# Patient Record
Sex: MALE | Race: WHITE | Employment: UNEMPLOYED | URBAN - METROPOLITAN AREA
[De-identification: names, ages, dates, MRNs, and addresses within clinical notes are randomized per-mention and may not be internally consistent; named-entity substitution may affect disease eponyms.]

---

## 2017-02-21 ENCOUNTER — HOSPITAL ENCOUNTER (EMERGENCY)
Age: 52
Discharge: HOME | End: 2017-02-21
Attending: EMERGENCY MEDICINE
Payer: MEDICARE

## 2017-02-21 VITALS
WEIGHT: 179 LBS | BODY MASS INDEX: 27.13 KG/M2 | OXYGEN SATURATION: 100 % | SYSTOLIC BLOOD PRESSURE: 135 MMHG | TEMPERATURE: 97.1 F | HEIGHT: 68 IN | RESPIRATION RATE: 19 BRPM | DIASTOLIC BLOOD PRESSURE: 88 MMHG | HEART RATE: 90 BPM

## 2017-02-21 DIAGNOSIS — J06.9 ACUTE UPPER RESPIRATORY INFECTION: Primary | ICD-10-CM

## 2017-02-21 LAB
FLUAV AG NPH QL IA: NEGATIVE
FLUBV AG NOSE QL IA: NEGATIVE

## 2017-02-21 PROCEDURE — 87804 INFLUENZA ASSAY W/OPTIC: CPT | Performed by: EMERGENCY MEDICINE

## 2017-02-21 PROCEDURE — 99283 EMERGENCY DEPT VISIT LOW MDM: CPT

## 2017-02-21 RX ORDER — GUAIFENESIN DEXTROMETHORPHAN HYDROBROMIDE ORAL SOLUTION 10; 100 MG/5ML; MG/5ML
10 SOLUTION ORAL
Qty: 118 ML | Refills: 0 | Status: SHIPPED | OUTPATIENT
Start: 2017-02-21 | End: 2017-03-07

## 2017-02-21 NOTE — ED NOTES
Emergency Department Nursing Plan of Care       The Nursing Plan of Care is developed from the Nursing assessment and Emergency Department Attending provider initial evaluation. The plan of care may be reviewed in the ED Provider note. The Plan of Care was developed with the following considerations:   Patient / Family readiness to learn indicated by:verbalized understanding  Persons(s) to be included in education: patient  Barriers to Learning/Limitations:No    Signed     Kelvin Copeland    2/21/2017   11:33 AM    See nursing assessment    Patient is alert and oriented x 4 and in no acute distress at this time. Respirations are at a regular rate, depth, and pattern. Patient updated on plan of care and has no questions or concerns at this time.

## 2017-02-21 NOTE — ED PROVIDER NOTES
Patient is a 45 y.o. male presenting with nasal congestion. Nasal Congestion   This is a new problem. Episode onset: Pt reports chest congestions, productive cough, and sore throat x 1 week. Pt reports one epsiode of emesis two days ago. Denies abd pain, nausea, fever/chills. Pt lives in assisted living home and multiple people have the flu. Pertinent negatives include no chest pain, no abdominal pain, no headaches and no shortness of breath. Nothing aggravates the symptoms. Nothing relieves the symptoms. He has tried nothing for the symptoms. History reviewed. No pertinent past medical history. History reviewed. No pertinent past surgical history. History reviewed. No pertinent family history. Social History     Social History    Marital status:      Spouse name: N/A    Number of children: N/A    Years of education: N/A     Occupational History    Not on file. Social History Main Topics    Smoking status: Current Some Day Smoker    Smokeless tobacco: Not on file    Alcohol use No    Drug use: No    Sexual activity: Not on file     Other Topics Concern    Not on file     Social History Narrative    No narrative on file         ALLERGIES: Review of patient's allergies indicates no known allergies. Review of Systems   Constitutional: Negative for chills and fever. HENT: Positive for congestion and sore throat. Negative for ear discharge, ear pain, facial swelling, postnasal drip, rhinorrhea, sinus pressure and trouble swallowing. Eyes: Negative for photophobia and visual disturbance. Respiratory: Positive for cough. Negative for chest tightness, shortness of breath, wheezing and stridor. Cardiovascular: Negative for chest pain. Gastrointestinal: Positive for vomiting. Negative for abdominal pain and nausea. Genitourinary: Negative for flank pain. Musculoskeletal: Negative for back pain and myalgias.    Skin: Negative for color change, pallor, rash and wound.   Neurological: Negative for dizziness, weakness, light-headedness and headaches. All other systems reviewed and are negative. Vitals:    02/21/17 1008   BP: 135/88   Pulse: (!) 108   Resp: 19   Temp: 97.1 °F (36.2 °C)   SpO2: 100%   Weight: 81.2 kg (179 lb)   Height: 5' 8\" (1.727 m)            Physical Exam   Constitutional: He is oriented to person, place, and time. He appears well-developed and well-nourished. No distress. HENT:   Head: Normocephalic and atraumatic. Right Ear: Tympanic membrane, external ear and ear canal normal.   Left Ear: Tympanic membrane, external ear and ear canal normal.   Nose: Nose normal. No mucosal edema or rhinorrhea. Right sinus exhibits no maxillary sinus tenderness and no frontal sinus tenderness. Left sinus exhibits no maxillary sinus tenderness and no frontal sinus tenderness. Mouth/Throat: Uvula is midline and mucous membranes are normal. No trismus in the jaw. No uvula swelling. Posterior oropharyngeal erythema present. No oropharyngeal exudate, posterior oropharyngeal edema or tonsillar abscesses. Eyes: Conjunctivae are normal.   Cardiovascular: Normal rate, regular rhythm and normal heart sounds. Pulmonary/Chest: Effort normal and breath sounds normal. No respiratory distress. He has no wheezes. He has no rales. Abdominal: Soft. Bowel sounds are normal. He exhibits no distension. There is no tenderness. Musculoskeletal: Normal range of motion. Neurological: He is alert and oriented to person, place, and time. No cranial nerve deficit. Skin: Skin is warm. No rash noted. Psychiatric: He has a normal mood and affect. His behavior is normal.   Nursing note and vitals reviewed.        MDM  Number of Diagnoses or Management Options  Acute upper respiratory infection:   Diagnosis management comments: DDx: URI, Viral Illness, Influenza, Viral Pharyngitis       Amount and/or Complexity of Data Reviewed  Clinical lab tests: ordered and reviewed      ED Course       Procedures      LABORATORY TESTS:  Recent Results (from the past 12 hour(s))   INFLUENZA A & B AG (RAPID TEST)    Collection Time: 02/21/17 11:15 AM   Result Value Ref Range    Influenza A Antigen NEGATIVE  NEG      Influenza B Antigen NEGATIVE  NEG         IMAGING RESULTS:  No orders to display       MEDICATIONS GIVEN:  Medications - No data to display    IMPRESSION:  1. Acute upper respiratory infection        PLAN:  1. Discharge Medication List as of 2/21/2017 12:20 PM      START taking these medications    Details   guaiFENesin-dextromethorphan (ROBAFEN DM COUGH)  mg/5 mL liqd Take 10 mL by mouth every six (6) hours as needed. , Print, Disp-118 mL, R-0           2.    Follow-up Information     Follow up With Details Comments [id number] Schedule an appointment as soon as possible for a visit in 2 days  [address]  [phone]    [facility] Schedule an appointment as soon as possible for a visit in 2 days  [address]  [phone]        Return to ED if worse

## 2017-02-21 NOTE — DISCHARGE INSTRUCTIONS

## 2017-03-07 ENCOUNTER — HOSPITAL ENCOUNTER (EMERGENCY)
Age: 52
Discharge: HOME | End: 2017-03-07
Attending: EMERGENCY MEDICINE
Payer: MEDICARE

## 2017-03-07 ENCOUNTER — APPOINTMENT (OUTPATIENT)
Dept: GENERAL RADIOLOGY | Age: 52
End: 2017-03-07
Attending: EMERGENCY MEDICINE
Payer: MEDICARE

## 2017-03-07 ENCOUNTER — APPOINTMENT (OUTPATIENT)
Dept: CT IMAGING | Age: 52
End: 2017-03-07
Attending: EMERGENCY MEDICINE
Payer: MEDICARE

## 2017-03-07 VITALS
DIASTOLIC BLOOD PRESSURE: 96 MMHG | HEART RATE: 111 BPM | OXYGEN SATURATION: 97 % | WEIGHT: 185 LBS | SYSTOLIC BLOOD PRESSURE: 146 MMHG | TEMPERATURE: 97.8 F | RESPIRATION RATE: 18 BRPM | BODY MASS INDEX: 28.04 KG/M2 | HEIGHT: 68 IN

## 2017-03-07 DIAGNOSIS — J44.1 ACUTE EXACERBATION OF CHRONIC OBSTRUCTIVE PULMONARY DISEASE (COPD) (HCC): Primary | ICD-10-CM

## 2017-03-07 LAB
ANION GAP BLD CALC-SCNC: 20 MMOL/L (ref 5–15)
ATRIAL RATE: 130 BPM
BASOPHILS # BLD AUTO: 0 K/UL (ref 0–0.1)
BASOPHILS # BLD: 0 % (ref 0–1)
BNP SERPL-MCNC: 14 PG/ML (ref 0–125)
BUN BLD-MCNC: 8 MG/DL (ref 9–20)
CA-I BLD-MCNC: 1.09 MMOL/L (ref 1.12–1.32)
CALCULATED P AXIS, ECG09: 62 DEGREES
CALCULATED R AXIS, ECG10: 91 DEGREES
CALCULATED T AXIS, ECG11: 63 DEGREES
CHLORIDE BLD-SCNC: 95 MMOL/L (ref 98–107)
CO2 BLD-SCNC: 22 MMOL/L (ref 21–32)
CREAT BLD-MCNC: 0.9 MG/DL (ref 0.6–1.3)
DEPRECATED S PYO AG THROAT QL EIA: NEGATIVE
DIAGNOSIS, 93000: NORMAL
EOSINOPHIL # BLD: 0.1 K/UL (ref 0–0.4)
EOSINOPHIL NFR BLD: 1 % (ref 0–7)
ERYTHROCYTE [DISTWIDTH] IN BLOOD BY AUTOMATED COUNT: 13.4 % (ref 11.5–14.5)
GLUCOSE BLD-MCNC: 93 MG/DL (ref 75–110)
HCT VFR BLD AUTO: 42.7 % (ref 36.6–50.3)
HCT VFR BLD CALC: 47 % (ref 36.6–50.3)
HGB BLD-MCNC: 14.8 G/DL (ref 12.1–17)
HGB BLD-MCNC: 16 GM/DL (ref 12.1–17)
LYMPHOCYTES # BLD AUTO: 17 % (ref 12–49)
LYMPHOCYTES # BLD: 1.7 K/UL (ref 0.8–3.5)
MCH RBC QN AUTO: 29.8 PG (ref 26–34)
MCHC RBC AUTO-ENTMCNC: 34.7 G/DL (ref 30–36.5)
MCV RBC AUTO: 85.9 FL (ref 80–99)
MONOCYTES # BLD: 0.9 K/UL (ref 0–1)
MONOCYTES NFR BLD AUTO: 9 % (ref 5–13)
NEUTS SEG # BLD: 7 K/UL (ref 1.8–8)
NEUTS SEG NFR BLD AUTO: 73 % (ref 32–75)
P-R INTERVAL, ECG05: 140 MS
PLATELET # BLD AUTO: 260 K/UL (ref 150–400)
POTASSIUM BLD-SCNC: 3.5 MMOL/L (ref 3.5–5.1)
Q-T INTERVAL, ECG07: 318 MS
QRS DURATION, ECG06: 78 MS
QTC CALCULATION (BEZET), ECG08: 467 MS
RBC # BLD AUTO: 4.97 M/UL (ref 4.1–5.7)
SERVICE CMNT-IMP: ABNORMAL
SODIUM BLD-SCNC: 133 MMOL/L (ref 136–145)
TROPONIN I BLD-MCNC: <0.04 NG/ML (ref 0–0.08)
VENTRICULAR RATE, ECG03: 130 BPM
WBC # BLD AUTO: 9.7 K/UL (ref 4.1–11.1)

## 2017-03-07 PROCEDURE — 87040 BLOOD CULTURE FOR BACTERIA: CPT | Performed by: EMERGENCY MEDICINE

## 2017-03-07 PROCEDURE — 80047 BASIC METABLC PNL IONIZED CA: CPT

## 2017-03-07 PROCEDURE — 93005 ELECTROCARDIOGRAM TRACING: CPT

## 2017-03-07 PROCEDURE — 87070 CULTURE OTHR SPECIMN AEROBIC: CPT | Performed by: EMERGENCY MEDICINE

## 2017-03-07 PROCEDURE — 74011636320 HC RX REV CODE- 636/320: Performed by: EMERGENCY MEDICINE

## 2017-03-07 PROCEDURE — 96360 HYDRATION IV INFUSION INIT: CPT

## 2017-03-07 PROCEDURE — 71275 CT ANGIOGRAPHY CHEST: CPT

## 2017-03-07 PROCEDURE — 36415 COLL VENOUS BLD VENIPUNCTURE: CPT | Performed by: EMERGENCY MEDICINE

## 2017-03-07 PROCEDURE — 83880 ASSAY OF NATRIURETIC PEPTIDE: CPT | Performed by: EMERGENCY MEDICINE

## 2017-03-07 PROCEDURE — 74011250636 HC RX REV CODE- 250/636: Performed by: EMERGENCY MEDICINE

## 2017-03-07 PROCEDURE — 84484 ASSAY OF TROPONIN QUANT: CPT

## 2017-03-07 PROCEDURE — 99285 EMERGENCY DEPT VISIT HI MDM: CPT

## 2017-03-07 PROCEDURE — 87880 STREP A ASSAY W/OPTIC: CPT | Performed by: EMERGENCY MEDICINE

## 2017-03-07 PROCEDURE — 71020 XR CHEST PA LAT: CPT

## 2017-03-07 PROCEDURE — 85025 COMPLETE CBC W/AUTO DIFF WBC: CPT | Performed by: EMERGENCY MEDICINE

## 2017-03-07 RX ORDER — SODIUM CHLORIDE 0.9 % (FLUSH) 0.9 %
5-10 SYRINGE (ML) INJECTION AS NEEDED
Status: DISCONTINUED | OUTPATIENT
Start: 2017-03-07 | End: 2017-03-07 | Stop reason: HOSPADM

## 2017-03-07 RX ORDER — SODIUM CHLORIDE 0.9 % (FLUSH) 0.9 %
5-10 SYRINGE (ML) INJECTION
Status: COMPLETED | OUTPATIENT
Start: 2017-03-07 | End: 2017-03-07

## 2017-03-07 RX ORDER — GUAIFENESIN 100 MG/5ML
81 LIQUID (ML) ORAL DAILY
Qty: 30 TAB | Refills: 0 | Status: SHIPPED | OUTPATIENT
Start: 2017-03-07 | End: 2020-11-06

## 2017-03-07 RX ORDER — DULOXETIN HYDROCHLORIDE 60 MG/1
60 CAPSULE, DELAYED RELEASE ORAL DAILY
COMMUNITY
End: 2017-03-29 | Stop reason: ALTCHOICE

## 2017-03-07 RX ORDER — RISPERIDONE 2 MG/1
2 TABLET, FILM COATED ORAL
COMMUNITY
End: 2017-03-29 | Stop reason: SDUPTHER

## 2017-03-07 RX ORDER — ALBUTEROL SULFATE 90 UG/1
1-2 AEROSOL, METERED RESPIRATORY (INHALATION)
Qty: 1 INHALER | Refills: 1 | Status: SHIPPED | OUTPATIENT
Start: 2017-03-07 | End: 2017-03-29 | Stop reason: SDUPTHER

## 2017-03-07 RX ORDER — TRAZODONE HYDROCHLORIDE 150 MG/1
150 TABLET ORAL
COMMUNITY
End: 2017-03-29 | Stop reason: ALTCHOICE

## 2017-03-07 RX ORDER — SODIUM CHLORIDE 0.9 % (FLUSH) 0.9 %
5-10 SYRINGE (ML) INJECTION EVERY 8 HOURS
Status: DISCONTINUED | OUTPATIENT
Start: 2017-03-07 | End: 2017-03-07 | Stop reason: HOSPADM

## 2017-03-07 RX ORDER — PREDNISONE 20 MG/1
20 TABLET ORAL 2 TIMES DAILY
Qty: 10 TAB | Refills: 0 | Status: SHIPPED | OUTPATIENT
Start: 2017-03-07 | End: 2017-03-12

## 2017-03-07 RX ORDER — BENZTROPINE MESYLATE 1 MG/1
1 TABLET ORAL 2 TIMES DAILY
COMMUNITY
End: 2020-10-26 | Stop reason: ALTCHOICE

## 2017-03-07 RX ORDER — HYDROXYZINE PAMOATE 50 MG/1
50 CAPSULE ORAL
COMMUNITY
End: 2017-03-29 | Stop reason: SDUPTHER

## 2017-03-07 RX ORDER — DOXYCYCLINE HYCLATE 100 MG
100 TABLET ORAL 2 TIMES DAILY
Qty: 14 TAB | Refills: 0 | Status: SHIPPED | OUTPATIENT
Start: 2017-03-07 | End: 2017-03-14

## 2017-03-07 RX ADMIN — Medication 10 ML: at 10:34

## 2017-03-07 RX ADMIN — IOPAMIDOL 100 ML: 755 INJECTION, SOLUTION INTRAVENOUS at 09:43

## 2017-03-07 RX ADMIN — SODIUM CHLORIDE 250 ML: 900 INJECTION, SOLUTION INTRAVENOUS at 10:34

## 2017-03-07 RX ADMIN — Medication 10 ML: at 09:44

## 2017-03-07 NOTE — DISCHARGE INSTRUCTIONS
Chronic Obstructive Pulmonary Disease (COPD): Care Instructions  Your Care Instructions    Chronic obstructive pulmonary disease (COPD) is a general term for a group of lung diseases, including emphysema and chronic bronchitis. People with COPD have decreased airflow in and out of the lungs, which makes it hard to breathe. The airways also can get clogged with thick mucus. Cigarette smoking is a major cause of COPD. Although there is no cure for COPD, you can slow its progress. Following your treatment plan and taking care of yourself can help you feel better and live longer. Follow-up care is a key part of your treatment and safety. Be sure to make and go to all appointments, and call your doctor if you are having problems. It's also a good idea to know your test results and keep a list of the medicines you take. How can you care for yourself at home? Staying healthy  · Do not smoke. This is the most important step you can take to prevent more damage to your lungs. If you need help quitting, talk to your doctor about stop-smoking programs and medicines. These can increase your chances of quitting for good. · Avoid colds and flu. Get a pneumococcal vaccine shot. If you have had one before, ask your doctor whether you need a second dose. Get the flu vaccine every fall. If you must be around people with colds or the flu, wash your hands often. · Avoid secondhand smoke, air pollution, and high altitudes. Also avoid cold, dry air and hot, humid air. Stay at home with your windows closed when air pollution is bad. Medicines and oxygen therapy  · Take your medicines exactly as prescribed. Call your doctor if you think you are having a problem with your medicine. · You may be taking medicines such as:  ¨ Bronchodilators. These help open your airways and make breathing easier. Bronchodilators are either short-acting (work for 6 to 9 hours) or long-acting (work for 24 hours).  You inhale most bronchodilators, so they start to act quickly. Always carry your quick-relief inhaler with you in case you need it while you are away from home. ¨ Corticosteroids (prednisone, budesonide). These reduce airway inflammation. They come in pill or inhaled form. You must take these medicines every day for them to work well. · A spacer may help you get more inhaled medicine to your lungs. Ask your doctor or pharmacist if a spacer is right for you. If it is, ask how to use it properly. · Do not take any vitamins, over-the-counter medicine, or herbal products without talking to your doctor first.  · If your doctor prescribed antibiotics, take them as directed. Do not stop taking them just because you feel better. You need to take the full course of antibiotics. · Oxygen therapy boosts the amount of oxygen in your blood and helps you breathe easier. Use the flow rate your doctor has recommended, and do not change it without talking to your doctor first.  Activity  · Get regular exercise. Walking is an easy way to get exercise. Start out slowly, and walk a little more each day. · Pay attention to your breathing. You are exercising too hard if you cannot talk while you are exercising. · Take short rest breaks when doing household chores and other activities. · Learn breathing methods--such as breathing through pursed lips--to help you become less short of breath. · If your doctor has not set you up with a pulmonary rehabilitation program, talk to him or her about whether rehab is right for you. Rehab includes exercise programs, education about your disease and how to manage it, help with diet and other changes, and emotional support. Diet  · Eat regular, healthy meals. Use bronchodilators about 1 hour before you eat to make it easier to eat. Eat several small meals instead of three large ones. Drink beverages at the end of the meal. Avoid foods that are hard to chew.   · Eat foods that contain protein so that you do not lose muscle mass.  Mental health  · Talk to your family, friends, or a therapist about your feelings. It is normal to feel frightened, angry, hopeless, helpless, and even guilty. Talking openly about bad feelings can help you cope. If these feelings last, talk to your doctor. When should you call for help? Call 911 anytime you think you may need emergency care. For example, call if:  · You have severe trouble breathing. Call your doctor now or seek immediate medical care if:  · You have new or worse trouble breathing. · You cough up blood. · You have a fever. Watch closely for changes in your health, and be sure to contact your doctor if:  · You cough more deeply or more often, especially if you notice more mucus or a change in the color of your mucus. · You have new or worse swelling in your legs or belly. · You are not getting better as expected. Where can you learn more? Go to http://amyTeleportricardo.info/. Tete [place] in the search box to learn more about \"Chronic Obstructive Pulmonary Disease (COPD): Care Instructions. \"  Current as of: May 23, 2016  Content Version: 11.1  © 6680-2417 [employer]. Care instructions adapted under license by [employer] (which disclaims liability or warranty for this information). If you have questions about a medical condition or this instruction, always ask your healthcare professional. [address] any warranty or liability for your use of this information. Learning About Coronary Artery Disease (CAD)  What is coronary artery disease? Coronary artery disease (CAD) occurs when plaque builds up in the arteries that bring oxygen-rich blood to your heart. Plaque is a fatty substance made of cholesterol, calcium, and other substances in the blood. This process is called hardening of the arteries, or atherosclerosis. What happens when you have coronary artery disease? · Plaque may narrow the coronary arteries. Narrowed arteries cause poor blood flow. This can lead to angina symptoms such as chest pain or discomfort. If blood flow is completely blocked, you could have a heart attack. · You can slow CAD and reduce the risk of future problems by making changes in your lifestyle. These include quitting smoking and eating heart-healthy foods. · Treatments for CAD, along with changes in your lifestyle, can help you live a longer and healthier life. How can you prevent coronary artery disease? · Do not smoke. It may be the best thing you can do to prevent heart disease. If you need help quitting, talk to your doctor about stop-smoking programs and medicines. These can increase your chances of quitting for good. · Be active. Get at least 30 minutes of exercise on most days of the week. Walking is a good choice. You also may want to do other activities, such as running, swimming, cycling, or playing tennis or team sports. · Eat heart-healthy foods. Eat more fruits and vegetables and less foods that contain saturated and trans fats. Limit alcohol, sodium, and sweets. · Stay at a healthy weight. Lose weight if you need to. · Manage other health problems such as diabetes, high blood pressure, and high cholesterol. · Manage stress. Stress can hurt your heart. To keep stress low, talk about your problems and feelings. Don't keep your feelings hidden. · If you have talked about it with your doctor, take a low-dose aspirin every day. Aspirin can help certain people lower their risk of a heart attack or stroke. But taking aspirin isn't right for everyone, because it can cause serious bleeding. Do not start taking daily aspirin unless your doctor knows about it. How is coronary artery disease treated? · Your doctor will suggest that you make lifestyle changes. For example, your doctor may ask you to eat healthy foods, quit smoking, lose extra weight, and be more active. · You will have to take medicines.   · Your doctor may suggest a procedure to open narrowed or blocked arteries. This is called angioplasty. Or your doctor may suggest using healthy blood vessels to create detours around narrowed or blocked arteries. This is called bypass surgery. Follow-up care is a key part of your treatment and safety. Be sure to make and go to all appointments, and call your doctor if you are having problems. It's also a good idea to know your test results and keep a list of the medicines you take. Where can you learn more? Go to http://amy-ricardo.info/. Enter (23) [phone] in the search box to learn more about \"Learning About Coronary Artery Disease (CAD). \"  Current as of: January 27, 2016  Content Version: 11.1  © 7705-0209 [employer]. Care instructions adapted under license by [employer] (which disclaims liability or warranty for this information). If you have questions about a medical condition or this instruction, always ask your healthcare professional. [place] [address] any warranty or liability for your use of this information.

## 2017-03-07 NOTE — ED NOTES
Patient given copy of dc instructions and 4 paper script(s) and 0 electronic scripts. Patient  verbalized understanding of instructions and script (s). Patient given a current medication reconciliation form and verbalized understanding of their medications. Patient  verbalized understanding of the importance of discussing medications with  his or her physician or clinic they will be following up with. Patient alert and oriented and in no acute distress. Patient offered wheelchair from treatment area to hospital entrance, patient declined wheelchair to lobby. Patient volunteer transporter Mr Cameron Nation to provide patient with ride home.

## 2017-03-07 NOTE — ED PROVIDER NOTES
HPI Comments: John Paul Cook is a 45 y.o. male presenting via EMS to the ED c/o 6/10 aching right upper chest pain x ~2 weeks. EMS states that the pt's vital signs were normal en route to the ED. Pt notes associated symptoms of sore throat, congestion, and productive cough (with brown/white sputum). Pt states that he has taken the prescribed cough medications from a prior ED visit. Pt denies any follow up with a pulmonologist, as directed by his PCP. Pt specifically denies any hemoptysis, N/V/D or fevers/chills. PCP: None  Social Hx: + tobacco use, - alcohol use, - illicit drug use    There are no other complaints, changes, or physical findings at this time. The history is provided by the patient and the EMS personnel. No  was used. History reviewed. No pertinent past medical history. History reviewed. No pertinent surgical history. History reviewed. No pertinent family history. Social History     Social History    Marital status:      Spouse name: N/A    Number of children: N/A    Years of education: N/A     Occupational History    Not on file. Social History Main Topics    Smoking status: Current Some Day Smoker     Packs/day: 1.00    Smokeless tobacco: Not on file    Alcohol use No    Drug use: No    Sexual activity: Not on file     Other Topics Concern    Not on file     Social History Narrative         ALLERGIES: Review of patient's allergies indicates no known allergies. Review of Systems   Constitutional: Negative. Negative for chills and fever. HENT: Positive for congestion and sore throat. Respiratory: Positive for cough. Negative for shortness of breath. Cardiovascular: Positive for chest pain (right sided). Gastrointestinal: Negative. Negative for abdominal pain, diarrhea, nausea and vomiting. Genitourinary: Negative. Negative for difficulty urinating. Skin: Negative. Negative for rash. Neurological: Negative. Psychiatric/Behavioral: Negative. All other systems reviewed and are negative.       Vitals:    03/07/17 0746 03/07/17 0921 03/07/17 1107   BP: (!) 156/95 (!) 146/96    Pulse: (!) 133 (!) 121 (!) 111   Resp: 18 18    Temp: 97.8 °F (36.6 °C) 97.8 °F (36.6 °C)    SpO2: 95% 93% 97%   Weight: 83.9 kg (185 lb)     Height: 5' 8\" (1.727 m)              Physical Exam   Physical Examination: General appearance - WDWN, in no apparent distress  Head - NC/AT  Eyes - pupils equal, round  and reactive, extraocular eye movements intact, conj/sclera clear, anicteric  Mouth - mucous membranes moist, erythema to the oropharynx  Nose/Ears - nares clear, Tms & canals clear  Neck - supple, no significant adenopathy, trachea midline, no crepitus, c spine diffusely non-tender, no step offs  Chest - Normal respiratory effort, clear to auscultation bilaterally, no wheezes/rales/rhonchi, right chest wall TTP  Heart - tachycardic, regular rhythm, S1 and S2 normal, no murmurs, gallops, or rubs  Abdomen - soft, nontender, nondistended, nabs, no masses, guarding, rebound or rigidity  Neurological - alert, oriented, normal speech, cranial nerves intact, no focal motor findings, motor & sensory diffusely intact, normal gait  Extremities/MS - peripheral pulses normal, no pedal edema, all joints atraumatic, FROM, no gross deformities, spine diffusely non-tender  Skin - normal coloration and turgor, no rashes, no lesions or lacerations     MDM  Number of Diagnoses or Management Options  Acute exacerbation of chronic obstructive pulmonary disease (COPD) ([facility] [address]):   Diagnosis management comments: DDx: mass, PNA, pleural effusion, pneumothorax, PE, unlikely cardiac       Amount and/or Complexity of Data Reviewed  Clinical lab tests: ordered and reviewed  Tests in the radiology section of CPT®: ordered and reviewed  Tests in the medicine section of CPT®: ordered and reviewed  Review and summarize past medical records: yes  Independent visualization of images, tracings, or specimens: yes    Patient Progress  Patient progress: stable    ED Course       Procedures  EKG interpretation: (Preliminary) at 0755  Rhythm: sinus tachycardia; and regular . Rate (approx.): 130 bpm; Axis: rightward axis; P wave: normal; QRS interval: normal ; ST/T wave: normal.  Written by Reagan Segovia. Trish Manriquez, ED Scribe, as dictated by Lorena Solano. Tye Foster MD.      Progress Note:  10:18 AM  Pt and/or family have been updated on their results. Pt and/or pt's family are aware of the plan of care and are in agreement. Pt will be provided an appointment for cardiology follow up at time of discharge. Written by Ann-Marie Manriquez, ED Scribe, as dictated by Lorena Solano. Tye Foster MD.        LABORATORY TESTS:  Recent Results (from the past 12 hour(s))   EKG, 12 LEAD, INITIAL    Collection Time: 03/07/17  7:55 AM   Result Value Ref Range    Ventricular Rate 130 BPM    Atrial Rate 130 BPM    P-R Interval 140 ms    QRS Duration 78 ms    Q-T Interval 318 ms    QTC Calculation (Bezet) 467 ms    Calculated P Axis 62 degrees    Calculated R Axis 91 degrees    Calculated T Axis 63 degrees    Diagnosis       Sinus tachycardia  Rightward axis  Borderline ECG  No previous ECGs available     STREP AG SCREEN, GROUP A    Collection Time: 03/07/17  8:09 AM   Result Value Ref Range    Group A Strep Ag ID NEGATIVE  NEG     CBC WITH AUTOMATED DIFF    Collection Time: 03/07/17  8:09 AM   Result Value Ref Range    WBC 9.7 4.1 - 11.1 K/uL    RBC 4.97 4.10 - 5.70 M/uL    HGB 14.8 12.1 - 17.0 g/dL    HCT 42.7 36.6 - 50.3 %    MCV 85.9 80.0 - 99.0 FL    MCH 29.8 26.0 - 34.0 PG    MCHC 34.7 30.0 - 36.5 g/dL    RDW 13.4 11.5 - 14.5 %    PLATELET 566 227 - 574 K/uL    NEUTROPHILS 73 32 - 75 %    LYMPHOCYTES 17 12 - 49 %    MONOCYTES 9 5 - 13 %    EOSINOPHILS 1 0 - 7 %    BASOPHILS 0 0 - 1 %    ABS. NEUTROPHILS 7.0 1.8 - 8.0 K/UL    ABS. LYMPHOCYTES 1.7 0.8 - 3.5 K/UL    ABS. MONOCYTES 0.9 0.0 - 1.0 K/UL    ABS.  EOSINOPHILS 0.1 0.0 - 0.4 K/UL    ABS. BASOPHILS 0.0 0.0 - 0.1 K/UL   POC CHEM8    Collection Time: 03/07/17  8:25 AM   Result Value Ref Range    Calcium, ionized (POC) 1.09 (L) 1.12 - 1.32 MMOL/L    Sodium (POC) 133 (L) 136 - 145 MMOL/L    Potassium (POC) 3.5 3.5 - 5.1 MMOL/L    Chloride (POC) 95 (L) 98 - 107 MMOL/L    CO2 (POC) 22 21 - 32 MMOL/L    Anion gap (POC) 20 (H) 5 - 15 mmol/L    Glucose (POC) 93 75 - 110 MG/DL    BUN (POC) 8 (L) 9 - 20 MG/DL    Creatinine (POC) 0.9 0.6 - 1.3 MG/DL    GFR-AA (POC) >60 >60 ml/min/1.73m2    GFR, non-AA (POC) >60 >60 ml/min/1.73m2    Hemoglobin (POC) 16.0 12.1 - 17.0 GM/DL    Hematocrit (POC) 47 36.6 - 50.3 %    Comment Notified RN or MD immediately by      POC TROPONIN-I    Collection Time: 03/07/17  8:29 AM   Result Value Ref Range    Troponin-I (POC) <0.04 0.00 - 0.08 ng/mL   PRO-BNP    Collection Time: 03/07/17  9:19 AM   Result Value Ref Range    NT pro-BNP 14 0 - 125 PG/ML       IMAGING RESULTS:  CXR Results  (Last 48 hours)               03/07/17 0835  XR CHEST PA LAT Final result    Impression:  Impression: Prominent interstitium possibly representing the patient's baseline. No focal infiltrate is identified. Narrative:  Exam:  2 view chest       Indication: Cough, chest pain       PA and lateral views demonstrate normal heart size. Interstitial prominence is   noted and may represent the patient's baseline. There is no acute process in the   lung fields. The osseous structures are unremarkable. CT Results  (Last 48 hours)               03/07/17 0948  CTA CHEST W WO CONT Final result    Impression:  IMPRESSION:    Moderate to severe emphysematous changes. No pulmonary embolism       No aortic aneurysm or dissection. Mild atherosclerotic change at the origin of the left subclavian artery. Nonvisualization of the right vertebral artery this may be due to hypoplastic   characteristics. Small to moderate hiatal hernia. Coronary artery disease affecting the left main coronary vessel and proximal   LAD. Narrative:  CLINICAL HISTORY: Dyspnea, tachycardia       INDICATION: Dyspnea and tachycardia       COMPARISON: None       TECHNIQUE: CT of the chest with  IV contrast , Isovue-370 is performed. Axial   images from the thoracic inlet to the level of the upper abdomen are obtained. Manual post-processing of the images and coronal reformatting is also performed. CT dose reduction was achieved through use of a standardized protocol tailored   for this examination and automatic exposure control for dose modulation. Multiplanar reformatted imaging was performed. Sagittal and coronal reformatting. 3-D Postprocessing of imaging was performed. 3-D MIP reconstructed images were obtained in the coronal plane. FINDINGS:        There is no pulmonary embolism. There is no aortic aneurysm or dissection. No acute intrathoracic process is identified. There is no mediastinal, axillary or hilar lymphadenopathy. There is no pleural   or pericardial effusion. The aorta is normal in course and caliber. The proximal   pulmonary arteries are without evidence of filling defects, the caliber of the   pulmonary arteries is also normal. . There is a small to moderate hiatal hernia   with distal esophageal wall thickening which is not further delineation. There   are moderate to severe emphysematous changes. There is atherosclerotic disease   at the origin of the left subclavian artery. A proximal left vertebral artery is   noted. This is patent. The right vertebral artery is not identified. Coronary   artery disease. The remainder of the upper abdomen visualized is unremarkable.                       MEDICATIONS GIVEN:  Medications   sodium chloride (NS) flush 5-10 mL (10 mL IntraVENous Given 3/7/17 1034)   sodium chloride (NS) flush 5-10 mL (not administered)   sodium chloride 0.9 % bolus infusion 250 mL (250 mL IntraVENous New Bag 3/7/17 1034)   iopamidol (ISOVUE-370) 76 % injection 100 mL (100 mL IntraVENous Given 3/7/17 0943)   sodium chloride (NS) flush 5-10 mL (10 mL IntraVENous Given 3/7/17 0922)       IMPRESSION:  1. Acute exacerbation of chronic obstructive pulmonary disease (COPD) ([address]        PLAN:  1. Current Discharge Medication List      CONTINUE these medications which have NOT CHANGED    Details   benztropine (COGENTIN) 1 mg tablet Take 1 mg by mouth two (2) times a day. traZODone (DESYREL) 150 mg tablet Take 150 mg by mouth nightly. hydrOXYzine pamoate (VISTARIL) 50 mg capsule Take 50 mg by mouth three (3) times daily as needed for Itching. DULoxetine (CYMBALTA) 60 mg capsule Take 60 mg by mouth daily. risperiDONE (RISPERDAL) 2 mg tablet Take 2 mg by mouth. 2.   Follow-up Information     Follow up With Details Comments Contact Info    Nasim Oreilly MD  Wednesday 3/15 at 1:20pm [address] [facility]  [address] EMERGENCY DEPT  As needed, If symptoms worsen [address] [facility]  [phone]        Return to ED if worse   DISCHARGE NOTE:  11:10 AM  The patient is ready for discharge. The patient's signs, symptoms, diagnosis, and discharge instructions have been discussed and the patient and/or family has conveyed their understanding. The patient and/or family is to follow up as recommended or return to the ER should their symptoms worsen. Plan has been discussed and the patient and/or family is in agreement. Written by Adore Pérez, ED Scribe, as dictated by Jorge Loredo. [facility]: This note is prepared by Drea Mckeon. Inocencio Pérez, acting as Scribe for Jorge Loredo. [place] [id number]. Brandi Barba MD: The scribe's documentation has been prepared under my direction and personally reviewed by me in its entirety.  I confirm that the note above accurately reflects all work, treatment, procedures, and medical decision making performed by me.

## 2017-03-09 LAB
BACTERIA SPEC CULT: NORMAL
SERVICE CMNT-IMP: NORMAL

## 2017-03-12 LAB
BACTERIA SPEC CULT: NORMAL
SERVICE CMNT-IMP: NORMAL

## 2017-03-15 ENCOUNTER — HOSPITAL ENCOUNTER (OUTPATIENT)
Dept: LAB | Age: 52
Discharge: HOME | End: 2017-03-15
Payer: MEDICARE

## 2017-03-15 ENCOUNTER — HOSPITAL ENCOUNTER (OUTPATIENT)
Dept: LAB | Age: 52
Discharge: HOME | End: 2017-03-15

## 2017-03-15 ENCOUNTER — OFFICE VISIT (OUTPATIENT)
Dept: CARDIOLOGY CLINIC | Age: 52
End: 2017-03-15

## 2017-03-15 VITALS
WEIGHT: 181.2 LBS | OXYGEN SATURATION: 99 % | DIASTOLIC BLOOD PRESSURE: 85 MMHG | HEIGHT: 68 IN | SYSTOLIC BLOOD PRESSURE: 113 MMHG | BODY MASS INDEX: 27.46 KG/M2 | HEART RATE: 129 BPM

## 2017-03-15 DIAGNOSIS — R07.89 OTHER CHEST PAIN: ICD-10-CM

## 2017-03-15 DIAGNOSIS — R00.0 TACHYCARDIA: ICD-10-CM

## 2017-03-15 DIAGNOSIS — R07.9 CHEST PAIN, UNSPECIFIED TYPE: Primary | ICD-10-CM

## 2017-03-15 DIAGNOSIS — R06.09 DYSPNEA ON EXERTION: ICD-10-CM

## 2017-03-15 PROCEDURE — 99001 SPECIMEN HANDLING PT-LAB: CPT | Performed by: INTERNAL MEDICINE

## 2017-03-15 PROCEDURE — 84443 ASSAY THYROID STIM HORMONE: CPT

## 2017-03-15 PROCEDURE — 85025 COMPLETE CBC W/AUTO DIFF WBC: CPT

## 2017-03-15 PROCEDURE — 84436 ASSAY OF TOTAL THYROXINE: CPT

## 2017-03-15 RX ORDER — ASPIRIN 81 MG/1
TABLET ORAL DAILY
COMMUNITY
End: 2017-10-13 | Stop reason: ALTCHOICE

## 2017-03-15 RX ORDER — GUAIFENESIN/DEXTROMETHORPHAN 100-10MG/5
LIQUID (ML) ORAL
Refills: 0 | COMMUNITY
Start: 2017-02-21 | End: 2022-02-26

## 2017-03-15 RX ORDER — RISPERIDONE 2 MG/1
2 TABLET, FILM COATED ORAL 2 TIMES DAILY
COMMUNITY

## 2017-03-15 RX ORDER — TRAZODONE HYDROCHLORIDE 150 MG/1
150 TABLET ORAL
COMMUNITY
End: 2017-03-15 | Stop reason: DRUGHIGH

## 2017-03-15 RX ORDER — GUAIFENESIN 100 MG/5ML
LIQUID (ML) ORAL
Refills: 0 | COMMUNITY
Start: 2017-03-07 | End: 2017-03-15 | Stop reason: DRUGHIGH

## 2017-03-15 RX ORDER — ALBUTEROL SULFATE 90 UG/1
2 AEROSOL, METERED RESPIRATORY (INHALATION) AS NEEDED
COMMUNITY
End: 2017-03-29 | Stop reason: SDUPTHER

## 2017-03-15 RX ORDER — TRAZODONE HYDROCHLORIDE 300 MG/1
TABLET ORAL
Refills: 3 | COMMUNITY
Start: 2017-03-01 | End: 2018-05-14 | Stop reason: SDUPTHER

## 2017-03-15 RX ORDER — DULOXETIN HYDROCHLORIDE 60 MG/1
60 CAPSULE, DELAYED RELEASE ORAL
COMMUNITY
End: 2017-03-29 | Stop reason: SDUPTHER

## 2017-03-15 RX ORDER — BENZTROPINE MESYLATE 1 MG/1
TABLET ORAL
COMMUNITY
End: 2017-03-29 | Stop reason: SDUPTHER

## 2017-03-15 RX ORDER — HYDROXYZINE 50 MG/1
50 TABLET, FILM COATED ORAL
COMMUNITY
End: 2017-10-13 | Stop reason: ALTCHOICE

## 2017-03-15 RX ORDER — QUETIAPINE FUMARATE 100 MG/1
TABLET, FILM COATED ORAL
Refills: 3 | COMMUNITY
Start: 2017-03-01 | End: 2018-05-04 | Stop reason: ALTCHOICE

## 2017-03-15 RX ORDER — DOXYCYCLINE HYCLATE 100 MG
TABLET ORAL
Refills: 0 | COMMUNITY
Start: 2017-03-07 | End: 2017-03-29 | Stop reason: ALTCHOICE

## 2017-03-15 RX ORDER — HYDROXYZINE PAMOATE 50 MG/1
CAPSULE ORAL
Refills: 2 | COMMUNITY
Start: 2017-03-04 | End: 2017-03-15 | Stop reason: DRUGHIGH

## 2017-03-15 NOTE — PATIENT INSTRUCTIONS
I have asked for a heart test called and echocardiogram. It's a painless ultrasound examination of your heart muscle. I have also ordered some simple blood tests that you can have drawn on your way out today. I'd like to see you back here after the echo test is done.

## 2017-03-15 NOTE — PROGRESS NOTES
This is the first office visit for this 54-year-old male referred for cardiac assessment after an emergency room visit for chest pain and dyspnea. He believes he was in the emergency room in this hospital for 2 visits within the last 6 weeks, but there is no record in the [facility] system of any prior contact. He states that he was treated for respiratory distress and bronchospasm on one visit, and the second visit included a complaint of right-sided chest discomfort. In both cases he was treated and discharged. He is not aware of any of his medications. He lives in a behavioral health oriented group home, and all of his listed medications or for behavioral health treatment. He is not presently on any cardiac medications. He is not sure what tests he has had in the past other than chest x-ray. He is an extremely limited historian. Mr Sandra Jamison denies paroxysmal nocturnal dyspnea but he does describe paroxysmal nocturnal diaphoresis which awakens him from sleep. He does not have orthopnea. He apparently does have an element of postural dizziness if he gets up out of a chair too quickly. He smokes about 20 cigarettes per day. His maximum consumption in previous years was 30 cigarettes per day but he has been a lifelong smoker since his teenage years. He denies prior surgeries or unusual traumatic events, and he is apparently not on any chronic management for COPD. He describes his chest discomfort as right-sided, sharply localized in the right parasternal area approximately ICS #4. He states that it is constant, it is not exacerbated by any particular posture, by deep breathing or by coughing. It is also not exacerbated by activity but he does describe significant exertional dyspnea with minimal ambulation. Review of systems is otherwise unremarkable. It was not possible to get a precise history about weight loss but he thinks he might be losing weight.   He does report calf cramps at times although he is unable to state what circumstances might be provocative. He also describes occasional cramps of his abdominal muscles. Once again he is unable to elaborate regarding provocative circumstances. On examination, the patient is an adult male appearing approximately his stated age. He is anxious looking with a slight pill-rolling tremor involving both hands. Posture is somewhat hunched and kyphotic. There is no muscle tremor or fasciculation noted. Muscle tone is normal, there is no edema, there is no sign of DVT. Tendon reflexes are slightly hyper but symmetrical.  Gait is normal.  Carotids show equal upstrokes without bruit. Jugular veins are flat. Lungs are clear to auscultation. Cardiac auscultation shows unusually rapid regular rhythm with S3 gallop audible to the right side of the sternum, no apparent murmur. Blood pressure is 113/85, equal in both arms. Oral mucosa  or red and rough surfaced without exudate. Palate is class I to II, tongue is normal, dentition appears to be in average repair. Twelve-lead electrocardiogram shows sinus tachycardia with left atrial conduction abnormality. The tracing is otherwise normal.  There is no chest x-ray on file. 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     Rule out occult thyrotoxicosis as well as occult anemia    Plan:  Echocardiography    TSH with reflex T4    CBC with automated differential    Follow-up as soon as the echocardiogram is completed for further management

## 2017-03-15 NOTE — MR AVS SNAPSHOT
Visit Information Date & Time Provider Department Dept. Phone Encounter #  
 3/15/2017  1:20 PM Priyanka Gillis MD [facility] [phone] Upcoming Health Maintenance Date Due Hepatitis C Screening 1965 DTaP/Tdap/Td series (1 - Tdap) 10/8/1985 FOBT Q 1 YEAR AGE 50-75 10/8/2014 INFLUENZA AGE 9 TO ADULT 8/1/2016 Allergies as of 3/15/2017  Review Complete On: 3/15/2017 By: Royal Graham No Known Allergies Current Immunizations  Never Reviewed No immunizations on file. Not reviewed this visit You Were Diagnosed With   
  
 Codes Comments Chest pain, unspecified type    -  Primary ICD-10-CM: R07.9 ICD-9-CM: 786.50 Tachycardia     ICD-10-CM: R00.0 ICD-9-CM: 303. 0 Dyspnea on exertion     ICD-10-CM: R06.09 
ICD-9-CM: 786.09 Other chest pain     ICD-10-CM: R07.89 ICD-9-CM: 786.59 Vitals BP Pulse Height(growth percentile) Weight(growth percentile) SpO2 BMI  
 113/85 (!) 129 5' 8\" (1.727 m) 181 lb 3.2 oz (82.2 kg) 99% 27.55 kg/m2 Smoking Status Current Every Day Smoker Vitals History BMI and BSA Data Body Mass Index Body Surface Area  
 27.55 kg/m 2 1.99 m 2 Your Updated Medication List  
  
   
This list is accurate as of: 3/15/17  1:54 PM.  Always use your most recent med list.  
  
  
  
  
 aspirin delayed-release 81 mg tablet Take  by mouth daily. benztropine 1 mg tablet Commonly known as:  COGENTIN Take  by mouth. doxycycline 100 mg tablet Commonly known as:  VIBRA-TABS TAKE 1 TABLET BY MOUTH TWO TIMES A DAY FOR 7 DAYS DULoxetine 60 mg capsule Commonly known as:  CYMBALTA Take 60 mg by mouth. hydrOXYzine HCl 50 mg tablet Commonly known as:  ATARAX Take 50 mg by mouth. QUEtiapine 100 mg tablet Commonly known as:  SEROquel TAKE 1 TABLET(S) BY MOUTH 2 TIMES A DAY  
  
 RisperDAL 2 mg tablet Generic drug:  risperiDONE Take  by mouth. traZODone 300 mg tablet Commonly known as:  DESYREL  
TAKE 1 TABLET(S) BY MOUTH AT BEDTIME  
  
 TUSSIN DM  mg/5 mL Liqd Generic drug:  guaiFENesin-dextromethorphan TAKE 10 ML BY MOUTH EVERY 6 HOURS AS NEEDED VENTOLIN HFA 90 mcg/actuation inhaler Generic drug:  albuterol Take 2 Puffs by inhalation as needed for Wheezing. We Performed the Following AMB POC EKG ROUTINE W/ 12 LEADS, INTER & REP [26324 CPT(R)] CBC WITH AUTOMATED DIFF [48260 CPT(R)] TSH REFLEX TO T4 [[id number] Custom] To-Do List   
 Around 03/15/2017 ECHO:  2D ECHO COMPLETE ADULT (TTE) W OR WO CONTR Patient Instructions I have asked for a heart test called and echocardiogram. It's a painless ultrasound examination of your heart muscle. I have also ordered some simple blood tests that you can have drawn on your way out today. I'd like to see you back here after the echo test is done. Introducing [facility]! Dex Thao introduces Manifact patient portal. Now you can access parts of your medical record, email your doctor's office, and request medication refills online. 1. In your internet browser, go to [email or web address] 2. Click on the First Time User? Click Here link in the Sign In box. You will see the New Member Sign Up page. 3. Enter your Manifact Access Code exactly as it appears below. You will not need to use this code after youve completed the sign-up process. If you do not sign up before the expiration date, you must request a new code. · Manifact Access Code: [email or web address] Expires: 6/13/2017  1:50 PM 
 
4. Enter the last four digits of your Social Security Number (xxxx) and Date of Birth (mm/dd/yyyy) as indicated and click Submit. You will be taken to the next sign-up page. 5. Create a Manifact ID.  This will be your Manifact login ID and cannot be changed, so think of one that is secure and easy to remember. 6. Create a YogiPlay password. You can change your password at any time. 7. Enter your Password Reset Question and Answer. This can be used at a later time if you forget your password. 8. Enter your e-mail address. You will receive e-mail notification when new information is available in [address] 9. Click Sign Up. You can now view and download portions of your medical record. 10. Click the Download Summary menu link to download a portable copy of your medical information. If you have questions, please visit the Frequently Asked Questions section of the YogiPlay website. Remember, YogiPlay is NOT to be used for urgent needs. For medical emergencies, dial 911. Now available from your iPhone and Android! Please provide this summary of care documentation to your next provider. If you have any questions after today's visit, please call [phone].

## 2017-03-16 LAB
BASOPHILS # BLD AUTO: 0 X10E3/UL (ref 0–0.2)
BASOPHILS NFR BLD AUTO: 0 %
EOSINOPHIL # BLD AUTO: 0.2 X10E3/UL (ref 0–0.4)
EOSINOPHIL NFR BLD AUTO: 3 %
ERYTHROCYTE [DISTWIDTH] IN BLOOD BY AUTOMATED COUNT: 13.6 % (ref 12.3–15.4)
HCT VFR BLD AUTO: 41.7 % (ref 37.5–51)
HGB BLD-MCNC: 13.9 G/DL (ref 12.6–17.7)
IMM GRANULOCYTES # BLD: 0.1 X10E3/UL (ref 0–0.1)
IMM GRANULOCYTES NFR BLD: 1 %
LYMPHOCYTES # BLD AUTO: 1.9 X10E3/UL (ref 0.7–3.1)
LYMPHOCYTES NFR BLD AUTO: 28 %
MCH RBC QN AUTO: 29.3 PG (ref 26.6–33)
MCHC RBC AUTO-ENTMCNC: 33.3 G/DL (ref 31.5–35.7)
MCV RBC AUTO: 88 FL (ref 79–97)
MONOCYTES # BLD AUTO: 1 X10E3/UL (ref 0.1–0.9)
MONOCYTES NFR BLD AUTO: 14 %
NEUTROPHILS # BLD AUTO: 3.8 X10E3/UL (ref 1.4–7)
NEUTROPHILS NFR BLD AUTO: 54 %
PLATELET # BLD AUTO: 253 X10E3/UL (ref 150–379)
RBC # BLD AUTO: 4.74 X10E6/UL (ref 4.14–5.8)
T4 SERPL-MCNC: 6.3 UG/DL (ref 4.5–12)
TSH SERPL DL<=0.005 MIU/L-ACNC: 5.05 UIU/ML (ref 0.45–4.5)
WBC # BLD AUTO: 6.9 X10E3/UL (ref 3.4–10.8)

## 2017-03-28 ENCOUNTER — TELEPHONE (OUTPATIENT)
Dept: CARDIOLOGY CLINIC | Age: 52
End: 2017-03-28

## 2017-03-29 ENCOUNTER — OFFICE VISIT (OUTPATIENT)
Dept: CARDIOLOGY CLINIC | Age: 52
End: 2017-03-29

## 2017-03-29 ENCOUNTER — HOSPITAL ENCOUNTER (OUTPATIENT)
Dept: NON INVASIVE DIAGNOSTICS | Age: 52
Discharge: HOME | End: 2017-03-29
Attending: INTERNAL MEDICINE
Payer: MEDICARE

## 2017-03-29 VITALS
BODY MASS INDEX: 27.31 KG/M2 | DIASTOLIC BLOOD PRESSURE: 75 MMHG | WEIGHT: 180.2 LBS | SYSTOLIC BLOOD PRESSURE: 112 MMHG | HEIGHT: 68 IN | HEART RATE: 82 BPM | OXYGEN SATURATION: 97 %

## 2017-03-29 DIAGNOSIS — R07.89 OTHER CHEST PAIN: ICD-10-CM

## 2017-03-29 DIAGNOSIS — R06.09 DOE (DYSPNEA ON EXERTION): Primary | ICD-10-CM

## 2017-03-29 DIAGNOSIS — J41.8 MIXED SIMPLE AND MUCOPURULENT CHRONIC BRONCHITIS (HCC): ICD-10-CM

## 2017-03-29 DIAGNOSIS — R06.09 DYSPNEA ON EXERTION: ICD-10-CM

## 2017-03-29 DIAGNOSIS — R07.9 CHEST PAIN, UNSPECIFIED TYPE: ICD-10-CM

## 2017-03-29 DIAGNOSIS — J41.0 SIMPLE CHRONIC BRONCHITIS (HCC): ICD-10-CM

## 2017-03-29 DIAGNOSIS — R00.0 TACHYCARDIA: ICD-10-CM

## 2017-03-29 DIAGNOSIS — R94.31 ABNORMAL EKG: ICD-10-CM

## 2017-03-29 PROCEDURE — 93306 TTE W/DOPPLER COMPLETE: CPT

## 2017-03-29 RX ORDER — TRAZODONE HYDROCHLORIDE 150 MG/1
TABLET ORAL
Refills: 3 | COMMUNITY
Start: 2016-12-31 | End: 2017-03-29 | Stop reason: ALTCHOICE

## 2017-03-29 RX ORDER — HYDROXYZINE PAMOATE 50 MG/1
CAPSULE ORAL
Refills: 2 | COMMUNITY
Start: 2017-02-04 | End: 2018-05-14 | Stop reason: ALTCHOICE

## 2017-03-29 RX ORDER — ALBUTEROL SULFATE 90 UG/1
2 AEROSOL, METERED RESPIRATORY (INHALATION) AS NEEDED
Qty: 1 INHALER | Refills: 6 | Status: SHIPPED | OUTPATIENT
Start: 2017-03-29 | End: 2017-03-31 | Stop reason: SDUPTHER

## 2017-03-29 RX ORDER — DULOXETIN HYDROCHLORIDE 60 MG/1
CAPSULE, DELAYED RELEASE ORAL
Refills: 3 | COMMUNITY
Start: 2017-03-04 | End: 2018-05-14 | Stop reason: DRUGHIGH

## 2017-03-29 RX ORDER — HALOPERIDOL DECANOATE 100 MG/ML
100 INJECTION INTRAMUSCULAR
COMMUNITY
Start: 2017-03-24 | End: 2020-11-06

## 2017-03-29 NOTE — PATIENT INSTRUCTIONS
For the heart test Chencho Murry Stress test) please have no caffeine starting the day before. You can have a light breakfast on the day of the test and you can take all of your pills. I renewed your inhaler with a written prescription.

## 2017-03-29 NOTE — PROGRESS NOTES
Trouble breathing during activity. He continues to deny chest pain. Since the last visit he underwent echocardiography and thyroid testing. Results for Soni Saez (MRN [id number]) as of 5/30/2017 14:08   Ref. Range 3/15/2017 12:00   T4, Total Latest Ref Range: 4.5 - 12.0 ug/dL 6.3   TSH Latest Ref Range: 0.450 - 4.500 uIU/mL 5.050 (H)     Symmetrical focal hypokinesis of distal LV. Strong possibility of occult  ischemic disease or sequelae of small vessel insult as from cocaine. SUMMARY:  Left ventricle: The ventricle was moderately dilated. Systolic function  was moderately to markedly reduced. Ejection fraction was estimated to be  30 % to 35 %. There was severe hypokinesis of the apical wall(s). There  was severe hypokinesis of the apical anterior wall(s). There was severe  hypokinesis of the apical septal wall(s). There was severe hypokinesis of  the apical inferior wall(s). There was severe hypokinesis of the apical  lateral wall(s). Wall thickness was moderately increased. There was mild  concentric hypertrophy. There was no asymmetric hypertrophy. Mass was  borderline (top normal). Right ventricle: Wall thickness was markedly increased. Systolic pressure  was moderately to markedly increased. Estimated peak pressure was 50 mmHg. Atrial septum: There was increased thickness of the septum, consistent  with lipomatous hypertrophy. Clearly, we need to exclude coronary insufficiency. This is imperative in light of his smoking history and the absence of chest pain by itself is not reassuring. He is probably hypothyroid but replacement should not be entertained until we have excluded active ischemic heart disease. On exam, blood pressure is 128/75. Pulse is 74 and regular. Peripheral pulses are intact. He has a bilateral hand tremor that has a parkinsonian appearance. Carotids are silent. Lungs are clear with diminished breath sounds. Cardiac auscultation is unremarkable.   There is no edema, there is no DVT. There is no abdominal bruit or pulsation.     Impression: High probability of coronary artery disease    Probable early hypothyroidism    Plan:  No recommendation of treatment for thyroid before ruling out CAD    Perfusion stress testing as soon as possible

## 2017-03-29 NOTE — MR AVS SNAPSHOT
Visit Information Date & Time Provider Department Dept. Phone Encounter #  
 3/29/2017  1:20 PM Timothy Burnett MD [facility] [phone] Upcoming Health Maintenance Date Due Hepatitis C Screening 1965 Pneumococcal 19-64 Medium Risk (1 of 1 - PPSV23) 10/8/1983 DTaP/Tdap/Td series (1 - Tdap) 10/8/1985 FOBT Q 1 YEAR AGE 50-75 10/8/2014 INFLUENZA AGE 9 TO ADULT 8/1/2016 Allergies as of 3/29/2017  Review Complete On: 3/29/2017 By: Bony Freire No Known Allergies Current Immunizations  Never Reviewed No immunizations on file. Not reviewed this visit You Were Diagnosed With   
  
 Codes Comments HCOPRA (dyspnea on exertion)    -  Primary ICD-10-CM: R06.09 
ICD-9-CM: 786.09 Abnormal EKG     ICD-10-CM: R94.31 
ICD-9-CM: 794.31 Simple chronic bronchitis (HCC)     ICD-10-CM: J41.0 ICD-9-CM: 491.0 Mixed simple and mucopurulent chronic bronchitis (HCC)     ICD-10-CM: J41.8 ICD-9-CM: 491.1 Vitals BP Pulse Height(growth percentile) Weight(growth percentile) SpO2 BMI  
 112/75 82 5' 8\" (1.727 m) 180 lb 3.2 oz (81.7 kg) 97% 27.4 kg/m2 Smoking Status Current Every Day Smoker Vitals History BMI and BSA Data Body Mass Index Body Surface Area  
 27.4 kg/m 2 1.98 m 2 Your Updated Medication List  
  
   
This list is accurate as of: 3/29/17  1:58 PM.  Always use your most recent med list.  
  
  
  
  
 albuterol 90 mcg/actuation inhaler Commonly known as:  VENTOLIN HFA Take 2 Puffs by inhalation as needed for Wheezing. * aspirin delayed-release 81 mg tablet Take  by mouth daily. * aspirin 81 mg chewable tablet Take 1 Tab by mouth daily for 30 days. benztropine 1 mg tablet Commonly known as:  COGENTIN Take 1 mg by mouth two (2) times a day. DULoxetine 60 mg capsule Commonly known as:  CYMBALTA TAKE 1 CAPSULE(S) BY MOUTH EVERYDAY  
  
 haloperidol decanoate 100 mg/mL injection Commonly known as:  HALDOL DECANOATE  
  
 hydrOXYzine HCl 50 mg tablet Commonly known as:  ATARAX Take 50 mg by mouth. hydrOXYzine pamoate 50 mg capsule Commonly known as:  VISTARIL  
TAKE 1 CAPSULE BY MOUTH TWICE DAILY AND 2 TABLETS BY MOUTH AT BEDTIME QUEtiapine 100 mg tablet Commonly known as:  SEROquel TAKE 1 TABLET(S) BY MOUTH 2 TIMES A DAY RisperDAL 2 mg tablet Generic drug:  risperiDONE Take  by mouth. traZODone 300 mg tablet Commonly known as:  DESYREL  
TAKE 1 TABLET(S) BY MOUTH AT BEDTIME  
  
 TUSSIN DM  mg/5 mL Liqd Generic drug:  guaiFENesin-dextromethorphan TAKE 10 ML BY MOUTH EVERY 6 HOURS AS NEEDED * Notice: This list has 2 medication(s) that are the same as other medications prescribed for you. Read the directions carefully, and ask your doctor or other care provider to review them with you. Prescriptions Printed Refills  
 albuterol (VENTOLIN HFA) 90 mcg/actuation inhaler 6 Sig: Take 2 Puffs by inhalation as needed for Wheezing. Class: Print Route: Inhalation To-Do List   
 Around 03/29/2017 Imaging:  NM CARDIAC SPECT W STRS/REST MULT Around 03/29/2017 ECG:  NUCLEAR STRESS TEST Patient Instructions For the heart test Juve Asencio Stress test) please have no caffeine starting the day before. You can have a light breakfast on the day of the test and you can take all of your pills. I renewed your inhaler with a written prescription. Introducing [facility]! Toribio Nyhan introduces [employer] patient portal. Now you can access parts of your medical record, email your doctor's office, and request medication refills online. 1. In your internet browser, go to [email or web address] 2. Click on the First Time User? Click Here link in the Sign In box.  You will see the New Member Sign Up page. 3. Enter your [employer] Access Code exactly as it appears below. You will not need to use this code after youve completed the sign-up process. If you do not sign up before the expiration date, you must request a new code. · [employer] Access Code: [email or web address] Expires: 5/22/2017 11:27 AM 
 
4. Enter the last four digits of your Social Security Number (xxxx) and Date of Birth (mm/dd/yyyy) as indicated and click Submit. You will be taken to the next sign-up page. 5. Create a [employer] Cor[employer] ID. This will be your [employer] login ID and cannot be changed, so think of one that is secure and easy to remember. 6. Create a [employer] password. You can change your password at any time. 7. Enter your Password Reset Question and Answer. This can be used at a later time if you forget your password. 8. Enter your e-mail address. You will receive e-mail notification when new information is available in [address]. 9. Click Sign Up. You can now view and download portions of your medical record. 10. Click the Download Summary menu link to download a portable copy of your medical information. If you have questions, please visit the Frequently Asked Questions section of the [employer] website. Remember, [employer] is NOT to be used for urgent needs. For medical emergencies, dial 911. Now available from your iPhone and Android! Please provide this summary of care documentation to your next provider. Your primary care clinician is listed as NONE. If you have any questions after today's visit, please call [phone].

## 2017-03-31 DIAGNOSIS — J41.8 MIXED SIMPLE AND MUCOPURULENT CHRONIC BRONCHITIS (HCC): ICD-10-CM

## 2017-04-02 RX ORDER — ALBUTEROL SULFATE 90 UG/1
2 AEROSOL, METERED RESPIRATORY (INHALATION) AS NEEDED
Qty: 1 INHALER | Refills: 6 | Status: SHIPPED | OUTPATIENT
Start: 2017-04-02 | End: 2017-04-28 | Stop reason: SDUPTHER

## 2017-04-04 ENCOUNTER — HOSPITAL ENCOUNTER (OUTPATIENT)
Dept: NUCLEAR MEDICINE | Age: 52
Discharge: HOME | End: 2017-04-04
Attending: INTERNAL MEDICINE
Payer: MEDICARE

## 2017-04-04 ENCOUNTER — TELEPHONE (OUTPATIENT)
Dept: CARDIOLOGY CLINIC | Age: 52
End: 2017-04-04

## 2017-04-04 ENCOUNTER — HOSPITAL ENCOUNTER (OUTPATIENT)
Dept: NON INVASIVE DIAGNOSTICS | Age: 52
Discharge: HOME | End: 2017-04-04
Attending: INTERNAL MEDICINE
Payer: MEDICARE

## 2017-04-04 DIAGNOSIS — R94.31 ABNORMAL EKG: ICD-10-CM

## 2017-04-04 DIAGNOSIS — R06.09 DOE (DYSPNEA ON EXERTION): ICD-10-CM

## 2017-04-04 LAB
ATTENDING PHYSICIAN, CST07: NORMAL
DIAGNOSIS, 93000: NORMAL
DUKE TM SCORE RESULT, CST14: NORMAL
DUKE TREADMILL SCORE, CST13: 1
ECG INTERP BEFORE EX, CST11: NORMAL
ECG INTERP DURING EX, CST12: NORMAL
FUNCTIONAL CAPACITY, CST17: NORMAL
KNOWN CARDIAC CONDITION, CST08: NORMAL
MAX. DIASTOLIC BP, CST04: 65 MMHG
MAX. HEART RATE, CST05: 122 BPM
MAX. SYSTOLIC BP, CST03: 162 MMHG
OVERALL BP RESPONSE TO EXERCISE, CST16: NORMAL
OVERALL HR RESPONSE TO EXERCISE, CST15: NORMAL
PEAK EX METS, CST10: 1 METS
PROTOCOL NAME, CST01: NORMAL
TEST INDICATION, CST09: NORMAL

## 2017-04-04 PROCEDURE — 93017 CV STRESS TEST TRACING ONLY: CPT

## 2017-04-04 PROCEDURE — 78452 HT MUSCLE IMAGE SPECT MULT: CPT

## 2017-04-04 PROCEDURE — 74011250636 HC RX REV CODE- 250/636: Performed by: INTERNAL MEDICINE

## 2017-04-04 RX ORDER — SODIUM CHLORIDE 0.9 % (FLUSH) 0.9 %
10 SYRINGE (ML) INJECTION AS NEEDED
Status: DISCONTINUED | OUTPATIENT
Start: 2017-04-04 | End: 2017-04-08 | Stop reason: HOSPADM

## 2017-04-04 RX ORDER — SODIUM CHLORIDE 0.9 % (FLUSH) 0.9 %
SYRINGE (ML) INJECTION
Status: COMPLETED
Start: 2017-04-04 | End: 2017-04-04

## 2017-04-04 RX ADMIN — REGADENOSON 0.4 MG: 0.08 INJECTION, SOLUTION INTRAVENOUS at 11:15

## 2017-04-04 RX ADMIN — Medication 10 ML: at 11:14

## 2017-04-04 RX ADMIN — Medication 10 ML: at 11:18

## 2017-04-04 RX ADMIN — Medication 10 ML: at 09:46

## 2017-04-04 NOTE — PROGRESS NOTES
PROGRESS NOTE    The patient Tisha banda 45 y.o. male arrived on 4/4/2017 for an appointment in cardiology. Patient was transported to cardiology outpatient unit by Don Cruz RN by wheelchair. RN verifies test explanation by physician with patient. Reviews test and allows for questions. No further questions. Medical history and allergies reviewed  and noted. Home Meds reviewed. Prior to Admission medications    Medication Sig Start Date End Date Taking? Authorizing Provider   albuterol (VENTOLIN HFA) 90 mcg/actuation inhaler Take 2 Puffs by inhalation as needed for Wheezing. 4/2/17   Lisa Jackson PA-C   hydrOXYzine pamoate (VISTARIL) 50 mg capsule TAKE 1 CAPSULE BY MOUTH TWICE DAILY AND 2 TABLETS BY MOUTH AT BEDTIME 2/4/17   Historical Provider   haloperidol decanoate (HALDOL DECANOATE) 100 mg/mL injection  3/24/17   Historical Provider   DULoxetine (CYMBALTA) 60 mg capsule TAKE 1 CAPSULE(S) BY MOUTH EVERYDAY 3/4/17   Historical Provider   hydrOXYzine HCl (ATARAX) 50 mg tablet Take 50 mg by mouth. Historical Provider   risperiDONE (RISPERDAL) 2 mg tablet Take  by mouth. Historical Provider   aspirin delayed-release 81 mg tablet Take  by mouth daily. Historical Provider   QUEtiapine (SEROQUEL) 100 mg tablet TAKE 1 TABLET(S) BY MOUTH 2 TIMES A DAY 3/1/17   Historical Provider   traZODone (DESYREL) 300 mg tablet TAKE 1 TABLET(S) BY MOUTH AT BEDTIME 3/1/17   Historical Provider   TUSSIN DM  mg/5 mL liqd TAKE 10 ML BY MOUTH EVERY 6 HOURS AS NEEDED 2/21/17   Historical Provider   benztropine (COGENTIN) 1 mg tablet Take 1 mg by mouth two (2) times a day. Angelic Oakley MD   aspirin 81 mg chewable tablet Take 1 Tab by mouth daily for 30 days. 3/7/17 4/6/17  MD Dr Char Marshall notified of arrival.  Patient resting on stretcher with side rails in up position for safety. All BP's noted on stress strip.  Patient monitored throughout test. Physician and RN remained in room with patient throughout test.      Patient prepped for test. IV started # 24 right ac by this Yodit Gould, [facility].      1100  Dr. Man Stanford, Yodit Gould, [facility] and Nuclear Medicine Tech in room, Timeout called. Universal protocol followed. Dr Man Stanford explained test to pt no further questions, consent obtained. Patient sitting on side of bed with seated leg exercise for Lexiscan stress test.  1108  Patient IV checked for patency. Normal saline flush 10 cc injected and IV remains patent. [address] 0.04mg/5ml injected over ten seconds followed by saline flush of 10cc. After 40 seconds Myoview injected by Nuclear Medicine Tech and then saline flush of 10 cc injected to maintain patency of IV.  1124  Patient given snack and soda. Patient Poole Gamma monitored in unit until BP and heart rate returned to baseline. Patient allowed to dress and this RN transported patient by wheelchair Nuclear Medicine area. iv discontinued tip intact, dsg placed. 1125  All personal belongings returned to patient. This RN notes to patient if chest pain or shortness of breath occurs in the future, please return to the Emergency Room.

## 2017-04-05 ENCOUNTER — APPOINTMENT (OUTPATIENT)
Dept: GENERAL RADIOLOGY | Age: 52
End: 2017-04-05
Attending: PHYSICIAN ASSISTANT
Payer: MEDICARE

## 2017-04-05 ENCOUNTER — HOSPITAL ENCOUNTER (EMERGENCY)
Age: 52
Discharge: HOME | End: 2017-04-05
Attending: EMERGENCY MEDICINE
Payer: MEDICARE

## 2017-04-05 VITALS
HEART RATE: 98 BPM | WEIGHT: 187 LBS | DIASTOLIC BLOOD PRESSURE: 78 MMHG | RESPIRATION RATE: 17 BRPM | OXYGEN SATURATION: 100 % | BODY MASS INDEX: 28.34 KG/M2 | HEIGHT: 68 IN | TEMPERATURE: 98.5 F | SYSTOLIC BLOOD PRESSURE: 147 MMHG

## 2017-04-05 DIAGNOSIS — R55 NEAR SYNCOPE: Primary | ICD-10-CM

## 2017-04-05 LAB
ALBUMIN SERPL BCP-MCNC: 3.2 G/DL (ref 3.5–5)
ALBUMIN/GLOB SERPL: 0.8 {RATIO} (ref 1.1–2.2)
ALP SERPL-CCNC: 71 U/L (ref 45–117)
ALT SERPL-CCNC: 24 U/L (ref 12–78)
AMPHET UR QL SCN: NEGATIVE
ANION GAP BLD CALC-SCNC: 8 MMOL/L (ref 5–15)
AST SERPL W P-5'-P-CCNC: 17 U/L (ref 15–37)
BARBITURATES UR QL SCN: NEGATIVE
BASOPHILS # BLD AUTO: 0 K/UL (ref 0–0.1)
BASOPHILS # BLD: 0 % (ref 0–1)
BENZODIAZ UR QL: NEGATIVE
BILIRUB SERPL-MCNC: 0.4 MG/DL (ref 0.2–1)
BUN SERPL-MCNC: 4 MG/DL (ref 6–20)
BUN/CREAT SERPL: 5 (ref 12–20)
CALCIUM SERPL-MCNC: 8.2 MG/DL (ref 8.5–10.1)
CANNABINOIDS UR QL SCN: NEGATIVE
CHLORIDE SERPL-SCNC: 101 MMOL/L (ref 97–108)
CK MB CFR SERPL CALC: NORMAL % (ref 0–2.5)
CK MB SERPL-MCNC: <1 NG/ML (ref 5–25)
CK SERPL-CCNC: 121 U/L (ref 39–308)
CO2 SERPL-SCNC: 28 MMOL/L (ref 21–32)
COCAINE UR QL SCN: NEGATIVE
CREAT SERPL-MCNC: 0.87 MG/DL (ref 0.7–1.3)
DRUG SCRN COMMENT,DRGCM: NORMAL
EOSINOPHIL # BLD: 0.3 K/UL (ref 0–0.4)
EOSINOPHIL NFR BLD: 4 % (ref 0–7)
ERYTHROCYTE [DISTWIDTH] IN BLOOD BY AUTOMATED COUNT: 12.9 % (ref 11.5–14.5)
EST. AVERAGE GLUCOSE BLD GHB EST-MCNC: 111 MG/DL
GLOBULIN SER CALC-MCNC: 3.8 G/DL (ref 2–4)
GLUCOSE SERPL-MCNC: 91 MG/DL (ref 65–100)
HBA1C MFR BLD: 5.5 % (ref 4.2–6.3)
HCT VFR BLD AUTO: 39 % (ref 36.6–50.3)
HGB BLD-MCNC: 13.2 G/DL (ref 12.1–17)
LYMPHOCYTES # BLD AUTO: 31 % (ref 12–49)
LYMPHOCYTES # BLD: 2.1 K/UL (ref 0.8–3.5)
MCH RBC QN AUTO: 29.1 PG (ref 26–34)
MCHC RBC AUTO-ENTMCNC: 33.8 G/DL (ref 30–36.5)
MCV RBC AUTO: 85.9 FL (ref 80–99)
METHADONE UR QL: NEGATIVE
MONOCYTES # BLD: 0.8 K/UL (ref 0–1)
MONOCYTES NFR BLD AUTO: 11 % (ref 5–13)
NEUTS SEG # BLD: 3.7 K/UL (ref 1.8–8)
NEUTS SEG NFR BLD AUTO: 54 % (ref 32–75)
OPIATES UR QL: NEGATIVE
PCP UR QL: NEGATIVE
PLATELET # BLD AUTO: 253 K/UL (ref 150–400)
POTASSIUM SERPL-SCNC: 3.6 MMOL/L (ref 3.5–5.1)
PROT SERPL-MCNC: 7 G/DL (ref 6.4–8.2)
RBC # BLD AUTO: 4.54 M/UL (ref 4.1–5.7)
SODIUM SERPL-SCNC: 137 MMOL/L (ref 136–145)
TROPONIN I SERPL-MCNC: <0.04 NG/ML
WBC # BLD AUTO: 6.9 K/UL (ref 4.1–11.1)

## 2017-04-05 PROCEDURE — 74011250637 HC RX REV CODE- 250/637: Performed by: INTERNAL MEDICINE

## 2017-04-05 PROCEDURE — 83036 HEMOGLOBIN GLYCOSYLATED A1C: CPT | Performed by: INTERNAL MEDICINE

## 2017-04-05 PROCEDURE — 82550 ASSAY OF CK (CPK): CPT | Performed by: PHYSICIAN ASSISTANT

## 2017-04-05 PROCEDURE — 85025 COMPLETE CBC W/AUTO DIFF WBC: CPT | Performed by: PHYSICIAN ASSISTANT

## 2017-04-05 PROCEDURE — 99285 EMERGENCY DEPT VISIT HI MDM: CPT

## 2017-04-05 PROCEDURE — 80307 DRUG TEST PRSMV CHEM ANLYZR: CPT | Performed by: INTERNAL MEDICINE

## 2017-04-05 PROCEDURE — 80053 COMPREHEN METABOLIC PANEL: CPT | Performed by: PHYSICIAN ASSISTANT

## 2017-04-05 PROCEDURE — 84484 ASSAY OF TROPONIN QUANT: CPT | Performed by: PHYSICIAN ASSISTANT

## 2017-04-05 PROCEDURE — 93005 ELECTROCARDIOGRAM TRACING: CPT

## 2017-04-05 PROCEDURE — 36415 COLL VENOUS BLD VENIPUNCTURE: CPT | Performed by: PHYSICIAN ASSISTANT

## 2017-04-05 PROCEDURE — 71010 XR CHEST PORT: CPT

## 2017-04-05 RX ORDER — METOPROLOL SUCCINATE 50 MG/1
50 TABLET, EXTENDED RELEASE ORAL DAILY
Qty: 14 TAB | Refills: 0 | Status: SHIPPED | OUTPATIENT
Start: 2017-04-05 | End: 2017-10-13

## 2017-04-05 RX ORDER — METOPROLOL SUCCINATE 25 MG/1
100 TABLET, EXTENDED RELEASE ORAL DAILY
Status: DISCONTINUED | OUTPATIENT
Start: 2017-04-05 | End: 2017-04-05 | Stop reason: HOSPADM

## 2017-04-05 RX ORDER — METOPROLOL SUCCINATE 50 MG/1
50 TABLET, EXTENDED RELEASE ORAL DAILY
Qty: 14 TAB | Refills: 0 | Status: SHIPPED | OUTPATIENT
Start: 2017-04-05 | End: 2017-04-05

## 2017-04-05 RX ADMIN — METOPROLOL SUCCINATE 100 MG: 25 TABLET, EXTENDED RELEASE ORAL at 17:06

## 2017-04-05 NOTE — ED NOTES
Pt reported he feels like passed out so he sat on the staircase and hold rail,denies hit his head,its about 10 seconds. Denies nausea,omiting,fever,chills,sob. Emergency Department Nursing Plan of Care       The Nursing Plan of Care is developed from the Nursing assessment and Emergency Department Attending provider initial evaluation. The plan of care may be reviewed in the ED Provider note.     The Plan of Care was developed with the following considerations:   Patient / Family readiness to learn indicated by:verbalized understanding  Persons(s) to be included in education: patient  Barriers to Learning/Limitations:No    Signed     Karma Sweeney RN    4/5/2017   4:10 PM

## 2017-04-05 NOTE — DISCHARGE INSTRUCTIONS
Lightheadedness or Faintness: Care Instructions  Your Care Instructions  Lightheadedness is a feeling that you are about to faint or \"pass out. \" You do not feel as if you or your surroundings are moving. It is different from vertigo, which is the feeling that you or things around you are spinning or tilting. Lightheadedness usually goes away or gets better when you lie down. If lightheadedness gets worse, it can lead to a fainting spell. It is common to feel lightheaded from time to time. Lightheadedness usually is not caused by a serious problem. It often is caused by a short-lasting drop in blood pressure and blood flow to your head that occurs when you get up too quickly from a seated or lying position. Follow-up care is a key part of your treatment and safety. Be sure to make and go to all appointments, and call your doctor if you are having problems. It's also a good idea to know your test results and keep a list of the medicines you take. How can you care for yourself at home? · Lie down for 1 or 2 minutes when you feel lightheaded. After lying down, sit up slowly and remain sitting for 1 to 2 minutes before slowly standing up. · Avoid movements, positions, or activities that have made you lightheaded in the past.  · Get plenty of rest, especially if you have a cold or flu, which can cause lightheadedness. · Make sure you drink plenty of fluids, especially if you have a fever or have been sweating. · Do not drive or put yourself and others in danger while you feel lightheaded. When should you call for help? Call 911 anytime you think you may need emergency care. For example, call if:  · You have symptoms of a stroke. These may include:  ¨ Sudden numbness, tingling, weakness, or loss of movement in your face, arm, or leg, especially on only one side of your body. ¨ Sudden vision changes. ¨ Sudden trouble speaking. ¨ Sudden confusion or trouble understanding simple statements.   ¨ Sudden problems with walking or balance. ¨ A sudden, severe headache that is different from past headaches. · You have symptoms of a heart attack. These may include:  ¨ Chest pain or pressure, or a strange feeling in the chest.  ¨ Sweating. ¨ Shortness of breath. ¨ Nausea or vomiting. ¨ Pain, pressure, or a strange feeling in the back, neck, jaw, or upper belly or in one or both shoulders or arms. ¨ Lightheadedness or sudden weakness. ¨ A fast or irregular heartbeat. After you call 911, the  may tell you to chew 1 adult-strength or 2 to 4 low-dose aspirin. Wait for an ambulance. Do not try to drive yourself. Watch closely for changes in your health, and be sure to contact your doctor if:  · Your lightheadedness gets worse or does not get better with home care. Where can you learn more? Go to http://amy-ricardo.info/. Enter O936 in the search box to learn more about \"Lightheadedness or Faintness: Care Instructions. \"  Current as of: May 27, 2016  Content Version: 11.2  © 7372-2336 [employer]. Care instructions adapted under license by [employer] (which disclaims liability or warranty for this information). If you have questions about a medical condition or this instruction, always ask your healthcare professional. [address] any warranty or liability for your use of this information.

## 2017-04-05 NOTE — ED TRIAGE NOTES
Pt arrived via EMS,EMS reported pt had syncope episode at 1130 am todayand c/o chest pain x 3 months its come and go.
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
## 2017-04-05 NOTE — ED PROVIDER NOTES
Patient is a 45 y.o. male presenting with dizziness and chest pain. Dizziness   Pertinent negatives include no speech difficulty and no agitation. Associated symptoms include chest pain. Pertinent negatives include no shortness of breath, no vomiting, no confusion, no headaches and no nausea. Chest Pain (Angina)    Associated symptoms include dizziness. Pertinent negatives include no abdominal pain, no back pain, no cough, no fever, no headaches, no nausea, no numbness, no shortness of breath and no vomiting. To ED with near syncope episode today. He described being at [facility], sitting on porch when he felt dizzy/lightheaded and \"went out for a few seconds\". Denies falling over, slumping over, but states he was \"totally out and then came to and everything was fuzzy for a little bit\". Then was able to get up on his own. He provides this very vague and changing history of several months of right sided chest pain. Pain is \"sharp but kind of pressure\" without radiation. He states it has now subsided but he thinks he may have had some after the episode this am.  Unsure how long it lasted. He is not able to tell me any associated or aggravating factors for his chest pain. No recent SOB or cough. Past Medical History:   Diagnosis Date    Hypertension     Psychiatric disorder        History reviewed. No pertinent surgical history. History reviewed. No pertinent family history. Social History     Social History    Marital status:      Spouse name: N/A    Number of children: N/A    Years of education: N/A     Occupational History    Not on file.      Social History Main Topics    Smoking status: Current Every Day Smoker     Packs/day: 1.00    Smokeless tobacco: Not on file    Alcohol use No    Drug use: No    Sexual activity: Not on file     Other Topics Concern    Not on file     Social History Narrative    ** Merged History Encounter ** ALLERGIES: Review of patient's allergies indicates no known allergies. Review of Systems   Constitutional: Negative for activity change, appetite change, fever and unexpected weight change. HENT: Negative for mouth sores and sore throat. Eyes: Negative for photophobia and discharge. Respiratory: Negative for cough, chest tightness, shortness of breath and wheezing. Cardiovascular: Positive for chest pain. Gastrointestinal: Negative for abdominal distention, abdominal pain, diarrhea, nausea and vomiting. Genitourinary: Negative for discharge and urgency. Musculoskeletal: Negative for back pain, myalgias and neck pain. Skin: Negative for pallor and rash. Neurological: Positive for dizziness and light-headedness. Negative for tremors, facial asymmetry, speech difficulty, numbness and headaches. Syncope: per hpi. Psychiatric/Behavioral: Negative for agitation and confusion. The patient is not hyperactive. All other systems reviewed and are negative. Vitals:    04/05/17 1535   BP: (!) 136/98   Pulse: (!) 104   Resp: 14   Temp: 98.1 °F (36.7 °C)   SpO2: 100%   Weight: 84.8 kg (187 lb)   Height: 5' 8\" (1.727 m)            Physical Exam     GENERAL ASSESSMENT: active, alert, no acute distress, well hydrated, well nourished  SKIN: no lesions, jaundice, petechiae, pallor, cyanosis, ecchymosis  HEAD: Atraumatic, normocephalic. NOSE: nasal mucosa, septum, turbinates normal bilaterally  MOUTH: mucous membranes moist.  pharynx without erythema or exudate  NECK: supple, full range of motion, no mass  RESP: respiratory effort normal. clear to auscultation with normal breath sounds bilaterally. CARD: Regular rate and rhythm, normal S1/S2, no murmurs, normal pulses and capillary fill. No chest wall tenderness. ABDOMEN: normal bowel sounds, soft and non-tender throughout, nondistended, no mass, no organomegaly. NEURO: AAOX3. Non-focal. Speech clear. No facial droop.  Moving extremities well and symmetrically. EXTREMITY: Normal muscle tone. No deformity or tenderness. MDM  Number of Diagnoses or Management Options  Near syncope:   Diagnosis management comments: DDX:  Dehydration, ACS, neurologic event, doubt PE given SAT and no SOB. Recent CTA negative for PE. Amount and/or Complexity of Data Reviewed  Clinical lab tests: ordered and reviewed  Tests in the radiology section of CPT®: ordered and reviewed  Obtain history from someone other than the patient: (Discussed with Dr. Zainab Greenwood. Seen in ED by Dr. Zainab Greenwood)      ED Course       Procedures           LABORATORY TESTS:  Recent Results (from the past 12 hour(s))   EKG, 12 LEAD, INITIAL    Collection Time: 04/05/17  3:32 PM   Result Value Ref Range    Ventricular Rate 103 BPM    Atrial Rate 103 BPM    P-R Interval 136 ms    QRS Duration 80 ms    Q-T Interval 376 ms    QTC Calculation (Bezet) 492 ms    Calculated P Axis 60 degrees    Calculated R Axis 74 degrees    Calculated T Axis 56 degrees    Diagnosis       Sinus tachycardia  Otherwise normal ECG  No previous ECGs available     CBC WITH AUTOMATED DIFF    Collection Time: 04/05/17  4:40 PM   Result Value Ref Range    WBC 6.9 4.1 - 11.1 K/uL    RBC 4.54 4.10 - 5.70 M/uL    HGB 13.2 12.1 - 17.0 g/dL    HCT 39.0 36.6 - 50.3 %    MCV 85.9 80.0 - 99.0 FL    MCH 29.1 26.0 - 34.0 PG    MCHC 33.8 30.0 - 36.5 g/dL    RDW 12.9 11.5 - 14.5 %    PLATELET 142 299 - 635 K/uL    NEUTROPHILS 54 32 - 75 %    LYMPHOCYTES 31 12 - 49 %    MONOCYTES 11 5 - 13 %    EOSINOPHILS 4 0 - 7 %    BASOPHILS 0 0 - 1 %    ABS. NEUTROPHILS 3.7 1.8 - 8.0 K/UL    ABS. LYMPHOCYTES 2.1 0.8 - 3.5 K/UL    ABS. MONOCYTES 0.8 0.0 - 1.0 K/UL    ABS. EOSINOPHILS 0.3 0.0 - 0.4 K/UL    ABS.  BASOPHILS 0.0 0.0 - 0.1 K/UL   METABOLIC PANEL, COMPREHENSIVE    Collection Time: 04/05/17  4:40 PM   Result Value Ref Range    Sodium 137 136 - 145 mmol/L    Potassium 3.6 3.5 - 5.1 mmol/L    Chloride 101 97 - 108 mmol/L    CO2 28 21 - 32 mmol/L    Anion gap 8 5 - 15 mmol/L    Glucose 91 65 - 100 mg/dL    BUN 4 (L) 6 - 20 MG/DL    Creatinine 0.87 0.70 - 1.30 MG/DL    BUN/Creatinine ratio 5 (L) 12 - 20      GFR est AA >60 >60 ml/min/1.73m2    GFR est non-AA >60 >60 ml/min/1.73m2    Calcium 8.2 (L) 8.5 - 10.1 MG/DL    Bilirubin, total 0.4 0.2 - 1.0 MG/DL    ALT (SGPT) 24 12 - 78 U/L    AST (SGOT) 17 15 - 37 U/L    Alk. phosphatase 71 45 - 117 U/L    Protein, total 7.0 6.4 - 8.2 g/dL    Albumin 3.2 (L) 3.5 - 5.0 g/dL    Globulin 3.8 2.0 - 4.0 g/dL    A-G Ratio 0.8 (L) 1.1 - 2.2     TROPONIN I    Collection Time: 04/05/17  4:40 PM   Result Value Ref Range    Troponin-I, Qt. <0.04 <0.05 ng/mL   CK W/ CKMB & INDEX    Collection Time: 04/05/17  4:40 PM   Result Value Ref Range     39 - 308 U/L    CK - MB <1.0 <3.6 NG/ML    CK-MB Index Cannot be calulated 0 - 2.5     DRUG SCREEN, URINE    Collection Time: 04/05/17  5:32 PM   Result Value Ref Range    AMPHETAMINE NEGATIVE  NEG      BARBITURATES NEGATIVE  NEG      BENZODIAZEPINE NEGATIVE  NEG      COCAINE NEGATIVE  NEG      METHADONE NEGATIVE  NEG      OPIATES NEGATIVE  NEG      PCP(PHENCYCLIDINE) NEGATIVE  NEG      THC (TH-CANNABINOL) NEGATIVE  NEG      Drug screen comment (NOTE)        IMAGING RESULTS:  XR CHEST PORT   Final Result          MEDICATIONS GIVEN:  Medications   metoprolol succinate (TOPROL-XL) XL tablet 100 mg (100 mg Oral Given 4/5/17 0112)       IMPRESSION:  1. Near syncope    UDS negative for cocaine  Will start BB as advised by Dr. Martha Garcia:  1. Discharge Medication List as of 4/5/2017  6:43 PM        2.    Follow-up Information     Follow up With Details Comments Contact Info    Bobby Fry MD Schedule an appointment as soon as possible for a visit in 1 week For continued management [address]  [phone]          Return to ED if worse

## 2017-04-05 NOTE — ED NOTES
Patient (s)  given copy of dc instructions and 1 paper script(s) and 0 electronic scripts. Patient (s)  verbalized understanding of instructions and script (s). Patient given a current medication reconciliation form and verbalized understanding of their medications. Patient (s) verbalized understanding of the importance of discussing medications with his or her physician or clinic they will be following up with. Patient alert and oriented and in no acute distress. Patient offered wheelchair from treatment area to hospital entrance, patient denied wheelchair.

## 2017-04-07 LAB
ATRIAL RATE: 103 BPM
CALCULATED P AXIS, ECG09: 60 DEGREES
CALCULATED R AXIS, ECG10: 74 DEGREES
CALCULATED T AXIS, ECG11: 56 DEGREES
DIAGNOSIS, 93000: NORMAL
P-R INTERVAL, ECG05: 136 MS
Q-T INTERVAL, ECG07: 376 MS
QRS DURATION, ECG06: 80 MS
QTC CALCULATION (BEZET), ECG08: 492 MS
VENTRICULAR RATE, ECG03: 103 BPM

## 2017-04-28 DIAGNOSIS — J41.8 MIXED SIMPLE AND MUCOPURULENT CHRONIC BRONCHITIS (HCC): ICD-10-CM

## 2017-05-02 RX ORDER — ALBUTEROL SULFATE 90 UG/1
2 AEROSOL, METERED RESPIRATORY (INHALATION) AS NEEDED
Qty: 1 INHALER | Refills: 6 | Status: SHIPPED | OUTPATIENT
Start: 2017-05-02

## 2017-10-13 ENCOUNTER — OFFICE VISIT (OUTPATIENT)
Dept: CARDIOLOGY CLINIC | Age: 52
End: 2017-10-13

## 2017-10-13 VITALS
SYSTOLIC BLOOD PRESSURE: 115 MMHG | WEIGHT: 164 LBS | HEIGHT: 68 IN | DIASTOLIC BLOOD PRESSURE: 83 MMHG | HEART RATE: 87 BPM | BODY MASS INDEX: 24.86 KG/M2 | OXYGEN SATURATION: 97 %

## 2017-10-13 DIAGNOSIS — R00.1 SYMPTOMATIC BRADYCARDIA: ICD-10-CM

## 2017-10-13 DIAGNOSIS — R06.02 SOB (SHORTNESS OF BREATH): Primary | ICD-10-CM

## 2017-10-13 RX ORDER — NAPROXEN 500 MG/1
500 TABLET ORAL
COMMUNITY
End: 2018-05-14 | Stop reason: SDUPTHER

## 2017-10-13 RX ORDER — HALOPERIDOL 5 MG/1
TABLET ORAL
COMMUNITY
End: 2018-05-14 | Stop reason: ALTCHOICE

## 2017-10-13 RX ORDER — ATORVASTATIN CALCIUM 20 MG/1
TABLET, FILM COATED ORAL DAILY
COMMUNITY
End: 2020-03-26 | Stop reason: SDUPTHER

## 2017-10-13 RX ORDER — LISINOPRIL 2.5 MG/1
TABLET ORAL DAILY
COMMUNITY
End: 2017-10-13

## 2017-10-13 RX ORDER — LISINOPRIL 2.5 MG/1
TABLET ORAL DAILY
COMMUNITY
End: 2017-12-18 | Stop reason: ALTCHOICE

## 2017-10-13 RX ORDER — METOPROLOL TARTRATE 25 MG/1
TABLET, FILM COATED ORAL 2 TIMES DAILY
COMMUNITY
End: 2017-12-18 | Stop reason: ALTCHOICE

## 2017-10-13 NOTE — PATIENT INSTRUCTIONS
-- PLEASE STOP both the Lisinopril and the Lopressor  -- Please get the heart monitor scheduled here at [facility] in the next week if possible  -- We'd like to see you back as soon as possible ~1-2 weeks    -- Please go to the ER with chest pain, SOB, low BP or slow heart rate get worse           Bradycardia: Care Instructions  Your Care Instructions    Bradycardia is a slow heart rate. If your heart beats too slowly, it can't supply your body with enough blood. This can make you weak or dizzy. Or it may make you pass out. Sometimes medicine can cause this problem. If this happens, your doctor may have you adjust one of your medicines. If a medicine is not the problem, your doctor may recommend a pacemaker. It is important to treat bradycardia so that you don't get more serious health problems. Your doctor will want to see you on a routine schedule to make sure that your heartbeat is normal.  Follow-up care is a key part of your treatment and safety. Be sure to make and go to all appointments, and call your doctor if you are having problems. It's also a good idea to know your test results and keep a list of the medicines you take. How can you care for yourself at home? · Take your medicines exactly as prescribed. Call your doctor if you think you are having a problem with your medicine. If your bradycardia is caused by another disease, your doctor will try to treat the disease. If it is caused by heart medicines, he or she will adjust your medicines. · Make lifestyle changes to improve your heart health. ¨ To control your cholesterol, avoid foods with a lot of fat, saturated fat, or sodium. Try to eat more fiber. And if your doctor says it's okay, get some exercise on most days. ¨ Do not smoke. Smoking can make your heart condition worse. If you need help quitting, talk to your doctor about stop-smoking programs and medicines. These can increase your chances of quitting for good.   ¨ Limit alcohol to 2 drinks a day for men and 1 drink a day for women. Too much alcohol can cause health problems. Pacemaker  If you have a pacemaker, you will get more specific information about it. Be sure to:  · Check your pulse as your doctor tells you. · Have your pacemaker checked as often as your doctor recommends. You may be able to do this over the phone or computer. · Avoid strong magnetic or electrical fields. These include wand metal detectors used in airports, MRIs, welding equipment, and generators. · You will be checked several times right after you get your pacemaker and when it is time to have the battery changed. Batteries last for 5 to 15 years. · You can talk on a cell phone. But keep it 6 inches away from your pacemaker. · Microwaves, TVs, radios, and kitchen and bathroom appliances won't harm you. When should you call for help? Call 911 anytime you think you may need emergency care. For example, call if:  · You passed out (lost consciousness). · You have symptoms of a heart attack. These may include:  ¨ Chest pain or pressure, or a strange feeling in the chest.  ¨ Sweating. ¨ Shortness of breath. ¨ Nausea or vomiting. ¨ Pain, pressure, or a strange feeling in the back, neck, jaw, or upper belly or in one or both shoulders or arms. ¨ Lightheadedness or sudden weakness. ¨ A fast or irregular heartbeat. After you call 911, the  may tell you to chew 1 adult-strength or 2 to 4 low-dose aspirin. Wait for an ambulance. Do not try to drive yourself. · You have symptoms of a stroke. These may include:  ¨ Sudden numbness, tingling, weakness, or loss of movement in your face, arm, or leg, especially on only one side of your body. ¨ Sudden vision changes. ¨ Sudden trouble speaking. ¨ Sudden confusion or trouble understanding simple statements. ¨ Sudden problems with walking or balance. ¨ A sudden, severe headache that is different from past headaches.   Call your doctor now or seek immediate medical care if:  · You are dizzy or lightheaded, or you feel like you may faint. · You have new or increased shortness of breath. · You had a pacemaker implanted and you have signs of infection, such as:  ¨ Increased pain, swelling, warmth, or redness. ¨ Red streaks leading from the cut (incision). ¨ Pus draining from the incision. ¨ A fever. Watch closely for changes in your health, and be sure to contact your doctor if you have any problems. Where can you learn more? Go to http://amy-ricardo.info/. Enter K443 in the search box to learn more about \"Bradycardia: Care Instructions. \"  Current as of: April 3, 2017  Content Version: 11.3  © 1610-0995 [employer]. Care instructions adapted under license by [employer] (which disclaims liability or warranty for this information). If you have questions about a medical condition or this instruction, always ask your healthcare professional. [place] [address] any warranty or liability for your use of this information.

## 2017-10-13 NOTE — PROGRESS NOTES
[facility]   [address] [facility], [address] [place] [address] Road                                          NEW PATIENT HPI/FOLLOW-UP      NAME:  Arsh Huggins   :   1965   MRN:   [id number]   PCP:  Eduardo Arriaza NP           Subjective: The patient is a 47y.o. year old male with h/o HTN, hyperlipidemia, polysubstance abuse in remission, and behavioral health hx, currently in residence at mental health facility who returns for concerns about new onset hypotension and symptomatic bradycardia. Pt notes \"it's been going on for some time\". Time line is difficult because pt is a poor historian. His mental health worker, who accompanied him to this visit, notes at least two separate medical visits with nurses in past two weeks. He also notes that pt has been less coherent in his thoughts and speech when he is experiencing this slow HR. The low HR is a/w precordial chest pain, lightheadedness and SOB. There has been no syncope but pt notes he has fallen, hitting the back of his head. There was no LOC from the fall. He has has low BP for \"some time\" as well. Denies change in exercise tolerance, edema, medication intolerance, palpitations, PND/orthopnea, wheezing, sputum, or syncope. Doing satisfactorily. Review of Systems  General: Pt denies excessive weight gain or loss. Pt is able to conduct ADL's. Respiratory: +shortness of breath, CHOPAR, Denies wheezing or stridor. Cardiovascular: +precordial pain, Denies palpitations, edema or PND  Gastrointestinal: Denies poor appetite, indigestion, abdominal pain or blood in stool  Peripheral vascular: Denies claudication, leg cramps  Neuropsychiatric: Denies paresthesias,tingling,numbness,anxiety,depression,fatigue  Musculoskeletal: Denies pain,tenderness, soreness,swelling      Past Medical History:   Diagnosis Date    Hypertension     Psychiatric disorder      There are no active problems to display for this patient.      No past surgical history on file. No Known Allergies   No family history on file. Social History     Social History    Marital status:      Spouse name: N/A    Number of children: N/A    Years of education: N/A     Occupational History    Not on file. Social History Main Topics    Smoking status: Current Every Day Smoker     Packs/day: 1.00    Smokeless tobacco: Not on file    Alcohol use No    Drug use: No    Sexual activity: Not on file     Other Topics Concern    Not on file     Social History Narrative    ** Merged History Encounter **           Current Outpatient Prescriptions   Medication Sig    lisinopril (PRINIVIL, ZESTRIL) 2.5 mg tablet Take  by mouth daily.  albuterol (VENTOLIN HFA) 90 mcg/actuation inhaler Take 2 Puffs by inhalation as needed for Wheezing.  hydrOXYzine pamoate (VISTARIL) 50 mg capsule TAKE 1 CAPSULE BY MOUTH TWICE DAILY AND 2 TABLETS BY MOUTH AT BEDTIME    haloperidol decanoate (HALDOL DECANOATE) 100 mg/mL injection     DULoxetine (CYMBALTA) 60 mg capsule TAKE 1 CAPSULE(S) BY MOUTH EVERYDAY    hydrOXYzine HCl (ATARAX) 50 mg tablet Take 50 mg by mouth.  risperiDONE (RISPERDAL) 2 mg tablet Take  by mouth.  aspirin delayed-release 81 mg tablet Take  by mouth daily.  QUEtiapine (SEROQUEL) 100 mg tablet TAKE 1 TABLET(S) BY MOUTH 2 TIMES A DAY    traZODone (DESYREL) 300 mg tablet TAKE 1 TABLET(S) BY MOUTH AT BEDTIME    TUSSIN DM  mg/5 mL liqd TAKE 10 ML BY MOUTH EVERY 6 HOURS AS NEEDED    benztropine (COGENTIN) 1 mg tablet Take 1 mg by mouth two (2) times a day. No current facility-administered medications for this visit. I have reviewed the MAs notes, vitals, problem list, allergy list, medical history, family medical, social history and medications.       Objective:     Physical Exam:     Vitals:    10/13/17 0922 10/13/17 0925 10/13/17 0928   BP: 97/72 94/68 115/83   Pulse: 90 92 87   SpO2: 97%     Weight: 164 lb (74.4 kg)     Height: 5' 8\" (1.727 m)      Body mass index is 24.94 kg/(m^2). General: WDWN adult male, in no acute distress. Flat affect. HEENT: No carotid bruits, no JVD, trach is midline. Heart:  Normal S1/S2 negative S3 or S4. Regular, no murmur, gallop or rub.   Respiratory: Clear bilaterally, no wheezing or rales  Abdomen:   Soft, non-tender, bowel sounds are active.   Extremities:  No edema, normal cap refill, no cyanosis. Neuro: A&Ox3, speech clear, gait stable. Skin: Skin color is normal. No rashes or lesions. No diaphoresis. Vascular: 2+ pulses symmetric in all extremities      Data Review:       Cardiographics:    EKG: NSR, with normal SD and QT intervals. No ST segment changes. Cardiology Labs:    Results for orders placed or performed during the hospital encounter of 04/05/17   EKG, 12 LEAD, INITIAL   Result Value Ref Range    Ventricular Rate 103 BPM    Atrial Rate 103 BPM    P-R Interval 136 ms    QRS Duration 80 ms    Q-T Interval 376 ms    QTC Calculation (Bezet) 492 ms    Calculated P Axis 60 degrees    Calculated R Axis 74 degrees    Calculated T Axis 56 degrees    Diagnosis       Sinus tachycardia  Otherwise normal ECG  No previous ECGs available  Confirmed by Willette Skiff ([id number]) on 4/7/2017 10:34:13 AM         No results found for: CHOL, CHOLX, CHLST, CHOLV, [id number], HDL, LDL, LDLC, DLDLP, Bretta B[facility], [facility], [facility]    Lab Results   Component Value Date/Time    Sodium 137 04/05/2017 04:40 PM    Potassium 3.6 04/05/2017 04:40 PM    Chloride 101 04/05/2017 04:40 PM    CO2 28 04/05/2017 04:40 PM    Anion gap 8 04/05/2017 04:40 PM    Glucose 91 04/05/2017 04:40 PM    BUN 4 04/05/2017 04:40 PM    Creatinine 0.87 04/05/2017 04:40 PM    BUN/Creatinine ratio 5 04/05/2017 04:40 PM    GFR est AA >60 04/05/2017 04:40 PM    GFR est non-AA >60 04/05/2017 04:40 PM    Calcium 8.2 04/05/2017 04:40 PM    Bilirubin, total 0.4 04/05/2017 04:40 PM    AST (SGOT) 17 04/05/2017 04:40 PM    Alk.  phosphatase 71 04/05/2017 04:40 PM    Protein, total 7.0 04/05/2017 04:40 PM    Albumin 3.2 04/05/2017 04:40 PM    Globulin 3.8 04/05/2017 04:40 PM    A-G Ratio 0.8 04/05/2017 04:40 PM    ALT (SGPT) 24 04/05/2017 04:40 PM          Assessment:       ICD-10-CM ICD-9-CM    1. SOB (shortness of breath) R06.02 786.05 AMB POC EKG ROUTINE W/ 12 LEADS, INTER & REP         Discussion: Patient presents at this time with episodic, symptomatic bradycardia and new onset hypotension. NM perfusion study done in April 2017 was negative for coronary insufficiency,  positive for concentric type hypertrophic cardiomyopathy. Other than hypotension, pt appears stable in office today. HR WNL. Plan:   Discussed with Dr. Vinh Aguilar     1. STOP lisinopril and lopressor   -- 48-Hr holter    2. Encouraged to seek immediate ER attention if symptoms worsen or episodes become more frequent. 3.Follow up: 1-2 weeks for test results    I have discussed the diagnosis with the patient and the intended plan as seen in the above orders. The patient has received an after-visit summary and questions were answered concerning future plans. I have discussed any concerning medication side effects and warnings with the patient as well.     Antonietta Salmon PA-C  10/13/2017

## 2017-10-13 NOTE — MR AVS SNAPSHOT
Visit Information Date & Time Provider Department Dept. Phone Encounter #  
 10/13/2017  9:20 AM Juliet Varela MD [facility] at [address] [place] [address] [phone] Upcoming Health Maintenance Date Due Hepatitis C Screening 1965 Pneumococcal 19-64 Medium Risk (1 of 1 - PPSV23) 10/8/1983 DTaP/Tdap/Td series (1 - Tdap) 10/8/1985 FOBT Q 1 YEAR AGE 50-75 10/8/2014 INFLUENZA AGE 9 TO ADULT 8/1/2017 Allergies as of 10/13/2017  Review Complete On: 10/13/2017 By: Chito Gunn PA-C No Known Allergies Current Immunizations  Never Reviewed No immunizations on file. Not reviewed this visit You Were Diagnosed With   
  
 Codes Comments SOB (shortness of breath)    -  Primary ICD-10-CM: R06.02 
ICD-9-CM: 786.05 Symptomatic bradycardia     ICD-10-CM: R00.1 ICD-9-CM: 427.89 Vitals BP Pulse Height(growth percentile) Weight(growth percentile) SpO2 BMI  
 115/83 87 5' 8\" (1.727 m) 164 lb (74.4 kg) 97% 24.94 kg/m2 Smoking Status Current Every Day Smoker Vitals History BMI and BSA Data Body Mass Index Body Surface Area 24.94 kg/m 2 1.89 m 2 Preferred Pharmacy Pharmacy Name [phone] [place] [phone] Your Updated Medication List  
  
   
This list is accurate as of: 10/13/17 10:19 AM.  Always use your most recent med list.  
  
  
  
  
 albuterol 90 mcg/actuation inhaler Commonly known as:  VENTOLIN HFA Take 2 Puffs by inhalation as needed for Wheezing. aspirin delayed-release 81 mg tablet Take  by mouth daily. benztropine 1 mg tablet Commonly known as:  COGENTIN Take 1 mg by mouth two (2) times a day. DULoxetine 60 mg capsule Commonly known as:  CYMBALTA TAKE 1 CAPSULE(S) BY MOUTH EVERYDAY  
  
 haloperidol decanoate 100 mg/mL injection Commonly known as:  HALDOL DECANOATE  
  
 hydrOXYzine HCl 50 mg tablet Commonly known as:  ATARAX Take 50 mg by mouth. hydrOXYzine pamoate 50 mg capsule Commonly known as:  VISTARIL  
TAKE 1 CAPSULE BY MOUTH TWICE DAILY AND 2 TABLETS BY MOUTH AT BEDTIME QUEtiapine 100 mg tablet Commonly known as:  SEROquel TAKE 1 TABLET(S) BY MOUTH 2 TIMES A DAY RisperDAL 2 mg tablet Generic drug:  risperiDONE Take  by mouth. traZODone 300 mg tablet Commonly known as:  DESYREL  
TAKE 1 TABLET(S) BY MOUTH AT BEDTIME  
  
 TUSSIN DM  mg/5 mL Liqd Generic drug:  guaiFENesin-dextromethorphan TAKE 10 ML BY MOUTH EVERY 6 HOURS AS NEEDED We Performed the Following AMB POC EKG ROUTINE W/ 12 LEADS, INTER & REP [16016 CPT(R)] To-Do List   
 10/13/2017 ECG:  ECG HOLTER MONITOR, UP TO 48 HRS Patient Instructions -- PLEASE STOP both the Lisinopril and the Lopressor -- Please get the heart monitor scheduled here at [facility] in the next week if possible 
-- We'd like to see you back as soon as possible ~1-2 weeks -- Please go to the ER with chest pain, SOB, low BP or slow heart rate get worse Bradycardia: Care Instructions Your Care Instructions Bradycardia is a slow heart rate. If your heart beats too slowly, it can't supply your body with enough blood. This can make you weak or dizzy. Or it may make you pass out. Sometimes medicine can cause this problem. If this happens, your doctor may have you adjust one of your medicines. If a medicine is not the problem, your doctor may recommend a pacemaker. It is important to treat bradycardia so that you don't get more serious health problems. Your doctor will want to see you on a routine schedule to make sure that your heartbeat is normal. 
Follow-up care is a key part of your treatment and safety.  Be sure to make and go to all appointments, and call your doctor if you are having problems. It's also a good idea to know your test results and keep a list of the medicines you take. How can you care for yourself at home? · Take your medicines exactly as prescribed. Call your doctor if you think you are having a problem with your medicine. If your bradycardia is caused by another disease, your doctor will try to treat the disease. If it is caused by heart medicines, he or she will adjust your medicines. · Make lifestyle changes to improve your heart health. ¨ To control your cholesterol, avoid foods with a lot of fat, saturated fat, or sodium. Try to eat more fiber. And if your doctor says it's okay, get some exercise on most days. ¨ Do not smoke. Smoking can make your heart condition worse. If you need help quitting, talk to your doctor about stop-smoking programs and medicines. These can increase your chances of quitting for good. ¨ Limit alcohol to 2 drinks a day for men and 1 drink a day for women. Too much alcohol can cause health problems. Pacemaker If you have a pacemaker, you will get more specific information about it. Be sure to: · Check your pulse as your doctor tells you. · Have your pacemaker checked as often as your doctor recommends. You may be able to do this over the phone or computer. · Avoid strong magnetic or electrical fields. These include wand metal detectors used in airports, MRIs, welding equipment, and generators. · You will be checked several times right after you get your pacemaker and when it is time to have the battery changed. Batteries last for 5 to 15 years. · You can talk on a cell phone. But keep it 6 inches away from your pacemaker. · Microwaves, TVs, radios, and kitchen and bathroom appliances won't harm you. When should you call for help? Call 911 anytime you think you may need emergency care. For example, call if: 
· You passed out (lost consciousness). · You have symptoms of a heart attack. These may include: ¨ Chest pain or pressure, or a strange feeling in the chest. 
¨ Sweating. ¨ Shortness of breath. ¨ Nausea or vomiting. ¨ Pain, pressure, or a strange feeling in the back, neck, jaw, or upper belly or in one or both shoulders or arms. ¨ Lightheadedness or sudden weakness. ¨ A fast or irregular heartbeat. After you call 911, the  may tell you to chew 1 adult-strength or 2 to 4 low-dose aspirin. Wait for an ambulance. Do not try to drive yourself. · You have symptoms of a stroke. These may include: 
¨ Sudden numbness, tingling, weakness, or loss of movement in your face, arm, or leg, especially on only one side of your body. ¨ Sudden vision changes. ¨ Sudden trouble speaking. ¨ Sudden confusion or trouble understanding simple statements. ¨ Sudden problems with walking or balance. ¨ A sudden, severe headache that is different from past headaches. Call your doctor now or seek immediate medical care if: 
· You are dizzy or lightheaded, or you feel like you may faint. · You have new or increased shortness of breath. · You had a pacemaker implanted and you have signs of infection, such as: 
¨ Increased pain, swelling, warmth, or redness. ¨ Red streaks leading from the cut (incision). ¨ Pus draining from the incision. ¨ A fever. Watch closely for changes in your health, and be sure to contact your doctor if you have any problems. Where can you learn more? Go to http://amy-ricardo.info/. Enter X016 in the search box to learn more about \"Bradycardia: Care Instructions. \" Current as of: April 3, 2017 Content Version: 11.3 © 2522-1331 [employer]. Care instructions adapted under license by [employer] (which disclaims liability or warranty for this information).  If you have questions about a medical condition or this instruction, always ask your healthcare professional. [address] any warranty or liability for your use of this information. Introducing [facility]! [employer] introduces Boxfish patient portal. Now you can access parts of your medical record, email your doctor's office, and request medication refills online. 1. In your internet browser, go to [email or web address] 2. Click on the First Time User? Click Here link in the Sign In box. You will see the New Member Sign Up page. 3. Enter your Boxfish Access Code exactly as it appears below. You will not need to use this code after youve completed the sign-up process. If you do not sign up before the expiration date, you must request a new code. · Boxfish Access Code: [email or web address] Expires: 1/11/2018 10:19 AM 
 
4. Enter the last four digits of your Social Security Number (xxxx) and Date of Birth (mm/dd/yyyy) as indicated and click Submit. You will be taken to the next sign-up page. 5. Create a Boxfish ID. This will be your Boxfish login ID and cannot be changed, so think of one that is secure and easy to remember. 6. Create a Boxfish password. You can change your password at any time. 7. Enter your Password Reset Question and Answer. This can be used at a later time if you forget your password. 8. Enter your e-mail address. You will receive e-mail notification when new information is available in [address] 9. Click Sign Up. You can now view and download portions of your medical record. 10. Click the Download Summary menu link to download a portable copy of your medical information. If you have questions, please visit the Frequently Asked Questions section of the Boxfish website. Remember, Boxfish is NOT to be used for urgent needs. For medical emergencies, dial 911. Now available from your iPhone and Android! Please provide this summary of care documentation to your next provider. Your primary care clinician is listed as Chantel.  If you have any questions after today's visit, please call [phone].

## 2017-10-23 ENCOUNTER — HOSPITAL ENCOUNTER (OUTPATIENT)
Dept: NON INVASIVE DIAGNOSTICS | Age: 52
Discharge: HOME | End: 2017-10-23
Attending: PHYSICIAN ASSISTANT
Payer: MEDICARE

## 2017-10-23 DIAGNOSIS — R00.1 SYMPTOMATIC BRADYCARDIA: ICD-10-CM

## 2017-10-23 DIAGNOSIS — R06.02 SOB (SHORTNESS OF BREATH): ICD-10-CM

## 2017-10-23 PROCEDURE — 93225 XTRNL ECG REC<48 HRS REC: CPT

## 2017-10-27 ENCOUNTER — TELEPHONE (OUTPATIENT)
Dept: CARDIOLOGY CLINIC | Age: 52
End: 2017-10-27

## 2017-10-27 NOTE — TELEPHONE ENCOUNTER
Made an attempt to contact patient regarding Holter results, and follow up, but was unable to reach him. Left message for patient to contact office to discuss.

## 2017-11-09 ENCOUNTER — TELEPHONE (OUTPATIENT)
Dept: CARDIOLOGY CLINIC | Age: 52
End: 2017-11-09

## 2017-11-09 NOTE — TELEPHONE ENCOUNTER
Made a second attempt to contact patient regarding Holter results, and follow up, but was unable to reach him. Left message for patient to contact office to discuss.

## 2017-12-18 ENCOUNTER — OFFICE VISIT (OUTPATIENT)
Dept: CARDIOLOGY CLINIC | Age: 52
End: 2017-12-18

## 2017-12-18 VITALS
HEIGHT: 68 IN | HEART RATE: 132 BPM | BODY MASS INDEX: 25.28 KG/M2 | SYSTOLIC BLOOD PRESSURE: 94 MMHG | OXYGEN SATURATION: 96 % | DIASTOLIC BLOOD PRESSURE: 53 MMHG | WEIGHT: 166.8 LBS

## 2017-12-18 DIAGNOSIS — I42.2 HYPERTROPHIC CARDIOMYOPATHY (HCC): ICD-10-CM

## 2017-12-18 DIAGNOSIS — R06.02 SOB (SHORTNESS OF BREATH): ICD-10-CM

## 2017-12-18 DIAGNOSIS — R00.0 TACHYCARDIA: ICD-10-CM

## 2017-12-18 DIAGNOSIS — R42 DIZZINESS: Primary | ICD-10-CM

## 2017-12-18 RX ORDER — ASPIRIN 81 MG/1
TABLET ORAL DAILY
COMMUNITY
End: 2020-03-25 | Stop reason: SDUPTHER

## 2017-12-18 RX ORDER — CARVEDILOL 6.25 MG/1
6.25 TABLET ORAL 2 TIMES DAILY WITH MEALS
Qty: 60 TAB | Refills: 6 | Status: SHIPPED | OUTPATIENT
Start: 2017-12-18 | End: 2018-09-05 | Stop reason: SDUPTHER

## 2017-12-18 NOTE — PROGRESS NOTES
Ambulatory cardiology attending physician note:    I discussed current findings with NASH Jackson and after holding full discussion regarding potential management changes, explained our thinking to . Tom Taylor. In brief, he has moderate but significant QT prolongation, already in sinus tachycardia with moderate hypotension. The low blood pressure has persisted despite removal of the ACE inhibitor. Given the QT prolongation we are reluctant to discontinue beta blockade altogether, but in light of the low left ventricular ejection fraction we will change metoprolol to a modest dose of Coreg. Meanwhile, we will ask the primary and behavioral health management team to give some thought to modification of the psychoactive medications because of the QT prolongation. We will need to follow the patient closely and he has been asked to come back in 1 month.     Alvarez Lanier MD [facility]

## 2017-12-18 NOTE — PATIENT INSTRUCTIONS
Dear Holly Bolaños,    Mr. Dilia Escobedo hypotension is likely from psychiatric cocktail. His QT, while not morbid, is prolonged on today's EKG which is why we should keep the BB on board, in case removal of the BB makes the HR even higher. We'll switch him to coreg instead of metoprolol as he also has a hypertrophic cardiomyopathy and 35% EF. I have discontinued the lisinopril. The recent Holter was negative for arrhythmias. We agree with labs for TSH. The last TSH in our system was 5.05 in March of 2017. Could the tachycardia be compensation for a hypothyroidism? Please call with any questions. Let's keep in touch.     Follow up should be in 1 month

## 2017-12-18 NOTE — PROGRESS NOTES
[facility]   [address] [facility], [facility] [address]                                          NEW PATIENT HPI/FOLLOW-UP      NAME:  Alison Antonio   :   1965   MRN:   [id number]   PCP:  Janee Palomares NP           Subjective: The patient is a 47y.o. year old male with h/o hypertrophic cardiomyopathy (recent EF 35%), hypotension, dizziness,  who returns for a routine follow-up. Since the last visit, patient reports no new symptoms. States same symptoms as before: dizziness, SOB/CHOPRA. No syncope. Denies chest pain, edema, medication intolerance, palpitations,  PND/orthopnea, wheezing, sputum, syncope. Review of Systems  General: Pt denies excessive weight gain or loss. Pt is able to conduct ADL's. Respiratory: +shortness of breath, CHOPRA, Denies wheezing or stridor. Cardiovascular: Denies precordial pain, palpitations, edema or PND  Gastrointestinal: Denies poor appetite, indigestion, abdominal pain or blood in stool  Peripheral vascular: Denies claudication, leg cramps  Neuropsychiatric: Denies paresthesias,tingling,numbness,anxiety,depression,fatigue  Musculoskeletal: Denies pain,tenderness, soreness,swelling      Past Medical History:   Diagnosis Date    Hypertension     Psychiatric disorder      There are no active problems to display for this patient. No past surgical history on file. No Known Allergies   No family history on file. Social History     Social History    Marital status:      Spouse name: N/A    Number of children: N/A    Years of education: N/A     Occupational History    Not on file.      Social History Main Topics    Smoking status: Current Every Day Smoker     Packs/day: 1.00    Smokeless tobacco: Not on file    Alcohol use No    Drug use: No    Sexual activity: Not on file     Other Topics Concern    Not on file     Social History Narrative    ** Merged History Encounter **           Current Outpatient Prescriptions Medication Sig    aspirin delayed-release 81 mg tablet Take  by mouth daily.  carvedilol (COREG) 6.25 mg tablet Take 1 Tab by mouth two (2) times daily (with meals).  atorvastatin (LIPITOR) 20 mg tablet Take  by mouth daily.  tiotropium (SPIRIVA WITH HANDIHALER) 18 mcg inhalation capsule Take 1 Cap by inhalation daily.  naproxen (NAPROSYN) 500 mg tablet Take 500 mg by mouth two (2) times daily as needed. FOR PAIN    albuterol (VENTOLIN HFA) 90 mcg/actuation inhaler Take 2 Puffs by inhalation as needed for Wheezing. (Patient taking differently: Take 2 Puffs by inhalation. EVERY 4-6 HOURS AS NEEDED)    hydrOXYzine pamoate (VISTARIL) 50 mg capsule TAKE 1 CAPSULE BY MOUTH TWICE DAILY AND 2 TABLETS BY MOUTH AT BEDTIME    haloperidol decanoate (HALDOL DECANOATE) 100 mg/mL injection 100 mg every twenty-eight (28) days.  risperiDONE (RISPERDAL) 2 mg tablet Take  by mouth two (2) times a day.  QUEtiapine (SEROQUEL) 100 mg tablet TAKE 1 TABLET(S) BY MOUTH 2 TIMES A DAY    traZODone (DESYREL) 300 mg tablet TAKE 1 TABLET(S) BY MOUTH AT BEDTIME    benztropine (COGENTIN) 1 mg tablet Take 1 mg by mouth two (2) times a day.  haloperidol (HALDOL) 5 mg tablet Take  by mouth nightly.  nicotine 22 mg/24 hr pt24 by TransDERmal route daily. DAILY FOR SMOKING CESSION    DULoxetine (CYMBALTA) 60 mg capsule TAKE 1 CAPSULE(S) BY MOUTH EVERYDAY    TUSSIN DM  mg/5 mL liqd TAKE 10 ML BY MOUTH EVERY 6 HOURS AS NEEDED     No current facility-administered medications for this visit. I have reviewed the nurses MAs , vitals, problem list, allergy list, medical history, family medical, social history and medications. Objective:     Physical Exam:     Vitals:    12/18/17 1328 12/18/17 1343 12/18/17 1349   BP: 104/63 101/44 94/53   Pulse: (!) 120 (!) 131 (!) 132   SpO2: 96%     Weight: 166 lb 12.8 oz (75.7 kg)     Height: 5' 8\" (1.727 m)      Body mass index is 25.36 kg/(m^2).     General: WDWN adult male, in no acute distress. Pleasant affect. HEENT: No carotid bruits, no JVD, trach is midline. Heart:  Normal S1/S2 negative S3 or S4. Regular, tachycardia, no murmur, gallop or rub.   Respiratory: Clear bilaterally, no wheezing or rales  Abdomen:   Soft, non-tender, bowel sounds are active.   Extremities:  No edema, normal cap refill, no cyanosis. Neuro: A&Ox3, speech clear, gait stable. Skin: Skin color is normal. No rashes or lesions. No diaphoresis. Vascular: 2+ pulses symmetric in all extremities        Data Review:       Cardiographics:    EKG interpretation:  Rhythm: sinus tachycardia; and regular . Rate (approx.): 135; Axis: normal P wave: normal, short TX interval; QRS interval: normal, but prolonged QT interval, QTc 445; ST/T wave: normal;  This EKG was interpreted by Ken Lomas PA-C. Cardiology Labs:    Results for orders placed or performed during the hospital encounter of 10/23/17   ECG HOLTER MONITOR, UP  [address], [address] [place] [address]                                         Test Date:    2017-10-26  Angela Dejesus Name:     Gemma Spring             Department:     Patient ID:   [id number]                Room:           Gender:       Male                   Technician:     :          19-               Requested By: Dr. Elena Goff Number:                          Reading MD:   Mirella Lee MD                    Interpretive Statements  Vikas Whiting was in Sinus Rhythm. The average heart rate, excluding ectopy, was 95 BPM with a minimum of 73 BPM   at 04:30 D2 and a maximum of 135 BPM at  07:08 D2. Heart beats, including ectopy, totaled 678698 beats. There were no VENTRICULAR ectopics found. SUPRAVENTRICULAR ECTOPICS totaled 3  ,with 3 single and 0 paired beats.     Holter monitor recording provided 24 hours 4 minutes of readable data. The   patient did not report any symptoms. Tracing quality is satisfactory. Baseline rhythm is sinus. AV conduction is   consistently normal. There is occasional normally conducted single APC's. Rate variations are physiologic. There is no ventricular ectopy at all, and   there are no variations in normal baseline ST segments. Impression:    Normal study with only insignificant atrial ectopy, no   symptoms    Chrystal Faria MD [facility]  Electronically signed by Chrystal Faria MD on Oct 26 2017  7:17PM CDT   Results for orders placed or performed during the hospital encounter of 04/05/17   EKG, 12 LEAD, INITIAL   Result Value Ref Range    Ventricular Rate 103 BPM    Atrial Rate 103 BPM    P-R Interval 136 ms    QRS Duration 80 ms    Q-T Interval 376 ms    QTC Calculation (Bezet) 492 ms    Calculated P Axis 60 degrees    Calculated R Axis 74 degrees    Calculated T Axis 56 degrees    Diagnosis       Sinus tachycardia  Otherwise normal ECG  No previous ECGs available  Confirmed by Lázaro Foster ([id number]) on 4/7/2017 10:34:13 AM         No results found for: CHOL, CHOLX, CHLST, CHOLV, 074685, HDL, LDL, LDLC, DLDLP, Surekha Cancel, CHHD, [facility]    Lab Results   Component Value Date/Time    Sodium 137 04/05/2017 04:40 PM    Potassium 3.6 04/05/2017 04:40 PM    Chloride 101 04/05/2017 04:40 PM    CO2 28 04/05/2017 04:40 PM    Anion gap 8 04/05/2017 04:40 PM    Glucose 91 04/05/2017 04:40 PM    BUN 4 04/05/2017 04:40 PM    Creatinine 0.87 04/05/2017 04:40 PM    BUN/Creatinine ratio 5 04/05/2017 04:40 PM    GFR est AA >60 04/05/2017 04:40 PM    GFR est non-AA >60 04/05/2017 04:40 PM    Calcium 8.2 04/05/2017 04:40 PM    Bilirubin, total 0.4 04/05/2017 04:40 PM    AST (SGOT) 17 04/05/2017 04:40 PM    Alk.  phosphatase 71 04/05/2017 04:40 PM    Protein, total 7.0 04/05/2017 04:40 PM    Albumin 3.2 04/05/2017 04:40 PM    Globulin 3.8 04/05/2017 04:40 PM    A-G Ratio 0.8 04/05/2017 04:40 PM ALT (SGPT) 24 04/05/2017 04:40 PM           Assessment:       ICD-10-CM ICD-9-CM    1. Dizziness R42 780.4 AMB POC EKG ROUTINE W/ 12 LEADS, INTER & REP   2. Hypertrophic cardiomyopathy (HCC) I42.2 425.18 carvedilol (COREG) 6.25 mg tablet   3. SOB (shortness of breath) R06.02 786.05    4. Tachycardia R00.0 785.0          Discussion: Patient presents at this time stable from a cardiac perspective. Hypotension is likely from psychiatric cocktail. QT, while not morbid, is prolonged would recommend a reduction in Haldol if this is feasible. We should keep the BB on board, in case removal of the BB makes the HR even higher. Tachycardia in setting hypertrophic cardiomyopathy would create dizziness and make the patient susceptible to syncope. He should remain well hydrated and HR should be controlled as well as possible. Discussed/seen with Dr. Christo Werner:     1. Switch metoprolol to Coreg 6.25 mg BID       2. Encouraged to remain hydrated. 3.Follow up: 1 month     --  Call with questions or concerns. I have discussed the diagnosis with the patient and the intended plan as seen in the above orders. The patient has received an after-visit summary and questions were answered concerning future plans. I have discussed any concerning medication side effects and warnings with the patient as well.     Francisca Locke PA-C  12/18/2017

## 2017-12-18 NOTE — MR AVS SNAPSHOT
Visit Information Date & Time Provider Department Dept. Phone Encounter #  
 12/18/2017  1:20 PM Mark Toledo MD [facility] [phone] [id number] Upcoming Health Maintenance Date Due Hepatitis C Screening 1965 Pneumococcal 19-64 Medium Risk (1 of 1 - PPSV23) 10/8/1983 DTaP/Tdap/Td series (1 - Tdap) 10/8/1985 FOBT Q 1 YEAR AGE 50-75 10/8/2014 Influenza Age 5 to Adult 8/1/2017 Allergies as of 12/18/2017  Review Complete On: 12/18/2017 By: Ashley Mie No Known Allergies Current Immunizations  Never Reviewed No immunizations on file. Not reviewed this visit You Were Diagnosed With   
  
 Codes Comments Dizziness    -  Primary ICD-10-CM: N17 ICD-9-CM: 780.4 Hypertrophic cardiomyopathy (HCC)     ICD-10-CM: I42.2 ICD-9-CM: 425.18   
 SOB (shortness of breath)     ICD-10-CM: R06.02 
ICD-9-CM: 786.05 Tachycardia     ICD-10-CM: R00.0 ICD-9-CM: 409. 0 Vitals BP Pulse Height(growth percentile) Weight(growth percentile) SpO2 BMI  
 94/53 (!) 132 5' 8\" (1.727 m) 166 lb 12.8 oz (75.7 kg) 96% 25.36 kg/m2 Smoking Status Current Every Day Smoker Vitals History BMI and BSA Data Body Mass Index Body Surface Area  
 25.36 kg/m 2 1.91 m 2 Preferred Pharmacy Pharmacy Name Phone [phone] [facility] [phone] Your Updated Medication List  
  
   
This list is accurate as of: 12/18/17  2:32 PM.  Always use your most recent med list.  
  
  
  
  
 albuterol 90 mcg/actuation inhaler Commonly known as:  VENTOLIN HFA Take 2 Puffs by inhalation as needed for Wheezing. aspirin delayed-release 81 mg tablet Take  by mouth daily. atorvastatin 20 mg tablet Commonly known as:  LIPITOR Take  by mouth daily. benztropine 1 mg tablet Commonly known as:  COGENTIN  
 Take 1 mg by mouth two (2) times a day. carvedilol 6.25 mg tablet Commonly known as:  Nitin Marshal Take 1 Tab by mouth two (2) times daily (with meals). DULoxetine 60 mg capsule Commonly known as:  CYMBALTA TAKE 1 CAPSULE(S) BY MOUTH EVERYDAY  
  
 haloperidol 5 mg tablet Commonly known as:  HALDOL Take  by mouth nightly.  
  
 haloperidol decanoate 100 mg/mL injection Commonly known as:  HALDOL DECANOATE  
100 mg every twenty-eight (28) days. hydrOXYzine pamoate 50 mg capsule Commonly known as:  VISTARIL  
TAKE 1 CAPSULE BY MOUTH TWICE DAILY AND 2 TABLETS BY MOUTH AT BEDTIME  
  
 NAPROSYN 500 mg tablet Generic drug:  naproxen Take 500 mg by mouth two (2) times daily as needed. FOR PAIN  
  
 nicotine 22 mg/24 hr Pt24  
by TransDERmal route daily. DAILY FOR SMOKING CESSION QUEtiapine 100 mg tablet Commonly known as:  SEROquel TAKE 1 TABLET(S) BY MOUTH 2 TIMES A DAY RisperDAL 2 mg tablet Generic drug:  risperiDONE Take  by mouth two (2) times a day. SPIRIVA WITH HANDIHALER 18 mcg inhalation capsule Generic drug:  tiotropium Take 1 Cap by inhalation daily. traZODone 300 mg tablet Commonly known as:  DESYREL  
TAKE 1 TABLET(S) BY MOUTH AT BEDTIME  
  
 TUSSIN DM  mg/5 mL Liqd Generic drug:  guaiFENesin-dextromethorphan TAKE 10 ML BY MOUTH EVERY 6 HOURS AS NEEDED Prescriptions Sent to Pharmacy Refills  
 carvedilol (COREG) 6.25 mg tablet 6 Sig: Take 1 Tab by mouth two (2) times daily (with meals). Class: Normal  
 [facility] [address] Ph #: [phone] Route: Oral  
  
We Performed the Following AMB POC EKG ROUTINE W/ 12 LEADS, INTER & REP [81602 CPT(R)] [facility] Instructions [facility] 
 
Mr. Dell Zheng hypotension is likely from psychiatric cocktail.  His QT, while not morbid, is prolonged on today's EKG which is why we should keep the BB on board, in case removal of the BB makes the HR even higher. We'll switch him to coreg instead of metoprolol as he also has a hypertrophic cardiomyopathy and 35% EF. I have discontinued the lisinopril. The recent Holter was negative for arrhythmias. We agree with labs for TSH. The last TSH in our system was 5.05 in March of 2017. Could the tachycardia be compensation for a hypothyroidism? Please call with any questions. Let's keep in touch. Follow up should be in 1 month Introducing [facility]! [facility] introduces [email or web address] patient portal. Now you can access parts of your medical record, email your doctor's office, and request medication refills online. 1. In your internet browser, go to [email or web address] 2. Click on the First Time User? Click Here link in the Sign In box. You will see the New Member Sign Up page. 3. Enter your [email or web address] Access Code exactly as it appears below. You will not need to use this code after youve completed the sign-up process. If you do not sign up before the expiration date, you must request a new code. · [email or web address] Access Code: [email or web address] Expires: 1/11/2018  9:19 AM 
 
4. Enter the last four digits of your Social Security Number (xxxx) and Date of Birth (mm/dd/yyyy) as indicated and click Submit. You will be taken to the next sign-up page. 5. Create a [email or web address] ID. This will be your [email or web address] login ID and cannot be changed, so think of one that is secure and easy to remember. 6. Create a [email or web address] password. You can change your password at any time. 7. Enter your Password Reset Question and Answer. This can be used at a later time if you forget your password. 8. Enter your e-mail address. You will receive e-mail notification when new information is available in [address] 9. Click Sign Up. You can now view and download portions of your medical record. 10. Click the Download Summary menu link to download a portable copy of your medical information. If you have questions, please visit the Frequently Asked Questions section of the [employer] website. Remember, [employer] is NOT to be used for urgent needs. For medical emergencies, dial 911. Now available from your iPhone and Android! Please provide this summary of care documentation to your next provider. Your primary care clinician is listed as Chantel. If you have any questions after today's visit, please call [phone].

## 2018-05-04 ENCOUNTER — OFFICE VISIT (OUTPATIENT)
Dept: CARDIOLOGY CLINIC | Age: 53
End: 2018-05-04

## 2018-05-04 VITALS
HEART RATE: 84 BPM | HEIGHT: 68 IN | BODY MASS INDEX: 24.71 KG/M2 | RESPIRATION RATE: 12 BRPM | DIASTOLIC BLOOD PRESSURE: 78 MMHG | SYSTOLIC BLOOD PRESSURE: 102 MMHG | WEIGHT: 163 LBS | OXYGEN SATURATION: 94 %

## 2018-05-04 DIAGNOSIS — F51.01 PRIMARY INSOMNIA: ICD-10-CM

## 2018-05-04 DIAGNOSIS — F33.2 SEVERE EPISODE OF RECURRENT MAJOR DEPRESSIVE DISORDER, WITHOUT PSYCHOTIC FEATURES (HCC): ICD-10-CM

## 2018-05-04 DIAGNOSIS — R52 PAIN: ICD-10-CM

## 2018-05-04 DIAGNOSIS — R00.0 INAPPROPRIATE SINUS TACHYCARDIA: ICD-10-CM

## 2018-05-04 DIAGNOSIS — J30.1 ALLERGIC RHINITIS DUE TO POLLEN, UNSPECIFIED SEASONALITY: ICD-10-CM

## 2018-05-04 DIAGNOSIS — R00.0 TACHYCARDIA: Primary | ICD-10-CM

## 2018-05-04 DIAGNOSIS — F40.11 GENERALIZED SOCIAL PHOBIA: ICD-10-CM

## 2018-05-04 DIAGNOSIS — I10 ESSENTIAL HYPERTENSION: ICD-10-CM

## 2018-05-04 DIAGNOSIS — R55 SYNCOPE, UNSPECIFIED SYNCOPE TYPE: ICD-10-CM

## 2018-05-04 NOTE — PROGRESS NOTES
Ambulatory cardiology attending physician note: We were able in the aftermath of this visit to obtain a current list of medications for this patient. They are as follows:     Lipitor 20 mg daily    Atrovent inhaler    Cogentin 1 mg twice daily    Coreg 6.25 twice daily    Clonazepam 0.5 twice daily    Cymbalta 60 mg at bedtime    Cymbalta 30 mg every morning    Haloperidol 200 mg injected IM, apparently as needed    Prinivil 2.5 mg 1 tablet daily    Claritin 10 mg daily    Lopressor 12.5 mg twice daily    Nicotine patch 14 mg once every 24 hours    Nicotine 4 mg gum once an hour    Risperdal 2 mg twice daily    Spiriva capsule inhaled once daily     trazodone 100 mg at bedtime    Ambien 10 mg at bedtime    Naprosyn 500 mg twice daily as needed for pain    Tussin DM syrup 10 mL by mouth every 6 hours as needed  Ventolin 90 mcg 2 puffs every 4-6 hours as needed        Urinary frequency without satisfactory completion. Nocturia times 2. 3+ cups of coffee daily. Advised to hydrate and flex leg muscles etc. I spent about 20 minutes with patient and caregiver reiterating instructions for prevention of orthostatic syncope. The medication list was only obtainable after the visit and obviously there are numerous things that could be promoting orthostasis but his behavioral health needs are extraordinarily complex. Cardiac and pulmonary examination are unremarkable today. The patient is peaceful intractable with no sign of agitation. He is fully cooperative. I reviewed all findings, diagnosis and plan of care with NASH Jackson and PA student Shabana Cormier.  The expressed summary and plan is the synthesis of this discussion.     Leidy Kahn MD [facility]

## 2018-05-04 NOTE — MR AVS SNAPSHOT
[address] [facility] 
 
 
 [address] [facility] 
[phone] Patient: Thais Ronquillo MRN: [id number] :1965 Visit Information Date & Time Provider Department Dept. Phone Encounter #  
 2018 11:00 AM Markell Beth MD [facility] at [address] [place] [address] [phone] Upcoming Health Maintenance Date Due Hepatitis C Screening 1965 Pneumococcal 19-64 Medium Risk (1 of 1 - PPSV23) 10/8/1983 DTaP/Tdap/Td series (1 - Tdap) 10/8/1985 FOBT Q 1 YEAR AGE 50-75 10/8/2014 MEDICARE YEARLY EXAM 3/14/2018 Influenza Age 5 to Adult 2018 Allergies as of 2018  Review Complete On: 2017 By: Palmer Lugo PA-C No Known Allergies Current Immunizations  Never Reviewed No immunizations on file. Not reviewed this visit You Were Diagnosed With   
  
 Codes Comments Tachycardia    -  Primary ICD-10-CM: R00.0 ICD-9-CM: 902. 0 Vitals BP Pulse Resp Height(growth percentile) Weight(growth percentile) SpO2  
 102/78 (BP 1 Location: Right arm, BP Patient Position: Sitting) 84 12 5' 8\" (1.727 m) 163 lb (73.9 kg) 94% BMI Smoking Status 24.78 kg/m2 Current Every Day Smoker Vitals History BMI and BSA Data Body Mass Index Body Surface Area 24.78 kg/m 2 1.88 m 2 Preferred Pharmacy Pharmacy Name Phone [address], [address] [place] [address] [phone] [facility] [phone] Your Updated Medication List  
  
   
This list is accurate as of 18 11:47 AM.  Always use your most recent med list.  
  
  
  
  
 albuterol 90 mcg/actuation inhaler Commonly known as:  VENTOLIN HFA Take 2 Puffs by inhalation as needed for Wheezing. aspirin delayed-release 81 mg tablet Take  by mouth daily. atorvastatin 20 mg tablet Commonly known as:  LIPITOR Take  by mouth daily. benztropine 1 mg tablet Commonly known as:  COGENTIN Take 1 mg by mouth two (2) times a day. carvedilol 6.25 mg tablet Commonly known as:  [place] Take 1 Tab by mouth two (2) times daily (with meals). DULoxetine 60 mg capsule Commonly known as:  CYMBALTA TAKE 1 CAPSULE(S) BY MOUTH EVERYDAY  
  
 haloperidol 5 mg tablet Commonly known as:  HALDOL Take  by mouth nightly.  
  
 haloperidol decanoate 100 mg/mL injection Commonly known as:  HALDOL DECANOATE  
100 mg every twenty-eight (28) days. hydrOXYzine pamoate 50 mg capsule Commonly known as:  VISTARIL  
TAKE 1 CAPSULE BY MOUTH TWICE DAILY AND 2 TABLETS BY MOUTH AT BEDTIME  
  
 NAPROSYN 500 mg tablet Generic drug:  naproxen Take 500 mg by mouth two (2) times daily as needed. FOR PAIN  
  
 nicotine 22 mg/24 hr Pt24  
by TransDERmal route daily. DAILY FOR SMOKING CESSION RisperDAL 2 mg tablet Generic drug:  risperiDONE Take  by mouth two (2) times a day. SPIRIVA WITH HANDIHALER 18 mcg inhalation capsule Generic drug:  tiotropium Take 1 Cap by inhalation daily. traZODone 300 mg tablet Commonly known as:  DESYREL  
TAKE 1 TABLET(S) BY MOUTH AT BEDTIME  
  
 TUSSIN DM  mg/5 mL Liqd Generic drug:  guaiFENesin-dextromethorphan TAKE 10 ML BY MOUTH EVERY 6 HOURS AS NEEDED We Performed the Following AMB POC EKG ROUTINE W/ 12 LEADS, INTER & REP [65210 CPT(R)] Patient Instructions I think the latest fainting spell was due to low blood pressure. I can prescribe medicine to bring the blood pressure up, but this will cause new problems with the weakened heart muscle. Review of existing medications shows a fairly high dose of HYDROXYZINE PAMOATE. Given that he is taking a high dose of TRAZODONE at bedtime, would it be possible to reduce or eliminate the HYDROXYZINE to allow him a bit higher blood pressure? I understand Yury Graf has been added; this will not hurt blood pressure. The QT interval on the EKG is normal today, so the current dose of RISPERIDONE is fine. I would appreciate a list of current medications with future visits. Thank you Tamiko Gottlieb MD 
 
 
  
Introducing [facility]! [employer] introduces [employer] patient portal. Now you can access parts of your medical record, email your doctor's office, and request medication refills online. 1. In your internet browser, go to [email or web address] 2. Click on the First Time User? Click Here link in the Sign In box. You will see the New Member Sign Up page. 3. Enter your [employer] Access Code exactly as it appears below. You will not need to use this code after youve completed the sign-up process. If you do not sign up before the expiration date, you must request a new code. · [employer] Access Code: [email or web address] Expires: 8/2/2018 11:47 AM 
 
4. Enter the last four digits of your Social Security Number (xxxx) and Date of Birth (mm/dd/yyyy) as indicated and click Submit. You will be taken to the next sign-up page. 5. Create a [employer] ID. This will be your [employer] login ID and cannot be changed, so think of one that is secure and easy to remember. 6. Create a [employer] password. You can change your password at any time. 7. Enter your Password Reset Question and Answer. This can be used at a later time if you forget your password. 8. Enter your e-mail address. You will receive e-mail notification when new information is available in [address] 9. Click Sign Up. You can now view and download portions of your medical record. 10. Click the Download Summary menu link to download a portable copy of your medical information. If you have questions, please visit the Frequently Asked Questions section of the [employer] website. Remember, [employer] is NOT to be used for urgent needs. For medical emergencies, dial 911. Now available from your iPhone and Android! Please provide this summary of care documentation to your next provider. Your primary care clinician is listed as Chantel. If you have any questions after today's visit, please call [phone].

## 2018-05-04 NOTE — PATIENT INSTRUCTIONS
I think the latest fainting spell was due to low blood pressure. I can prescribe medicine to bring the blood pressure up, but this will cause new problems with the weakened heart muscle. Review of existing medications shows a fairly high dose of HYDROXYZINE PAMOATE. Given that he is taking a high dose of TRAZODONE at bedtime, would it be possible to reduce or eliminate the HYDROXYZINE to allow him a bit higher blood pressure? I understand Assbrynna Jeans has been added; this will not hurt blood pressure. The QT interval on the EKG is normal today, so the current dose of RISPERIDONE is fine. I would appreciate a list of current medications with future visits.      Thank you    Billy Castellanos MD

## 2018-05-04 NOTE — PROGRESS NOTES
Chief Complaint   Patient presents with    Shortness of Breath     no other complaints.  Syncope     1. Have you been to the ER, urgent care clinic since your last visit? Hospitalized since your last visit? No    2. Have you seen or consulted any other health care providers outside of the [address] [place] [address] since your last visit? Include any pap smears or colon screening.  Yes When: Josey Ruiz;  Vladimir Blackwell 442 2 WEEKS AGO

## 2018-05-04 NOTE — PROGRESS NOTES
[facility]   [address] [facility], [address] [place] [address]                                          NEW PATIENT HPI/FOLLOW-UP      NAME:  Alli Johnston   :   1965   MRN:   [id number]   PCP:  Alma Gilbert NP           Subjective: The patient is a 47y.o. year old male with h/o psychiatric history, hypertrophic cardiomyopathy who returns for a routine follow-up. Patient reports a syncopal episode 2 days ago. He was trying to get something out of his closet when he started to feel dizzy and lightheaded. He walked towards his bed and passed out on it. Denies any prodrome of chest pain, palpitations, SOB, or any other symptoms. Patient had another syncopal episode a couple months ago where he passed out from standing up too quickly. Reports chronic SOB and CHOPRA that patient felt was getting better, but is now unsure after syncopal episode 2 days ago. Patient currently feels tired, however attributes it to not taking his coffee this morning. Patient states he usually feels like this and improves after his morning cup. Denies any chest pain or palpitations in general, medication intolerance, extremity swelling.  in the room. Review of Systems  General: Pt denies excessive weight gain or loss. Pt is able to conduct ADL's. Respiratory: (+) SOB/CHOPRA. Denieswheezing or stridor. Cardiovascular: Denies precordial pain, palpitations, or PND  Gastrointestinal: Denies poor appetite, indigestion, abdominal pain, or blood in stool  Peripheral vascular: Denies claudication, leg cramps  Neuropsychiatric: (+) tired currently. Denies paresthesias, tingling, numbness, anxiety, depression.   Musculoskeletal: Denies pain, tenderness, soreness, swelling      Past Medical History:   Diagnosis Date    Hypertension     Psychiatric disorder      Patient Active Problem List    Diagnosis Date Noted    Dizziness 2017    Hypertrophic cardiomyopathy ([facility] [address] 2017    SOB (shortness of breath) 12/18/2017    Tachycardia 12/18/2017      History reviewed. No pertinent surgical history. No Known Allergies   History reviewed. No pertinent family history. Social History     Social History    Marital status:      Spouse name: N/A    Number of children: N/A    Years of education: N/A     Occupational History    Not on file. Social History Main Topics    Smoking status: Current Every Day Smoker     Packs/day: 1.00    Smokeless tobacco: Never Used    Alcohol use No    Drug use: No    Sexual activity: Not on file     Other Topics Concern    Not on file     Social History Narrative    ** Merged History Encounter **           Current Outpatient Prescriptions   Medication Sig    tiotropium (SPIRIVA WITH HANDIHALER) 18 mcg inhalation capsule Take 1 Cap by inhalation daily.  albuterol (VENTOLIN HFA) 90 mcg/actuation inhaler Take 2 Puffs by inhalation as needed for Wheezing. (Patient taking differently: Take 2 Puffs by inhalation. EVERY 4-6 HOURS AS NEEDED)    haloperidol decanoate (HALDOL DECANOATE) 100 mg/mL injection 100 mg every twenty-eight (28) days.  aspirin delayed-release 81 mg tablet Take  by mouth daily.  carvedilol (COREG) 6.25 mg tablet Take 1 Tab by mouth two (2) times daily (with meals).  atorvastatin (LIPITOR) 20 mg tablet Take  by mouth daily.  haloperidol (HALDOL) 5 mg tablet Take  by mouth nightly.  naproxen (NAPROSYN) 500 mg tablet Take 500 mg by mouth two (2) times daily as needed. FOR PAIN    nicotine 22 mg/24 hr pt24 by TransDERmal route daily. DAILY FOR SMOKING CESSION    hydrOXYzine pamoate (VISTARIL) 50 mg capsule TAKE 1 CAPSULE BY MOUTH TWICE DAILY AND 2 TABLETS BY MOUTH AT BEDTIME    DULoxetine (CYMBALTA) 60 mg capsule TAKE 1 CAPSULE(S) BY MOUTH EVERYDAY    risperiDONE (RISPERDAL) 2 mg tablet Take  by mouth two (2) times a day.     QUEtiapine (SEROQUEL) 100 mg tablet TAKE 1 TABLET(S) BY MOUTH 2 TIMES A DAY    traZODone (DESYREL) 300 mg tablet TAKE 1 TABLET(S) BY MOUTH AT BEDTIME    TUSSIN DM  mg/5 mL liqd TAKE 10 ML BY MOUTH EVERY 6 HOURS AS NEEDED    benztropine (COGENTIN) 1 mg tablet Take 1 mg by mouth two (2) times a day. No current facility-administered medications for this visit. I have reviewed the nurses notes, vitals, problem list, allergy list, medical history, family medical, social history and medications. Objective:     Physical Exam:     Vitals:    18 1039 18 1048   BP: 95/73 102/78   Pulse: 87 84   Resp: 12    SpO2: 94%    Weight: 163 lb (73.9 kg)    Height: 5' 8\" (1.727 m)     Body mass index is 24.78 kg/(m^2). General: WDWN, in no acute distress. Slowed affect and movement. HEENT: No carotid bruits, no JVD, trach is midline. Heart:  RRR. Normal S1/S2 negative S3 or S4. No murmur, gallop or rub.   Respiratory: Clear bilaterally, no wheezing or rales  Extremities:  No edema, normal cap refill, no cyanosis. Neuro: A&Ox3, speech slow, yet clear and coherent, gait stable. Skin: Skin color is normal. No rashes or lesions. No diaphoresis. Vascular: Radial pulses 2+ and symmetric. Warm extremities throughout. Data Review:       Cardiographics:    EKG interpretation:  Rhythm: NSR at 78 bpm, borderline Qs in lateral and inferior leads. No ST elevations or TX prolongation. QTc 421.  This EKG was interpreted by Armani Sarmiento.        Cardiology Labs:    Results for orders placed or performed during the hospital encounter of 10/23/17   ECG HOLTER MONITOR, UP  [facility]                    [address] [facility], [address]                                         Test Date:    2017-10-26  [facility] Name:     May Gorman             Department:     Patient ID:   [id number]                Room:           Gender:       Male                   Technician:     : 34-               Requested By: Dr. Kyle Howard  Order Number:                          Reading MD:   Kyle Howard MD                    Interpretive Statements  Niki Crowell was in Sinus Rhythm. The average heart rate, excluding ectopy, was 95 BPM with a minimum of 73 BPM   at 04:30 D2 and a maximum of 135 BPM at  07:08 D2. Heart beats, including ectopy, totaled 128631 beats. There were no VENTRICULAR ectopics found. SUPRAVENTRICULAR ECTOPICS totaled 3  ,with 3 single and 0 paired beats. Holter monitor recording provided 24 hours 4 minutes of readable data. The   patient did not report any symptoms. Tracing quality is satisfactory. Baseline rhythm is sinus. AV conduction is   consistently normal. There is occasional normally conducted single APC's. Rate variations are physiologic. There is no ventricular ectopy at all, and   there are no variations in normal baseline ST segments.      Impression:    Normal study with only insignificant atrial ectopy, no   symptoms    Kyle Howard MD [facility]  Electronically signed by Kyle Howard MD on Oct 26 2017  7:17PM CDT   Results for orders placed or performed during the hospital encounter of 04/05/17   EKG, 12 LEAD, INITIAL   Result Value Ref Range    Ventricular Rate 103 BPM    Atrial Rate 103 BPM    P-R Interval 136 ms    QRS Duration 80 ms    Q-T Interval 376 ms    QTC Calculation (Bezet) 492 ms    Calculated P Axis 60 degrees    Calculated R Axis 74 degrees    Calculated T Axis 56 degrees    Diagnosis       Sinus tachycardia  Otherwise normal ECG  No previous ECGs available  Confirmed by Rafael Melgoza ([id number]) on 4/7/2017 10:34:13 AM         No results found for: CHOL, CHOLX, CHLST, CHOLV, 297282, HDL, LDL, LDLC, DLDLP, TGLX, TRIGL, TRIGP, CHHD, [facility]    Lab Results   Component Value Date/Time    Sodium 137 04/05/2017 04:40 PM    Potassium 3.6 04/05/2017 04:40 PM    Chloride 101 04/05/2017 04:40 PM    CO2 28 04/05/2017 04:40 PM    Anion gap 8 04/05/2017 04:40 PM    Glucose 91 04/05/2017 04:40 PM    BUN 4 (L) 04/05/2017 04:40 PM    Creatinine 0.87 04/05/2017 04:40 PM    BUN/Creatinine ratio 5 (L) 04/05/2017 04:40 PM    GFR est AA >60 04/05/2017 04:40 PM    GFR est non-AA >60 04/05/2017 04:40 PM    Calcium 8.2 (L) 04/05/2017 04:40 PM    Bilirubin, total 0.4 04/05/2017 04:40 PM    AST (SGOT) 17 04/05/2017 04:40 PM    Alk. phosphatase 71 04/05/2017 04:40 PM    Protein, total 7.0 04/05/2017 04:40 PM    Albumin 3.2 (L) 04/05/2017 04:40 PM    Globulin 3.8 04/05/2017 04:40 PM    A-G Ratio 0.8 (L) 04/05/2017 04:40 PM    ALT (SGPT) 24 04/05/2017 04:40 PM          Assessment:       ICD-10-CM ICD-9-CM    1. Tachycardia R00.0 785.0 AMB POC EKG ROUTINE W/ 12 LEADS, INTER & REP         Discussion: Patient presents at this time stable from a cardiac perspective. BP was well controlled. Pleased with present status. 1. Syncopal episodes, possibly due to polypharmacy psych medications, possibly orthostatic hypotension, r/o cardiology epidemiology   -- Patient on psychiatric cocktail that can prolong QT interval and cause orthostasis. Patient's QT interval has been prolonged in previous visits, however is not today. We ask to have patient's psych medications be re-evaluated and possibly adjusted. Discussed/seen with Dr. Manuela Mahajan and [facility] PA-C. Plan:     1. Continue same meds. -- Re-evaluate and possibly adjust psych regimen. -- F/U with Electrophysiology    2. Lipid profile and labs followed by PCP. 3. Encouraged to exercise to tolerance, lose weight, stop smoking and follow low fat, low cholesterol, low sodium predominantly Plant-based (consider Mediterranean) diet. 4.Follow up 3 months   --  Call with questions or concerns. -- Will follow up any test results by phone and/or f/u here in office if needed. I have discussed the diagnosis with the patient and the intended plan as seen in the above orders.   The patient has received an after-visit summary and questions were answered concerning future plans. I have discussed any concerning medication side effects and warnings with the patient as well.     Santa Sidhu  5/4/2018

## 2018-05-14 RX ORDER — LISINOPRIL 2.5 MG/1
2.5 TABLET ORAL DAILY
Qty: 30 TAB | Refills: 3 | Status: SHIPPED | OUTPATIENT
Start: 2018-05-14 | End: 2018-06-22 | Stop reason: ALTCHOICE

## 2018-05-14 RX ORDER — LORATADINE 10 MG/1
10 TABLET ORAL DAILY
Qty: 30 TAB | Refills: 3 | Status: SHIPPED | OUTPATIENT
Start: 2018-05-14

## 2018-05-14 RX ORDER — ZOLPIDEM TARTRATE 10 MG/1
10 TABLET ORAL
Qty: 30 TAB | Refills: 3 | Status: SHIPPED | OUTPATIENT
Start: 2018-05-14

## 2018-05-14 RX ORDER — DULOXETIN HYDROCHLORIDE 30 MG/1
30 CAPSULE, DELAYED RELEASE ORAL DAILY
Qty: 90 CAP | Refills: 3 | Status: SHIPPED | OUTPATIENT
Start: 2018-05-14

## 2018-05-14 RX ORDER — NAPROXEN 500 MG/1
500 TABLET ORAL
Qty: 60 TAB | Refills: 3 | Status: SHIPPED | OUTPATIENT
Start: 2018-05-14 | End: 2022-02-26

## 2018-05-14 RX ORDER — CLONAZEPAM 0.5 MG/1
0.5 TABLET ORAL
Qty: 60 TAB | Refills: 3 | Status: SHIPPED | OUTPATIENT
Start: 2018-05-14 | End: 2020-12-08

## 2018-05-14 RX ORDER — TRAZODONE HYDROCHLORIDE 300 MG/1
150 TABLET ORAL
Qty: 30 TAB | Refills: 3 | Status: SHIPPED | OUTPATIENT
Start: 2018-05-14 | End: 2020-11-06

## 2018-06-22 ENCOUNTER — OFFICE VISIT (OUTPATIENT)
Dept: CARDIOLOGY CLINIC | Age: 53
End: 2018-06-22

## 2018-06-22 VITALS
OXYGEN SATURATION: 97 % | HEART RATE: 80 BPM | WEIGHT: 160 LBS | BODY MASS INDEX: 24.25 KG/M2 | SYSTOLIC BLOOD PRESSURE: 87 MMHG | RESPIRATION RATE: 12 BRPM | DIASTOLIC BLOOD PRESSURE: 70 MMHG | HEIGHT: 68 IN

## 2018-06-22 DIAGNOSIS — I95.1 ORTHOSTATIC HYPOTENSION: ICD-10-CM

## 2018-06-22 DIAGNOSIS — I42.2 HYPERTROPHIC CARDIOMYOPATHY (HCC): Primary | ICD-10-CM

## 2018-06-22 RX ORDER — METOPROLOL TARTRATE 25 MG/1
TABLET, FILM COATED ORAL 2 TIMES DAILY
COMMUNITY
End: 2018-06-22 | Stop reason: ALTCHOICE

## 2018-06-22 NOTE — PATIENT INSTRUCTIONS
-- 3 month's follow up    Orthostatic Hypotension: Care Instructions  Your Care Instructions    Orthostatic hypotension is a quick drop in blood pressure. It happens when you get up from sitting or lying down. You may feel faint, lightheaded, or dizzy. When a person sits up or stands up, the body changes the way it pumps blood. This can slow the flow of blood to the brain for a very short time. And that can make you feel lightheaded. Many medicines can cause this problem, especially in older people. Lack of fluids (dehydration) or illnesses such as diabetes or heart disease also can cause it. Follow-up care is a key part of your treatment and safety. Be sure to make and go to all appointments, and call your doctor if you are having problems. It's also a good idea to know your test results and keep a list of the medicines you take. How can you care for yourself at home? · Tell your doctor about any problems you have with your medicines. · If your doctor prescribes medicine to help prevent a low blood pressure problem, take it exactly as prescribed. Call your doctor if you think you are having a problem with your medicine. · Drink plenty of fluids, enough so that your urine is light yellow or clear like water. Choose water and other caffeine-free clear liquids. If you have kidney, heart, or liver disease and have to limit fluids, talk with your doctor before you increase the amount of fluids you drink. · Limit or avoid alcohol and caffeine. · Get up slowly from bed or after sitting for a long time. If you are in bed, roll to your side and swing your legs over the edge of the bed and onto the floor. Push your body up to a sitting position. Wait for a while before you slowly stand up. If you are dizzy or lightheaded, sit or lie down. When should you call for help? Call 911 anytime you think you may need emergency care. For example, call if:  ? · You passed out (lost consciousness). ? Watch closely for changes in your health, and be sure to contact your doctor if:  ? · You do not get better as expected. Where can you learn more? Go to http://amy-ricardo.info/. Enter R526 in the search box to learn more about \"Orthostatic Hypotension: Care Instructions. \"  Current as of: September 21, 2016  Content Version: 11.4  © 5729-7350 [employer]. Care instructions adapted under license by [employer] (which disclaims liability or warranty for this information). If you have questions about a medical condition or this instruction, always ask your healthcare professional. [place] [address] any warranty or liability for your use of this information.

## 2018-06-22 NOTE — PROGRESS NOTES
[facility]   [address] [facility], [address] [place] [address] Road                                          NEW PATIENT HPI/FOLLOW-UP      NAME:  Linda Lai   :   1965   MRN:   [id number]   PCP:  Coreen Rosenthal NP           Subjective: The patient is a 47y.o. year old male with h/o hypertrophic cardiomyopathy, orthostatic hypotension and extensive Behavioral Health issue who returns for a routine follow-up. Since the last visit, patient reports no new episodes of orthostasis/fainting or lightheadedness with postural change. He is using suggested techniques of moving between positions cautiously, exercising his legs before standing up, etc. Pt is accompanied by his , who states the psych NP has removed the Seroquel to help with intermittent QT prolongation on EKG. Denies change in exercise tolerance, chest pain, edema, medication intolerance, palpitations, shortness of breath, PND/orthopnea wheezing, sputum, syncope, dizziness or light headedness. Doing satisfactorily. Review of Systems  General: Pt denies excessive weight gain or loss. Pt is able to conduct ADL's. Respiratory: Denies shortness of breath, CHOPRA, wheezing or stridor. Cardiovascular: Denies precordial pain, palpitations, edema or PND  Gastrointestinal: Denies poor appetite, indigestion, abdominal pain or blood in stool  Peripheral vascular: Denies claudication, leg cramps  Neuropsychiatric: Denies paresthesias,tingling,numbness,anxiety,depression,fatigue  Musculoskeletal: Denies pain,tenderness, soreness,swelling      Past Medical History:   Diagnosis Date    Hypertension     Psychiatric disorder      Patient Active Problem List    Diagnosis Date Noted    Dizziness 2017    Hypertrophic cardiomyopathy ([facility] [address] 2017    SOB (shortness of breath) 2017    Tachycardia 2017      History reviewed. No pertinent surgical history. No Known Allergies   History reviewed.  No pertinent family history. Social History     Social History    Marital status:      Spouse name: N/A    Number of children: N/A    Years of education: N/A     Occupational History    Not on file. Social History Main Topics    Smoking status: Current Every Day Smoker     Packs/day: 1.00    Smokeless tobacco: Never Used    Alcohol use No    Drug use: No    Sexual activity: Not on file     Other Topics Concern    Not on file     Social History Narrative    ** Merged History Encounter **           Current Outpatient Prescriptions   Medication Sig    traZODone (DESYREL) 300 mg tablet Take 0.5 Tabs by mouth nightly.  clonazePAM (KLONOPIN) 0.5 mg tablet Take 1 Tab by mouth nightly as needed. Max Daily Amount: 0.5 mg. Indications: Panic Disorder (Patient taking differently: Take 0.5 mg by mouth two (2) times a day.)    DULoxetine (CYMBALTA) 30 mg capsule Take 1 Cap by mouth daily. One daily in the morning, two at HS (Patient taking differently: Take 60 mg by mouth daily. One daily in the morning, two at HS)    loratadine (CLARITIN) 10 mg tablet Take 1 Tab by mouth daily.  zolpidem (AMBIEN) 10 mg tablet Take 1 Tab by mouth nightly as needed for Sleep. Max Daily Amount: 10 mg.    naproxen (NAPROSYN) 500 mg tablet Take 1 Tab by mouth two (2) times daily as needed. FOR PAIN   Indications: Pain    carvedilol (COREG) 6.25 mg tablet Take 1 Tab by mouth two (2) times daily (with meals).  atorvastatin (LIPITOR) 20 mg tablet Take  by mouth daily.  tiotropium (SPIRIVA WITH HANDIHALER) 18 mcg inhalation capsule Take 1 Cap by inhalation daily.  nicotine 22 mg/24 hr pt24 by TransDERmal route daily. DAILY FOR SMOKING CESSION    albuterol (VENTOLIN HFA) 90 mcg/actuation inhaler Take 2 Puffs by inhalation as needed for Wheezing. (Patient taking differently: Take 2 Puffs by inhalation. EVERY 4-6 HOURS AS NEEDED)    risperiDONE (RISPERDAL) 2 mg tablet Take  by mouth two (2) times a day.     TUSSIN DM  mg/5 mL liqd TAKE 10 ML BY MOUTH EVERY 6 HOURS AS NEEDED    benztropine (COGENTIN) 1 mg tablet Take 1 mg by mouth two (2) times a day.  aspirin delayed-release 81 mg tablet Take  by mouth daily.  haloperidol decanoate (HALDOL DECANOATE) 100 mg/mL injection 100 mg every twenty-eight (28) days.  aspirin 81 mg chewable tablet Take 1 Tab by mouth daily for 30 days. No current facility-administered medications for this visit. I have reviewed the nurses notes, vitals, problem list, allergy list, medical history, family medical, social history and medications. Objective:     Physical Exam:     Vitals:    06/22/18 1015 06/22/18 1017   BP: (!) 86/76 (!) 87/70   Pulse: 80    Resp: 12    SpO2: 97%    Weight: 160 lb (72.6 kg)    Height: 5' 8\" (1.727 m)     Body mass index is 24.33 kg/(m^2). General: WDWN adult male, in no acute distress. Pleasant affect. HEENT: No carotid bruits, no JVD, trach is midline. Heart:  Normal S1/S2 negative S3 or S4. Regular, early 1/6 systolic murmur, no gallop or rub.   Respiratory: Clear bilaterally, no wheezing or rales  Abdomen:   Soft, non-tender, bowel sounds are active.   Extremities:  No edema, normal cap refill, no cyanosis. Neuro: A&Ox3, speech clear, gait stable. Skin: Skin color is normal. +maculopapular rash on b/l knees; no lesions. No diaphoresis.   Vascular: 2+ pulses symmetric in all extremities        Data Review:       Cardiographics:    EKG interpretation:  None today    Cardiology Labs:    Results for orders placed or performed during the hospital encounter of 10/23/17   ECG HOLTER MONITOR, UP  [facility]                    [address] [facility], [address]                                         Test Date:    2017-10-26  [facility] Name:     Deena Luevano             Department:     Patient ID:   [id number]                Room: Gender:       Male                   Technician:     :          81-               Requested By: Dr. Bobby Pack  Order Number:                          Reading MD:   Bobby Pack MD                    Interpretive Statements  Rey Forbes was in Sinus Rhythm. The average heart rate, excluding ectopy, was 95 BPM with a minimum of 73 BPM   at 04:30 D2 and a maximum of 135 BPM at  07:08 D2. Heart beats, including ectopy, totaled [id number] beats. There were no VENTRICULAR ectopics found. SUPRAVENTRICULAR ECTOPICS totaled 3  ,with 3 single and 0 paired beats. Holter monitor recording provided 24 hours 4 minutes of readable data. The   patient did not report any symptoms. Tracing quality is satisfactory. Baseline rhythm is sinus. AV conduction is   consistently normal. There is occasional normally conducted single APC's. Rate variations are physiologic. There is no ventricular ectopy at all, and   there are no variations in normal baseline ST segments.      Impression:    Normal study with only insignificant atrial ectopy, no   symptoms    Bobby Pack MD [facility]  Electronically signed by Bobby Pack MD on Oct 26 2017  7:17PM CDT   Results for orders placed or performed during the hospital encounter of 17   EKG, 12 LEAD, INITIAL   Result Value Ref Range    Ventricular Rate 103 BPM    Atrial Rate 103 BPM    P-R Interval 136 ms    QRS Duration 80 ms    Q-T Interval 376 ms    QTC Calculation (Bezet) 492 ms    Calculated P Axis 60 degrees    Calculated R Axis 74 degrees    Calculated T Axis 56 degrees    Diagnosis       Sinus tachycardia  Otherwise normal ECG  No previous ECGs available  Confirmed by Zakia Logan ([id number]) on 2017 10:34:13 AM         No results found for: CHOL, CHOLX, CHLST, CHOLV, 741269, HDL, LDL, LDLC, DLDLP, TGLX, TRIGL, TRIGP, CHHD, CHHDX    Lab Results   Component Value Date/Time    Sodium 137 2017 04:40 PM    Potassium 3.6 2017 04:40 PM Chloride 101 04/05/2017 04:40 PM    CO2 28 04/05/2017 04:40 PM    Anion gap 8 04/05/2017 04:40 PM    Glucose 91 04/05/2017 04:40 PM    BUN 4 (L) 04/05/2017 04:40 PM    Creatinine 0.87 04/05/2017 04:40 PM    BUN/Creatinine ratio 5 (L) 04/05/2017 04:40 PM    GFR est AA >60 04/05/2017 04:40 PM    GFR est non-AA >60 04/05/2017 04:40 PM    Calcium 8.2 (L) 04/05/2017 04:40 PM    Bilirubin, total 0.4 04/05/2017 04:40 PM    AST (SGOT) 17 04/05/2017 04:40 PM    Alk. phosphatase 71 04/05/2017 04:40 PM    Protein, total 7.0 04/05/2017 04:40 PM    Albumin 3.2 (L) 04/05/2017 04:40 PM    Globulin 3.8 04/05/2017 04:40 PM    A-G Ratio 0.8 (L) 04/05/2017 04:40 PM    ALT (SGPT) 24 04/05/2017 04:40 PM          Assessment:       ICD-10-CM ICD-9-CM    1. Hypertrophic cardiomyopathy (HCC) I42.2 425.18    2. Orthostatic hypotension I95.1 458.0          Discussion: Patient presents at this time stable from a cardiac perspective. BP was hypotensive. Pt inadvertently on two BBs though HR seemed unaffected. Will correct. Pleased that a complete med-rec was able to be done during visit as pt had paperwork from behavioral health facility with him. Pleased with present status. Discussed/seen with Dr. Jewels Wadsworth:     1. STOP Lisinopril and Metoprolol   -- Continue Coreg 6.25 mg BID. 2. Lipid profile and labs followed by PCP/mental health facility    3. Encouraged to exercise to tolerance, and follow low fat, low cholesterol, low sodium predominantly Plant-based (consider Mediterranean) diet. 4. Follow up: 3 months   --  Call with questions or concerns. I have discussed the diagnosis with the patient and the intended plan as seen in the above orders. The patient has received an after-visit summary and questions were answered concerning future plans. I have discussed any concerning medication side effects and warnings with the patient as well.     Miguel Angel Andres PA-C  6/22/2018

## 2018-06-22 NOTE — MR AVS SNAPSHOT
[address] [facility] 
 
 
 [address] [place] [address] 
[phone] Patient: Reza Rojas MRN: [id number] :1965 Visit Information Date & Time Provider Department Dept. Phone Encounter #  
 2018 10:20 AM [facility] [place] [address] [id number] Your Appointments 2018  2:40 PM  
New Patient with Christophe Rubinstein, MD  
[facility] [place] ([facility] Appt Note: NP refd by Jasmin lAmeida NP for chronic systolic heart failure- Medicare [address]  
  
   
 [address] [facility] [address] [facility] [id number] Upcoming Health Maintenance Date Due Hepatitis C Screening 1965 Pneumococcal 19-64 Medium Risk (1 of 1 - PPSV23) 10/8/1983 DTaP/Tdap/Td series (1 - Tdap) 10/8/1985 FOBT Q 1 YEAR AGE 50-75 10/8/2014 MEDICARE YEARLY EXAM 3/14/2018 Influenza Age 5 to Adult 2018 Allergies as of 2018  Review Complete On: 2018 By: Kade Gong LPN No Known Allergies Current Immunizations  Never Reviewed No immunizations on file. Not reviewed this visit You Were Diagnosed With   
  
 Codes Comments Hypertrophic cardiomyopathy ([facility] [address]    -  Primary ICD-10-CM: I42.2 ICD-9-CM: 425.18 Orthostatic hypotension     ICD-10-CM: I95.1 ICD-9-CM: 458.0 Vitals BP Pulse Resp Height(growth percentile) Weight(growth percentile) SpO2  
 (!) 87/70 (BP 1 Location: Right arm, BP Patient Position: Sitting) 80 12 5' 8\" (1.727 m) 160 lb (72.6 kg) 97% BMI Smoking Status 24.33 kg/m2 Current Every Day Smoker Vitals History BMI and BSA Data Body Mass Index Body Surface Area  
 24.33 kg/m 2 1.87 m 2 Preferred Pharmacy Pharmacy Name Phone [address], [address]42 [facility] 149 3800 [facility] [phone] Your Updated Medication List  
  
   
This list is accurate as of 6/22/18 10:39 AM.  Always use your most recent med list.  
  
  
  
  
 albuterol 90 mcg/actuation inhaler Commonly known as:  VENTOLIN HFA Take 2 Puffs by inhalation as needed for Wheezing. * aspirin delayed-release 81 mg tablet Take  by mouth daily. * aspirin 81 mg chewable tablet Take 1 Tab by mouth daily for 30 days. atorvastatin 20 mg tablet Commonly known as:  LIPITOR Take  by mouth daily. benztropine 1 mg tablet Commonly known as:  COGENTIN Take 1 mg by mouth two (2) times a day. carvedilol 6.25 mg tablet Commonly known as:  Vanessa Chante Take 1 Tab by mouth two (2) times daily (with meals). clonazePAM 0.5 mg tablet Commonly known as:  Erven Level Take 1 Tab by mouth nightly as needed. Max Daily Amount: 0.5 mg. Indications: Panic Disorder DULoxetine 30 mg capsule Commonly known as:  CYMBALTA Take 1 Cap by mouth daily. One daily in the morning, two at HS  
  
 haloperidol decanoate 100 mg/mL injection Commonly known as:  HALDOL DECANOATE  
100 mg every twenty-eight (28) days. loratadine 10 mg tablet Commonly known as:  Gaile Chimera Take 1 Tab by mouth daily. naproxen 500 mg tablet Commonly known as:  NAPROSYN Take 1 Tab by mouth two (2) times daily as needed. FOR PAIN   Indications: Pain  
  
 nicotine 22 mg/24 hr Pt24  
by TransDERmal route daily. DAILY FOR SMOKING CESSION RisperDAL 2 mg tablet Generic drug:  risperiDONE Take  by mouth two (2) times a day. SPIRIVA WITH HANDIHALER 18 mcg inhalation capsule Generic drug:  tiotropium Take 1 Cap by inhalation daily. traZODone 300 mg tablet Commonly known as:   Goshen Take 0.5 Tabs by mouth nightly. TUSSIN DM  mg/5 mL Liqd Generic drug:  guaiFENesin-dextromethorphan TAKE 10 ML BY MOUTH EVERY 6 HOURS AS NEEDED  
  
 zolpidem 10 mg tablet Commonly known as:  AMBIEN Take 1 Tab by mouth nightly as needed for Sleep. Max Daily Amount: 10 mg.  
  
 * Notice: This list has 2 medication(s) that are the same as other medications prescribed for you. Read the directions carefully, and ask your doctor or other care provider to review them with you. Patient Instructions -- 3 month's follow up Orthostatic Hypotension: Care Instructions Your Care Instructions Orthostatic hypotension is a quick drop in blood pressure. It happens when you get up from sitting or lying down. You may feel faint, lightheaded, or dizzy. When a person sits up or stands up, the body changes the way it pumps blood. This can slow the flow of blood to the brain for a very short time. And that can make you feel lightheaded. Many medicines can cause this problem, especially in older people. Lack of fluids (dehydration) or illnesses such as diabetes or heart disease also can cause it. Follow-up care is a key part of your treatment and safety. Be sure to make and go to all appointments, and call your doctor if you are having problems. It's also a good idea to know your test results and keep a list of the medicines you take. How can you care for yourself at home? · Tell your doctor about any problems you have with your medicines. · If your doctor prescribes medicine to help prevent a low blood pressure problem, take it exactly as prescribed. Call your doctor if you think you are having a problem with your medicine. · Drink plenty of fluids, enough so that your urine is light yellow or clear like water. Choose water and other caffeine-free clear liquids. If you have kidney, heart, or liver disease and have to limit fluids, talk with your doctor before you increase the amount of fluids you drink. · Limit or avoid alcohol and caffeine. · Get up slowly from bed or after sitting for a long time. If you are in bed, roll to your side and swing your legs over the edge of the bed and onto the floor. Push your body up to a sitting position. Wait for a while before you slowly stand up. If you are dizzy or lightheaded, sit or lie down. When should you call for help? Call 911 anytime you think you may need emergency care. For example, call if: 
? · You passed out (lost consciousness). ? Watch closely for changes in your health, and be sure to contact your doctor if: 
? · You do not get better as expected. Where can you learn more? Go to http://amy-ricardo.info/. Enter T470 in the search box to learn more about \"Orthostatic Hypotension: Care Instructions. \" Current as of: September 21, 2016 Content Version: 11.4 © 7880-5491 [employer]. Care instructions adapted under license by [employer] (which disclaims liability or warranty for this information). If you have questions about a medical condition or this instruction, always ask your healthcare professional. [address] any warranty or liability for your use of this information. Introducing [facility]! Nurys Liu introduces [employer] patient portal. Now you can access parts of your medical record, email your doctor's office, and request medication refills online. 1. In your internet browser, go to [email or web address] 2. Click on the First Time User? Click Here link in the Sign In box. You will see the New Member Sign Up page. 3. Enter your [employer] Access Code exactly as it appears below. You will not need to use this code after youve completed the sign-up process. If you do not sign up before the expiration date, you must request a new code. · [employer] Access Code: [email or web address] Expires: 8/2/2018 11:47 AM 
 
4.  Enter the last four digits of your Social Security Number (xxxx) and Date of Birth (mm/dd/yyyy) as indicated and click Submit. You will be taken to the next sign-up page. 5. Create a [employer] ID. This will be your [employer] login ID and cannot be changed, so think of one that is secure and easy to remember. 6. Create a [employer] password. You can change your password at any time. 7. Enter your Password Reset Question and Answer. This can be used at a later time if you forget your password. 8. Enter your e-mail address. You will receive e-mail notification when new information is available in [address] 9. Click Sign Up. You can now view and download portions of your medical record. 10. Click the Download Summary menu link to download a portable copy of your medical information. If you have questions, please visit the Frequently Asked Questions section of the [employer] website. Remember, [employer] is NOT to be used for urgent needs. For medical emergencies, dial 911. Now available from your iPhone and Android! Please provide this summary of care documentation to your next provider. Your primary care clinician is listed as Chantel. If you have any questions after today's visit, please call [phone].

## 2018-06-22 NOTE — PROGRESS NOTES
Ambulatory cardiology attending physician note:    Psoriasis has not had any further episodes of syncope. He is exercising precautions with regard to postural change but the psychiatric nurse has been curtailing some of psychiatric medications with autonomic effects as well as possible QT prolongation. With a complete medication reconciliation was possible redundancy regard to beta-blockade. Blood pressure is low enough so that he will probably do well without lisinopril. These clarification return in  in printed form with the patient and caregiver. Last echo showed a 35% ejection fraction with moderate congestive cardiomyopathy. He will be monitored for possible weight gain or new onset of breathlessness and he will be seen immediately for recurrence of syncope, was a return visit 3 months.

## 2018-06-22 NOTE — PROGRESS NOTES
Chief Complaint   Patient presents with    Hypotension     no other complaints     1. Have you been to the ER, urgent care clinic since your last visit? Hospitalized since your last visit?no  2. Have you seen or consulted any other health care providers outside of the [facility] since your last visit? Include any pap smears or colon screening. 6/6/18; [facility] .  Lynn Blood ;

## 2018-08-27 ENCOUNTER — OFFICE VISIT (OUTPATIENT)
Dept: SLEEP MEDICINE | Age: 53
End: 2018-08-27

## 2018-08-27 VITALS
WEIGHT: 159.7 LBS | HEIGHT: 68 IN | DIASTOLIC BLOOD PRESSURE: 73 MMHG | SYSTOLIC BLOOD PRESSURE: 107 MMHG | BODY MASS INDEX: 24.2 KG/M2 | HEART RATE: 83 BPM | OXYGEN SATURATION: 96 %

## 2018-08-27 DIAGNOSIS — G47.33 OSA (OBSTRUCTIVE SLEEP APNEA): Primary | ICD-10-CM

## 2018-08-27 NOTE — PROGRESS NOTES
[address] [facility] [place]. [place], [address]  Tel.  [phone]  Fax. [address] [facility] 60  [place], [address] [facility]  Tel.  [phone]  Fax. [phone] [address] [facility] Toby Dimas   Tel.  [phone]  Fax. [phone]       Chief Complaint       Chief Complaint   Patient presents with    Sleep Problem     NP. refd by Rossy Meza NP for chronic systolic heart failure       HPI      Arsh Huggins is a  47y.o. year old male referred by Dr. Rossy Meza for evaluation of a sleep disorder  . The patient is accompanied by his . The patient reports he has experienced daytime sleepiness, snoring, non-restorative sleep, nocturnal awakening. The patient retires at 99: 30 pm and awakens at 5 am. The patient notes that he will experience frequent awakening from sleep. In general he is able to return to sleep after awakening. he tends to awaken spontaneously. He notes that he awakens 3-4 times during the night. He is tired on awakening. He will take naps during the day with modest improvement in fatigue. He notes that he is more short of breath when sleeping supine and has obtained a recliner sleeping  semi-recumbent. He notes that he    Drinks a number of caffeinated beverages throughout the day. The patient has not undergone diagnostic testing for the current problems. [place] Sleepiness Score: 7       No Known Allergies    Current Outpatient Prescriptions   Medication Sig Dispense Refill    traZODone (DESYREL) 300 mg tablet Take 0.5 Tabs by mouth nightly. 30 Tab 3    clonazePAM (KLONOPIN) 0.5 mg tablet Take 1 Tab by mouth nightly as needed. Max Daily Amount: 0.5 mg. Indications: Panic Disorder (Patient taking differently: Take 0.5 mg by mouth two (2) times a day.) 60 Tab 3    DULoxetine (CYMBALTA) 30 mg capsule Take 1 Cap by mouth daily.  One daily in the morning, two at HS (Patient taking differently: Take 60 mg by mouth daily. One daily in the morning, two at HS) 90 Cap 3    loratadine (CLARITIN) 10 mg tablet Take 1 Tab by mouth daily. 30 Tab 3    zolpidem (AMBIEN) 10 mg tablet Take 1 Tab by mouth nightly as needed for Sleep. Max Daily Amount: 10 mg. 30 Tab 3    naproxen (NAPROSYN) 500 mg tablet Take 1 Tab by mouth two (2) times daily as needed. FOR PAIN   Indications: Pain 60 Tab 3    aspirin delayed-release 81 mg tablet Take  by mouth daily.  carvedilol (COREG) 6.25 mg tablet Take 1 Tab by mouth two (2) times daily (with meals). 60 Tab 6    atorvastatin (LIPITOR) 20 mg tablet Take  by mouth daily.  tiotropium (SPIRIVA WITH HANDIHALER) 18 mcg inhalation capsule Take 1 Cap by inhalation daily.  nicotine 22 mg/24 hr pt24 by TransDERmal route daily. DAILY FOR SMOKING CESSION      albuterol (VENTOLIN HFA) 90 mcg/actuation inhaler Take 2 Puffs by inhalation as needed for Wheezing. (Patient taking differently: Take 2 Puffs by inhalation. EVERY 4-6 HOURS AS NEEDED) 1 Inhaler 6    haloperidol decanoate (HALDOL DECANOATE) 100 mg/mL injection 100 mg every twenty-eight (28) days.  risperiDONE (RISPERDAL) 2 mg tablet Take  by mouth two (2) times a day.  TUSSIN DM  mg/5 mL liqd TAKE 10 ML BY MOUTH EVERY 6 HOURS AS NEEDED  0    benztropine (COGENTIN) 1 mg tablet Take 1 mg by mouth two (2) times a day.  aspirin 81 mg chewable tablet Take 1 Tab by mouth daily for 30 days. 30 Tab 0        He  has a past medical history of Hypertension and Psychiatric disorder. He  has no past surgical history on file. He family history is not on file. He  reports that he has been smoking. He has been smoking about 1.00 pack per day. He has never used smokeless tobacco. He reports that he does not drink alcohol or use illicit drugs. Review of Systems:  Review of Systems   Constitutional: Negative for chills and fever. HENT: Negative for hearing loss and tinnitus. Eyes: Positive for blurred vision. Negative for double vision. Respiratory: Positive for shortness of breath. Cardiovascular: Positive for palpitations. Gastrointestinal: Positive for abdominal pain and vomiting. Genitourinary: Positive for frequency. Musculoskeletal: Positive for back pain. Negative for neck pain. Skin: Positive for itching and rash. Neurological: Positive for dizziness and tingling. Psychiatric/Behavioral: Positive for depression, hallucinations and memory loss. The patient is nervous/anxious. Objective:     Visit Vitals    /73    Pulse 83    Ht 5' 8\" (1.727 m)    Wt 159 lb 11.2 oz (72.4 kg)    SpO2 96%    BMI 24.28 kg/m2     Body mass index is 24.28 kg/(m^2). General:   Conversant, cooperative   Eyes:  Pupils equal and reactive, no nystagmus   Oropharynx:   Mallampati score I tongue normal   Tonsils:      Neck:   No carotid bruits; Neck circ. in \"inches\": 13   Chest/Lungs:  Clear on auscultation    CVS:  Normal rate, regular rhythm   Skin:  Warm to touch; no obvious rashes   Neuro:  Speech fluent, face symmetrical, tongue movement normal   Psych:  Normal affect,  normal countenance        Assessment:       ICD-10-CM ICD-9-CM    1. ROSELINE (obstructive sleep apnea) G47.33 327.23      History consistent with sleep disordered breathing. His group residence has been contacted and will make arrangements for the patient to obtain a sleep study. Plan:     No orders of the defined types were placed in this encounter. * Patient has a history and examination consistent with the diagnosis of sleep apnea. * Sleep testing was ordered for initial evaluation. * He was provided information on sleep apnea including corresponding risk factors and the importance of proper treatment. * Treatment options if indicated were reviewed today. Instructions:  o Do not engage in activities requiring a normal degree of alertness if fatigue is present.   o The patient understands that untreated or undertreated sleep apnea could impair judgement and the ability to function normally during the day.  o Call or return if symptoms worsen or persist.          Estela Gonzalez MD, [facility]  Electronically signed 08/27/18       This note was created using voice recognition software. Despite editing, there may be syntax errors. This note will not be viewable in [address]

## 2018-08-27 NOTE — PATIENT INSTRUCTIONS
Sleep Apnea: Care Instructions  Your Care Instructions    Sleep apnea means that you frequently stop breathing for 10 seconds or longer during sleep. It can be mild to severe, based on the number of times an hour that you stop breathing or have slowed breathing. Blocked or narrowed airways in your nose, mouth, or throat can cause sleep apnea. Your airway can become blocked when your throat muscles and tongue relax during sleep. You can treat sleep apnea at home by making lifestyle changes. You also can use a CPAP breathing machine that keeps tissues in the throat from blocking your airway. Or your doctor may suggest that you use a breathing device while you sleep. It helps keep your airway open. This could be a device that you put in your mouth. Other examples include strips or disks that you use on your nose. In some cases, surgery may be needed to remove enlarged tissues in the throat. Follow-up care is a key part of your treatment and safety. Be sure to make and go to all appointments, and call your doctor if you are having problems. It's also a good idea to know your test results and keep a list of the medicines you take. How can you care for yourself at home? · Lose weight, if needed. It may reduce the number of times you stop breathing or have slowed breathing. · Sleep on your side. It may stop mild apnea. If you tend to roll onto your back, sew a pocket in the back of your pajama top. Put a tennis ball into the pocket, and stitch the pocket shut. This will help keep you from sleeping on your back. · Avoid alcohol and medicines such as sleeping pills and sedatives before bed. · Do not smoke. Smoking can make sleep apnea worse. If you need help quitting, talk to your doctor about stop-smoking programs and medicines. These can increase your chances of quitting for good. · Prop up the head of your bed 4 to 6 inches by putting bricks under the legs of the bed.   · Treat breathing problems, such as a stuffy nose, caused by a cold or allergies. · Try a continuous positive airway pressure (CPAP) breathing machine if your doctor recommends it. The machine keeps your airway open when you sleep. · If CPAP does not work for you, ask your doctor if you can try other breathing machines. A bilevel positive airway pressure machine uses one type of air pressure for breathing in and another type for breathing out. Another device raises or lowers air pressure as needed while you breathe. · Talk to your doctor if:  ¨ Your nose feels dry or bleeds when you use one of these machines. You may need to increase moisture in the air. A humidifier may help. ¨ Your nose is runny or stuffy from using a breathing machine. Decongestants or a corticosteroid nasal spray may help. ¨ You are sleepy during the day and it gets in the way of the normal things you do. Do not drive when you are drowsy. When should you call for help? Watch closely for changes in your health, and be sure to contact your doctor if:    · You still have sleep apnea even though you have made lifestyle changes.     · You are thinking of trying a device such as CPAP.     · You are having problems using a CPAP or similar machine. Where can you learn more? Go to http://amy-ricardo.info/. Enter S838 in the search box to learn more about \"Sleep Apnea: Care Instructions. \"  Current as of: December 6, 2017  Content Version: 11.7  © 1476-3578 [employer]. Care instructions adapted under license by [employer] (which disclaims liability or warranty for this information). If you have questions about a medical condition or this instruction, always ask your healthcare professional. [place] [address] any warranty or liability for your use of this information.

## 2018-09-05 DIAGNOSIS — I42.2 HYPERTROPHIC CARDIOMYOPATHY (HCC): ICD-10-CM

## 2018-09-06 RX ORDER — CARVEDILOL 6.25 MG/1
6.25 TABLET ORAL 2 TIMES DAILY WITH MEALS
Qty: 60 TAB | Refills: 3 | Status: SHIPPED | OUTPATIENT
Start: 2018-09-06 | End: 2019-05-24 | Stop reason: SDUPTHER

## 2018-11-13 ENCOUNTER — HOSPITAL ENCOUNTER (OUTPATIENT)
Dept: SLEEP MEDICINE | Age: 53
Discharge: HOME | End: 2018-11-13
Payer: MEDICARE

## 2018-11-13 DIAGNOSIS — G47.33 OSA (OBSTRUCTIVE SLEEP APNEA): ICD-10-CM

## 2018-11-13 PROCEDURE — 95810 POLYSOM 6/> YRS 4/> PARAM: CPT | Performed by: SPECIALIST

## 2018-11-13 PROCEDURE — 95810 POLYSOM 6/> YRS 4/> PARAM: CPT | Performed by: INTERNAL MEDICINE

## 2018-11-15 ENCOUNTER — TELEPHONE (OUTPATIENT)
Dept: SLEEP MEDICINE | Age: 53
End: 2018-11-15

## 2018-11-28 ENCOUNTER — TELEPHONE (OUTPATIENT)
Dept: SLEEP MEDICINE | Age: 53
End: 2018-11-28

## 2018-11-28 NOTE — TELEPHONE ENCOUNTER
The patient phoned the office with his  for results. He stated that the best way to reach him is through [employer] at [phone]. Please call with study results. Thanks!

## 2018-12-06 NOTE — TELEPHONE ENCOUNTER
Results of sleep study reviewed with  Compa Mcconnell.  He verbalized understanding and will communicate with Mr. Cheyenne Brewster

## 2018-12-19 ENCOUNTER — DOCUMENTATION ONLY (OUTPATIENT)
Dept: CARDIOLOGY CLINIC | Age: 53
End: 2018-12-19

## 2018-12-19 ENCOUNTER — TELEPHONE (OUTPATIENT)
Dept: CARDIOLOGY CLINIC | Age: 53
End: 2018-12-19

## 2018-12-19 NOTE — TELEPHONE ENCOUNTER
I called Alexander back and left a message informing him that I was unable to fax the order to discontinue the medication to Nohemi Noriega. It states, \"\" The voice mailbox is full. \"\" I also informed him that I tried to reach her by phone without success because it states, \"\"The voice mailbox is full. If you can reach Nohemi Noriega it would be greatly appreciated.

## 2018-12-19 NOTE — PROGRESS NOTES
2:45 PM    Left message on confidential voice mail of pt's  who's number is listed as pt's primary contact. I notified him of possible medication duplication that was brought to my attention through [address] [place] [address] , [employer]. (please see scanned media.) Pt is apparently on both lopressor and and carvedilol. Dangerous, not the least because pt has h/o syncope. Perhaps this is the cause. Difficult to do medication reconcilliations with outside providers.     Astrid Ron PA-C

## 2018-12-19 NOTE — PROGRESS NOTES
I spoke with Terrence Stevenson. He informed me that the patient is in a group home. The order to discontinue the Metoprolol needs to be faxed to Enriqueta Esparza at [phone].

## 2018-12-20 ENCOUNTER — TELEPHONE (OUTPATIENT)
Dept: CARDIOLOGY CLINIC | Age: 53
End: 2018-12-20

## 2018-12-20 NOTE — TELEPHONE ENCOUNTER
I attempted to reach Ms. Borja twice today without success. The phone call goes directly to voice mail. I also attempted to fax the discontinuation letter to the fax number given to me by [facility] without success.

## 2018-12-28 NOTE — TELEPHONE ENCOUNTER
I reached Miss Devendra Short and informed her that the patient should not be on two Beta Blockers. The Metoprolol can be discontinued. I informed her that I had tried to reach her several times via phone and fax without success. She did give me a new fax number of [phone]. Fax failed, no answer noted. Will mail letter certified.

## 2019-01-16 ENCOUNTER — APPOINTMENT (OUTPATIENT)
Dept: CT IMAGING | Age: 54
End: 2019-01-16
Attending: EMERGENCY MEDICINE
Payer: MEDICARE

## 2019-01-16 ENCOUNTER — HOSPITAL ENCOUNTER (EMERGENCY)
Age: 54
Discharge: HOME | End: 2019-01-17
Attending: EMERGENCY MEDICINE | Admitting: EMERGENCY MEDICINE
Payer: MEDICARE

## 2019-01-16 ENCOUNTER — APPOINTMENT (OUTPATIENT)
Dept: GENERAL RADIOLOGY | Age: 54
End: 2019-01-16
Attending: EMERGENCY MEDICINE
Payer: MEDICARE

## 2019-01-16 DIAGNOSIS — E86.0 DEHYDRATION: ICD-10-CM

## 2019-01-16 DIAGNOSIS — J98.4 APICAL LUNG SCARRING: ICD-10-CM

## 2019-01-16 DIAGNOSIS — I95.1 ORTHOSTASIS: Primary | ICD-10-CM

## 2019-01-16 DIAGNOSIS — B86 SCABIES: ICD-10-CM

## 2019-01-16 DIAGNOSIS — S01.01XA LACERATION OF SCALP, INITIAL ENCOUNTER: ICD-10-CM

## 2019-01-16 LAB
ALBUMIN SERPL-MCNC: 3.3 G/DL (ref 3.5–5)
ALBUMIN/GLOB SERPL: 0.9 {RATIO} (ref 1.1–2.2)
ALP SERPL-CCNC: 75 U/L (ref 45–117)
ALT SERPL-CCNC: 39 U/L (ref 12–78)
ANION GAP SERPL CALC-SCNC: 5 MMOL/L (ref 5–15)
AST SERPL-CCNC: 29 U/L (ref 15–37)
BASOPHILS # BLD: 0 K/UL (ref 0–0.1)
BASOPHILS NFR BLD: 1 % (ref 0–1)
BILIRUB SERPL-MCNC: 0.4 MG/DL (ref 0.2–1)
BUN SERPL-MCNC: 8 MG/DL (ref 6–20)
BUN/CREAT SERPL: 9 (ref 12–20)
CALCIUM SERPL-MCNC: 8.2 MG/DL (ref 8.5–10.1)
CHLORIDE SERPL-SCNC: 101 MMOL/L (ref 97–108)
CK SERPL-CCNC: 84 U/L (ref 39–308)
CO2 SERPL-SCNC: 28 MMOL/L (ref 21–32)
CREAT SERPL-MCNC: 0.89 MG/DL (ref 0.7–1.3)
DIFFERENTIAL METHOD BLD: ABNORMAL
EOSINOPHIL # BLD: 0.7 K/UL (ref 0–0.4)
EOSINOPHIL NFR BLD: 10 % (ref 0–7)
ERYTHROCYTE [DISTWIDTH] IN BLOOD BY AUTOMATED COUNT: 13.8 % (ref 11.5–14.5)
GLOBULIN SER CALC-MCNC: 3.8 G/DL (ref 2–4)
GLUCOSE SERPL-MCNC: 110 MG/DL (ref 65–100)
HCT VFR BLD AUTO: 42.1 % (ref 36.6–50.3)
HGB BLD-MCNC: 15.2 G/DL (ref 12.1–17)
IMM GRANULOCYTES # BLD AUTO: 0.1 K/UL (ref 0–0.04)
IMM GRANULOCYTES NFR BLD AUTO: 1 % (ref 0–0.5)
LYMPHOCYTES # BLD: 2.5 K/UL (ref 0.8–3.5)
LYMPHOCYTES NFR BLD: 35 % (ref 12–49)
MCH RBC QN AUTO: 33.6 PG (ref 26–34)
MCHC RBC AUTO-ENTMCNC: 36.1 G/DL (ref 30–36.5)
MCV RBC AUTO: 93.1 FL (ref 80–99)
MONOCYTES # BLD: 0.7 K/UL (ref 0–1)
MONOCYTES NFR BLD: 11 % (ref 5–13)
NEUTS SEG # BLD: 3 K/UL (ref 1.8–8)
NEUTS SEG NFR BLD: 43 % (ref 32–75)
NRBC # BLD: 0 K/UL (ref 0–0.01)
NRBC BLD-RTO: 0 PER 100 WBC
PLATELET # BLD AUTO: 201 K/UL (ref 150–400)
PMV BLD AUTO: 9.9 FL (ref 8.9–12.9)
POTASSIUM SERPL-SCNC: 3.6 MMOL/L (ref 3.5–5.1)
PROT SERPL-MCNC: 7.1 G/DL (ref 6.4–8.2)
RBC # BLD AUTO: 4.52 M/UL (ref 4.1–5.7)
SODIUM SERPL-SCNC: 134 MMOL/L (ref 136–145)
TROPONIN I SERPL-MCNC: <0.05 NG/ML
WBC # BLD AUTO: 7 K/UL (ref 4.1–11.1)

## 2019-01-16 PROCEDURE — 80053 COMPREHEN METABOLIC PANEL: CPT

## 2019-01-16 PROCEDURE — 99284 EMERGENCY DEPT VISIT MOD MDM: CPT

## 2019-01-16 PROCEDURE — 70450 CT HEAD/BRAIN W/O DYE: CPT

## 2019-01-16 PROCEDURE — 84484 ASSAY OF TROPONIN QUANT: CPT

## 2019-01-16 PROCEDURE — 71045 X-RAY EXAM CHEST 1 VIEW: CPT

## 2019-01-16 PROCEDURE — 93005 ELECTROCARDIOGRAM TRACING: CPT

## 2019-01-16 PROCEDURE — 72125 CT NECK SPINE W/O DYE: CPT

## 2019-01-16 PROCEDURE — 85025 COMPLETE CBC W/AUTO DIFF WBC: CPT

## 2019-01-16 PROCEDURE — 36415 COLL VENOUS BLD VENIPUNCTURE: CPT

## 2019-01-16 PROCEDURE — 82550 ASSAY OF CK (CPK): CPT

## 2019-01-16 RX ORDER — LIDOCAINE HYDROCHLORIDE AND EPINEPHRINE 20; 10 MG/ML; UG/ML
1.5 INJECTION, SOLUTION INFILTRATION; PERINEURAL
Status: DISCONTINUED | OUTPATIENT
Start: 2019-01-16 | End: 2019-01-17 | Stop reason: HOSPADM

## 2019-01-16 RX ORDER — SODIUM CHLORIDE 9 MG/ML
1000 INJECTION, SOLUTION INTRAVENOUS ONCE
Status: DISCONTINUED | OUTPATIENT
Start: 2019-01-16 | End: 2019-01-17

## 2019-01-16 RX ORDER — ACETAMINOPHEN 325 MG/1
975 TABLET ORAL
Status: COMPLETED | OUTPATIENT
Start: 2019-01-16 | End: 2019-01-17

## 2019-01-17 VITALS
SYSTOLIC BLOOD PRESSURE: 135 MMHG | RESPIRATION RATE: 25 BRPM | HEIGHT: 68 IN | OXYGEN SATURATION: 96 % | BODY MASS INDEX: 22.28 KG/M2 | DIASTOLIC BLOOD PRESSURE: 79 MMHG | TEMPERATURE: 97.8 F | WEIGHT: 147 LBS | HEART RATE: 90 BPM

## 2019-01-17 LAB
ATRIAL RATE: 88 BPM
CALCULATED P AXIS, ECG09: 69 DEGREES
CALCULATED R AXIS, ECG10: 75 DEGREES
CALCULATED T AXIS, ECG11: 62 DEGREES
DIAGNOSIS, 93000: NORMAL
P-R INTERVAL, ECG05: 152 MS
Q-T INTERVAL, ECG07: 378 MS
QRS DURATION, ECG06: 86 MS
QTC CALCULATION (BEZET), ECG08: 457 MS
VENTRICULAR RATE, ECG03: 88 BPM

## 2019-01-17 PROCEDURE — 74011250637 HC RX REV CODE- 250/637: Performed by: PHYSICIAN ASSISTANT

## 2019-01-17 PROCEDURE — 77030008460 HC STPLR SKN PRECIS 3M -A

## 2019-01-17 PROCEDURE — 74011250637 HC RX REV CODE- 250/637: Performed by: EMERGENCY MEDICINE

## 2019-01-17 PROCEDURE — 77030018836 HC SOL IRR NACL ICUM -A

## 2019-01-17 PROCEDURE — 74011250636 HC RX REV CODE- 250/636: Performed by: EMERGENCY MEDICINE

## 2019-01-17 PROCEDURE — 90471 IMMUNIZATION ADMIN: CPT

## 2019-01-17 PROCEDURE — 75810000293 HC SIMP/SUPERF WND  RPR

## 2019-01-17 PROCEDURE — 90715 TDAP VACCINE 7 YRS/> IM: CPT | Performed by: EMERGENCY MEDICINE

## 2019-01-17 RX ORDER — PERMETHRIN 50 MG/G
CREAM TOPICAL
Qty: 60 G | Refills: 0 | Status: SHIPPED | OUTPATIENT
Start: 2019-01-17 | End: 2019-02-16

## 2019-01-17 RX ADMIN — BACITRACIN ZINC, NEOMYCIN SULFATE, POLYMYXIN B SULFATE 1 PACKET: 3.5; 5000; 4 OINTMENT TOPICAL at 00:51

## 2019-01-17 RX ADMIN — ACETAMINOPHEN 975 MG: 325 TABLET ORAL at 00:51

## 2019-01-17 RX ADMIN — TETANUS TOXOID, REDUCED DIPHTHERIA TOXOID AND ACELLULAR PERTUSSIS VACCINE, ADSORBED 0.5 ML: 5; 2.5; 8; 8; 2.5 SUSPENSION INTRAMUSCULAR at 00:51

## 2019-01-17 NOTE — ED PROVIDER NOTES
EMERGENCY DEPARTMENT HISTORY AND PHYSICAL EXAM 
 
 
Date: 1/16/2019 Patient Name: Liberty Anders History of Presenting Illness Chief Complaint Patient presents with  Syncope  Head Injury  Laceration  Neck Pain  Insect Bite History Provided By: Patient HPI: Liberty Anders, 46 y.o. male with PMHx significant for HTN and COPD, presents ambulatory to the ED for evaluation after a syncopal episode and GLF that occurred today resulting in a laceration on the top of the pt's head, neck pain, and SOB. He states that he was in his bedroom when the episode occurred and experiencing dizziness during prior to falling and had LOC for ~10s. He reports feeling off balance w/ standing and states that he has to stand slowly to avoid getting dizzy. He reports feeling dizzy and experiencing near-syncopal episodes for the past year. He states that he can ambulated w/o assistance at baseline. He reports that he is using his inhaler regularly. He denies a hx of cardiac stents, but notes taking heart medications today. He also c/o generalized, pruritic bed bug bites generally across his body. He states that he lives in a group home and reports that there is an infestation of bed bugs and has been bitten multiple times. He denies getting scabies on his bilateral hands and states that he is using a cream for the itching. He reports having old self-harm cut scars on his bilateral wrists. The pt specifically denies experiencing incontinence, numbness/weakness/tingling, urinary sxs, back pain, fever, cough, chills, CP, or N/V/D. PMHx: HTN and COPD SHx: - EtOH, + tobacco, - illicit drugs There are no other complaints, changes, or physical findings at this time. PCP: Arley Kidney, NP No current facility-administered medications on file prior to encounter. Current Outpatient Medications on File Prior to Encounter Medication Sig Dispense Refill  carvedilol (COREG) 6.25 mg tablet Take 1 Tab by mouth two (2) times daily (with meals). 60 Tab 3  
 traZODone (DESYREL) 300 mg tablet Take 0.5 Tabs by mouth nightly. 30 Tab 3  clonazePAM (KLONOPIN) 0.5 mg tablet Take 1 Tab by mouth nightly as needed. Max Daily Amount: 0.5 mg. Indications: Panic Disorder (Patient taking differently: Take 0.5 mg by mouth two (2) times a day.) 60 Tab 3  
 DULoxetine (CYMBALTA) 30 mg capsule Take 1 Cap by mouth daily. One daily in the morning, two at HS (Patient taking differently: Take 60 mg by mouth daily. One daily in the morning, two at HS) 90 Cap 3  
 loratadine (CLARITIN) 10 mg tablet Take 1 Tab by mouth daily. 30 Tab 3  
 zolpidem (AMBIEN) 10 mg tablet Take 1 Tab by mouth nightly as needed for Sleep. Max Daily Amount: 10 mg. 30 Tab 3  
 naproxen (NAPROSYN) 500 mg tablet Take 1 Tab by mouth two (2) times daily as needed. FOR PAIN   Indications: Pain 60 Tab 3  
 aspirin delayed-release 81 mg tablet Take  by mouth daily.  atorvastatin (LIPITOR) 20 mg tablet Take  by mouth daily.  tiotropium (SPIRIVA WITH HANDIHALER) 18 mcg inhalation capsule Take 1 Cap by inhalation daily.  nicotine 22 mg/24 hr pt24 by TransDERmal route daily. DAILY FOR SMOKING CESSION    
 albuterol (VENTOLIN HFA) 90 mcg/actuation inhaler Take 2 Puffs by inhalation as needed for Wheezing. (Patient taking differently: Take 2 Puffs by inhalation. EVERY 4-6 HOURS AS NEEDED) 1 Inhaler 6  
 haloperidol decanoate (HALDOL DECANOATE) 100 mg/mL injection 100 mg every twenty-eight (28) days.  risperiDONE (RISPERDAL) 2 mg tablet Take  by mouth two (2) times a day.  TUSSIN DM  mg/5 mL liqd TAKE 10 ML BY MOUTH EVERY 6 HOURS AS NEEDED  0  
 benztropine (COGENTIN) 1 mg tablet Take 1 mg by mouth two (2) times a day.  aspirin 81 mg chewable tablet Take 1 Tab by mouth daily for 30 days. 30 Tab 0 Past History Past Medical History: 
Past Medical History:  
Diagnosis Date  Hypertension  Psychiatric disorder Past Surgical History: No past surgical history on file. Family History: No family history on file. Social History: 
Social History Tobacco Use  Smoking status: Current Every Day Smoker Packs/day: 1.00  Smokeless tobacco: Never Used Substance Use Topics  Alcohol use: No  
 Drug use: No  
 
 
Allergies: 
No Known Allergies Review of Systems Review of Systems Constitutional: Negative for chills and fever. HENT: Negative for congestion. Eyes: Negative for visual disturbance. Respiratory: Positive for shortness of breath. Negative for cough and chest tightness. Cardiovascular: Negative for chest pain and leg swelling. Gastrointestinal: Negative for abdominal pain, diarrhea, nausea and vomiting.  
     -incontinence Endocrine: Negative for polyuria. Genitourinary: Negative. Negative for dysuria and frequency. Musculoskeletal: Positive for neck pain. Negative for back pain and myalgias. Skin: Positive for wound. Negative for color change. Allergic/Immunologic: Negative for immunocompromised state. Neurological: Positive for dizziness and syncope. Negative for weakness, numbness and headaches. +head hit 
-tingling Physical Exam  
Physical Exam 
 
Nursing note and vitals reviewed. General appearance: non-toxic, NAD, c-collar in place Eyes: PERRL, EOMI, conjunctiva normal, anicteric sclera HEENT: mucous membranes dry, oropharynx is clear Pulmonary: lung sounds are slightly coarse, lungs resonant to percussion Cardiac: normal rate and regular rhythm, no murmurs, gallops, or rubs, 2+DP pulses, 2+ radial pulses Abdomen: soft, nontender, nondistended, bowel sounds present MSK: no pre-tibial edema, no thoracic or lumbar spinal step-offs Neuro: Alert, answers questions appropriately, CN II-XII intact; strength symmetric, light touch intact on extremities. Skin: capillary refill brisk, linear laceration on the L frontoparietal scalp, hemodynamically stable, no skull depression, excoriation of extremities and lower abdominal wall Diagnostic Study Results Labs - Recent Results (from the past 12 hour(s)) EKG, 12 LEAD, INITIAL Collection Time: 01/16/19  8:56 PM  
Result Value Ref Range Ventricular Rate 88 BPM  
 Atrial Rate 88 BPM  
 P-R Interval 152 ms QRS Duration 86 ms  
 Q-T Interval 378 ms QTC Calculation (Bezet) 457 ms Calculated P Axis 69 degrees Calculated R Axis 75 degrees Calculated T Axis 62 degrees Diagnosis Normal sinus rhythm Normal ECG When compared with ECG of 05-APR-2017 15:32, No significant change was found CBC WITH AUTOMATED DIFF Collection Time: 01/16/19  9:25 PM  
Result Value Ref Range WBC 7.0 4.1 - 11.1 K/uL  
 RBC 4.52 4.10 - 5.70 M/uL  
 HGB 15.2 12.1 - 17.0 g/dL HCT 42.1 36.6 - 50.3 % MCV 93.1 80.0 - 99.0 FL  
 MCH 33.6 26.0 - 34.0 PG  
 MCHC 36.1 30.0 - 36.5 g/dL  
 RDW 13.8 11.5 - 14.5 % PLATELET 578 983 - 000 K/uL MPV 9.9 8.9 - 12.9 FL  
 NRBC 0.0 0  WBC ABSOLUTE NRBC 0.00 0.00 - 0.01 K/uL NEUTROPHILS 43 32 - 75 % LYMPHOCYTES 35 12 - 49 % MONOCYTES 11 5 - 13 % EOSINOPHILS 10 (H) 0 - 7 % BASOPHILS 1 0 - 1 % IMMATURE GRANULOCYTES 1 (H) 0.0 - 0.5 % ABS. NEUTROPHILS 3.0 1.8 - 8.0 K/UL  
 ABS. LYMPHOCYTES 2.5 0.8 - 3.5 K/UL  
 ABS. MONOCYTES 0.7 0.0 - 1.0 K/UL  
 ABS. EOSINOPHILS 0.7 (H) 0.0 - 0.4 K/UL  
 ABS. BASOPHILS 0.0 0.0 - 0.1 K/UL  
 ABS. IMM. GRANS. 0.1 (H) 0.00 - 0.04 K/UL  
 DF AUTOMATED METABOLIC PANEL, COMPREHENSIVE Collection Time: 01/16/19  9:25 PM  
Result Value Ref Range Sodium 134 (L) 136 - 145 mmol/L Potassium 3.6 3.5 - 5.1 mmol/L Chloride 101 97 - 108 mmol/L  
 CO2 28 21 - 32 mmol/L Anion gap 5 5 - 15 mmol/L Glucose 110 (H) 65 - 100 mg/dL BUN 8 6 - 20 MG/DL  Creatinine 0.89 0.70 - 1.30 MG/DL  
 BUN/Creatinine ratio 9 (L) 12 - 20 GFR est AA >60 >60 ml/min/1.73m2 GFR est non-AA >60 >60 ml/min/1.73m2 Calcium 8.2 (L) 8.5 - 10.1 MG/DL Bilirubin, total 0.4 0.2 - 1.0 MG/DL  
 ALT (SGPT) 39 12 - 78 U/L  
 AST (SGOT) 29 15 - 37 U/L Alk. phosphatase 75 45 - 117 U/L Protein, total 7.1 6.4 - 8.2 g/dL Albumin 3.3 (L) 3.5 - 5.0 g/dL Globulin 3.8 2.0 - 4.0 g/dL A-G Ratio 0.9 (L) 1.1 - 2.2 CK Collection Time: 01/16/19  9:25 PM  
Result Value Ref Range CK 84 39 - 308 U/L  
TROPONIN I Collection Time: 01/16/19  9:25 PM  
Result Value Ref Range Troponin-I, Qt. <0.05 <0.05 ng/mL Radiologic Studies -  
CT HEAD WO CONT (Final result) Result time 01/16/19 23:26:50 Final result by Mukesh Feliciano MD (01/16/19 23:26:50) Impression:  
 IMPRESSION: No acute findings. Narrative: EXAM: CT HEAD WO CONT INDICATION: Head trauma, closed, mild, GCS >= 13, no risk factors, neuro exam 
normal, syncopal episode and fall COMPARISON: None. CONTRAST: None. TECHNIQUE: Unenhanced CT of the head was performed using 5 mm images. Brain and 
bone windows were generated.  CT dose reduction was achieved through use of a 
standardized protocol tailored for this examination and automatic exposure 
control for dose modulation.   
 
FINDINGS: 
The ventricles and sulci are normal in size, shape and configuration and 
midline. There is no significant white matter disease. There is no intracranial 
hemorrhage, extra-axial collection, mass, mass effect or midline shift.  The 
basilar cisterns are open. No acute infarct is identified. The bone windows 
demonstrate no abnormalities. There is mucosal thickening of the right maxillary 
sinus.  
  
  
  
   
   
CT SPINE CERV WO CONT (Final result) Result time 01/16/19 23:36:20 Final result by Mukesh Feliciano MD (01/16/19 23:36:20) Impression:  
 IMPRESSION: 
 
 1. Multilevel degenerative changes without acute findings. 2. Centrilobular emphysema with probable asymmetric scarring at the right lung 
apex which could be followed with CT of the thorax. Narrative: EXAM:  CT CERVICAL SPINE WITHOUT CONTRAST INDICATION: Recent trauma, spine. COMPARISON: None. CONTRAST:  None. TECHNIQUE: Multislice helical CT of the cervical spine was performed without 
intravenous contrast administration.  Sagittal and coronal reconstructions were 
generated.  CT dose reduction was achieved through use of a standardized 
protocol tailored for this examination and automatic exposure control for dose 
modulation. FINDINGS: 
 
The alignment is within normal limits. There is no fracture or subluxation. The 
odontoid process is intact. The craniocervical junction is within normal limits. The prevertebral soft tissues are within normal limits. There is biapical lung 
scarring, greater on the right with centrilobular emphysema. C2-C3: There is no spinal canal or neural foraminal stenosis. C3-C4: Uncinate process hypertrophy and facet arthropathy with left neural 
foraminal narrowing. C4-C5: There is no spinal canal or neural foraminal stenosis. C5-C6: There is no spinal canal or neural foraminal stenosis. C6-C7: Uncinate process hypertrophy with bilateral neural foraminal narrowing, 
much greater on the right and vacuum disc. C7-T1: There is no spinal canal or neural foraminal stenosis.    
   
XR CHEST PORT (Final result) Result time 01/16/19 23:13:30 Final result by Danis Barrera MD (01/16/19 23:13:30) Impression:  
 IMPRESSION: No acute findings. Narrative: EXAM: XR CHEST PORT INDICATION: dizzy, near syncope COMPARISON: 2017 FINDINGS: A portable AP radiograph of the chest was obtained at 2228 hours. The 
patient is on a cardiac monitor. The lungs are hyperinflated but clear.  The 
 cardiac and mediastinal contours and pulmonary vascularity are normal.  The 
bones and soft tissues are grossly within normal limits. Medical Decision Making I am the first provider for this patient. I reviewed the vital signs, available nursing notes, past medical history, past surgical history, family history and social history. Vital Signs-Reviewed the patient's vital signs. Patient Vitals for the past 12 hrs: 
 Temp Pulse Resp BP SpO2  
01/17/19 0127  90  135/79   
01/16/19 2145  89 25 120/71 96 % 01/16/19 2130  88 22 112/67 96 % 01/16/19 2115  89 21 108/66 96 % 01/16/19 2041 97.8 °F (36.6 °C) 91 18 131/75 96 % Pulse Oximetry Analysis - 96% on RA Cardiac Monitor:  
Rate: 91 bpm 
Rhythm: Normal Sinus Rhythm EKG interpretation: (Preliminary) (20:56:10) Rhythm: normal sinus rhythm; and regular . Rate (approx.): 88; Axis: normal; ST/T wave: no ST elevation, no ST depression; no significant ST changes, nl ECG. IN interval 152 ms, QRS 86 ms,  ms, QTc 457 ms. Written by Maryuri Bocanegra ED Scribe, as dictated by Chato Stephens MD. Records Reviewed: Nursing Notes and Old Medical Records Provider Notes (Medical Decision Making): DDx: dehydration, orthostasis, metabolic disorder, lower suspicion for arrhythmia, PE, or ACS Pt has drank >6 soft drinks in ED; ambulatory, asymptomatic, well appearing. D/c with outpatient f/u. ED Course:  
Initial assessment performed. The patients presenting problems have been discussed, and they are in agreement with the care plan formulated and outlined with them. I have encouraged them to ask questions as they arise throughout their visit. Procedure Note - Laceration Repair: 
11:30 PM 
Procedure by Monique Ann PA-C. Complexity: complex 4.7cm linear laceration to scalp  was irrigated copiously with NS under jet lavage, prepped with Chlorprep and draped in a sterile fashion.   The area was anesthetized with 2.5 mLs of  Lidocaine 2% with epinephrine via local infiltration. The wound was explored with the following results: No foreign bodies found. The wound was repaired with 9 staples. The wound was closed with good hemostasis and approximation. Estimated blood loss: 10mL The procedure took 16-30 minutes, and pt tolerated well. Critical Care Time: 0 minutes Disposition: 
DISCHARGE NOTE 
2:20 AM 
The patient has been re-evaluated and is ready for discharge. Reviewed available results with patient. Counseled pt on diagnosis and care plan. Pt has expressed understanding, and all questions have been answered. Pt agrees with plan and agrees to F/U as recommended, or return to the ED if their sxs worsen. Discharge instructions have been provided and explained to the pt, along with reasons to return to the ED. Written by Chao Dejesus ED Scribe, as dictated by Elida Valentine MD. 
 
PLAN: 
1. Discharge Medication List as of 1/17/2019  2:51 AM  
  
START taking these medications Details  
permethrin (ACTICIN) 5 % topical cream Apply over body from neck to feet, including on hands and fingernails; wash off after 8-12 hours; may repeat 1 time after 2 weeks, Normal, Disp-60 g, R-0  
  
  
CONTINUE these medications which have NOT CHANGED Details  
carvedilol (COREG) 6.25 mg tablet Take 1 Tab by mouth two (2) times daily (with meals). , Normal, Disp-60 Tab, R-3  
  
traZODone (DESYREL) 300 mg tablet Take 0.5 Tabs by mouth nightly., Normal, Disp-30 Tab, R-3  
  
clonazePAM (KLONOPIN) 0.5 mg tablet Take 1 Tab by mouth nightly as needed. Max Daily Amount: 0.5 mg. Indications: Panic Disorder, Print, Disp-60 Tab, R-3  
  
DULoxetine (CYMBALTA) 30 mg capsule Take 1 Cap by mouth daily. One daily in the morning, two at HS, Normal, Disp-90 Cap, R-3  
  
loratadine (CLARITIN) 10 mg tablet Take 1 Tab by mouth daily. , Normal, Disp-30 Tab, R-3  
  
 zolpidem (AMBIEN) 10 mg tablet Take 1 Tab by mouth nightly as needed for Sleep. Max Daily Amount: 10 mg., Print, Disp-30 Tab, R-3  
  
naproxen (NAPROSYN) 500 mg tablet Take 1 Tab by mouth two (2) times daily as needed. FOR PAIN   Indications: Pain, Normal, Disp-60 Tab, R-3  
  
aspirin delayed-release 81 mg tablet Take  by mouth daily. , Historical Med  
  
atorvastatin (LIPITOR) 20 mg tablet Take  by mouth daily. , Historical Med  
  
tiotropium (SPIRIVA WITH HANDIHALER) 18 mcg inhalation capsule Take 1 Cap by inhalation daily. , Historical Med  
  
nicotine 22 mg/24 hr pt24 by TransDERmal route daily. DAILY FOR SMOKING CESSION, Historical Med  
  
albuterol (VENTOLIN HFA) 90 mcg/actuation inhaler Take 2 Puffs by inhalation as needed for Wheezing., Normal, Disp-1 Inhaler, R-6  
  
haloperidol decanoate (HALDOL DECANOATE) 100 mg/mL injection 100 mg every twenty-eight (28) days. , Historical Med  
  
risperiDONE (RISPERDAL) 2 mg tablet Take  by mouth two (2) times a day., Historical Med  
  
TUSSIN DM  mg/5 mL liqd TAKE 10 ML BY MOUTH EVERY 6 HOURS AS NEEDED, Historical Med, R-0, ARMANDO  
  
benztropine (COGENTIN) 1 mg tablet Take 1 mg by mouth two (2) times a day., Historical Med  
  
aspirin 81 mg chewable tablet Take 1 Tab by mouth daily for 30 days. , Print, Disp-30 Tab, R-0  
  
  
 
2. Follow-up Information Follow up With Specialties Details Why Contact Info Arlene Marshall NP Nurse Practitioner Schedule an appointment as soon as possible for a visit in 1 week For suture removal [id number] [place] [id number] 
[phone] [facility] EMERGENCY DEPT Emergency Medicine Go in 1 day If symptoms worsen [address] [facility] 
[phone] Return to ED if worse Diagnosis Clinical Impression: 1. Orthostasis 2. Laceration of scalp, initial encounter 3. Scabies 4. Dehydration 5. Apical lung scarring -advised to f/u with PCP for lung scarring and any further imaging. Attestation: This note is prepared by Jenna Dumont, acting as Scribe for [employer]. Gaetano Garnica MD. [employer]. Gaetano Garnica MD: The scribe's documentation has been prepared under my direction and personally reviewed by me in its entirety. I confirm that the note above accurately reflects all work, treatment, procedures, and medical decision making performed by me.

## 2019-01-17 NOTE — ED NOTES
Pt discharged home with written and verbal instructions given to patient and patient ambulated to waiting room with steady gait.

## 2019-01-17 NOTE — DISCHARGE INSTRUCTIONS
Patient Education        Dehydration: Care Instructions  Your Care Instructions  Dehydration happens when your body loses too much fluid. This might happen when you do not drink enough water or you lose large amounts of fluids from your body because of diarrhea, vomiting, or sweating. Severe dehydration can be life-threatening. Water and minerals called electrolytes help put your body fluids back in balance. Learn the early signs of fluid loss, and drink more fluids to prevent dehydration. Follow-up care is a key part of your treatment and safety. Be sure to make and go to all appointments, and call your doctor if you are having problems. It's also a good idea to know your test results and keep a list of the medicines you take. How can you care for yourself at home? · To prevent dehydration, drink plenty of fluids, enough so that your urine is light yellow or clear like water. Choose water and other caffeine-free clear liquids until you feel better. If you have kidney, heart, or liver disease and have to limit fluids, talk with your doctor before you increase the amount of fluids you drink. · If you do not feel like eating or drinking, try taking small sips of water, sports drinks, or other rehydration drinks. · Get plenty of rest.  To prevent dehydration  · Add more fluids to your diet and daily routine, unless your doctor has told you not to. · During hot weather, drink more fluids. Drink even more fluids if you exercise a lot. Stay away from drinks with alcohol or caffeine. · Watch for the symptoms of dehydration. These include:  ? A dry, sticky mouth. ? Dark yellow urine, and not much of it. ? Dry and sunken eyes. ? Feeling very tired. · Learn what problems can lead to dehydration. These include:  ? Diarrhea, fever, and vomiting. ? Any illness with a fever, such as pneumonia or the flu. ?  Activities that cause heavy sweating, such as endurance races and heavy outdoor work in hot or humid weather. ? Alcohol or drug abuse or withdrawal.  ? Certain medicines, such as cold and allergy pills (antihistamines), diet pills (diuretics), and laxatives. ? Certain diseases, such as diabetes, cancer, and heart or kidney disease. When should you call for help? Call 911 anytime you think you may need emergency care. For example, call if:    · You passed out (lost consciousness).    Call your doctor now or seek immediate medical care if:    · You are confused and cannot think clearly.     · You are dizzy or lightheaded, or you feel like you may faint.     · You have signs of needing more fluids. You have sunken eyes and a dry mouth, and you pass only a little dark urine.     · You cannot keep fluids down.    Watch closely for changes in your health, and be sure to contact your doctor if:    · You are not making tears.     · Your skin is very dry and sags slowly back into place after you pinch it.     · Your mouth and eyes are very dry. Where can you learn more? Go to http://amy-ricardo.info/. Enter P306 in the search box to learn more about \"Dehydration: Care Instructions. \"  Current as of: November 20, 2017  Content Version: 11.8  © 5410-2459 [employer]. Care instructions adapted under license by [employer] (which disclaims liability or warranty for this information). If you have questions about a medical condition or this instruction, always ask your healthcare professional. [place] [address] any warranty or liability for your use of this information. Patient Education        Cuts: Care Instructions  Your Care Instructions  A cut can happen anywhere on your body. Stitches, staples, skin adhesives, or pieces of tape called Steri-Strips are sometimes used to keep the edges of a cut together and help it heal. Steri-Strips can be used by themselves or with stitches or staples. Sometimes cuts are left open.   If the cut went deep and through the skin, the doctor may have closed the cut in two layers. A deeper layer of stitches brings the deep part of the cut together. These stitches will dissolve and don't need to be removed. The upper layer closure, which could be stitches, staples, Steri-Strips, or adhesive, is what you see on the cut. A cut is often covered by a bandage. The doctor has checked you carefully, but problems can develop later. If you notice any problems or new symptoms, get medical treatment right away. Follow-up care is a key part of your treatment and safety. Be sure to make and go to all appointments, and call your doctor if you are having problems. It's also a good idea to know your test results and keep a list of the medicines you take. How can you care for yourself at home? If a cut is open or closed  · Prop up the sore area on a pillow anytime you sit or lie down during the next 3 days. Try to keep it above the level of your heart. This will help reduce swelling. · Keep the cut dry for the first 24 to 48 hours. After this, you can shower if your doctor okays it. Pat the cut dry. · Don't soak the cut, such as in a bathtub. Your doctor will tell you when it's safe to get the cut wet. · After the first 24 to 48 hours, clean the cut with soap and water 2 times a day unless your doctor gives you different instructions. ? Don't use hydrogen peroxide or alcohol, which can slow healing. ? You may cover the cut with a thin layer of petroleum jelly and a nonstick bandage. ? If the doctor put a bandage over the cut, put on a new bandage after cleaning the cut or if the bandage gets wet or dirty. · Avoid any activity that could cause your cut to reopen. · Be safe with medicines. Read and follow all instructions on the label. ? If the doctor gave you a prescription medicine for pain, take it as prescribed.   ? If you are not taking a prescription pain medicine, ask your doctor if you can take an over-the-counter medicine. If the cut is closed with stitches, staples, or Steri-Strips  · Follow the above instructions for open or closed cuts. · Do not remove the stitches or staples on your own. Your doctor will tell you when to come back to have the stitches or staples removed. · Leave Steri-Strips on until they fall off. If the cut is closed with a skin adhesive  · Follow the above instructions for open or closed cuts. · Leave the skin adhesive on your skin until it falls off on its own. This may take 5 to 10 days. · Do not scratch, rub, or pick at the adhesive. · Do not put the sticky part of a bandage directly on the adhesive. · Do not put any kind of ointment, cream, or lotion over the area. This can make the adhesive fall off too soon. Do not use hydrogen peroxide or alcohol, which can slow healing. When should you call for help? Call your doctor now or seek immediate medical care if:    · You have new pain, or your pain gets worse.     · The skin near the cut is cold or pale or changes color.     · You have tingling, weakness, or numbness near the cut.     · The cut starts to bleed, and blood soaks through the bandage. Oozing small amounts of blood is normal.     · You have trouble moving the area near the cut.     · You have symptoms of infection, such as:  ? Increased pain, swelling, warmth, or redness around the cut.  ? Red streaks leading from the cut.  ? Pus draining from the cut.  ? A fever.    Watch closely for changes in your health, and be sure to contact your doctor if:    · The cut reopens.     · You do not get better as expected. Where can you learn more? Go to http://amy-ricardo.info/. Enter M735 in the search box to learn more about \"Cuts: Care Instructions. \"  Current as of: November 20, 2017  Content Version: 11.8  © 3921-6979 [employer].  Care instructions adapted under license by [employer] (which disclaims liability or warranty for this information). If you have questions about a medical condition or this instruction, always ask your healthcare professional. [address] any warranty or liability for your use of this information. Patient Education        Scabies: Care Instructions  Your Care Instructions  Scabies is a skin problem that can cause intense itching. It is caused by very tiny bugs called mites that dig just under the skin and lay eggs. An allergic reaction to the mites causes the itching. Scabies is usually spread by person-to-person contact. It is also possible, but not common, for scabies to spread through towels, clothes, and bedding. Everyone in your household should be treated. Scabies is treated with medicine. Itching may last for several weeks after treatment. Follow-up care is a key part of your treatment and safety. Be sure to make and go to all appointments, and call your doctor if you are having problems. It's also a good idea to know your test results and keep a list of the medicines you take. How can you care for yourself at home? · Use the lotion or cream your doctor recommends or prescribes. One treatment usually cures scabies. Do not use the cream again unless your doctor tells you to. · Wash all clothes, bedding, and towels that you used in the 3 days before you started treatment. Use hot water, and use the hot cycle in the dryer. Another option is to dry-clean these items. Or seal them in a plastic bag for 3 to 7 days. · Take an oral antihistamine, such as loratadine (Claritin) or diphenhydramine (Benadryl), to help stop itching. You also can use a nonprescription anti-itch cream. Read and follow all instructions on the label. · Do not have physical contact with other people or let anyone use your personal items until you have finished treatment. Do not use other people's personal items until your treatment is done.  Tell people with whom you have close contact that they will need treatment if they have symptoms. · Take an oatmeal bath to help relieve itching. Add a handful of oatmeal (ground to a powder) to your bath. Or you can try an oatmeal bath product, such as Aveeno. When should you call for help? Call your doctor now or seek immediate medical care if:    · You have signs of infection, such as:  ? Increased pain, swelling, warmth, or redness. ? Red streaks leading from the mite bites. ? Pus draining from a bite area. ? A fever.    Watch closely for changes in your health, and be sure to contact your doctor if:    · Anyone else in your family has itching.     · You do not get better within 2 weeks. Where can you learn more? Go to http://amy-ricardo.info/. Enter T551 in the search box to learn more about \"Scabies: Care Instructions. \"  Current as of: April 18, 2018  Content Version: 11.8  © 5995-7553 [employer]. Care instructions adapted under license by [employer] (which disclaims liability or warranty for this information). If you have questions about a medical condition or this instruction, always ask your healthcare professional. [place] [address] any warranty or liability for your use of this information. Patient Education        Orthostatic Hypotension: Care Instructions  Your Care Instructions    Orthostatic hypotension is a quick drop in blood pressure. It happens when you get up from sitting or lying down. You may feel faint, lightheaded, or dizzy. When a person sits up or stands up, the body changes the way it pumps blood. This can slow the flow of blood to the brain for a very short time. And that can make you feel lightheaded. Many medicines can cause this problem, especially in older people. Lack of fluids (dehydration) or illnesses such as diabetes or heart disease also can cause it. Follow-up care is a key part of your treatment and safety.  Be sure to make and go to all appointments, and call your doctor if you are having problems. It's also a good idea to know your test results and keep a list of the medicines you take. How can you care for yourself at home? · Tell your doctor about any problems you have with your medicines. · If your doctor prescribes medicine to help prevent a low blood pressure problem, take it exactly as prescribed. Call your doctor if you think you are having a problem with your medicine. · Drink plenty of fluids, enough so that your urine is light yellow or clear like water. Choose water and other caffeine-free clear liquids. If you have kidney, heart, or liver disease and have to limit fluids, talk with your doctor before you increase the amount of fluids you drink. · Limit or avoid alcohol and caffeine. · Get up slowly from bed or after sitting for a long time. If you are in bed, roll to your side and swing your legs over the edge of the bed and onto the floor. Push your body up to a sitting position. Wait for a while before you slowly stand up. If you are dizzy or lightheaded, sit or lie down. When should you call for help? Call 911 anytime you think you may need emergency care. For example, call if:    · You passed out (lost consciousness).    Watch closely for changes in your health, and be sure to contact your doctor if:    · You do not get better as expected. Where can you learn more? Go to http://amy-ricardo.info/. Enter N390 in the search box to learn more about \"Orthostatic Hypotension: Care Instructions. \"  Current as of: December 6, 2017  Content Version: 11.8  © 8065-6675 [employer]. Care instructions adapted under license by [employer] (which disclaims liability or warranty for this information). If you have questions about a medical condition or this instruction, always ask your healthcare professional. [address] any warranty or liability for your use of this information.

## 2019-01-17 NOTE — ED TRIAGE NOTES
Pt to ed via ems with c/o near syncope. Per ems, pt had near syncopal episode and fell and hit his head and has laceration to the back of his head. Pt arrives in cervical collar. EMS also states house has bed bug infestation but they did not see any bed bugs. Pt has what appears to be insect bites all over his arms, hands, legs and torso.

## 2019-04-10 ENCOUNTER — OFFICE VISIT (OUTPATIENT)
Dept: CARDIOLOGY CLINIC | Age: 54
End: 2019-04-10

## 2019-04-10 VITALS
HEART RATE: 87 BPM | SYSTOLIC BLOOD PRESSURE: 127 MMHG | RESPIRATION RATE: 18 BRPM | BODY MASS INDEX: 25.61 KG/M2 | WEIGHT: 169 LBS | HEIGHT: 68 IN | DIASTOLIC BLOOD PRESSURE: 73 MMHG | OXYGEN SATURATION: 99 %

## 2019-04-10 DIAGNOSIS — R42 DIZZINESS: ICD-10-CM

## 2019-04-10 DIAGNOSIS — R06.02 SOB (SHORTNESS OF BREATH): ICD-10-CM

## 2019-04-10 DIAGNOSIS — R55 VASOVAGAL SYNCOPE: ICD-10-CM

## 2019-04-10 DIAGNOSIS — I42.2 HYPERTROPHIC CARDIOMYOPATHY (HCC): ICD-10-CM

## 2019-04-10 DIAGNOSIS — R00.0 TACHYCARDIA: Primary | ICD-10-CM

## 2019-04-10 RX ORDER — DULOXETIN HYDROCHLORIDE 60 MG/1
60 CAPSULE, DELAYED RELEASE ORAL
COMMUNITY
Start: 2019-03-22

## 2019-04-10 RX ORDER — IPRATROPIUM BROMIDE 17 UG/1
2 AEROSOL, METERED RESPIRATORY (INHALATION) 4 TIMES DAILY
COMMUNITY
Start: 2019-03-21 | End: 2022-02-26

## 2019-04-10 RX ORDER — UMECLIDINIUM BROMIDE AND VILANTEROL TRIFENATATE 62.5; 25 UG/1; UG/1
1 POWDER RESPIRATORY (INHALATION) DAILY
COMMUNITY
Start: 2019-03-21 | End: 2022-02-26

## 2019-04-10 RX ORDER — IBUPROFEN 200 MG
1 TABLET ORAL EVERY 24 HOURS
COMMUNITY
End: 2022-02-26

## 2019-04-10 RX ORDER — DEXAMETHASONE 4 MG/1
2 TABLET ORAL 2 TIMES DAILY
COMMUNITY
Start: 2019-03-21 | End: 2022-02-26

## 2019-04-10 RX ORDER — TRAZODONE HYDROCHLORIDE 50 MG/1
TABLET ORAL
COMMUNITY
Start: 2019-02-15 | End: 2020-11-06

## 2019-04-10 NOTE — PROGRESS NOTES
Chief Complaint   Patient presents with    Follow-up     11 mo    Cardiomyopathy     1. Have you been to the ER, urgent care clinic since your last visit? Hospitalized since your last visit? No    2. Have you seen or consulted any other health care providers outside of the [address] [place] [address] since your last visit? Include any pap smears or colon screening.  No

## 2019-04-10 NOTE — PROGRESS NOTES
Roselyn Pro is a 25-year-old male with long-term complex behavioral health issues. He resides in a supervised group home. There has been a problem in the past with postural syncope which has not been recurrent lately but on the last office encounter he was noted to have several medication redundancies including the combination of Lopressor and carvedilol. He has a primary care provider from a different system and somehow the patient and his  never managed to bring all of his medications. Today is no exception, he did not bring his medications and his  is not with him. He has a personal financial statement in with his medication list. I warned him to separate and conceal this unless needed. His Primary care source is Faye Hernandez. Review of his medication list shows both 30 mg and 60 mg Cymbalta, Risperdal 2 mg, Trazodone, Zolpidem, Benadryl, and Clonazepam.     He has coreg on the list but not lopressor any more. He has had one episode of postural syncope; stood up and fell forward, unguarded impact against furniture, ED visit, sutures at vertex of scalp. (staples out, wound healed). About 6 weeks ago. He has recurrent throbbing headaches, mostly frontal and temporal, lasting 6-8 hours, relieved by nothing ( the home will not administer PRN medications). He sleeps fairly, 1-2 awakenings at night for bladder, has a lot of trouble going back to sleep. He has increasing visual trouble and lost his glasses recently. He smokes about 10 cigarettes per day. EKG is perfectly normal today. BP today is 119/81 then 127/73. Weight is 169, height is 5'8\" with BMI of 25.70. Resp 18.  unlabored, room air SpO2 is 99%. Pulse is 87/reg. Faint resting tremor right hand only, tendon reflexes normal. Carotids silent, JV flat (seated). Lungs clear, cardiac auscultation normal, no murmur or gallop. PERRL. No edema, no DVT, peripheral pulses intact.  Lungs clear, no rales, no wheezing. In January, hemogram was unremarkable other than 10% eosinophils. Chemistries were normal with low creatinine. Chest X ray in January shows borderline normal cardiac silhouette with minor bilateral nodularity of vessels:        Echo in 2017      IMPRESSIONS:  Symmetrical focal hypokinesis of distal LV. Strong possibility of occult  ischemic disease or sequelae of small vessel insult as from cocaine. SUMMARY:  Left ventricle: The ventricle was moderately dilated. Systolic function  was moderately to markedly reduced. Ejection fraction was estimated to be  30 % to 35 %. There was severe hypokinesis of the apical wall(s). There  was severe hypokinesis of the apical anterior wall(s). There was severe  hypokinesis of the apical septal wall(s). There was severe hypokinesis of  the apical inferior wall(s). There was severe hypokinesis of the apical  lateral wall(s). Wall thickness was moderately increased. There was mild  concentric hypertrophy. There was no asymmetric hypertrophy. Mass was  borderline (top normal). Right ventricle: Wall thickness was markedly increased. Systolic pressure  was moderately to markedly increased. Estimated peak pressure was 50 mmHg. Atrial septum: There was increased thickness of the septum, consistent  with lipomatous hypertrophy. Tricuspid valve: There was mild regurgitation. Pulmonic valve: There was mild regurgitation    Subsequent nuclear perfusion in April 2017:    Study Result     History: Mr. Katharine Sahu is a 50-year-old male complaining of dyspnea on exertion. Multiple risk factors for coronary artery disease are present. He is recovering  from a long history of polysubstance abuse and living in a supervised program.  His dyspnea does not include chest pain but he is a rather vague medical  historian. 12-lead EKG shows sinus rhythm with diffuse nonspecific inferior and  anterolateral ST segment changes.     Stress:   The patient was unable to cooperate with treadmill exercise and  pharmacologic challenge was selected. He was injected with 0.4 mg Lexiscan  during seated leg exercise with EKG monitoring. Oxygen saturation remained at  98% throughout the test. At peak drug effect he was injected with Tc 99m  sestamibi for perfusion imaging. There were no EKG changes over and above  diffuse resting mild ST segment abnormality. Peak heart rate was 122 with blood  pressure of 162/65. There were no arrhythmias.     Images: SPECT myocardial perfusion imaging was accomplished in a one day  protocol, complaining 11 mCi of Tc 99m sestamibi at rest and then 33 mCi of the  same isotope during drug challenge. The raw data shows reasonable separation of  target organ from background with limited tracer uptake overall in the resting  images. Biliary excretion of isotope seems to follow normal timing. Tomographic  reconstruction shows small overall cardiac size with generally better perfusion  after Lexiscan than in the resting images, once allowance is made for  attenuation from the hepatic dome. There are no areas of infarction. In the  visual tomographic interpretation there is some doubt about the inferior wall,  but quantitative perfusion mapping is completely normal suggesting that the  attenuation is entirely hepatic artifact. Wall motion analysis at rest shows  concentric left ventricular hypertrophy with a 58% ejection fraction. After  Lexiscan the ejection fraction rises to 70% with a small LV cavity, increased  myocardial mass, and obvious concentric left ventricular hypertrophy.  Early  diastolic relaxation appears impaired.     IMPRESSION  IMPRESSION: Technically adequate test negative for coronary insufficiency,  positive for concentric type hypertrophic cardiomyopathy     IMPRESSION:    Orthostatic or postural syncope, recurrent with removal of redundant beta blocker    Likely source is mild dysautonomia compounded by his combination of behavioral health medication    Noted that he has 2 different doses of cymbalta. Frequent headaches associated with lightheadedness ?  Hypotensive in nature    Suggest:    Consider reduction of cymbalta    Reinforce orthostatic precautions    No evidence of heart disease as a source for syncope    Thank you    Kourtney Medrano MD [facility] [place]  [phone]

## 2019-04-10 NOTE — PATIENT INSTRUCTIONS
Please carry my note back with you to the care center. There is no need to change your heart medications right now.

## 2019-05-24 DIAGNOSIS — I42.2 HYPERTROPHIC CARDIOMYOPATHY (HCC): ICD-10-CM

## 2019-05-29 RX ORDER — CARVEDILOL 6.25 MG/1
6.25 TABLET ORAL 2 TIMES DAILY WITH MEALS
Qty: 60 TAB | Refills: 3 | Status: SHIPPED | OUTPATIENT
Start: 2019-05-29 | End: 2019-10-31 | Stop reason: SDUPTHER

## 2019-06-19 ENCOUNTER — OFFICE VISIT (OUTPATIENT)
Dept: CARDIOLOGY CLINIC | Age: 54
End: 2019-06-19

## 2019-06-19 VITALS
HEIGHT: 68 IN | WEIGHT: 175 LBS | OXYGEN SATURATION: 96 % | SYSTOLIC BLOOD PRESSURE: 136 MMHG | BODY MASS INDEX: 26.52 KG/M2 | HEART RATE: 90 BPM | DIASTOLIC BLOOD PRESSURE: 96 MMHG | RESPIRATION RATE: 20 BRPM

## 2019-06-19 DIAGNOSIS — I42.2 HYPERTROPHIC CARDIOMYOPATHY (HCC): ICD-10-CM

## 2019-06-19 DIAGNOSIS — I95.9 HYPOTENSION, UNSPECIFIED HYPOTENSION TYPE: ICD-10-CM

## 2019-06-19 DIAGNOSIS — R00.0 TACHYCARDIA: Primary | ICD-10-CM

## 2019-06-19 NOTE — PROGRESS NOTES
Chief Complaint   Patient presents with    Follow-up     8 wks    Hypertension     1. Have you been to the ER, urgent care clinic since your last visit? Hospitalized since your last visit? No    2. Have you seen or consulted any other health care providers outside of the [address] [place] [address] since your last visit? Include any pap smears or colon screening.  No

## 2019-06-19 NOTE — PROGRESS NOTES
[facility]   [address] [facility], [address] [place] [address] Road                                          NEW PATIENT HPI/FOLLOW-UP      NAME:  Yvette Brewer   :   1965   MRN:   [id number]   PCP:  Kingsley Layton NP           Subjective: The patient is a 46y.o. year old male smoker with h/o COPD, hypotension, falls and behavioral health disorder 2/2 long-term substance abuse who returns for a routine follow-up. Since the last visit, patient reports no new symptoms. Has knee pain due to arthritis and this causes his knees to buckle on occasion. He fell walking back to his residence last week resulting in a slightly scraped knee. He sees Dr. Soheila Willis at [facility] for primary care, who is also treating his COPD. He reports SOB with light exercise and sometimes at rest. Is using inhalers prescribed by PCP. Is smoking 10 cigarettes/day and understands how this is related to his chronic lung disease. Denies change in exercise tolerance, chest pain, edema, medication intolerance, palpitations,  PND/orthopnea, wheezing, sputum, syncope, dizziness or light headedness. Doing satisfactorily. Review of Systems  General: Pt denies excessive weight gain or loss. Pt is able to conduct ADL's. Respiratory: +shortness of breath, CHOPRA, Denies wheezing or stridor.   Cardiovascular: Denies precordial pain, palpitations, edema or PND  Gastrointestinal: Denies poor appetite, indigestion, abdominal pain or blood in stool  Peripheral vascular: Denies claudication, leg cramps  Neuropsychiatric: Denies paresthesias,tingling,numbness,anxiety,depression,fatigue  Musculoskeletal: Denies pain,tenderness, soreness,swelling      Past Medical History:   Diagnosis Date    Hypertension     Psychiatric disorder      Patient Active Problem List    Diagnosis Date Noted    Dizziness 2017    Hypertrophic cardiomyopathy ([facility] [address] 2017    SOB (shortness of breath) 2017    Tachycardia 12/18/2017      History reviewed. No pertinent surgical history. No Known Allergies   History reviewed. No pertinent family history. Social History     Socioeconomic History    Marital status:      Spouse name: Not on file    Number of children: Not on file    Years of education: Not on file    Highest education level: Not on file   Occupational History    Not on file   Social Needs    Financial resource strain: Not on file    Food insecurity:     Worry: Not on file     Inability: Not on file    Transportation needs:     Medical: Not on file     Non-medical: Not on file   Tobacco Use    Smoking status: Current Every Day Smoker     Packs/day: 1.00    Smokeless tobacco: Never Used   Substance and Sexual Activity    Alcohol use: No    Drug use: No    Sexual activity: Not on file   Lifestyle    Physical activity:     Days per week: Not on file     Minutes per session: Not on file    Stress: Not on file   Relationships    Social connections:     Talks on phone: Not on file     Gets together: Not on file     Attends Gnosticism service: Not on file     Active member of club or organization: Not on file     Attends meetings of clubs or organizations: Not on file     Relationship status: Not on file    Intimate partner violence:     Fear of current or ex partner: Not on file     Emotionally abused: Not on file     Physically abused: Not on file     Forced sexual activity: Not on file   Other Topics Concern    Not on file   Social History Narrative    ** Merged History Encounter **           Current Outpatient Medications   Medication Sig    carvedilol (COREG) 6.25 mg tablet Take 1 Tab by mouth two (2) times daily (with meals).  FLOVENT  mcg/actuation inhaler     ATROVENT HFA 17 mcg/actuation inhaler     ANORO ELLIPTA 62.5-25 mcg/actuation inhaler     traZODone (DESYREL) 300 mg tablet Take 0.5 Tabs by mouth nightly.     clonazePAM (KLONOPIN) 0.5 mg tablet Take 1 Tab by mouth nightly as needed. Max Daily Amount: 0.5 mg. Indications: Panic Disorder (Patient taking differently: Take 0.5 mg by mouth two (2) times a day.)    DULoxetine (CYMBALTA) 30 mg capsule Take 1 Cap by mouth daily. One daily in the morning, two at HS (Patient taking differently: Take 30 mg by mouth every morning. One daily in the morning, two at HS)    loratadine (CLARITIN) 10 mg tablet Take 1 Tab by mouth daily.  zolpidem (AMBIEN) 10 mg tablet Take 1 Tab by mouth nightly as needed for Sleep. Max Daily Amount: 10 mg.    naproxen (NAPROSYN) 500 mg tablet Take 1 Tab by mouth two (2) times daily as needed. FOR PAIN   Indications: Pain    atorvastatin (LIPITOR) 20 mg tablet Take  by mouth daily.  nicotine 22 mg/24 hr pt24 by TransDERmal route daily. DAILY FOR SMOKING CESSION    albuterol (VENTOLIN HFA) 90 mcg/actuation inhaler Take 2 Puffs by inhalation as needed for Wheezing. (Patient taking differently: Take 2 Puffs by inhalation. EVERY 4-6 HOURS AS NEEDED)    risperiDONE (RISPERDAL) 2 mg tablet Take  by mouth two (2) times a day.  TUSSIN DM  mg/5 mL liqd TAKE 10 ML BY MOUTH EVERY 6 HOURS AS NEEDED    benztropine (COGENTIN) 1 mg tablet Take 1 mg by mouth two (2) times a day.  traZODone (DESYREL) 50 mg tablet     DULoxetine (CYMBALTA) 60 mg capsule 60 mg nightly.  nicotine (NICODERM CQ) 14 mg/24 hr patch 1 Patch by TransDERmal route every twenty-four (24) hours.  aspirin delayed-release 81 mg tablet Take  by mouth daily.  tiotropium (SPIRIVA WITH HANDIHALER) 18 mcg inhalation capsule Take 1 Cap by inhalation daily.  haloperidol decanoate (HALDOL DECANOATE) 100 mg/mL injection 100 mg every twenty-eight (28) days.  aspirin 81 mg chewable tablet Take 1 Tab by mouth daily for 30 days. No current facility-administered medications for this visit.          I have reviewed the nurses notes, vitals, problem list, allergy list, medical history, family medical, social history and medications. Objective:     Physical Exam:     Vitals:    06/19/19 1418   BP: (!) 136/96   Pulse: 90   Resp: 20   SpO2: 96%   Weight: 175 lb (79.4 kg)   Height: 5' 8\" (1.727 m)    Body mass index is 26.61 kg/m². General: WDWN adult male, in no acute distress. Pleasant affect. HEENT: No carotid bruits, no JVD, trach is midline. Heart:  Normal S1/S2 negative S3 or S4. RRR, no murmur, gallop or rub.   Respiratory: Clear bilaterally, no wheezing or rales  Abdomen:   Soft, non-tender, bowel sounds are active.   Extremities:  No edema, normal cap refill, no cyanosis. Small scab right anterior knee. Neuro: A&Ox3, speech clear, gait stable. Skin: Skin color is normal. No rashes or lesions. No diaphoresis.   Vascular: 2+ pulses symmetric in all extremities        Data Review:       Cardiographics:    EKG interpretation:  Not performed today        Cardiology Labs:    Results for orders placed or performed during the hospital encounter of 01/16/19   EKG, 12 LEAD, INITIAL   Result Value Ref Range    Ventricular Rate 88 BPM    Atrial Rate 88 BPM    P-R Interval 152 ms    QRS Duration 86 ms    Q-T Interval 378 ms    QTC Calculation (Bezet) 457 ms    Calculated P Axis 69 degrees    Calculated R Axis 75 degrees    Calculated T Axis 62 degrees    Diagnosis       Normal sinus rhythm  When compared with ECG of 05-APR-2017 15:32,  No significant change was found  Confirmed by Vince Montano MD, Shama Oropeza ([id number]) on 1/17/2019 8:07:05 AM     Results for orders placed or performed during the hospital encounter of 10/23/17   ECG HOLTER MONITOR, UP  [facility]                    [address] [facility], [address]                                         Test Date:    2017-10-26  Chasidy Wadsworth Name:     Yfn Hatch             Department:     Patient ID:   [id number]                Room:           Gender:       Male Technician:     :          73-               Requested By: Dr. Felix Read  Order Number:                          Reading MD:   Felix Read MD                    Interpretive Statements  Nel Denson was in Sinus Rhythm. The average heart rate, excluding ectopy, was 95 BPM with a minimum of 73 BPM   at 04:30 D2 and a maximum of 135 BPM at  07:08 D2. Heart beats, including ectopy, totaled 283491 beats. There were no VENTRICULAR ectopics found. SUPRAVENTRICULAR ECTOPICS totaled 3  ,with 3 single and 0 paired beats. Holter monitor recording provided 24 hours 4 minutes of readable data. The   patient did not report any symptoms. Tracing quality is satisfactory. Baseline rhythm is sinus. AV conduction is   consistently normal. There is occasional normally conducted single APC's. Rate variations are physiologic. There is no ventricular ectopy at all, and   there are no variations in normal baseline ST segments. Impression:    Normal study with only insignificant atrial ectopy, no   symptoms    Felix Read MD [facility]  Electronically signed by Felix Read MD on Oct 26 2017  7:17PM CDT       No results found for: CHOL, CHOLX, CHLST, CHOLV, 548555, HDL, LDL, LDLC, DLDLP, TGLX, TRIGL, TRIGP, CHHD, [facility]    Lab Results   Component Value Date/Time    Sodium 134 (L) 2019 09:25 PM    Potassium 3.6 2019 09:25 PM    Chloride 101 2019 09:25 PM    CO2 28 2019 09:25 PM    Anion gap 5 2019 09:25 PM    Glucose 110 (H) 2019 09:25 PM    BUN 8 2019 09:25 PM    Creatinine 0.89 2019 09:25 PM    BUN/Creatinine ratio 9 (L) 2019 09:25 PM    GFR est AA >60 2019 09:25 PM    GFR est non-AA >60 2019 09:25 PM    Calcium 8.2 (L) 2019 09:25 PM    Bilirubin, total 0.4 2019 09:25 PM    AST (SGOT) 29 2019 09:25 PM    Alk.  phosphatase 75 2019 09:25 PM    Protein, total 7.1 2019 09:25 PM    Albumin 3.3 (L) 2019 09:25 PM    Globulin 3.8 01/16/2019 09:25 PM    A-G Ratio 0.9 (L) 01/16/2019 09:25 PM    ALT (SGPT) 39 01/16/2019 09:25 PM          Assessment:       ICD-10-CM ICD-9-CM    1. Tachycardia R00.0 785.0    2. Hypertrophic cardiomyopathy (HCC) I42.2 425.18    3. Hypotension, unspecified hypotension type I95.9 458.9          Discussion: Patient presents at this time stable from a cardiac perspective. BP was well controlled. Pt did bring a current list of medications, which shows only one beta-blocker: Coreg. However, he is on Ventolin (beta-agonist) for COPD as well. Pleased with present status. The patient was counseled on the dangers of tobacco use, and was advised to quit. Reviewed strategies to maximize success, including cutting back daily cigarettes by one each week. Discussed/seen with Dr. James Eva: 1. Continue same meds. 2.Encouraged to stop smoking. 3.Follow up: 6 months   --  Call with questions or concerns. I have discussed the diagnosis with the patient and the intended plan as seen in the above orders. The patient has received an after-visit summary and questions were answered concerning future plans. I have discussed any concerning medication side effects and warnings with the patient as well.     Kenny Baird PA-C  6/19/2019

## 2019-08-09 ENCOUNTER — OFFICE VISIT (OUTPATIENT)
Dept: CARDIOLOGY CLINIC | Age: 54
End: 2019-08-09

## 2019-08-09 VITALS
WEIGHT: 171 LBS | OXYGEN SATURATION: 95 % | SYSTOLIC BLOOD PRESSURE: 114 MMHG | HEART RATE: 85 BPM | RESPIRATION RATE: 16 BRPM | HEIGHT: 68 IN | BODY MASS INDEX: 25.91 KG/M2 | DIASTOLIC BLOOD PRESSURE: 84 MMHG

## 2019-08-09 DIAGNOSIS — G44.039 EPISODIC PAROXYSMAL HEMICRANIA, NOT INTRACTABLE: ICD-10-CM

## 2019-08-09 DIAGNOSIS — F51.01 PRIMARY INSOMNIA: Primary | ICD-10-CM

## 2019-08-09 RX ORDER — ACETAMINOPHEN 500 MG
500 TABLET ORAL
Qty: 60 TAB | Refills: 3 | Status: SHIPPED | OUTPATIENT
Start: 2019-08-09 | End: 2022-02-26

## 2019-08-09 RX ORDER — DIPHENHYDRAMINE HCL 50 MG
50 CAPSULE ORAL
COMMUNITY

## 2019-08-09 RX ORDER — DIPHENHYDRAMINE HCL 25 MG
25 CAPSULE ORAL
Qty: 90 CAP | Refills: 3 | Status: SHIPPED | OUTPATIENT
Start: 2019-08-09 | End: 2019-08-19

## 2019-08-09 NOTE — LETTER
8/30/19 Patient: Rina Rivera YOB: 1965 Date of Visit: 8/9/2019 Leon Prabhakar NP 
[address] [facility] [address] VIA Facsimile: [phone] Dear Leon Prabhakar NP, Thank you for referring Mr. Rina Rivera to [address] [place] [address] for evaluation. My notes for this consultation are attached. If you have questions, please do not hesitate to call me. I look forward to following your patient along with you.  
 
 
Sincerely, 
 
Jacky Villalta MD

## 2019-08-09 NOTE — PROGRESS NOTES
Chief Complaint   Patient presents with    Follow-up    Irregular Heart Beat    Shortness of Breath    Cardiomyopathy    Dizziness     1. Have you been to the ER, urgent care clinic since your last visit? Hospitalized since your last visit? No    2. Have you seen or consulted any other health care providers outside of the [address] [place] [address] since your last visit? Include any pap smears or colon screening.  No

## 2019-08-09 NOTE — PATIENT INSTRUCTIONS
I have prescribed ACETAMINOPHEN 500 mg for your headaches. I also represcribed the BENADRYL for sleep. Please discuss the headaches with the [facility] doctor if they don't go away with these measures.     Your heart is entirely stable and your EKG is normal. Please otherwise continue the same medications

## 2019-08-09 NOTE — PROGRESS NOTES
Marvin Draper is a 43-year-old male living in a sheltered environment group home. He is a recovering polysubstance abuser with residual cognitive impairment. He complains of 3 weeks of continuous frontal and occipital headache. No postural change, sharp, constant with occasional brief letup. He has some orthostatic dizziness that is variable. No e[pisodes of fainting or passing out. He was taken off trazodone for sleep in favor of benadryl but that has now become unavailable for unclear reasons to him. It is still listed for him. He has the headache for aboiut 3 weeks. He was sleeping better on benadryl than trazodone. Some recent decay in visual acuity but no blind spotts. New PMD:  [facility]    Unremarkable PE except for resting tachycardia. He weighs 171 pounds. BMI is 26.0. He is 5 feet 8 inches tall. Respiratory rate is 16 without distress with 95% room air SPO2. Blood pressure is 114/84 in the left arm, resting pulse is 85. Peripheral pulses transmit normally with normal timing. There is no peripheral edema, there is no sign of DVT. Abdomen is nontender without pulsation bruit or mass. Cardiac auscultation shows regular rhythm with normal quality S1 and S2, no murmur, no gallop. PMI is normal.  Lungs are clear. Jugular veins are flat, there is no carotid bruit on either side. Oral pharyngeal anatomy is class II. There is no gross abnormality of movement. Speech and cranial nerves appear intact. He is complaining of frontal headache at the time of the visit. Tendon reflexes are symmetrically mildly diminished but symmetrical, gait is stable balance is intact    His last EKG was in April.   It was normal except for mild changes of early repolarization    Impression: Insomnia    Headaches    Hypotension is improved    No active cardiac disease    Plan:  Renewed Benadryl for sleep and acetaminophen for headaches    Patient asked to discuss headaches for work-up with the doctors in the joint care office    Patient reassured that there is no sign of active cardiac disease and that the bulk of his follow-up can be with general internal medicine    Patient was encouraged to bring his medications with him for each visit whether they were prescribed in this office or not.     Josselin Guy MD, [facility]

## 2019-08-26 NOTE — PROGRESS NOTES
Ambulatory cardiology attending physician note:    Christina Weber is a former polysubstance abuser with a behavioral health disorder who is now sober and living in a specialized assisted living facility. He is compliant with his medications, and his previous issue of orthostatic syncope has not recurred. I independently interviewed and examined him followed by full discussion of management with NAS[facility] Jackson.     Echo Moody MD, [facility]

## 2019-10-16 ENCOUNTER — HOSPITAL ENCOUNTER (OUTPATIENT)
Dept: MRI IMAGING | Age: 54
Discharge: HOME | End: 2019-10-16
Attending: INTERNAL MEDICINE
Payer: MEDICARE

## 2019-10-16 ENCOUNTER — HOSPITAL ENCOUNTER (OUTPATIENT)
Dept: VASCULAR SURGERY | Age: 54
Discharge: HOME | End: 2019-10-16
Attending: INTERNAL MEDICINE
Payer: MEDICARE

## 2019-10-16 DIAGNOSIS — I70.0 ATHEROSCLEROSIS OF AORTA (HCC): ICD-10-CM

## 2019-10-16 DIAGNOSIS — E78.5 HYPERLIPEMIA: ICD-10-CM

## 2019-10-16 DIAGNOSIS — M54.50 LOW BACK PAIN: ICD-10-CM

## 2019-10-16 PROCEDURE — 93922 UPR/L XTREMITY ART 2 LEVELS: CPT

## 2019-10-16 PROCEDURE — 74011250636 HC RX REV CODE- 250/636

## 2019-10-16 PROCEDURE — 72158 MRI LUMBAR SPINE W/O & W/DYE: CPT

## 2019-10-16 PROCEDURE — A9575 INJ GADOTERATE MEGLUMI 0.1ML: HCPCS

## 2019-10-16 RX ORDER — GADOTERATE MEGLUMINE 376.9 MG/ML
15 INJECTION INTRAVENOUS
Status: COMPLETED | OUTPATIENT
Start: 2019-10-16 | End: 2019-10-16

## 2019-10-16 RX ADMIN — GADOTERATE MEGLUMINE 15 ML: 376.9 INJECTION INTRAVENOUS at 18:17

## 2019-10-18 LAB
LEFT ABI: 1.05
LEFT ANTERIOR TIBIAL: 135 MMHG
LEFT ARM BP: 146 MMHG
LEFT POSTERIOR TIBIAL: 154 MMHG
LEFT TBI: 0.73
LEFT TOE PRESSURE: 107 MMHG
RIGHT ABI: 1.07
RIGHT ARM BP: 142 MMHG
RIGHT POSTERIOR TIBIAL: 156 MMHG
RIGHT TBI: 0.79
RIGHT TOE PRESSURE: 116 MMHG

## 2019-10-31 DIAGNOSIS — I42.2 HYPERTROPHIC CARDIOMYOPATHY (HCC): ICD-10-CM

## 2019-10-31 RX ORDER — CARVEDILOL 6.25 MG/1
6.25 TABLET ORAL 2 TIMES DAILY WITH MEALS
Qty: 60 TAB | Refills: 3 | Status: SHIPPED | OUTPATIENT
Start: 2019-10-31 | End: 2020-03-25 | Stop reason: SDUPTHER

## 2019-11-19 ENCOUNTER — HOSPITAL ENCOUNTER (OUTPATIENT)
Dept: LAB | Age: 54
Discharge: HOME | End: 2019-11-19

## 2020-03-25 DIAGNOSIS — I42.2 HYPERTROPHIC CARDIOMYOPATHY (HCC): ICD-10-CM

## 2020-03-25 RX ORDER — ASPIRIN 81 MG/1
81 TABLET ORAL DAILY
Qty: 30 TAB | Refills: 5 | Status: SHIPPED | OUTPATIENT
Start: 2020-03-25 | End: 2022-02-26

## 2020-03-25 RX ORDER — CARVEDILOL 6.25 MG/1
6.25 TABLET ORAL 2 TIMES DAILY WITH MEALS
Qty: 60 TAB | Refills: 5 | Status: SHIPPED | OUTPATIENT
Start: 2020-03-25 | End: 2020-10-06 | Stop reason: SDUPTHER

## 2020-03-26 RX ORDER — ATORVASTATIN CALCIUM 20 MG/1
20 TABLET, FILM COATED ORAL DAILY
Qty: 30 TAB | Refills: 5 | Status: SHIPPED | OUTPATIENT
Start: 2020-03-26

## 2020-08-14 ENCOUNTER — HOSPITAL ENCOUNTER (OUTPATIENT)
Dept: CT IMAGING | Age: 55
Discharge: HOME | End: 2020-08-14
Payer: MEDICARE

## 2020-08-14 DIAGNOSIS — R22.2 NODULE OF CHEST WALL: ICD-10-CM

## 2020-08-14 PROCEDURE — 74011636320 HC RX REV CODE- 636/320

## 2020-08-14 PROCEDURE — 71260 CT THORAX DX C+: CPT

## 2020-08-14 RX ORDER — SODIUM CHLORIDE 0.9 % (FLUSH) 0.9 %
10 SYRINGE (ML) INJECTION
Status: COMPLETED | OUTPATIENT
Start: 2020-08-14 | End: 2020-08-14

## 2020-08-14 RX ADMIN — Medication 10 ML: at 13:30

## 2020-08-14 RX ADMIN — IOPAMIDOL 100 ML: 755 INJECTION, SOLUTION INTRAVENOUS at 13:30

## 2020-09-15 ENCOUNTER — HOSPITAL ENCOUNTER (OUTPATIENT)
Dept: ULTRASOUND IMAGING | Age: 55
Discharge: HOME | End: 2020-09-15
Attending: INTERNAL MEDICINE
Payer: MEDICARE

## 2020-09-15 DIAGNOSIS — B18.2 CHRONIC HEPATITIS C WITH HEPATIC COMA (HCC): ICD-10-CM

## 2020-09-15 PROCEDURE — 76705 ECHO EXAM OF ABDOMEN: CPT

## 2020-09-28 ENCOUNTER — TRANSCRIBE ORDER (OUTPATIENT)
Dept: SCHEDULING | Age: 55
End: 2020-09-28

## 2020-09-28 DIAGNOSIS — R91.1 LUNG NODULE SEEN ON IMAGING STUDY: Primary | ICD-10-CM

## 2020-10-06 DIAGNOSIS — I42.2 HYPERTROPHIC CARDIOMYOPATHY (HCC): ICD-10-CM

## 2020-10-06 NOTE — TELEPHONE ENCOUNTER
Last appt: 08/19/2019  No future appointments. Requested Prescriptions     Pending Prescriptions Disp Refills    carvediloL (COREG) 6.25 mg tablet 60 Tab 5     Sig: Take 1 Tab by mouth two (2) times daily (with meals).      Last refilled: 3/25/2020 Refill approved.

## 2020-10-07 ENCOUNTER — TRANSCRIBE ORDER (OUTPATIENT)
Dept: SCHEDULING | Age: 55
End: 2020-10-07

## 2020-10-07 DIAGNOSIS — R91.8 LUNG MASS: Primary | ICD-10-CM

## 2020-10-07 RX ORDER — CARVEDILOL 6.25 MG/1
6.25 TABLET ORAL 2 TIMES DAILY WITH MEALS
Qty: 60 TAB | Refills: 0 | Status: SHIPPED | OUTPATIENT
Start: 2020-10-07 | End: 2020-11-06

## 2020-10-12 ENCOUNTER — HOSPITAL ENCOUNTER (OUTPATIENT)
Dept: PET IMAGING | Age: 55
Discharge: HOME | End: 2020-10-12
Attending: INTERNAL MEDICINE
Payer: MEDICARE

## 2020-10-12 VITALS — HEIGHT: 68 IN | WEIGHT: 176 LBS | BODY MASS INDEX: 26.67 KG/M2

## 2020-10-12 DIAGNOSIS — R91.8 LUNG MASS: ICD-10-CM

## 2020-10-12 PROCEDURE — A9552 F18 FDG: HCPCS

## 2020-10-12 RX ORDER — SODIUM CHLORIDE 0.9 % (FLUSH) 0.9 %
10 SYRINGE (ML) INJECTION
Status: COMPLETED | OUTPATIENT
Start: 2020-10-12 | End: 2020-10-12

## 2020-10-12 RX ADMIN — Medication 10 ML: at 14:55

## 2020-10-26 ENCOUNTER — OFFICE VISIT (OUTPATIENT)
Dept: SURGERY | Age: 55
End: 2020-10-26
Payer: MEDICARE

## 2020-10-26 VITALS
HEART RATE: 112 BPM | HEIGHT: 68 IN | WEIGHT: 176 LBS | DIASTOLIC BLOOD PRESSURE: 98 MMHG | TEMPERATURE: 97.7 F | BODY MASS INDEX: 26.67 KG/M2 | OXYGEN SATURATION: 95 % | SYSTOLIC BLOOD PRESSURE: 153 MMHG | RESPIRATION RATE: 18 BRPM

## 2020-10-26 DIAGNOSIS — I42.2 HYPERTROPHIC CARDIOMYOPATHY (HCC): ICD-10-CM

## 2020-10-26 DIAGNOSIS — R91.1 SOLITARY PULMONARY NODULE: Primary | ICD-10-CM

## 2020-10-26 PROCEDURE — G8432 DEP SCR NOT DOC, RNG: HCPCS | Performed by: THORACIC SURGERY (CARDIOTHORACIC VASCULAR SURGERY)

## 2020-10-26 PROCEDURE — 3017F COLORECTAL CA SCREEN DOC REV: CPT | Performed by: THORACIC SURGERY (CARDIOTHORACIC VASCULAR SURGERY)

## 2020-10-26 PROCEDURE — G8427 DOCREV CUR MEDS BY ELIG CLIN: HCPCS | Performed by: THORACIC SURGERY (CARDIOTHORACIC VASCULAR SURGERY)

## 2020-10-26 PROCEDURE — 99205 OFFICE O/P NEW HI 60 MIN: CPT | Performed by: THORACIC SURGERY (CARDIOTHORACIC VASCULAR SURGERY)

## 2020-10-26 PROCEDURE — G8419 CALC BMI OUT NRM PARAM NOF/U: HCPCS | Performed by: THORACIC SURGERY (CARDIOTHORACIC VASCULAR SURGERY)

## 2020-10-26 RX ORDER — PERMETHRIN 50 MG/G
CREAM TOPICAL
COMMUNITY
End: 2020-11-06

## 2020-10-26 RX ORDER — LIDOCAINE HYDROCHLORIDE 20 MG/ML
2.5 SOLUTION OROPHARYNGEAL
COMMUNITY
End: 2020-11-06

## 2020-10-26 RX ORDER — DIPHENHYDRAMINE HCL 25 MG
4 CAPSULE ORAL
COMMUNITY
End: 2020-11-06

## 2020-10-26 RX ORDER — CLONAZEPAM 1 MG/1
1 TABLET ORAL
COMMUNITY
Start: 2020-10-19 | End: 2020-12-01 | Stop reason: SDUPTHER

## 2020-10-26 RX ORDER — TRIAMCINOLONE ACETONIDE 1 MG/G
CREAM TOPICAL
COMMUNITY
Start: 2020-07-22

## 2020-10-26 RX ORDER — GABAPENTIN 600 MG/1
600 TABLET ORAL 3 TIMES DAILY
COMMUNITY
Start: 2020-10-19

## 2020-10-26 RX ORDER — FLUTICASONE PROPIONATE AND SALMETEROL 250; 50 UG/1; UG/1
1 POWDER RESPIRATORY (INHALATION) 2 TIMES DAILY
COMMUNITY
Start: 2020-07-24 | End: 2022-02-26

## 2020-10-26 RX ORDER — ALBUTEROL SULFATE 90 UG/1
2 AEROSOL, METERED RESPIRATORY (INHALATION)
COMMUNITY
End: 2020-12-01 | Stop reason: SDUPTHER

## 2020-10-26 RX ORDER — BENZTROPINE MESYLATE 1 MG/1
1 TABLET ORAL 2 TIMES DAILY
COMMUNITY
Start: 2020-10-19

## 2020-10-26 RX ORDER — TRAMADOL HYDROCHLORIDE 50 MG/1
50 TABLET ORAL
COMMUNITY
Start: 2020-10-23 | End: 2021-01-04

## 2020-10-26 NOTE — PROGRESS NOTES
Asked to see Mr Yunior Hill re: solitary pulmonary nodule    Referred by Dr. Aguila Bose    Mr. Yunior Hill is a pleasant 62 y/o gentleman with a past medical history significant for a psychiatric disorder, tobacco abuse and cardiomyopathy. He was recently found to have a left lung nodule. This has been investigated with Chest CT and PET scan. He denies any respiratory symptoms. He is not very active but he can walk a couple blocks. He does state that he gets pretty winded climbing stairs. Allergies   Allergen Reactions    Tuberculin Ppd Swelling     NEEDS CXR       PMHx: psychiatric disorder NOS, hypertrophic cardiomyopathy    PSHx: none    SocHx: active smoker, h/o polysubstance abuse    No family history of lung cancer    Outpatient Medications Marked as Taking for the 10/26/20 encounter (Office Visit) with Meghan Moreno MD   Medication Sig Dispense Refill    benztropine (COGENTIN) 0.5 mg tablet Take 0.5 mg by mouth two (2) times a day.  clonazePAM (KlonoPIN) 1 mg tablet Take 1 mg by mouth nightly.  fluticasone propion-salmeteroL (ADVAIR/WIXELA) 250-50 mcg/dose diskus inhaler Take 1 Puff by inhalation two (2) times a day.  gabapentin (NEURONTIN) 600 mg tablet Take 600 mg by mouth three (3) times daily.  lidocaine (Lidocaine Viscous) 2 % solution Take 2.5 mL by mouth every three (3) hours as needed for Pain.  nicotine (NICORETTE) 4 mg gum Take 4 mg by mouth every  one (1) hour.  permethrin (Elimite) 5 % topical cream Apply  to affected area now. apply sparingly as directed      triamcinolone acetonide (KENALOG) 0.1 % topical cream Apply  to affected area two (2) times daily as needed.  traMADoL (ULTRAM) 50 mg tablet Take 50 mg by mouth three (3) times daily as needed for Pain.  albuterol (Ventolin HFA) 90 mcg/actuation inhaler Take 2 Puffs by inhalation every four to six (4-6) hours as needed for Wheezing or Shortness of Breath.       carvediloL (COREG) 6.25 mg tablet Take 1 Tab by mouth two (2) times daily (with meals). 60 Tab 0    atorvastatin (LIPITOR) 20 mg tablet Take 1 Tab by mouth daily. 30 Tab 5    aspirin delayed-release 81 mg tablet Take 1 Tab by mouth daily. 30 Tab 5    diphenhydrAMINE (BENADRYL) 25 mg capsule Take 25 mg by mouth nightly as needed. One to two tablets by mouth at bedtime as needed for insomnia.  acetaminophen (TYLENOL) 500 mg tablet Take 1 Tab by mouth every six (6) hours as needed for Pain (headache). Indications: head pain 60 Tab 3    FLOVENT  mcg/actuation inhaler Take 2 Puffs by inhalation two (2) times a day.  ATROVENT HFA 17 mcg/actuation inhaler Take 2 Puffs by inhalation four (4) times daily.  DULoxetine (CYMBALTA) 60 mg capsule Take 60 mg by mouth nightly.  ANORO ELLIPTA 62.5-25 mcg/actuation inhaler Take 1 Puff by inhalation daily.  nicotine (NICODERM CQ) 14 mg/24 hr patch 1 Patch by TransDERmal route every twenty-four (24) hours.  clonazePAM (KLONOPIN) 0.5 mg tablet Take 1 Tab by mouth nightly as needed. Max Daily Amount: 0.5 mg. Indications: Panic Disorder (Patient taking differently: Take 0.5 mg by mouth two (2) times a day.) 60 Tab 3    DULoxetine (CYMBALTA) 30 mg capsule Take 1 Cap by mouth daily. One daily in the morning, two at HS (Patient taking differently: Take 30 mg by mouth every morning. One daily in the morning, two at HS) 90 Cap 3    loratadine (CLARITIN) 10 mg tablet Take 1 Tab by mouth daily. 30 Tab 3    zolpidem (AMBIEN) 10 mg tablet Take 1 Tab by mouth nightly as needed for Sleep. Max Daily Amount: 10 mg. 30 Tab 3    naproxen (NAPROSYN) 500 mg tablet Take 1 Tab by mouth two (2) times daily as needed. FOR PAIN   Indications: Pain 60 Tab 3    tiotropium (SPIRIVA WITH HANDIHALER) 18 mcg inhalation capsule Take 1 Cap by inhalation daily.  haloperidol decanoate (HALDOL DECANOATE) 100 mg/mL injection 100 mg every twenty-eight (28) days.       risperiDONE (RISPERDAL) 2 mg tablet Take 2 mg by mouth two (2) times a day.  TUSSIN DM  mg/5 mL liqd TAKE 10 ML BY MOUTH EVERY 6 HOURS AS NEEDED  0       ROS:    Constitutional- denies weight loss or gain  HEENT- denies dysphagia  Neuro- denies syncope  Optho- no recent changes in vision  Resp- denies hemoptysis  CV- denies angina or palpitations  GI- denies abd pain  - no complaints  ID- denies recent fevers  Vasc- denies claudication    Blood pressure (!) 153/98, pulse (!) 112, temperature 97.7 °F (36.5 °C), temperature source Oral, resp. rate 18, height 5' 8\" (1.727 m), weight 176 lb (79.8 kg), SpO2 95 %. On exam he is seated upright  Alert and oriented  well developed  Not tachypneic  Normal speech, normal affect  No cervical or supraclavicular lymphadenopathy  Lungs CTA b/l  No chest wall tenderness  Rrr, no murmurs  Radial pulses palp b/l  abd sntnd, no organomegaly  LE non edematous  UE both forearms with multiple jagged scars    ==============================    Cr 0.89    WBC 7.0  Hgb 15.2  Plts 201  ==============================    I have personally reviewed his chest cT nad pet scanl  There is a 1.3cm spiculated PET avid nodule in the superior segment of the LLL. No associated lymphadenopathy    ==============================    PFTs- pending  ==============================    Diagnoses  1: Solitary pulmonary nodule  2: Cardiomyopathy  3: Psychiatric disorder NOS    =============================    Mr. French Levin has a spiculated PET avid LLL pulmonary nodule. This should be considered malignant until proven otherwise given his smoking history. I think he would benefit from surgical resection. I would like to review his films with Radiology. I think bassem is not in the UL but the superior segment of the LLL. He is scheduled for pFTs in November with PAR. He needs to be seen by his Cardiologist pre-op. His last echo showed an EF 30% and he hasn't been seen in 18 months.     We will see him back after his PFTs and Cards visit. Thank you for allowing us to care for Mr Cesilia Schultz spent 60 minutes with Elis Viera and their family, greater than 50% of which was spent in counseling and education reviewing their films, discussing surgical options and formulating a future care plan.

## 2020-10-26 NOTE — PROGRESS NOTES
New Patient. PET-avid CHASIDY nodule. 1. Have you been to the ER, urgent care clinic since your last visit? Hospitalized since your last visit? No    2. Have you seen or consulted any other health care providers outside of the [address] [place] [address] since your last visit? Include any pap smears or colon screening.  No

## 2020-10-29 ENCOUNTER — TELEPHONE (OUTPATIENT)
Dept: SURGERY | Age: 55
End: 2020-10-29

## 2020-10-29 NOTE — TELEPHONE ENCOUNTER
Patient notified of appointment with Cardiologist (no specific name given to me by Lise Ann at [facility]) on Friday, November 6th @ 11:00 am.  Nasima Rosales at 10:45am.    [facility], [address] [place] [address], [address], [place] [address]. Patient told me to call Ms. Katie Cosme to have her arrange transportation. I s/w Ms. Katie Cosme and gave her the same information. She will arrange transportation for the patient. Patient also told me that Tito Else will be doing a Stress Test on him.

## 2020-11-06 ENCOUNTER — OFFICE VISIT (OUTPATIENT)
Dept: CARDIOLOGY CLINIC | Age: 55
End: 2020-11-06
Payer: MEDICARE

## 2020-11-06 VITALS
RESPIRATION RATE: 16 BRPM | BODY MASS INDEX: 25.76 KG/M2 | DIASTOLIC BLOOD PRESSURE: 96 MMHG | WEIGHT: 170 LBS | TEMPERATURE: 98.5 F | HEART RATE: 111 BPM | HEIGHT: 68 IN | SYSTOLIC BLOOD PRESSURE: 134 MMHG | OXYGEN SATURATION: 96 %

## 2020-11-06 DIAGNOSIS — R00.0 TACHYCARDIA: ICD-10-CM

## 2020-11-06 DIAGNOSIS — I50.22 SYSTOLIC CHF, CHRONIC (HCC): Primary | ICD-10-CM

## 2020-11-06 DIAGNOSIS — I42.2 HYPERTROPHIC CARDIOMYOPATHY (HCC): Primary | ICD-10-CM

## 2020-11-06 PROCEDURE — 99204 OFFICE O/P NEW MOD 45 MIN: CPT | Performed by: NURSE PRACTITIONER

## 2020-11-06 PROCEDURE — 3017F COLORECTAL CA SCREEN DOC REV: CPT | Performed by: NURSE PRACTITIONER

## 2020-11-06 PROCEDURE — G8427 DOCREV CUR MEDS BY ELIG CLIN: HCPCS | Performed by: NURSE PRACTITIONER

## 2020-11-06 PROCEDURE — G8432 DEP SCR NOT DOC, RNG: HCPCS | Performed by: NURSE PRACTITIONER

## 2020-11-06 PROCEDURE — G8419 CALC BMI OUT NRM PARAM NOF/U: HCPCS | Performed by: NURSE PRACTITIONER

## 2020-11-06 PROCEDURE — 93000 ELECTROCARDIOGRAM COMPLETE: CPT | Performed by: NURSE PRACTITIONER

## 2020-11-06 RX ORDER — CLONAZEPAM 1 MG/1
1 TABLET ORAL
COMMUNITY
End: 2022-02-26

## 2020-11-06 RX ORDER — CARVEDILOL 12.5 MG/1
12.5 TABLET ORAL 2 TIMES DAILY WITH MEALS
Qty: 60 TAB | Refills: 1 | Status: SHIPPED | OUTPATIENT
Start: 2020-11-06 | End: 2021-04-01 | Stop reason: SDUPTHER

## 2020-11-06 NOTE — PROGRESS NOTES
Identified pt with two pt identifiers(name and ). Reviewed record in preparation for visit and have obtained necessary documentation. Chief Complaint   Patient presents with    Follow-up    Pre-op Exam           Visit Vitals  BP (!) 134/96 (BP 1 Location: Left arm, BP Patient Position: Sitting)   Pulse (!) 111   Temp 98.5 °F (36.9 °C) (Temporal)   Resp 16   Ht 5' 8\" (1.727 m)   Wt 170 lb (77.1 kg)   SpO2 96%   BMI 25.85 kg/m²     Pain Scale: 0 - No pain/10    Coordination of Care Questionnaire:  :   1. Have you been to the ER, urgent care clinic since your last visit? Hospitalized since your last visit? No    2. Have you seen or consulted any other health care providers outside of the [address] [place] [address] since your last visit? Include any pap smears or colon screening.  No

## 2020-11-06 NOTE — LETTER
11/6/20 Patient: Ashish Hernandez YOB: 1965 Date of Visit: 11/6/2020 Dolly Luke MD 
[address] [address] VIA Facsimile: [phone] Dear Dolly Luke MD, Thank you for referring Mr. Ashish Hernandez to [address] [place] [address] for evaluation. My notes for this consultation are attached. If you have questions, please do not hesitate to call me. I look forward to following your patient along with you. Sincerely, Yanna Roberts NP

## 2020-11-06 NOTE — PROGRESS NOTES
[facility] @ [address] [facility] [place]  Subjective/HPI:     Yunior Hill is a 63 y.o. male with a history of hypertrophic nonischemic cardiomyopathy ejection fraction 30-35% from 2017, history of substance abuse, current tobacco abuse, high probability of malignant pulmonary nodule and underlying mental health condition presents for cardiac clearance for upcoming resection of pulmonary nodule. He has not been seen by cardiology in quite some time. Previously followed by Dr. Katherin Tapia. He reports nonlimiting dyspnea on exertion denies exertional chest pain. There is no reports of diaphoresis nausea or vomiting. He lives in a structured home environment where his medications are dispensed, he unfortunately continues to smoke but is trying to quit. His regular health care is done at [facility]. ECHO 2017  SUMMARY:  Left ventricle: The ventricle was moderately dilated. Systolic function  was moderately to markedly reduced. Ejection fraction was estimated to be  30 % to 35 %. There was severe hypokinesis of the apical wall(s). There  was severe hypokinesis of the apical anterior wall(s). There was severe  hypokinesis of the apical septal wall(s). There was severe hypokinesis of  the apical inferior wall(s). There was severe hypokinesis of the apical  lateral wall(s). Wall thickness was moderately increased. There was mild  concentric hypertrophy. There was no asymmetric hypertrophy. Mass was  borderline (top normal).     Right ventricle: Wall thickness was markedly increased. Systolic pressure  was moderately to markedly increased. Estimated peak pressure was 50 mmHg.     Atrial septum: There was increased thickness of the septum, consistent  with lipomatous hypertrophy.     Tricuspid valve: There was mild regurgitation.     Pulmonic valve:  There was mild regurgitation      NST 2017  IMPRESSION  IMPRESSION: Technically adequate test negative for coronary insufficiency,  positive for concentric type hypertrophic cardiomyopathy    PCP Provider  Lydia Schultz MD  Past Medical History:   Diagnosis Date    Hypertension     Psychiatric disorder       No past surgical history on file. Allergies   Allergen Reactions    Tuberculin Ppd Swelling     NEEDS CXR      No family history on file. Current Outpatient Medications   Medication Sig    clonazePAM (KlonoPIN) 0.5 mg tablet Take  by mouth nightly as needed for Anxiety.  carvediloL (COREG) 12.5 mg tablet Take 1 Tab by mouth two (2) times daily (with meals). Increased 11/6/2020    benztropine (COGENTIN) 0.5 mg tablet Take 0.5 mg by mouth two (2) times a day.  clonazePAM (KlonoPIN) 1 mg tablet Take 1 mg by mouth nightly.  fluticasone propion-salmeteroL (ADVAIR/WIXELA) 250-50 mcg/dose diskus inhaler Take 1 Puff by inhalation two (2) times a day.  gabapentin (NEURONTIN) 600 mg tablet Take 600 mg by mouth three (3) times daily.  triamcinolone acetonide (KENALOG) 0.1 % topical cream Apply  to affected area two (2) times daily as needed.  traMADoL (ULTRAM) 50 mg tablet Take 50 mg by mouth three (3) times daily as needed for Pain.  albuterol (Ventolin HFA) 90 mcg/actuation inhaler Take 2 Puffs by inhalation every four to six (4-6) hours as needed for Wheezing or Shortness of Breath.  atorvastatin (LIPITOR) 20 mg tablet Take 1 Tab by mouth daily.  aspirin delayed-release 81 mg tablet Take 1 Tab by mouth daily.  diphenhydrAMINE (BENADRYL) 25 mg capsule Take 25 mg by mouth nightly as needed. One to two tablets by mouth at bedtime as needed for insomnia.  acetaminophen (TYLENOL) 500 mg tablet Take 1 Tab by mouth every six (6) hours as needed for Pain (headache). Indications: head pain    FLOVENT  mcg/actuation inhaler Take 2 Puffs by inhalation two (2) times a day.  ATROVENT HFA 17 mcg/actuation inhaler Take 2 Puffs by inhalation four (4) times daily.     DULoxetine (CYMBALTA) 60 mg capsule Take 60 mg by mouth nightly.  ANORO ELLIPTA 62.5-25 mcg/actuation inhaler Take 1 Puff by inhalation daily.  nicotine (NICODERM CQ) 14 mg/24 hr patch 1 Patch by TransDERmal route every twenty-four (24) hours.  clonazePAM (KLONOPIN) 0.5 mg tablet Take 1 Tab by mouth nightly as needed. Max Daily Amount: 0.5 mg. Indications: Panic Disorder (Patient taking differently: Take 0.5 mg by mouth two (2) times a day.)    DULoxetine (CYMBALTA) 30 mg capsule Take 1 Cap by mouth daily. One daily in the morning, two at HS (Patient taking differently: Take 30 mg by mouth every morning. One daily in the morning, two at HS)    loratadine (CLARITIN) 10 mg tablet Take 1 Tab by mouth daily.  zolpidem (AMBIEN) 10 mg tablet Take 1 Tab by mouth nightly as needed for Sleep. Max Daily Amount: 10 mg.    naproxen (NAPROSYN) 500 mg tablet Take 1 Tab by mouth two (2) times daily as needed. FOR PAIN   Indications: Pain    tiotropium (SPIRIVA WITH HANDIHALER) 18 mcg inhalation capsule Take 1 Cap by inhalation daily.  albuterol (VENTOLIN HFA) 90 mcg/actuation inhaler Take 2 Puffs by inhalation as needed for Wheezing. (Patient taking differently: Take 2 Puffs by inhalation. EVERY 4-6 HOURS AS NEEDED)    risperiDONE (RISPERDAL) 2 mg tablet Take 2 mg by mouth two (2) times a day.  TUSSIN DM  mg/5 mL liqd TAKE 10 ML BY MOUTH EVERY 6 HOURS AS NEEDED     No current facility-administered medications for this visit.        Vitals:    11/06/20 1510 11/06/20 1524   BP: (!) 144/96 (!) 134/96   Pulse: (!) 111    Resp: 16    Temp: 98.5 °F (36.9 °C)    TempSrc: Temporal    SpO2: 96%    Weight: 170 lb (77.1 kg)    Height: 5' 8\" (1.727 m)      Social History     Socioeconomic History    Marital status:      Spouse name: Not on file    Number of children: Not on file    Years of education: Not on file    Highest education level: Not on file   Occupational History    Not on file   Social Needs    Financial resource strain: Not on file    Food insecurity     Worry: Not on file     Inability: Not on file    Transportation needs     Medical: Not on file     Non-medical: Not on file   Tobacco Use    Smoking status: Current Every Day Smoker     Packs/day: 1.00    Smokeless tobacco: Never Used   Substance and Sexual Activity    Alcohol use: No    Drug use: No    Sexual activity: Not on file   Lifestyle    Physical activity     Days per week: Not on file     Minutes per session: Not on file    Stress: Not on file   Relationships    Social connections     Talks on phone: Not on file     Gets together: Not on file     Attends Adventism service: Not on file     Active member of club or organization: Not on file     Attends meetings of clubs or organizations: Not on file     Relationship status: Not on file    Intimate partner violence     Fear of current or ex partner: Not on file     Emotionally abused: Not on file     Physically abused: Not on file     Forced sexual activity: Not on file   Other Topics Concern    Not on file   Social History Narrative    ** Merged History Encounter **            I have reviewed the nurses notes, vitals, problem list, allergy list, medical history, family, social history and medications. Review of Symptoms  11 systems reviewed, negative other than as stated in the HPI      Physical Exam:      General: Well developed, intermittently tremulous in no acute distress, cooperative and alert  HEENT: No carotid bruits, no JVD, trach is midline. Neck Supple, PERRL, EOM intact. Heart:  Normal S1/S2 negative S3 or S4. Regular, no murmur, gallop or rub. Respiratory: Prolonged exhalation with scant wheeze, no rales or crackles  Abdomen:   Soft, non-tender, no masses, bowel sounds are active. Extremities:  No edema, normal cap refill, no cyanosis, atraumatic. Neuro: A&Ox3, speech clear, gait stable. Skin: Skin color is normal. No rashes or lesions.  Non diaphoretic  Vascular: 2+ pulses symmetric in all extremities    Cardiographics    ECG: Sinus tachycardia        Cardiology Labs:  No results found for: CHOL, CHOLX, CHLST, CHOLV, 182181, HDL, HDLP, LDL, LDLC, DLDLP, TGLX, TRIGL, TRIGP, CHHD, CHHDX    Lab Results   Component Value Date/Time    Sodium 134 (L) 01/16/2019 09:25 PM    Potassium 3.6 01/16/2019 09:25 PM    Chloride 101 01/16/2019 09:25 PM    CO2 28 01/16/2019 09:25 PM    Anion gap 5 01/16/2019 09:25 PM    Glucose 110 (H) 01/16/2019 09:25 PM    BUN 8 01/16/2019 09:25 PM    Creatinine 0.89 01/16/2019 09:25 PM    BUN/Creatinine ratio 9 (L) 01/16/2019 09:25 PM    GFR est AA >60 01/16/2019 09:25 PM    GFR est non-AA >60 01/16/2019 09:25 PM    Calcium 8.2 (L) 01/16/2019 09:25 PM    Bilirubin, total 0.4 01/16/2019 09:25 PM    Alk. phosphatase 75 01/16/2019 09:25 PM    Protein, total 7.1 01/16/2019 09:25 PM    Albumin 3.3 (L) 01/16/2019 09:25 PM    Globulin 3.8 01/16/2019 09:25 PM    A-G Ratio 0.9 (L) 01/16/2019 09:25 PM    ALT (SGPT) 39 01/16/2019 09:25 PM           Assessment:     Assessment:     Diagnoses and all orders for this visit:    1. Hypertrophic cardiomyopathy (HCC)  -     AMB POC EKG ROUTINE W/ 12 LEADS, INTER & REP  -     NT-PRO BNP  -     METABOLIC PANEL, COMPREHENSIVE; Future  -     MAGNESIUM  -     carvediloL (COREG) 12.5 mg tablet; Take 1 Tab by mouth two (2) times daily (with meals). Increased 11/6/2020    2. Tachycardia  -     NT-PRO BNP  -     METABOLIC PANEL, COMPREHENSIVE; Future  -     MAGNESIUM        ICD-10-CM ICD-9-CM    1. Hypertrophic cardiomyopathy (HCC)  I42.2 425.18 AMB POC EKG ROUTINE W/ 12 LEADS, INTER & REP      NT-PRO BNP      METABOLIC PANEL, COMPREHENSIVE      MAGNESIUM      carvediloL (COREG) 12.5 mg tablet   2.  Tachycardia  R00.0 785.0 NT-PRO BNP      METABOLIC PANEL, COMPREHENSIVE      MAGNESIUM     Orders Placed This Encounter    NT-PRO BNP    METABOLIC PANEL, COMPREHENSIVE     Standing Status:   Future     Standing Expiration Date:   5/6/2021   Keith MAGNESIUM    AMB POC EKG ROUTINE W/ 12 LEADS, INTER & REP     Order Specific Question:   Reason for Exam:     Answer:   screen    clonazePAM (KlonoPIN) 0.5 mg tablet     Sig: Take  by mouth nightly as needed for Anxiety.  carvediloL (COREG) 12.5 mg tablet     Sig: Take 1 Tab by mouth two (2) times daily (with meals). Increased 11/6/2020     Dispense:  60 Tab     Refill:  1        Plan:     1. Hypertrophic nonischemic cardiomyopathy: Diagnosed 2017 ejection fraction 30-35% presents mildly hypertensive and tachycardic, will uptitrate carvedilol 12.5 mg twice a day. Obtain 2D echocardiogram, sent to lab today for proBNP metabolic panel as anticipate adding additional heart failure therapy. Presently at [facility] heart class IIb secondary to dyspnea although multifactorial given underlying pulmonary disease and active smoker. He is euvolemic  2. Hypertension: Mildly elevated up titration of carvedilol  3. Hyperlipidemia: On statin therapy labs followed by primary care  4. History of COPD: Active smoker trying to quit on multiple inhalers followed by primary care  Proceed with echocardiogram to evaluate systolic function, advance to appropriate guideline directed heart failure therapy dependent on lab work. Cardiac clearance dependent on echo outcome. If no improvement in EF percent despite optimal guideline directed medical therapy for CHF he will need a ICD. Follow-up after echo complete. Behzad Pratt NP      Please note that this dictation was completed with [employer], the computer voice recognition software. Quite often unanticipated grammatical, syntax, homophones, and other interpretive errors are inadvertently transcribed by the computer software. Please disregard these errors. Please excuse any errors that have escaped final proofreading. Thank you.

## 2020-11-07 LAB
ALBUMIN SERPL-MCNC: 4.3 G/DL (ref 3.8–4.9)
ALBUMIN/GLOB SERPL: 1.4 {RATIO} (ref 1.2–2.2)
ALP SERPL-CCNC: 108 IU/L (ref 39–117)
ALT SERPL-CCNC: 45 IU/L (ref 0–44)
AST SERPL-CCNC: 46 IU/L (ref 0–40)
BILIRUB SERPL-MCNC: 0.8 MG/DL (ref 0–1.2)
BUN SERPL-MCNC: 8 MG/DL (ref 6–24)
BUN/CREAT SERPL: 11 (ref 9–20)
CALCIUM SERPL-MCNC: 9.5 MG/DL (ref 8.7–10.2)
CHLORIDE SERPL-SCNC: 91 MMOL/L (ref 96–106)
CO2 SERPL-SCNC: 24 MMOL/L (ref 20–29)
CREAT SERPL-MCNC: 0.71 MG/DL (ref 0.76–1.27)
GLOBULIN SER CALC-MCNC: 3.1 G/DL (ref 1.5–4.5)
GLUCOSE SERPL-MCNC: 86 MG/DL (ref 65–99)
MAGNESIUM SERPL-MCNC: 1.6 MG/DL (ref 1.6–2.3)
NT-PROBNP SERPL-MCNC: 36 PG/ML (ref 0–210)
POTASSIUM SERPL-SCNC: 4.3 MMOL/L (ref 3.5–5.2)
PROT SERPL-MCNC: 7.4 G/DL (ref 6–8.5)
SODIUM SERPL-SCNC: 132 MMOL/L (ref 134–144)

## 2020-11-09 NOTE — PROGRESS NOTES
Q: Please call patient lab work looks good. Proceed with getting the echocardiogram done and will add additional heart failure medications as necessary dependent on his ejection fraction.

## 2020-11-10 NOTE — PROGRESS NOTES
Placed call to pt. Two pt identifiers confirmed. Pt and caregiver given results. Caregiver verbalized understanding of information discussed w/ no further questions at this time.

## 2020-11-13 ENCOUNTER — HOSPITAL ENCOUNTER (OUTPATIENT)
Dept: NON INVASIVE DIAGNOSTICS | Age: 55
Discharge: HOME | End: 2020-11-13
Payer: MEDICARE

## 2020-11-13 DIAGNOSIS — I50.22 SYSTOLIC CHF, CHRONIC (HCC): ICD-10-CM

## 2020-11-13 PROCEDURE — 93306 TTE W/DOPPLER COMPLETE: CPT

## 2020-11-16 LAB
ECHO AV AREA PEAK VELOCITY: 3.07 CM2
ECHO AV PEAK GRADIENT: 3.01 MMHG
ECHO AV PEAK VELOCITY: 86.68 CM/S
ECHO LV INTERNAL DIMENSION DIASTOLIC: 3.46 CM (ref 4.2–5.9)
ECHO LV INTERNAL DIMENSION SYSTOLIC: 2.52 CM
ECHO LV IVSD: 1.2 CM (ref 0.6–1)
ECHO LV MASS 2D: 97.1 G (ref 88–224)
ECHO LV POSTERIOR WALL DIASTOLIC: 0.74 CM (ref 0.6–1)
ECHO LVOT DIAM: 2.12 CM
ECHO LVOT PEAK GRADIENT: 2.28 MMHG
ECHO LVOT PEAK VELOCITY: 75.48 CM/S
ECHO MV A VELOCITY: 69.4 CM/S
ECHO MV AREA PHT: 4.08 CM2
ECHO MV E DECELERATION TIME (DT): 185.73 MS
ECHO MV E VELOCITY: 77.02 CM/S
ECHO MV E/A RATIO: 1.11
ECHO MV PRESSURE HALF TIME (PHT): 53.86 MS

## 2020-11-16 NOTE — PROGRESS NOTES
Q: Please call patient ECHO finds significant improvement in the pumping strength of his heart. Continue current meds and follow up with me as planned later this week.     I will be clearing him for surgery, I will send you the formal note when he comes for follow up on the 20th, thanks Segundo Webber

## 2020-11-19 PROBLEM — I10 ESSENTIAL HYPERTENSION: Status: ACTIVE | Noted: 2020-11-19

## 2020-11-19 PROBLEM — E78.00 HIGH CHOLESTEROL: Status: ACTIVE | Noted: 2020-11-19

## 2020-11-19 NOTE — PROGRESS NOTES
[facility] @ [address], [place]  Subjective/HPI:     Justin Awan is a 63 y.o. male with a history of hypertrophic nonischemic cardiomyopathy that was diagnosed in 2017 ejection fraction at the time 30-35%. He was seen 2 weeks ago for cardiac clearance for upcoming thoracic surgery for high probability of malignant pulmonary nodule. At the last visit due to being mildly hypertensive and tachycardic carvedilol was increased. Echocardiogram as demonstrated below show significant improvement in ejection fraction to now 50-55%. Patient reports feeling well, denies lightheadedness or dizziness. States he is cut smoking down by at least half. ECHO 11/13/2020  Echo Findings     Left Ventricle  Upper normal wall thickness. Wall motion: normal. The estimated EF is 50 - 55%. Visually measured ejection fraction. Low normal systolic function. Left Atrium  Normal cavity size. Right Ventricle  Normal cavity size and global systolic function. Right Atrium  Normal cavity size. Aortic Valve  Normal valve structure, no stenosis and no regurgitation. Mitral Valve  Normal valve structure and no stenosis. Trace regurgitation. Tricuspid Valve  Normal valve structure, no stenosis and no regurgitation. Pulmonic Valve  Pulmonic valve not well visualized, but normal doppler findings. Aorta  Normal aortic root. Pericardium  No evidence of pericardial effusion     Previous visit HPI/plan  Justin Awan is a 63 y.o. male with a history of hypertrophic nonischemic cardiomyopathy ejection fraction 30-35% from 2017, history of substance abuse, current tobacco abuse, high probability of malignant pulmonary nodule and underlying mental health condition presents for cardiac clearance for upcoming resection of pulmonary nodule. He has not been seen by cardiology in quite some time. Previously followed by Dr. Tito Lucas.   He reports nonlimiting dyspnea on exertion denies exertional chest pain. There is no reports of diaphoresis nausea or vomiting. He lives in a structured home environment where his medications are dispensed, he unfortunately continues to smoke but is trying to quit. His regular health care is done at [facility] care    1. Hypertrophic nonischemic cardiomyopathy: Diagnosed 2017 ejection fraction 30-35% presents mildly hypertensive and tachycardic, will uptitrate carvedilol 12.5 mg twice a day. Obtain 2D echocardiogram, sent to lab today for proBNP metabolic panel as anticipate adding additional heart failure therapy. Presently at [facility] heart class IIb secondary to dyspnea although multifactorial given underlying pulmonary disease and active smoker. He is euvolemic  2. Hypertension: Mildly elevated up titration of carvedilol  3. Hyperlipidemia: On statin therapy labs followed by primary care  4. History of COPD: Active smoker trying to quit on multiple inhalers followed by primary care  Proceed with echocardiogram to evaluate systolic function, advance to appropriate guideline directed heart failure therapy dependent on lab work. Cardiac clearance dependent on echo outcome. If no improvement in EF percent despite optimal guideline directed medical therapy for CHF he will need a ICD. PCP Provider  Nan Tenorio MD  Past Medical History:   Diagnosis Date    Essential hypertension 11/19/2020    High cholesterol 11/19/2020    Hypertension     Psychiatric disorder       No past surgical history on file. Allergies   Allergen Reactions    Tuberculin Ppd Swelling     NEEDS CXR      No family history on file. Current Outpatient Medications   Medication Sig    clonazePAM (KlonoPIN) 0.5 mg tablet Take  by mouth nightly as needed for Anxiety.  carvediloL (COREG) 12.5 mg tablet Take 1 Tab by mouth two (2) times daily (with meals). Increased 11/6/2020    benztropine (COGENTIN) 0.5 mg tablet Take 0.5 mg by mouth two (2) times a day.     clonazePAM (KlonoPIN) 1 mg tablet Take 1 mg by mouth nightly.  fluticasone propion-salmeteroL (ADVAIR/WIXELA) 250-50 mcg/dose diskus inhaler Take 1 Puff by inhalation two (2) times a day.  gabapentin (NEURONTIN) 600 mg tablet Take 600 mg by mouth three (3) times daily.  triamcinolone acetonide (KENALOG) 0.1 % topical cream Apply  to affected area two (2) times daily as needed.  traMADoL (ULTRAM) 50 mg tablet Take 50 mg by mouth three (3) times daily as needed for Pain.  albuterol (Ventolin HFA) 90 mcg/actuation inhaler Take 2 Puffs by inhalation every four to six (4-6) hours as needed for Wheezing or Shortness of Breath.  atorvastatin (LIPITOR) 20 mg tablet Take 1 Tab by mouth daily.  aspirin delayed-release 81 mg tablet Take 1 Tab by mouth daily.  diphenhydrAMINE (BENADRYL) 25 mg capsule Take 25 mg by mouth nightly as needed. One to two tablets by mouth at bedtime as needed for insomnia.  acetaminophen (TYLENOL) 500 mg tablet Take 1 Tab by mouth every six (6) hours as needed for Pain (headache). Indications: head pain    FLOVENT  mcg/actuation inhaler Take 2 Puffs by inhalation two (2) times a day.  ATROVENT HFA 17 mcg/actuation inhaler Take 2 Puffs by inhalation four (4) times daily.  DULoxetine (CYMBALTA) 60 mg capsule Take 60 mg by mouth nightly.  ANORO ELLIPTA 62.5-25 mcg/actuation inhaler Take 1 Puff by inhalation daily.  nicotine (NICODERM CQ) 14 mg/24 hr patch 1 Patch by TransDERmal route every twenty-four (24) hours.  clonazePAM (KLONOPIN) 0.5 mg tablet Take 1 Tab by mouth nightly as needed. Max Daily Amount: 0.5 mg. Indications: Panic Disorder (Patient taking differently: Take 0.5 mg by mouth two (2) times a day.)    DULoxetine (CYMBALTA) 30 mg capsule Take 1 Cap by mouth daily. One daily in the morning, two at HS (Patient taking differently: Take 30 mg by mouth every morning.  One daily in the morning, two at HS)    loratadine (CLARITIN) 10 mg tablet Take 1 Tab by mouth daily.  zolpidem (AMBIEN) 10 mg tablet Take 1 Tab by mouth nightly as needed for Sleep. Max Daily Amount: 10 mg.    naproxen (NAPROSYN) 500 mg tablet Take 1 Tab by mouth two (2) times daily as needed. FOR PAIN   Indications: Pain    tiotropium (SPIRIVA WITH HANDIHALER) 18 mcg inhalation capsule Take 1 Cap by inhalation daily.  albuterol (VENTOLIN HFA) 90 mcg/actuation inhaler Take 2 Puffs by inhalation as needed for Wheezing. (Patient taking differently: Take 2 Puffs by inhalation. EVERY 4-6 HOURS AS NEEDED)    risperiDONE (RISPERDAL) 2 mg tablet Take 2 mg by mouth two (2) times a day.  TUSSIN DM  mg/5 mL liqd TAKE 10 ML BY MOUTH EVERY 6 HOURS AS NEEDED     No current facility-administered medications for this visit. There were no vitals filed for this visit.   Social History     Socioeconomic History    Marital status:      Spouse name: Not on file    Number of children: Not on file    Years of education: Not on file    Highest education level: Not on file   Occupational History    Not on file   Social Needs    Financial resource strain: Not on file    Food insecurity     Worry: Not on file     Inability: Not on file    Transportation needs     Medical: Not on file     Non-medical: Not on file   Tobacco Use    Smoking status: Current Every Day Smoker     Packs/day: 1.00    Smokeless tobacco: Never Used   Substance and Sexual Activity    Alcohol use: No    Drug use: No    Sexual activity: Not on file   Lifestyle    Physical activity     Days per week: Not on file     Minutes per session: Not on file    Stress: Not on file   Relationships    Social connections     Talks on phone: Not on file     Gets together: Not on file     Attends [employer] service: Not on file     Active member of club or organization: Not on file     Attends meetings of clubs or organizations: Not on file     Relationship status: Not on file    Intimate partner violence     Fear of current or ex partner: Not on file     Emotionally abused: Not on file     Physically abused: Not on file     Forced sexual activity: Not on file   Other Topics Concern    Not on file   Social History Narrative    ** Merged History Encounter **            I have reviewed the nurses notes, vitals, problem list, allergy list, medical history, family, social history and medications. Review of Symptoms  11 systems reviewed, negative other than as stated in the HPI      Physical Exam:      General: Well developed, in no acute distress, cooperative and alert  HEENT: No carotid bruits, no JVD, trach is midline. Neck Supple, PERRL, EOM intact. Heart:  Normal S1/S2 negative S3 or S4. Regular, no murmur, gallop or rub. Respiratory: Diminished bilaterally at the bases with prolonged exhalation. Abdomen:   Soft, non-tender, no masses, bowel sounds are active. Extremities:  No edema, normal cap refill, no cyanosis, atraumatic. Neuro: A&Ox3, speech clear, gait stable. Skin: Skin color is normal. No rashes or lesions. Non diaphoretic  Vascular: 2+ pulses symmetric in all extremities    Cardiographics    ECG:         Cardiology Labs:  No results found for: CHOL, CHOLX, CHLST, CHOLV, 022000, HDL, HDLP, LDL, LDLC, DLDLP, TGLX, TRIGL, TRIGP, CHHD, CHHDX    Lab Results   Component Value Date/Time    Sodium 132 (L) 11/06/2020 04:08 PM    Potassium 4.3 11/06/2020 04:08 PM    Chloride 91 (L) 11/06/2020 04:08 PM    CO2 24 11/06/2020 04:08 PM    Anion gap 5 01/16/2019 09:25 PM    Glucose 86 11/06/2020 04:08 PM    BUN 8 11/06/2020 04:08 PM    Creatinine 0.71 (L) 11/06/2020 04:08 PM    BUN/Creatinine ratio 11 11/06/2020 04:08 PM    GFR est  11/06/2020 04:08 PM    GFR est non- 11/06/2020 04:08 PM    Calcium 9.5 11/06/2020 04:08 PM    Bilirubin, total 0.8 11/06/2020 04:08 PM    Alk.  phosphatase 108 11/06/2020 04:08 PM    Protein, total 7.4 11/06/2020 04:08 PM    Albumin 4.3 11/06/2020 04:08 PM    Globulin 3.8 01/16/2019 09:25 PM A-G Ratio 1.4 11/06/2020 04:08 PM    ALT (SGPT) 45 (H) 11/06/2020 04:08 PM           Assessment:     Assessment:     Diagnoses and all orders for this visit:    1. Hypertrophic cardiomyopathy ([address]    2. Essential hypertension    3. High cholesterol    4. Pulmonary nodule        ICD-10-CM ICD-9-CM    1. Hypertrophic cardiomyopathy (HCC)  I42.2 425.18    2. Essential hypertension  I10 401.9    3. High cholesterol  E78.00 272.0    4. Pulmonary nodule  R91.1 793.11      No orders of the defined types were placed in this encounter. Plan:     1. Hypertrophic nonischemic cardiomyopathy: Diagnosed 2017 ejection fraction 30-35% repeat echocardiogram now shows improvement in ejection fraction to 50-55%. ProBNP 36, normal renal function. Remains at [place] heart class IIb again multifactorial secondary to underlying pulmonary disease tobacco abuse he is euvolemic. 2.  Hypertension: Controlled 121/85 I,proved w/ up titration of Coreg. 3.  Hyperlipidemia: On statin therapy labs followed by primary care  4. History of COPD: Active smoker trying to quit on multiple inhalers followed by primary care    Cleared from cardiology perspective for thoracic surgery. . Continue current medications, follow up 6 months. Discussed critical importance of smoking cessation. Romeo Cai NP      Please note that this dictation was completed with [employer], the [employer] voice recognition software. Quite often unanticipated grammatical, syntax, homophones, and other interpretive errors are inadvertently transcribed by the computer software. Please disregard these errors. Please excuse any errors that have escaped final proofreading. Thank you.

## 2020-11-20 ENCOUNTER — OFFICE VISIT (OUTPATIENT)
Dept: CARDIOLOGY CLINIC | Age: 55
End: 2020-11-20
Payer: MEDICARE

## 2020-11-20 VITALS
OXYGEN SATURATION: 96 % | SYSTOLIC BLOOD PRESSURE: 121 MMHG | DIASTOLIC BLOOD PRESSURE: 85 MMHG | HEIGHT: 68 IN | BODY MASS INDEX: 24.83 KG/M2 | TEMPERATURE: 98.4 F | WEIGHT: 163.8 LBS | HEART RATE: 96 BPM | RESPIRATION RATE: 16 BRPM

## 2020-11-20 DIAGNOSIS — R91.1 PULMONARY NODULE: ICD-10-CM

## 2020-11-20 DIAGNOSIS — E78.00 HIGH CHOLESTEROL: ICD-10-CM

## 2020-11-20 DIAGNOSIS — I42.2 HYPERTROPHIC CARDIOMYOPATHY (HCC): Primary | ICD-10-CM

## 2020-11-20 DIAGNOSIS — I42.2 HYPERTROPHIC CARDIOMYOPATHY (HCC): ICD-10-CM

## 2020-11-20 DIAGNOSIS — I10 ESSENTIAL HYPERTENSION: ICD-10-CM

## 2020-11-20 DIAGNOSIS — R00.0 TACHYCARDIA: ICD-10-CM

## 2020-11-20 PROCEDURE — G8427 DOCREV CUR MEDS BY ELIG CLIN: HCPCS | Performed by: NURSE PRACTITIONER

## 2020-11-20 PROCEDURE — 99214 OFFICE O/P EST MOD 30 MIN: CPT | Performed by: NURSE PRACTITIONER

## 2020-11-20 PROCEDURE — 3017F COLORECTAL CA SCREEN DOC REV: CPT | Performed by: NURSE PRACTITIONER

## 2020-11-20 PROCEDURE — G8432 DEP SCR NOT DOC, RNG: HCPCS | Performed by: NURSE PRACTITIONER

## 2020-11-20 PROCEDURE — G8420 CALC BMI NORM PARAMETERS: HCPCS | Performed by: NURSE PRACTITIONER

## 2020-11-20 NOTE — PROGRESS NOTES
Pt reported for office visit on 11/20/2020  Pt was informed of results per NP [facility] CHRISTOPHER NOGUERA finds significant improvement in the pumping strength of his heart. Continue current meds\"  Pt verbalized understanding of information discussed w/ no further questions at this time.

## 2020-11-20 NOTE — PROGRESS NOTES
Identified pt with two pt identifiers(name and ). Reviewed record in preparation for visit and have obtained necessary documentation. Chief Complaint   Patient presents with    Follow-up           Visit Vitals  /85 (BP 1 Location: Left arm, BP Patient Position: Sitting)   Pulse 96   Temp 98.4 °F (36.9 °C) (Temporal)   Resp 16   Ht 5' 8\" (1.727 m)   Wt 163 lb 12.8 oz (74.3 kg)   SpO2 96%   BMI 24.91 kg/m²     Pain Scale: 5/10    Coordination of Care Questionnaire:  :   1. Have you been to the ER, urgent care clinic since your last visit? Hospitalized since your last visit? No    2. Have you seen or consulted any other health care providers outside of the [address] [place] [address] since your last visit? Include any pap smears or colon screening.  Yes PCP Dr. Jordan Flatten

## 2020-11-20 NOTE — Clinical Note
Cleared from cardiology, EF 50-55%, BP / Heart rate improved w/ increased coreg. I'll see him back in 6 months. Pt waiting for call from your office to schedule.   Thanks Colleen Sicard

## 2020-12-01 ENCOUNTER — OFFICE VISIT (OUTPATIENT)
Dept: SURGERY | Age: 55
End: 2020-12-01
Payer: MEDICARE

## 2020-12-01 VITALS
SYSTOLIC BLOOD PRESSURE: 154 MMHG | TEMPERATURE: 97.6 F | WEIGHT: 163 LBS | RESPIRATION RATE: 18 BRPM | BODY MASS INDEX: 24.71 KG/M2 | OXYGEN SATURATION: 97 % | HEIGHT: 68 IN | HEART RATE: 88 BPM | DIASTOLIC BLOOD PRESSURE: 107 MMHG

## 2020-12-01 DIAGNOSIS — R91.1 SOLITARY PULMONARY NODULE: Primary | ICD-10-CM

## 2020-12-01 DIAGNOSIS — I42.2 HYPERTROPHIC CARDIOMYOPATHY (HCC): ICD-10-CM

## 2020-12-01 PROCEDURE — G8420 CALC BMI NORM PARAMETERS: HCPCS | Performed by: THORACIC SURGERY (CARDIOTHORACIC VASCULAR SURGERY)

## 2020-12-01 PROCEDURE — 99214 OFFICE O/P EST MOD 30 MIN: CPT | Performed by: THORACIC SURGERY (CARDIOTHORACIC VASCULAR SURGERY)

## 2020-12-01 PROCEDURE — 3017F COLORECTAL CA SCREEN DOC REV: CPT | Performed by: THORACIC SURGERY (CARDIOTHORACIC VASCULAR SURGERY)

## 2020-12-01 PROCEDURE — G8427 DOCREV CUR MEDS BY ELIG CLIN: HCPCS | Performed by: THORACIC SURGERY (CARDIOTHORACIC VASCULAR SURGERY)

## 2020-12-01 PROCEDURE — G8432 DEP SCR NOT DOC, RNG: HCPCS | Performed by: THORACIC SURGERY (CARDIOTHORACIC VASCULAR SURGERY)

## 2020-12-01 RX ORDER — HALOPERIDOL DECANOATE 100 MG/ML
INJECTION INTRAMUSCULAR
COMMUNITY
Start: 2020-11-30

## 2020-12-01 NOTE — PROGRESS NOTES
Mr. Bao Eddy returns to clinic today to discuss surgery and the results of his recent testing. He completed his PFTs and Echo and was seen by his cardiology team.  He has no new respiratory complaints. He reports he has cut down from 10 cigarettes per day to 5. He is hoping to quit when in the hospital after surgery. No significant changes to his past medical or surgical history. Current Outpatient Medications:     haloperidol decanoate (HALDOL DECANOATE) 100 mg/mL injection, every twenty-eight (28) days. , Disp: , Rfl:     clonazePAM (KlonoPIN) 0.5 mg tablet, Take  by mouth nightly as needed for Anxiety. , Disp: , Rfl:     carvediloL (COREG) 12.5 mg tablet, Take 1 Tab by mouth two (2) times daily (with meals). Increased 11/6/2020, Disp: 60 Tab, Rfl: 1    benztropine (COGENTIN) 0.5 mg tablet, Take 0.5 mg by mouth two (2) times a day., Disp: , Rfl:     fluticasone propion-salmeteroL (ADVAIR/WIXELA) 250-50 mcg/dose diskus inhaler, Take 1 Puff by inhalation two (2) times a day., Disp: , Rfl:     gabapentin (NEURONTIN) 600 mg tablet, Take 600 mg by mouth three (3) times daily. , Disp: , Rfl:     traMADoL (ULTRAM) 50 mg tablet, Take 50 mg by mouth three (3) times daily as needed for Pain., Disp: , Rfl:     atorvastatin (LIPITOR) 20 mg tablet, Take 1 Tab by mouth daily. , Disp: 30 Tab, Rfl: 5    aspirin delayed-release 81 mg tablet, Take 1 Tab by mouth daily. , Disp: 30 Tab, Rfl: 5    diphenhydrAMINE (BENADRYL) 25 mg capsule, Take 25 mg by mouth nightly as needed. One to two tablets by mouth at bedtime as needed for insomnia. , Disp: , Rfl:     acetaminophen (TYLENOL) 500 mg tablet, Take 1 Tab by mouth every six (6) hours as needed for Pain (headache). Indications: head pain, Disp: 60 Tab, Rfl: 3    FLOVENT  mcg/actuation inhaler, Take 2 Puffs by inhalation two (2) times a day., Disp: , Rfl:     ATROVENT HFA 17 mcg/actuation inhaler, Take 2 Puffs by inhalation four (4) times daily. , Disp: , Rfl:    DULoxetine (CYMBALTA) 60 mg capsule, Take 60 mg by mouth nightly., Disp: , Rfl:     ANORO ELLIPTA 62.5-25 mcg/actuation inhaler, Take 1 Puff by inhalation daily. , Disp: , Rfl:     clonazePAM (KLONOPIN) 0.5 mg tablet, Take 1 Tab by mouth nightly as needed. Max Daily Amount: 0.5 mg. Indications: Panic Disorder (Patient taking differently: Take 0.5 mg by mouth two (2) times a day.), Disp: 60 Tab, Rfl: 3    DULoxetine (CYMBALTA) 30 mg capsule, Take 1 Cap by mouth daily. One daily in the morning, two at HS (Patient taking differently: Take 30 mg by mouth every morning. One daily in the morning, two at HS), Disp: 90 Cap, Rfl: 3    loratadine (CLARITIN) 10 mg tablet, Take 1 Tab by mouth daily. , Disp: 30 Tab, Rfl: 3    zolpidem (AMBIEN) 10 mg tablet, Take 1 Tab by mouth nightly as needed for Sleep. Max Daily Amount: 10 mg., Disp: 30 Tab, Rfl: 3    naproxen (NAPROSYN) 500 mg tablet, Take 1 Tab by mouth two (2) times daily as needed. FOR PAIN   Indications: Pain, Disp: 60 Tab, Rfl: 3    tiotropium (SPIRIVA WITH HANDIHALER) 18 mcg inhalation capsule, Take 1 Cap by inhalation daily. , Disp: , Rfl:     albuterol (VENTOLIN HFA) 90 mcg/actuation inhaler, Take 2 Puffs by inhalation as needed for Wheezing. (Patient taking differently: Take 2 Puffs by inhalation. EVERY 4-6 HOURS AS NEEDED), Disp: 1 Inhaler, Rfl: 6    risperiDONE (RISPERDAL) 2 mg tablet, Take 2 mg by mouth two (2) times a day., Disp: , Rfl:     TUSSIN DM  mg/5 mL liqd, TAKE 10 ML BY MOUTH EVERY 6 HOURS AS NEEDED, Disp: , Rfl: 0    triamcinolone acetonide (KENALOG) 0.1 % topical cream, Apply  to affected area two (2) times daily as needed. , Disp: , Rfl:     nicotine (NICODERM CQ) 14 mg/24 hr patch, 1 Patch by TransDERmal route every twenty-four (24) hours. , Disp: , Rfl:       Afebrile  HD stable    On exam he is seated upright  Alert and oriented  well developed  Not tachypneic  Normal speech, normal affect  No cervical or supraclavicular lymphadenopathy  Lungs CTA b/l  No chest wall tenderness  Rrr, no murmurs  Radial pulses palp b/l  abd sntnd, no organomegaly  LE non edematous  UE both forearms with multiple jagged scars      Echo- EF 50-55%    I have reveiwed his Cardiology notes. EF improved, felt to be low risk for jes-operative event. PFTs  FeV1 58%  DLCO 58%    Diagnoses  1: Solitary pulmonary nodule  2: Cardiomyopathy  3: Psychiatric disorder NOS    Mr. Kathryn Duckworth has a PEt avid suspicious LLL pulmonary nodule. PFTs adequate and his EF has improved. Plan for a robotic LLL superior segmentectomy this month. Will post.  He will need PAT. Thank you for allowing us to care for Mr. Kathryn Duckworth. I spent 25 minutes with Mr. Kathryn Duckworth, greater than 50% of which was spent in counseling and education, reviewing his echo, PFTs and discussing the surgical plan.

## 2020-12-01 NOTE — PROGRESS NOTES
Discuss results/surgery. 1. Have you been to the ER, urgent care clinic since your last visit? Hospitalized since your last visit? No    2. Have you seen or consulted any other health care providers outside of the [address] [place] [address] since your last visit? Include any pap smears or colon screening.  No

## 2020-12-04 ENCOUNTER — TRANSCRIBE ORDER (OUTPATIENT)
Dept: REGISTRATION | Age: 55
End: 2020-12-04

## 2020-12-04 DIAGNOSIS — Z01.812 PRE-PROCEDURE LAB EXAM: Primary | ICD-10-CM

## 2020-12-04 NOTE — PERIOP NOTES
PATIENT/FRIEND CALLED AND MADE AWARE OF COVID-19 TESTING NEEDED TO BE DONE WITHIN 96 HOURS OF SURGERY. COVID-19 TESTING APPOINTMENT MADE FOR PATIENT. PATIENT/FRIEND INSTRUCTED ON SELF QUARANTINE BETWEEN TESTING AND ARRIVAL TIME DAY OF SURGERY.

## 2020-12-08 ENCOUNTER — TRANSCRIBE ORDER (OUTPATIENT)
Dept: SCHEDULING | Age: 55
End: 2020-12-08

## 2020-12-08 ENCOUNTER — HOSPITAL ENCOUNTER (OUTPATIENT)
Dept: PREADMISSION TESTING | Age: 55
Discharge: HOME | End: 2020-12-08
Payer: MEDICARE

## 2020-12-08 VITALS
TEMPERATURE: 97.9 F | HEIGHT: 68 IN | HEART RATE: 87 BPM | DIASTOLIC BLOOD PRESSURE: 84 MMHG | BODY MASS INDEX: 25.93 KG/M2 | SYSTOLIC BLOOD PRESSURE: 135 MMHG | WEIGHT: 171.08 LBS

## 2020-12-08 DIAGNOSIS — I42.9 CARDIOMYOPATHY (HCC): ICD-10-CM

## 2020-12-08 DIAGNOSIS — I50.32 CHRONIC DIASTOLIC HEART FAILURE (HCC): Primary | ICD-10-CM

## 2020-12-08 LAB
ABO + RH BLD: NORMAL
ALBUMIN SERPL-MCNC: 3.8 G/DL (ref 3.5–5)
ALBUMIN/GLOB SERPL: 1 {RATIO} (ref 1.1–2.2)
ALP SERPL-CCNC: 103 U/L (ref 45–117)
ALT SERPL-CCNC: 46 U/L (ref 12–78)
ANION GAP SERPL CALC-SCNC: 7 MMOL/L (ref 5–15)
AST SERPL-CCNC: 36 U/L (ref 15–37)
BASOPHILS # BLD: 0.1 K/UL (ref 0–0.1)
BASOPHILS NFR BLD: 1 % (ref 0–1)
BILIRUB SERPL-MCNC: 0.5 MG/DL (ref 0.2–1)
BLOOD GROUP ANTIBODIES SERPL: NORMAL
BUN SERPL-MCNC: 10 MG/DL (ref 6–20)
BUN/CREAT SERPL: 12 (ref 12–20)
CALCIUM SERPL-MCNC: 8.5 MG/DL (ref 8.5–10.1)
CHLORIDE SERPL-SCNC: 94 MMOL/L (ref 97–108)
CO2 SERPL-SCNC: 28 MMOL/L (ref 21–32)
CREAT SERPL-MCNC: 0.81 MG/DL (ref 0.7–1.3)
DIFFERENTIAL METHOD BLD: ABNORMAL
EOSINOPHIL # BLD: 0.5 K/UL (ref 0–0.4)
EOSINOPHIL NFR BLD: 6 % (ref 0–7)
ERYTHROCYTE [DISTWIDTH] IN BLOOD BY AUTOMATED COUNT: 12.8 % (ref 11.5–14.5)
GLOBULIN SER CALC-MCNC: 3.7 G/DL (ref 2–4)
GLUCOSE SERPL-MCNC: 78 MG/DL (ref 65–100)
HCT VFR BLD AUTO: 45.1 % (ref 36.6–50.3)
HGB BLD-MCNC: 15.2 G/DL (ref 12.1–17)
IMM GRANULOCYTES # BLD AUTO: 0.1 K/UL (ref 0–0.04)
IMM GRANULOCYTES NFR BLD AUTO: 1 % (ref 0–0.5)
LYMPHOCYTES # BLD: 2.6 K/UL (ref 0.8–3.5)
LYMPHOCYTES NFR BLD: 32 % (ref 12–49)
MCH RBC QN AUTO: 31.7 PG (ref 26–34)
MCHC RBC AUTO-ENTMCNC: 33.7 G/DL (ref 30–36.5)
MCV RBC AUTO: 94.2 FL (ref 80–99)
MONOCYTES # BLD: 1.1 K/UL (ref 0–1)
MONOCYTES NFR BLD: 13 % (ref 5–13)
NEUTS SEG # BLD: 3.9 K/UL (ref 1.8–8)
NEUTS SEG NFR BLD: 47 % (ref 32–75)
NRBC # BLD: 0 K/UL (ref 0–0.01)
NRBC BLD-RTO: 0 PER 100 WBC
PLATELET # BLD AUTO: 240 K/UL (ref 150–400)
PMV BLD AUTO: 10.4 FL (ref 8.9–12.9)
POTASSIUM SERPL-SCNC: 4.4 MMOL/L (ref 3.5–5.1)
PROT SERPL-MCNC: 7.5 G/DL (ref 6.4–8.2)
RBC # BLD AUTO: 4.79 M/UL (ref 4.1–5.7)
SODIUM SERPL-SCNC: 129 MMOL/L (ref 136–145)
SPECIMEN EXP DATE BLD: NORMAL
WBC # BLD AUTO: 8.2 K/UL (ref 4.1–11.1)

## 2020-12-08 PROCEDURE — 85025 COMPLETE CBC W/AUTO DIFF WBC: CPT

## 2020-12-08 PROCEDURE — 36415 COLL VENOUS BLD VENIPUNCTURE: CPT

## 2020-12-08 PROCEDURE — 86900 BLOOD TYPING SEROLOGIC ABO: CPT

## 2020-12-08 PROCEDURE — 80053 COMPREHEN METABOLIC PANEL: CPT

## 2020-12-08 NOTE — PERIOP NOTES
PATIENT DID NOT BRING LIST OF MEDICATIONS WITH HIM TODAY FOR P.A.T. , CALLED PCP DR. BARRETT ALY OFFICE FOR LIST OF MEDICATIONS PATIENT IS CURRENTLY TAKING. PATIENT SEES [facility], PATIENT SAYS THEY HAVE LIST OF HIS MEDICATIONS FOR MENTAL HEALTH. CALLED [phone] [facility] PATIENT LIVES AT [facility]- CARETAKER IS MSDelia Jose Dumont.

## 2020-12-08 NOTE — PERIOP NOTES
PATIENT GIVEN SURGICAL SITE INFECTION FAQ HANDOUT AND HAND WASHING TIP SHEET. PREOP INSTRUCTIONS REVIEWED AND PATIENT VERBALIZES UNDERSTANDING OF INSTRUCTIONS. PATIENT HAS BEEN GIVEN THE OPPORTUNITY TO ASK ADDITIONAL QUESTIONS. GAVE PATIENT 2 BOTTLES OF CHG CLEANSER AND INSTRUCTIONS, PATIENT VERBALIZED UNDERSTANDING. PATIENT CALLED AND MADE AWARE OF COVID-19 TESTING NEEDED TO BE DONE WITHIN 96 HOURS OF SURGERY. COVID-19 TESTING APPOINTMENT MADE FOR PATIENT. PATIENT INSTRUCTED ON SELF QUARANTINE BETWEEN TESTING AND ARRIVAL TIME DAY OF SURGERY. PATIENT IS TREATED BY Anurag  [phone]  , PATIENT LIVES IN [facility] , MS CHRISTIAN IS PATIENTS CAREGIVER AND PATIENT STATES SHE WILL BE BRINGING HIM DOS.  HER PHONE IS [phone]

## 2020-12-11 ENCOUNTER — ANESTHESIA EVENT (OUTPATIENT)
Dept: SURGERY | Age: 55
DRG: 164 | End: 2020-12-11
Payer: MEDICARE

## 2020-12-11 NOTE — PERIOP NOTES
1130 - MS CHRISTIAN AT [facility] WHERE PATIENT RESIDES AND WHO IS CAREGIVER CALLED THIS MORNING TO LET ME KNOW PATIENT CANNOT FIND POA PAPERWORK.     1210 - CALLED MAIN OR DURING HUDDLE CALL AND IF POA PAPERWORK IS MISSING, THAT WILL BE AN ISSUE DAY OF SURGERY PER [facility]. [phone] , SHE IS LISTED AS PATIENTS MENTAL HEALTH PROVIDER/CLINICIAN AT [place] [address] , LEFT VOICEMAIL INQUIRING IF PATIENT HAS A POA OR IF HE SIGNS HIS OWN CONSENTS FOR SURGERY. LEFT VM FOR HER TO CALL ME BACK.     1402- SPOKE WITH MYRA CHRISTIAN AND PER PATIENT AND THE FILES AT [facility] PATIENT RESIDES AND SHE IS CAREGIVER, PATIENT SIGNS HIS OWN CONSENTS AND THERE WAS NEVER A POA ESTABLISHED. MS. CHRISTIAN STATES \" HE IS VERY CAPABLE OF SIGNING HIS OWN CONSENTS\".     Delgado Aguirre RETURNED CALL AND STATED ACCORDING TO HER RECORDS HE DOES NOT HAVE A POA AND HE SIGNS HIS OWN CONSENTS. \"

## 2020-12-11 NOTE — PERIOP NOTES
CALLED PATIENTS HOME # MAILBOX IS FULL. CALLED AND SPOKE WITH MYRA CHRISTIAN  ([phone] )  - CAREGIVER AT [facility] WHERE PATIENT RESIDES , WHO SAYS WE SHOULD HAVE ANY PAPERWORK ON FILE WHEN ASKED IF SHE IS PATIENTS POA. SHE INSTRUCTED ME TO CALL HECTOR AND SPEAK WITH HIM TO REMIND HIM TO BRING PAPERWORK IF HE HAS IT, BUT HIS MAILBOX IS FULL.

## 2020-12-16 ENCOUNTER — HOSPITAL ENCOUNTER (INPATIENT)
Age: 55
LOS: 2 days | Discharge: HOME | DRG: 164 | End: 2020-12-18
Attending: THORACIC SURGERY (CARDIOTHORACIC VASCULAR SURGERY) | Admitting: THORACIC SURGERY (CARDIOTHORACIC VASCULAR SURGERY)
Payer: MEDICARE

## 2020-12-16 ENCOUNTER — ANESTHESIA (OUTPATIENT)
Dept: SURGERY | Age: 55
DRG: 164 | End: 2020-12-16
Payer: MEDICARE

## 2020-12-16 ENCOUNTER — APPOINTMENT (OUTPATIENT)
Dept: GENERAL RADIOLOGY | Age: 55
DRG: 164 | End: 2020-12-16
Attending: THORACIC SURGERY (CARDIOTHORACIC VASCULAR SURGERY)
Payer: MEDICARE

## 2020-12-16 DIAGNOSIS — C34.32 MALIGNANT NEOPLASM OF LOWER LOBE OF LEFT LUNG (HCC): Primary | ICD-10-CM

## 2020-12-16 PROBLEM — C34.90 LUNG CANCER (HCC): Status: ACTIVE | Noted: 2020-12-16

## 2020-12-16 LAB
GLUCOSE BLD STRIP.AUTO-MCNC: 163 MG/DL (ref 65–100)
SERVICE CMNT-IMP: ABNORMAL

## 2020-12-16 PROCEDURE — 74011250636 HC RX REV CODE- 250/636: Performed by: ANESTHESIOLOGY

## 2020-12-16 PROCEDURE — 74011000250 HC RX REV CODE- 250: Performed by: THORACIC SURGERY (CARDIOTHORACIC VASCULAR SURGERY)

## 2020-12-16 PROCEDURE — 77030002996 HC SUT SLK J&J -A: Performed by: THORACIC SURGERY (CARDIOTHORACIC VASCULAR SURGERY)

## 2020-12-16 PROCEDURE — 77030003666 HC NDL SPINAL BD -A: Performed by: THORACIC SURGERY (CARDIOTHORACIC VASCULAR SURGERY)

## 2020-12-16 PROCEDURE — 74011250637 HC RX REV CODE- 250/637: Performed by: ANESTHESIOLOGY

## 2020-12-16 PROCEDURE — 77030013797 HC KT TRNSDUC PRSSR EDWD -A

## 2020-12-16 PROCEDURE — 88313 SPECIAL STAINS GROUP 2: CPT

## 2020-12-16 PROCEDURE — 77030037892: Performed by: THORACIC SURGERY (CARDIOTHORACIC VASCULAR SURGERY)

## 2020-12-16 PROCEDURE — 74011250636 HC RX REV CODE- 250/636: Performed by: THORACIC SURGERY (CARDIOTHORACIC VASCULAR SURGERY)

## 2020-12-16 PROCEDURE — 77030035489 HC REDUCR CANN ENDOWR INTU -C: Performed by: THORACIC SURGERY (CARDIOTHORACIC VASCULAR SURGERY)

## 2020-12-16 PROCEDURE — 88342 IMHCHEM/IMCYTCHM 1ST ANTB: CPT

## 2020-12-16 PROCEDURE — 76060000035 HC ANESTHESIA 2 TO 2.5 HR: Performed by: THORACIC SURGERY (CARDIOTHORACIC VASCULAR SURGERY)

## 2020-12-16 PROCEDURE — 77030020263 HC SOL INJ SOD CL0.9% LFCR 1000ML: Performed by: THORACIC SURGERY (CARDIOTHORACIC VASCULAR SURGERY)

## 2020-12-16 PROCEDURE — 88341 IMHCHEM/IMCYTCHM EA ADD ANTB: CPT

## 2020-12-16 PROCEDURE — 77030010507 HC ADH SKN DERMBND J&J -B: Performed by: THORACIC SURGERY (CARDIOTHORACIC VASCULAR SURGERY)

## 2020-12-16 PROCEDURE — 65660000000 HC RM CCU STEPDOWN

## 2020-12-16 PROCEDURE — 77030020703 HC SEAL CANN DISP INTU -B: Performed by: THORACIC SURGERY (CARDIOTHORACIC VASCULAR SURGERY)

## 2020-12-16 PROCEDURE — 76010000875 HC OR TIME 1.5 TO 2HR INTENSV - TIER 2: Performed by: THORACIC SURGERY (CARDIOTHORACIC VASCULAR SURGERY)

## 2020-12-16 PROCEDURE — 77030005402 HC CATH RAD ART LN KT TELE -B

## 2020-12-16 PROCEDURE — 77030035277 HC OBTRTR BLDELSS DISP INTU -B: Performed by: THORACIC SURGERY (CARDIOTHORACIC VASCULAR SURGERY)

## 2020-12-16 PROCEDURE — 77030005401 HC CATH RAD ARRO -A

## 2020-12-16 PROCEDURE — 88305 TISSUE EXAM BY PATHOLOGIST: CPT

## 2020-12-16 PROCEDURE — 94640 AIRWAY INHALATION TREATMENT: CPT

## 2020-12-16 PROCEDURE — 77030005401 HC CATH RAD ARRO -A: Performed by: ANESTHESIOLOGY

## 2020-12-16 PROCEDURE — 77030032522 HC SHT STPL PK ENDOWR INTU -B: Performed by: THORACIC SURGERY (CARDIOTHORACIC VASCULAR SURGERY)

## 2020-12-16 PROCEDURE — 74011000250 HC RX REV CODE- 250: Performed by: NURSE ANESTHETIST, CERTIFIED REGISTERED

## 2020-12-16 PROCEDURE — 77030016151 HC PROTCTR LNS DFOG COVD -B: Performed by: THORACIC SURGERY (CARDIOTHORACIC VASCULAR SURGERY)

## 2020-12-16 PROCEDURE — 77030040361 HC SLV COMPR DVT MDII -B: Performed by: THORACIC SURGERY (CARDIOTHORACIC VASCULAR SURGERY)

## 2020-12-16 PROCEDURE — 07B74ZX EXCISION OF THORAX LYMPHATIC, PERCUTANEOUS ENDOSCOPIC APPROACH, DIAGNOSTIC: ICD-10-PCS | Performed by: THORACIC SURGERY (CARDIOTHORACIC VASCULAR SURGERY)

## 2020-12-16 PROCEDURE — 77030008756 HC TU IRR SUC STRY -B: Performed by: THORACIC SURGERY (CARDIOTHORACIC VASCULAR SURGERY)

## 2020-12-16 PROCEDURE — 77030031492 HC PRT ACC BLNT AIRSEAL CNMD -B: Performed by: THORACIC SURGERY (CARDIOTHORACIC VASCULAR SURGERY)

## 2020-12-16 PROCEDURE — 77030014008 HC SPNG HEMSTAT J&J -C: Performed by: THORACIC SURGERY (CARDIOTHORACIC VASCULAR SURGERY)

## 2020-12-16 PROCEDURE — 77030013797 HC KT TRNSDUC PRSSR EDWD -A: Performed by: ANESTHESIOLOGY

## 2020-12-16 PROCEDURE — 77030012390 HC DRN CHST BTL GTNG -B: Performed by: THORACIC SURGERY (CARDIOTHORACIC VASCULAR SURGERY)

## 2020-12-16 PROCEDURE — 74011250637 HC RX REV CODE- 250/637: Performed by: THORACIC SURGERY (CARDIOTHORACIC VASCULAR SURGERY)

## 2020-12-16 PROCEDURE — 2709999900 HC NON-CHARGEABLE SUPPLY: Performed by: THORACIC SURGERY (CARDIOTHORACIC VASCULAR SURGERY)

## 2020-12-16 PROCEDURE — 74011250636 HC RX REV CODE- 250/636: Performed by: NURSE ANESTHETIST, CERTIFIED REGISTERED

## 2020-12-16 PROCEDURE — 71045 X-RAY EXAM CHEST 1 VIEW: CPT

## 2020-12-16 PROCEDURE — 8E0W4CZ ROBOTIC ASSISTED PROCEDURE OF TRUNK REGION, PERCUTANEOUS ENDOSCOPIC APPROACH: ICD-10-PCS | Performed by: THORACIC SURGERY (CARDIOTHORACIC VASCULAR SURGERY)

## 2020-12-16 PROCEDURE — 88307 TISSUE EXAM BY PATHOLOGIST: CPT

## 2020-12-16 PROCEDURE — 82962 GLUCOSE BLOOD TEST: CPT

## 2020-12-16 PROCEDURE — 77010033678 HC OXYGEN DAILY

## 2020-12-16 PROCEDURE — 77030018673: Performed by: THORACIC SURGERY (CARDIOTHORACIC VASCULAR SURGERY)

## 2020-12-16 PROCEDURE — 77030040922 HC BLNKT HYPOTHRM STRY -A

## 2020-12-16 PROCEDURE — 77030032523 HC RELD STPL PK ENDORS INTU -C: Performed by: THORACIC SURGERY (CARDIOTHORACIC VASCULAR SURGERY)

## 2020-12-16 PROCEDURE — 94664 DEMO&/EVAL PT USE INHALER: CPT

## 2020-12-16 PROCEDURE — 0BBJ4ZZ EXCISION OF LEFT LOWER LUNG LOBE, PERCUTANEOUS ENDOSCOPIC APPROACH: ICD-10-PCS | Performed by: THORACIC SURGERY (CARDIOTHORACIC VASCULAR SURGERY)

## 2020-12-16 PROCEDURE — 76210000006 HC OR PH I REC 0.5 TO 1 HR: Performed by: THORACIC SURGERY (CARDIOTHORACIC VASCULAR SURGERY)

## 2020-12-16 PROCEDURE — 77030031139 HC SUT VCRL2 J&J -A: Performed by: THORACIC SURGERY (CARDIOTHORACIC VASCULAR SURGERY)

## 2020-12-16 PROCEDURE — 88331 PATH CONSLTJ SURG 1 BLK 1SPC: CPT

## 2020-12-16 PROCEDURE — 77030008671 HC TU ENDO/BRNC CUF COVD -B: Performed by: ANESTHESIOLOGY

## 2020-12-16 PROCEDURE — 77030035278 HC STPLR SEAL ENDOWR INTU -B: Performed by: THORACIC SURGERY (CARDIOTHORACIC VASCULAR SURGERY)

## 2020-12-16 RX ORDER — CEFAZOLIN SODIUM 1 G/3ML
INJECTION, POWDER, FOR SOLUTION INTRAMUSCULAR; INTRAVENOUS AS NEEDED
Status: DISCONTINUED | OUTPATIENT
Start: 2020-12-16 | End: 2020-12-16 | Stop reason: HOSPADM

## 2020-12-16 RX ORDER — OXYCODONE AND ACETAMINOPHEN 5; 325 MG/1; MG/1
1 TABLET ORAL AS NEEDED
Status: DISCONTINUED | OUTPATIENT
Start: 2020-12-16 | End: 2020-12-16 | Stop reason: HOSPADM

## 2020-12-16 RX ORDER — SODIUM CHLORIDE, SODIUM LACTATE, POTASSIUM CHLORIDE, CALCIUM CHLORIDE 600; 310; 30; 20 MG/100ML; MG/100ML; MG/100ML; MG/100ML
125 INJECTION, SOLUTION INTRAVENOUS CONTINUOUS
Status: DISCONTINUED | OUTPATIENT
Start: 2020-12-16 | End: 2020-12-16 | Stop reason: HOSPADM

## 2020-12-16 RX ORDER — SODIUM CHLORIDE 0.9 % (FLUSH) 0.9 %
5-40 SYRINGE (ML) INJECTION EVERY 8 HOURS
Status: DISCONTINUED | OUTPATIENT
Start: 2020-12-16 | End: 2020-12-16 | Stop reason: HOSPADM

## 2020-12-16 RX ORDER — PHENYLEPHRINE HCL IN 0.9% NACL 0.4MG/10ML
SYRINGE (ML) INTRAVENOUS AS NEEDED
Status: DISCONTINUED | OUTPATIENT
Start: 2020-12-16 | End: 2020-12-16 | Stop reason: HOSPADM

## 2020-12-16 RX ORDER — SODIUM CHLORIDE 0.9 % (FLUSH) 0.9 %
5-40 SYRINGE (ML) INJECTION EVERY 8 HOURS
Status: DISCONTINUED | OUTPATIENT
Start: 2020-12-16 | End: 2020-12-18 | Stop reason: HOSPADM

## 2020-12-16 RX ORDER — ALBUTEROL SULFATE 0.83 MG/ML
2.5 SOLUTION RESPIRATORY (INHALATION)
Status: DISCONTINUED | OUTPATIENT
Start: 2020-12-16 | End: 2020-12-18 | Stop reason: HOSPADM

## 2020-12-16 RX ORDER — MIDAZOLAM HYDROCHLORIDE 1 MG/ML
0.5 INJECTION, SOLUTION INTRAMUSCULAR; INTRAVENOUS
Status: DISCONTINUED | OUTPATIENT
Start: 2020-12-16 | End: 2020-12-16 | Stop reason: HOSPADM

## 2020-12-16 RX ORDER — CARVEDILOL 12.5 MG/1
12.5 TABLET ORAL 2 TIMES DAILY WITH MEALS
Status: DISCONTINUED | OUTPATIENT
Start: 2020-12-16 | End: 2020-12-18 | Stop reason: HOSPADM

## 2020-12-16 RX ORDER — DULOXETIN HYDROCHLORIDE 60 MG/1
60 CAPSULE, DELAYED RELEASE ORAL
Status: DISCONTINUED | OUTPATIENT
Start: 2020-12-16 | End: 2020-12-18 | Stop reason: HOSPADM

## 2020-12-16 RX ORDER — ASPIRIN 81 MG/1
81 TABLET ORAL DAILY
Status: DISCONTINUED | OUTPATIENT
Start: 2020-12-17 | End: 2020-12-18 | Stop reason: HOSPADM

## 2020-12-16 RX ORDER — LORATADINE 10 MG/1
10 TABLET ORAL DAILY
Status: DISCONTINUED | OUTPATIENT
Start: 2020-12-17 | End: 2020-12-18 | Stop reason: HOSPADM

## 2020-12-16 RX ORDER — DIPHENHYDRAMINE HCL 25 MG
25 CAPSULE ORAL
Status: DISCONTINUED | OUTPATIENT
Start: 2020-12-16 | End: 2020-12-18 | Stop reason: HOSPADM

## 2020-12-16 RX ORDER — DULOXETIN HYDROCHLORIDE 30 MG/1
30 CAPSULE, DELAYED RELEASE ORAL
Status: DISCONTINUED | OUTPATIENT
Start: 2020-12-17 | End: 2020-12-18 | Stop reason: HOSPADM

## 2020-12-16 RX ORDER — NALOXONE HYDROCHLORIDE 0.4 MG/ML
0.4 INJECTION, SOLUTION INTRAMUSCULAR; INTRAVENOUS; SUBCUTANEOUS AS NEEDED
Status: DISCONTINUED | OUTPATIENT
Start: 2020-12-16 | End: 2020-12-18 | Stop reason: HOSPADM

## 2020-12-16 RX ORDER — SODIUM CHLORIDE 9 MG/ML
25 INJECTION, SOLUTION INTRAVENOUS CONTINUOUS
Status: DISCONTINUED | OUTPATIENT
Start: 2020-12-16 | End: 2020-12-16 | Stop reason: HOSPADM

## 2020-12-16 RX ORDER — ONDANSETRON 2 MG/ML
4 INJECTION INTRAMUSCULAR; INTRAVENOUS
Status: DISCONTINUED | OUTPATIENT
Start: 2020-12-16 | End: 2020-12-18 | Stop reason: HOSPADM

## 2020-12-16 RX ORDER — FENTANYL CITRATE 50 UG/ML
25 INJECTION, SOLUTION INTRAMUSCULAR; INTRAVENOUS
Status: COMPLETED | OUTPATIENT
Start: 2020-12-16 | End: 2020-12-16

## 2020-12-16 RX ORDER — IPRATROPIUM BROMIDE 0.5 MG/2.5ML
0.5 SOLUTION RESPIRATORY (INHALATION)
Status: DISCONTINUED | OUTPATIENT
Start: 2020-12-16 | End: 2020-12-18 | Stop reason: HOSPADM

## 2020-12-16 RX ORDER — RISPERIDONE 1 MG/1
2 TABLET, FILM COATED ORAL 2 TIMES DAILY
Status: DISCONTINUED | OUTPATIENT
Start: 2020-12-16 | End: 2020-12-18 | Stop reason: HOSPADM

## 2020-12-16 RX ORDER — ONDANSETRON 2 MG/ML
INJECTION INTRAMUSCULAR; INTRAVENOUS AS NEEDED
Status: DISCONTINUED | OUTPATIENT
Start: 2020-12-16 | End: 2020-12-16 | Stop reason: HOSPADM

## 2020-12-16 RX ORDER — SODIUM CHLORIDE 0.9 % (FLUSH) 0.9 %
5-40 SYRINGE (ML) INJECTION AS NEEDED
Status: DISCONTINUED | OUTPATIENT
Start: 2020-12-16 | End: 2020-12-16 | Stop reason: HOSPADM

## 2020-12-16 RX ORDER — ALBUTEROL SULFATE 90 UG/1
2 AEROSOL, METERED RESPIRATORY (INHALATION)
Status: DISCONTINUED | OUTPATIENT
Start: 2020-12-16 | End: 2020-12-16 | Stop reason: CLARIF

## 2020-12-16 RX ORDER — BENZTROPINE MESYLATE 1 MG/1
0.5 TABLET ORAL 2 TIMES DAILY
Status: DISCONTINUED | OUTPATIENT
Start: 2020-12-16 | End: 2020-12-18 | Stop reason: HOSPADM

## 2020-12-16 RX ORDER — LIDOCAINE HYDROCHLORIDE 20 MG/ML
INJECTION, SOLUTION EPIDURAL; INFILTRATION; INTRACAUDAL; PERINEURAL AS NEEDED
Status: DISCONTINUED | OUTPATIENT
Start: 2020-12-16 | End: 2020-12-16 | Stop reason: HOSPADM

## 2020-12-16 RX ORDER — PROPOFOL 10 MG/ML
INJECTION, EMULSION INTRAVENOUS AS NEEDED
Status: DISCONTINUED | OUTPATIENT
Start: 2020-12-16 | End: 2020-12-16 | Stop reason: HOSPADM

## 2020-12-16 RX ORDER — ATORVASTATIN CALCIUM 20 MG/1
20 TABLET, FILM COATED ORAL DAILY
Status: DISCONTINUED | OUTPATIENT
Start: 2020-12-17 | End: 2020-12-18 | Stop reason: HOSPADM

## 2020-12-16 RX ORDER — OXYCODONE AND ACETAMINOPHEN 5; 325 MG/1; MG/1
2 TABLET ORAL
Status: DISCONTINUED | OUTPATIENT
Start: 2020-12-16 | End: 2020-12-18 | Stop reason: HOSPADM

## 2020-12-16 RX ORDER — HYDROMORPHONE HYDROCHLORIDE 1 MG/ML
1 INJECTION, SOLUTION INTRAMUSCULAR; INTRAVENOUS; SUBCUTANEOUS
Status: DISCONTINUED | OUTPATIENT
Start: 2020-12-16 | End: 2020-12-18 | Stop reason: HOSPADM

## 2020-12-16 RX ORDER — ARFORMOTEROL TARTRATE 15 UG/2ML
15 SOLUTION RESPIRATORY (INHALATION)
Status: DISCONTINUED | OUTPATIENT
Start: 2020-12-16 | End: 2020-12-18 | Stop reason: HOSPADM

## 2020-12-16 RX ORDER — SODIUM CHLORIDE 0.9 % (FLUSH) 0.9 %
5-40 SYRINGE (ML) INJECTION AS NEEDED
Status: DISCONTINUED | OUTPATIENT
Start: 2020-12-16 | End: 2020-12-18 | Stop reason: HOSPADM

## 2020-12-16 RX ORDER — SODIUM CHLORIDE, SODIUM LACTATE, POTASSIUM CHLORIDE, CALCIUM CHLORIDE 600; 310; 30; 20 MG/100ML; MG/100ML; MG/100ML; MG/100ML
25 INJECTION, SOLUTION INTRAVENOUS CONTINUOUS
Status: DISCONTINUED | OUTPATIENT
Start: 2020-12-16 | End: 2020-12-16 | Stop reason: HOSPADM

## 2020-12-16 RX ORDER — MORPHINE SULFATE 10 MG/ML
2 INJECTION, SOLUTION INTRAMUSCULAR; INTRAVENOUS
Status: DISCONTINUED | OUTPATIENT
Start: 2020-12-16 | End: 2020-12-16 | Stop reason: HOSPADM

## 2020-12-16 RX ORDER — BUDESONIDE 0.5 MG/2ML
500 INHALANT ORAL
Status: DISCONTINUED | OUTPATIENT
Start: 2020-12-16 | End: 2020-12-18 | Stop reason: HOSPADM

## 2020-12-16 RX ORDER — MIDAZOLAM HYDROCHLORIDE 1 MG/ML
1 INJECTION, SOLUTION INTRAMUSCULAR; INTRAVENOUS AS NEEDED
Status: DISCONTINUED | OUTPATIENT
Start: 2020-12-16 | End: 2020-12-16 | Stop reason: HOSPADM

## 2020-12-16 RX ORDER — HYDROMORPHONE HYDROCHLORIDE 1 MG/ML
0.2 INJECTION, SOLUTION INTRAMUSCULAR; INTRAVENOUS; SUBCUTANEOUS
Status: COMPLETED | OUTPATIENT
Start: 2020-12-16 | End: 2020-12-16

## 2020-12-16 RX ORDER — KETOROLAC TROMETHAMINE 30 MG/ML
30 INJECTION, SOLUTION INTRAMUSCULAR; INTRAVENOUS EVERY 6 HOURS
Status: DISCONTINUED | OUTPATIENT
Start: 2020-12-16 | End: 2020-12-18 | Stop reason: HOSPADM

## 2020-12-16 RX ORDER — ENOXAPARIN SODIUM 100 MG/ML
40 INJECTION SUBCUTANEOUS EVERY 24 HOURS
Status: DISCONTINUED | OUTPATIENT
Start: 2020-12-17 | End: 2020-12-18 | Stop reason: HOSPADM

## 2020-12-16 RX ORDER — IBUPROFEN 200 MG
1 TABLET ORAL EVERY 24 HOURS
Status: DISCONTINUED | OUTPATIENT
Start: 2020-12-16 | End: 2020-12-18 | Stop reason: HOSPADM

## 2020-12-16 RX ORDER — ONDANSETRON 2 MG/ML
4 INJECTION INTRAMUSCULAR; INTRAVENOUS AS NEEDED
Status: DISCONTINUED | OUTPATIENT
Start: 2020-12-16 | End: 2020-12-16 | Stop reason: HOSPADM

## 2020-12-16 RX ORDER — ROCURONIUM BROMIDE 10 MG/ML
INJECTION, SOLUTION INTRAVENOUS AS NEEDED
Status: DISCONTINUED | OUTPATIENT
Start: 2020-12-16 | End: 2020-12-16 | Stop reason: HOSPADM

## 2020-12-16 RX ORDER — SUCCINYLCHOLINE CHLORIDE 20 MG/ML
INJECTION INTRAMUSCULAR; INTRAVENOUS AS NEEDED
Status: DISCONTINUED | OUTPATIENT
Start: 2020-12-16 | End: 2020-12-16 | Stop reason: HOSPADM

## 2020-12-16 RX ORDER — ACETAMINOPHEN 325 MG/1
650 TABLET ORAL ONCE
Status: DISCONTINUED | OUTPATIENT
Start: 2020-12-16 | End: 2020-12-16 | Stop reason: HOSPADM

## 2020-12-16 RX ORDER — MIDAZOLAM HYDROCHLORIDE 1 MG/ML
INJECTION, SOLUTION INTRAMUSCULAR; INTRAVENOUS AS NEEDED
Status: DISCONTINUED | OUTPATIENT
Start: 2020-12-16 | End: 2020-12-16 | Stop reason: HOSPADM

## 2020-12-16 RX ORDER — ZOLPIDEM TARTRATE 5 MG/1
10 TABLET ORAL
Status: DISCONTINUED | OUTPATIENT
Start: 2020-12-16 | End: 2020-12-18 | Stop reason: HOSPADM

## 2020-12-16 RX ORDER — DEXAMETHASONE SODIUM PHOSPHATE 4 MG/ML
INJECTION, SOLUTION INTRA-ARTICULAR; INTRALESIONAL; INTRAMUSCULAR; INTRAVENOUS; SOFT TISSUE AS NEEDED
Status: DISCONTINUED | OUTPATIENT
Start: 2020-12-16 | End: 2020-12-16 | Stop reason: HOSPADM

## 2020-12-16 RX ORDER — GABAPENTIN 600 MG/1
600 TABLET ORAL 3 TIMES DAILY
Status: DISCONTINUED | OUTPATIENT
Start: 2020-12-16 | End: 2020-12-18 | Stop reason: HOSPADM

## 2020-12-16 RX ORDER — LIDOCAINE HYDROCHLORIDE 10 MG/ML
0.1 INJECTION, SOLUTION EPIDURAL; INFILTRATION; INTRACAUDAL; PERINEURAL AS NEEDED
Status: DISCONTINUED | OUTPATIENT
Start: 2020-12-16 | End: 2020-12-16 | Stop reason: HOSPADM

## 2020-12-16 RX ORDER — FENTANYL CITRATE 50 UG/ML
50 INJECTION, SOLUTION INTRAMUSCULAR; INTRAVENOUS AS NEEDED
Status: DISCONTINUED | OUTPATIENT
Start: 2020-12-16 | End: 2020-12-16 | Stop reason: HOSPADM

## 2020-12-16 RX ORDER — FENTANYL CITRATE 50 UG/ML
INJECTION, SOLUTION INTRAMUSCULAR; INTRAVENOUS AS NEEDED
Status: DISCONTINUED | OUTPATIENT
Start: 2020-12-16 | End: 2020-12-16 | Stop reason: HOSPADM

## 2020-12-16 RX ORDER — DIPHENHYDRAMINE HYDROCHLORIDE 50 MG/ML
12.5 INJECTION, SOLUTION INTRAMUSCULAR; INTRAVENOUS AS NEEDED
Status: DISCONTINUED | OUTPATIENT
Start: 2020-12-16 | End: 2020-12-16 | Stop reason: HOSPADM

## 2020-12-16 RX ORDER — CLONAZEPAM 0.5 MG/1
0.5 TABLET ORAL
Status: DISCONTINUED | OUTPATIENT
Start: 2020-12-16 | End: 2020-12-18 | Stop reason: HOSPADM

## 2020-12-16 RX ADMIN — DEXAMETHASONE SODIUM PHOSPHATE 8 MG: 4 INJECTION, SOLUTION INTRAMUSCULAR; INTRAVENOUS at 08:30

## 2020-12-16 RX ADMIN — FENTANYL CITRATE 100 MCG: 50 INJECTION, SOLUTION INTRAMUSCULAR; INTRAVENOUS at 07:35

## 2020-12-16 RX ADMIN — GABAPENTIN 600 MG: 600 TABLET, FILM COATED ORAL at 22:58

## 2020-12-16 RX ADMIN — FENTANYL CITRATE 25 MCG: 50 INJECTION, SOLUTION INTRAMUSCULAR; INTRAVENOUS at 11:05

## 2020-12-16 RX ADMIN — MEPERIDINE HYDROCHLORIDE 12.5 MG: 25 INJECTION INTRAMUSCULAR; INTRAVENOUS; SUBCUTANEOUS at 10:23

## 2020-12-16 RX ADMIN — HYDROMORPHONE HYDROCHLORIDE 0.2 MG: 1 INJECTION, SOLUTION INTRAMUSCULAR; INTRAVENOUS; SUBCUTANEOUS at 12:00

## 2020-12-16 RX ADMIN — CARVEDILOL 12.5 MG: 12.5 TABLET, FILM COATED ORAL at 19:41

## 2020-12-16 RX ADMIN — Medication 120 MCG: at 07:48

## 2020-12-16 RX ADMIN — Medication 10 ML: at 22:59

## 2020-12-16 RX ADMIN — BUDESONIDE 500 MCG: 0.5 INHALANT RESPIRATORY (INHALATION) at 21:19

## 2020-12-16 RX ADMIN — PHENYLEPHRINE HYDROCHLORIDE 75 MCG/MIN: 10 INJECTION INTRAVENOUS at 08:01

## 2020-12-16 RX ADMIN — HYDROMORPHONE HYDROCHLORIDE 0.2 MG: 1 INJECTION, SOLUTION INTRAMUSCULAR; INTRAVENOUS; SUBCUTANEOUS at 11:30

## 2020-12-16 RX ADMIN — SODIUM CHLORIDE, POTASSIUM CHLORIDE, SODIUM LACTATE AND CALCIUM CHLORIDE: 600; 310; 30; 20 INJECTION, SOLUTION INTRAVENOUS at 07:20

## 2020-12-16 RX ADMIN — KETOROLAC TROMETHAMINE 30 MG: 30 INJECTION, SOLUTION INTRAMUSCULAR at 19:42

## 2020-12-16 RX ADMIN — SUCCINYLCHOLINE CHLORIDE 140 MG: 20 INJECTION, SOLUTION INTRAMUSCULAR; INTRAVENOUS at 07:35

## 2020-12-16 RX ADMIN — IPRATROPIUM BROMIDE 0.5 MG: 0.5 SOLUTION RESPIRATORY (INHALATION) at 17:08

## 2020-12-16 RX ADMIN — CEFAZOLIN 2 G: 330 INJECTION, POWDER, FOR SOLUTION INTRAMUSCULAR; INTRAVENOUS at 07:50

## 2020-12-16 RX ADMIN — PROPOFOL 150 MG: 10 INJECTION, EMULSION INTRAVENOUS at 07:35

## 2020-12-16 RX ADMIN — DULOXETINE HYDROCHLORIDE 60 MG: 60 CAPSULE, DELAYED RELEASE ORAL at 22:58

## 2020-12-16 RX ADMIN — LIDOCAINE HYDROCHLORIDE 60 MG: 20 INJECTION, SOLUTION EPIDURAL; INFILTRATION; INTRACAUDAL; PERINEURAL at 07:35

## 2020-12-16 RX ADMIN — MEPERIDINE HYDROCHLORIDE 12.5 MG: 25 INJECTION INTRAMUSCULAR; INTRAVENOUS; SUBCUTANEOUS at 09:53

## 2020-12-16 RX ADMIN — HYDROMORPHONE HYDROCHLORIDE 0.2 MG: 1 INJECTION, SOLUTION INTRAMUSCULAR; INTRAVENOUS; SUBCUTANEOUS at 11:45

## 2020-12-16 RX ADMIN — Medication 120 MCG: at 07:59

## 2020-12-16 RX ADMIN — RISPERIDONE 2 MG: 1 TABLET ORAL at 19:42

## 2020-12-16 RX ADMIN — HYDROMORPHONE HYDROCHLORIDE 1 MG: 1 INJECTION, SOLUTION INTRAMUSCULAR; INTRAVENOUS; SUBCUTANEOUS at 16:35

## 2020-12-16 RX ADMIN — FENTANYL CITRATE 50 MCG: 50 INJECTION, SOLUTION INTRAMUSCULAR; INTRAVENOUS at 09:01

## 2020-12-16 RX ADMIN — OXYCODONE HYDROCHLORIDE AND ACETAMINOPHEN 1 TABLET: 5; 325 TABLET ORAL at 13:11

## 2020-12-16 RX ADMIN — HYDROMORPHONE HYDROCHLORIDE 0.2 MG: 1 INJECTION, SOLUTION INTRAMUSCULAR; INTRAVENOUS; SUBCUTANEOUS at 11:15

## 2020-12-16 RX ADMIN — Medication 80 MCG: at 08:00

## 2020-12-16 RX ADMIN — MIDAZOLAM HYDROCHLORIDE 3 MG: 1 INJECTION, SOLUTION INTRAMUSCULAR; INTRAVENOUS at 07:27

## 2020-12-16 RX ADMIN — KETOROLAC TROMETHAMINE 30 MG: 30 INJECTION, SOLUTION INTRAMUSCULAR at 23:03

## 2020-12-16 RX ADMIN — FENTANYL CITRATE 25 MCG: 50 INJECTION, SOLUTION INTRAMUSCULAR; INTRAVENOUS at 11:45

## 2020-12-16 RX ADMIN — ROCURONIUM BROMIDE 30 MG: 10 SOLUTION INTRAVENOUS at 07:42

## 2020-12-16 RX ADMIN — ONDANSETRON 4 MG: 2 INJECTION INTRAMUSCULAR; INTRAVENOUS at 09:53

## 2020-12-16 RX ADMIN — ARFORMOTEROL TARTRATE 15 MCG: 15 SOLUTION RESPIRATORY (INHALATION) at 21:18

## 2020-12-16 RX ADMIN — GABAPENTIN 600 MG: 600 TABLET, FILM COATED ORAL at 16:35

## 2020-12-16 RX ADMIN — BENZTROPINE MESYLATE 0.5 MG: 1 TABLET ORAL at 19:42

## 2020-12-16 RX ADMIN — ONDANSETRON HYDROCHLORIDE 4 MG: 2 INJECTION, SOLUTION INTRAMUSCULAR; INTRAVENOUS at 08:30

## 2020-12-16 RX ADMIN — IPRATROPIUM BROMIDE 0.5 MG: 0.5 SOLUTION RESPIRATORY (INHALATION) at 21:19

## 2020-12-16 RX ADMIN — FENTANYL CITRATE 50 MCG: 50 INJECTION, SOLUTION INTRAMUSCULAR; INTRAVENOUS at 09:11

## 2020-12-16 RX ADMIN — ROCURONIUM BROMIDE 5 MG: 10 SOLUTION INTRAVENOUS at 07:35

## 2020-12-16 RX ADMIN — SODIUM CHLORIDE, SODIUM LACTATE, POTASSIUM CHLORIDE, AND CALCIUM CHLORIDE 25 ML/HR: 600; 310; 30; 20 INJECTION, SOLUTION INTRAVENOUS at 06:56

## 2020-12-16 RX ADMIN — FENTANYL CITRATE 25 MCG: 50 INJECTION, SOLUTION INTRAMUSCULAR; INTRAVENOUS at 10:58

## 2020-12-16 RX ADMIN — HYDROMORPHONE HYDROCHLORIDE 0.2 MG: 1 INJECTION, SOLUTION INTRAMUSCULAR; INTRAVENOUS; SUBCUTANEOUS at 10:58

## 2020-12-16 RX ADMIN — FENTANYL CITRATE 25 MCG: 50 INJECTION, SOLUTION INTRAMUSCULAR; INTRAVENOUS at 11:10

## 2020-12-16 RX ADMIN — MIDAZOLAM HYDROCHLORIDE 2 MG: 1 INJECTION, SOLUTION INTRAMUSCULAR; INTRAVENOUS at 07:23

## 2020-12-16 RX ADMIN — SODIUM CHLORIDE, SODIUM LACTATE, POTASSIUM CHLORIDE, AND CALCIUM CHLORIDE 25 ML/HR: 600; 310; 30; 20 INJECTION, SOLUTION INTRAVENOUS at 06:51

## 2020-12-16 NOTE — BRIEF OP NOTE
Brief Postoperative Note    Patient: Ioana Bender  YOB: 1965  MRN: [id number]    Date of Procedure: 12/16/2020     Pre-Op Diagnosis: SOLITARY PULMONARY NODULE    Post-Op Diagnosis: Same as preoperative diagnosis.       Procedure(s):  Xi ROBOTIC LEFT LOWER LOBE wedge resection, MLND    Surgeon(s):  Margy Alonzo MD    Surgical Assistant: Surg Asst-1: Dinorah Taylor    Anesthesia: General     Estimated Blood Loss (mL): less than 50     Complications: None    Specimens:   ID Type Source Tests Collected by Time Destination   1 : left lower lobe wedge Frozen Section Lung Biopsy  Margy Alonzo MD 12/16/2020 0758 Pathology   2 : 9L lymph node Fresh Lymph Node  Margy Alonzo MD 12/16/2020 0825 Pathology   3 : 10L lymph node Fresh Lymph Node  Margy Alonzo MD 12/16/2020 0830 Pathology        Implants: * No implants in log *    Drains: * No LDAs found *    Findings: frozen, NSCLC >2cm margin    Condition: stable    Disposition: to pacu    Electronically Signed by Aruna Loving MD on 12/16/2020 at 9:03 AM

## 2020-12-16 NOTE — PERIOP NOTES
1000ml ns to sterile field  Pt care giver updated re:surgery start-ms. tsang  Patient: Maxine Pierce MRN: [id number]  SSN: [id number]   YOB: 1965  Age: 63 y.o. Sex: male     Patient is status post Procedure(s):  Xi ROBOTIC LEFT LOWER LOBE SEGMENTECTOMY. Surgeon(s) and Role:     * Samanta Khan MD - Primary    Local/Dose/Irrigation:  See mar                  Peripheral IV 12/16/20 Left Hand (Active)       Peripheral IV 12/16/20 Right Hand (Active)      Arterial Line 12/16/20 Left Radial artery (Active)          Airway - Endotracheal Tube 12/16/20 (Active)                   Dressing/Packing:  Wound Chest Left Port holes x4-Dressing/Treatment: Surgical glue (12/16/20 5395)    Splint/Cast:  ]    Other:        .

## 2020-12-16 NOTE — H&P
Mr. Yolie Rodriguez returns to clinic today to discuss surgery and the results of his recent testing. He completed his PFTs and Echo and was seen by his cardiology team.  He has no new respiratory complaints. He reports he has cut down from 10 cigarettes per day to 5. He is hoping to quit when in the hospital after surgery.     No significant changes to his past medical or surgical history.        Current Outpatient Medications:     haloperidol decanoate (HALDOL DECANOATE) 100 mg/mL injection, every twenty-eight (28) days. , Disp: , Rfl:     clonazePAM (KlonoPIN) 0.5 mg tablet, Take  by mouth nightly as needed for Anxiety. , Disp: , Rfl:     carvediloL (COREG) 12.5 mg tablet, Take 1 Tab by mouth two (2) times daily (with meals). Increased 11/6/2020, Disp: 60 Tab, Rfl: 1    benztropine (COGENTIN) 0.5 mg tablet, Take 0.5 mg by mouth two (2) times a day., Disp: , Rfl:     fluticasone propion-salmeteroL (ADVAIR/WIXELA) 250-50 mcg/dose diskus inhaler, Take 1 Puff by inhalation two (2) times a day., Disp: , Rfl:     gabapentin (NEURONTIN) 600 mg tablet, Take 600 mg by mouth three (3) times daily. , Disp: , Rfl:     traMADoL (ULTRAM) 50 mg tablet, Take 50 mg by mouth three (3) times daily as needed for Pain., Disp: , Rfl:     atorvastatin (LIPITOR) 20 mg tablet, Take 1 Tab by mouth daily. , Disp: 30 Tab, Rfl: 5    aspirin delayed-release 81 mg tablet, Take 1 Tab by mouth daily. , Disp: 30 Tab, Rfl: 5    diphenhydrAMINE (BENADRYL) 25 mg capsule, Take 25 mg by mouth nightly as needed. One to two tablets by mouth at bedtime as needed for insomnia. , Disp: , Rfl:     acetaminophen (TYLENOL) 500 mg tablet, Take 1 Tab by mouth every six (6) hours as needed for Pain (headache). Indications: head pain, Disp: 60 Tab, Rfl: 3    FLOVENT  mcg/actuation inhaler, Take 2 Puffs by inhalation two (2) times a day., Disp: , Rfl:     ATROVENT HFA 17 mcg/actuation inhaler, Take 2 Puffs by inhalation four (4) times daily. , Disp: , Rfl:   DULoxetine (CYMBALTA) 60 mg capsule, Take 60 mg by mouth nightly., Disp: , Rfl:     ANORO ELLIPTA 62.5-25 mcg/actuation inhaler, Take 1 Puff by inhalation daily. , Disp: , Rfl:     clonazePAM (KLONOPIN) 0.5 mg tablet, Take 1 Tab by mouth nightly as needed. Max Daily Amount: 0.5 mg. Indications: Panic Disorder (Patient taking differently: Take 0.5 mg by mouth two (2) times a day.), Disp: 60 Tab, Rfl: 3    DULoxetine (CYMBALTA) 30 mg capsule, Take 1 Cap by mouth daily. One daily in the morning, two at HS (Patient taking differently: Take 30 mg by mouth every morning. One daily in the morning, two at HS), Disp: 90 Cap, Rfl: 3    loratadine (CLARITIN) 10 mg tablet, Take 1 Tab by mouth daily. , Disp: 30 Tab, Rfl: 3    zolpidem (AMBIEN) 10 mg tablet, Take 1 Tab by mouth nightly as needed for Sleep. Max Daily Amount: 10 mg., Disp: 30 Tab, Rfl: 3    naproxen (NAPROSYN) 500 mg tablet, Take 1 Tab by mouth two (2) times daily as needed. FOR PAIN   Indications: Pain, Disp: 60 Tab, Rfl: 3    tiotropium (SPIRIVA WITH HANDIHALER) 18 mcg inhalation capsule, Take 1 Cap by inhalation daily. , Disp: , Rfl:     albuterol (VENTOLIN HFA) 90 mcg/actuation inhaler, Take 2 Puffs by inhalation as needed for Wheezing. (Patient taking differently: Take 2 Puffs by inhalation. EVERY 4-6 HOURS AS NEEDED), Disp: 1 Inhaler, Rfl: 6    risperiDONE (RISPERDAL) 2 mg tablet, Take 2 mg by mouth two (2) times a day., Disp: , Rfl:     TUSSIN DM  mg/5 mL liqd, TAKE 10 ML BY MOUTH EVERY 6 HOURS AS NEEDED, Disp: , Rfl: 0    triamcinolone acetonide (KENALOG) 0.1 % topical cream, Apply  to affected area two (2) times daily as needed. , Disp: , Rfl:     nicotine (NICODERM CQ) 14 mg/24 hr patch, 1 Patch by TransDERmal route every twenty-four (24) hours. , Disp: , Rfl:         Afebrile  HD stable     On exam he is seated upright  Alert and oriented  well developed  Not tachypneic  Normal speech, normal affect  No cervical or supraclavicular lymphadenopathy  Lungs CTA b/l  No chest wall tenderness  Rrr, no murmurs  Radial pulses palp b/l  abd sntnd, no organomegaly  LE non edematous  UE both forearms with multiple jagged scars        Echo- EF 50-55%     I have reveiwed his Cardiology notes. EF improved, felt to be low risk for jes-operative event.     PFTs  FeV1 58%  DLCO 58%     Diagnoses  1: Solitary pulmonary nodule  2: Cardiomyopathy  3: Psychiatric disorder NOS     Mr. Zach Duncan has a PEt avid suspicious LLL pulmonary nodule. PFTs adequate and his EF has improved.     Plan for a robotic LLL superior segmentectomy this month. Will post.  He will need PAT.     Thank you for allowing us to care for Mr. Zach Duncan.

## 2020-12-16 NOTE — OP NOTES
[address]   OPERATIVE REPORT    Name:  Felix Haddad  MR#:  [id number]  :  1965  ACCOUNT #:  [de-identified]  DATE OF SERVICE:  2020    CLINICAL SERVICE:  Thoracic Surgery. ATTENDING SURGEON:  Donald Salomon MD    OPERATIONS PERFORMED:  1.  Robotic left lower lobe wedge resection (da Larinda Balloon). 2.  Robotic mediastinal lymph node sampling. PREOPERATIVE DIAGNOSIS:  Solitary pulmonary nodule. POSTOPERATIVE DIAGNOSIS:  Non-small cell lung cancer. FIRST ASSISTANT:  Stacy Fox SA    SPECIMENS SENT:  1. Lymph nodes from stations 9L, 10L were sent to anatomic pathology. 2.  Left lower lobe wedge was sent to anatomic pathology. DRAINS AND TUBES:  One 28-Thai chest tube was left within the left hemithorax. ANESTHESIA:  General with double-lumen endotracheal intubation. ESTIMATED BLOOD LOSS:  For this case was less than 50 mL. INDICATIONS FOR PROCEDURE:  The patient is a 53-year-old gentleman referred from Pulmonary Medicine for a PET-avid solitary pulmonary nodule in the superior segment of the left lower lobe. He was seen in preop clinic, and after a thorough workup, decision was made to proceed to the operating room for definitive resection. PROCEDURE IN DETAIL:  After informed consent was obtained and placed on the chart, the patient was taken to the operating room and placed supine on the operating room table. General anesthesia with double-lumen endotracheal intubation was induced without complications. Preoperative antibiotics were administered. The patient was then placed in the right lateral decubitus position with the left side up. The patient's left chest was prepped and draped in sterile fashion. Time-out was performed. Through the eighth intercostal space as far anterior as possible, a 12 mm robotic trocar was placed. Chest was insufflated. In the same intercostal space more posteriorly, two 8 mm trocars and additional 12 were placed. Inferiorly, an assist port was placed. The robot was then docked without complication. Instruments were inserted under direct visualization. The left lower lobe was inspected. There was a very well developed posterior fissure between upper lobe and left lower lobe. We were able to identify the nodule by haptic feedback in the superior segment of the left lower lobe. Due to its positioning and due to the very well developed fissure, we were able to perform a very generous wedge resection of the superior segment of the left lower lobe. This was extirpated through a specimen bag through the assist port. Nodule was easily palpated, and this was sent to anatomic pathology. Frozen section revealed non-small cell lung cancer and was greater than 2 cm from the margin. Inferior pulmonary ligament was released. Station 9L lymph node was sent to anatomic pathology. Posterior pleura was released. We dissected out the subcarinal space, and there were no definitive station 7 lymph nodes. Posteriorly in the hilum, station 10L lymph node was dissected out and sent to anatomic pathology. Hemostasis was ensured. Instruments were withdrawn. Robot was undocked. Approximately 60 mL of 1% lidocaine mixed with 0.25% Marcaine was used as local anesthetic to perform intercostal nerve blocks. A 28-Tamazight chest tube was placed posteriorly. Trocars were withdrawn, hemostasis ensured. Lung was re-inflated under direct visualization. The incisions were then closed in a multilayered fashion and covered with Dermabond. The patient was then reversed from general anesthesia, extubated and taken to PACU in stable condition. All surgical counts were correct x2 at the end of this case. There were no immediate complications identified during this case. Dr. Maksim Cho was present and scrubbed throughout the entire procedure.       Ant Pacheco MD      [id number]  D:  12/16/2020 9:34  T: 12/16/2020 14:11  JOB #:  [id number]  CC:  Mily Joy MD

## 2020-12-16 NOTE — ANESTHESIA PROCEDURE NOTES
Arterial Line Placement    Performed by: Aime Mclain DO  Authorized by: Aime Mclain DO     Pre-Procedure  Indications:  Arterial pressure monitoring and blood sampling  Preanesthetic Checklist: patient identified, risks and benefits discussed, anesthesia consent, site marked, patient being monitored, timeout performed and patient being monitored      Procedure:   Prep:  Chlorhexidine  Seldinger Technique?: Yes    Orientation:  Left  Location:  Radial artery  Catheter size:  20 G  Number of attempts:  1  Cont Cardiac Output Sensor: No      Assessment:   Post-procedure:  Line secured and sterile dressing applied  Patient Tolerance:  Patient tolerated the procedure well with no immediate complications

## 2020-12-16 NOTE — ANESTHESIA POSTPROCEDURE EVALUATION
Procedure(s):  Xi ROBOTIC LEFT LOWER LOBE SEGMENTECTOMY. general    Anesthesia Post Evaluation        Patient location during evaluation: PACU  Patient participation: complete - patient participated  Level of consciousness: awake  Pain management: adequate  Airway patency: patent  Anesthetic complications: no  Cardiovascular status: hemodynamically stable  Respiratory status: acceptable  Hydration status: acceptable  Comments: I have seen and evaluated the patient. The patient is ready for PACU discharge. Mirna Grimes, DO                         INITIAL Post-op Vital signs:   Vitals Value Taken Time   /97 12/16/20 1200   Temp 36.8 °C (98.2 °F) 12/16/20 1015   Pulse 104 12/16/20 1208   Resp 21 12/16/20 1208   SpO2 100 % 12/16/20 1208   Vitals shown include unvalidated device data.

## 2020-12-16 NOTE — ANESTHESIA PREPROCEDURE EVALUATION
Relevant Problems   RESPIRATORY SYSTEM   (+) SOB (shortness of breath)      CARDIOVASCULAR   (+) Essential hypertension      PERSONAL HX & FAMILY HX OF CANCER   (+) Lung cancer (HCC)       Anesthetic History   No history of anesthetic complications            Review of Systems / Medical History  Patient summary reviewed, nursing notes reviewed and pertinent labs reviewed    Pulmonary    COPD               Neuro/Psych         Psychiatric history     Cardiovascular    Hypertension      CHF          Comments: EF 55%  HOCM   GI/Hepatic/Renal  Within defined limits              Endo/Other        Cancer     Other Findings              Physical Exam    Airway  Mallampati: II  TM Distance: > 6 cm  Neck ROM: normal range of motion   Mouth opening: Normal     Cardiovascular  Regular rate and rhythm,  S1 and S2 normal,  no murmur, click, rub, or gallop             Dental    Dentition: Poor dentition     Pulmonary  Breath sounds clear to auscultation               Abdominal  GI exam deferred       Other Findings            Anesthetic Plan    ASA: 3  Anesthesia type: general    Monitoring Plan: Arterial line      Induction: Intravenous  Anesthetic plan and risks discussed with: Patient

## 2020-12-17 ENCOUNTER — APPOINTMENT (OUTPATIENT)
Dept: GENERAL RADIOLOGY | Age: 55
DRG: 164 | End: 2020-12-17
Attending: NURSE PRACTITIONER
Payer: MEDICARE

## 2020-12-17 PROCEDURE — 94640 AIRWAY INHALATION TREATMENT: CPT

## 2020-12-17 PROCEDURE — 74011250636 HC RX REV CODE- 250/636: Performed by: THORACIC SURGERY (CARDIOTHORACIC VASCULAR SURGERY)

## 2020-12-17 PROCEDURE — 77010033678 HC OXYGEN DAILY

## 2020-12-17 PROCEDURE — 94664 DEMO&/EVAL PT USE INHALER: CPT

## 2020-12-17 PROCEDURE — 71045 X-RAY EXAM CHEST 1 VIEW: CPT

## 2020-12-17 PROCEDURE — 65660000000 HC RM CCU STEPDOWN

## 2020-12-17 PROCEDURE — 74011000250 HC RX REV CODE- 250: Performed by: THORACIC SURGERY (CARDIOTHORACIC VASCULAR SURGERY)

## 2020-12-17 PROCEDURE — 99024 POSTOP FOLLOW-UP VISIT: CPT | Performed by: NURSE PRACTITIONER

## 2020-12-17 PROCEDURE — 74011250637 HC RX REV CODE- 250/637: Performed by: THORACIC SURGERY (CARDIOTHORACIC VASCULAR SURGERY)

## 2020-12-17 RX ADMIN — ALBUTEROL SULFATE 2.5 MG: 2.5 SOLUTION RESPIRATORY (INHALATION) at 05:14

## 2020-12-17 RX ADMIN — ARFORMOTEROL TARTRATE 15 MCG: 15 SOLUTION RESPIRATORY (INHALATION) at 21:18

## 2020-12-17 RX ADMIN — ZOLPIDEM TARTRATE 10 MG: 5 TABLET ORAL at 23:01

## 2020-12-17 RX ADMIN — Medication 10 ML: at 14:41

## 2020-12-17 RX ADMIN — Medication 10 ML: at 06:59

## 2020-12-17 RX ADMIN — DULOXETINE HYDROCHLORIDE 60 MG: 60 CAPSULE, DELAYED RELEASE ORAL at 23:01

## 2020-12-17 RX ADMIN — GABAPENTIN 600 MG: 600 TABLET, FILM COATED ORAL at 08:58

## 2020-12-17 RX ADMIN — OXYCODONE AND ACETAMINOPHEN 2 TABLET: 5; 325 TABLET ORAL at 09:06

## 2020-12-17 RX ADMIN — IPRATROPIUM BROMIDE 0.5 MG: 0.5 SOLUTION RESPIRATORY (INHALATION) at 07:47

## 2020-12-17 RX ADMIN — ENOXAPARIN SODIUM 40 MG: 100 INJECTION SUBCUTANEOUS at 10:13

## 2020-12-17 RX ADMIN — RISPERIDONE 2 MG: 1 TABLET ORAL at 17:29

## 2020-12-17 RX ADMIN — KETOROLAC TROMETHAMINE 30 MG: 30 INJECTION, SOLUTION INTRAMUSCULAR at 17:29

## 2020-12-17 RX ADMIN — KETOROLAC TROMETHAMINE 30 MG: 30 INJECTION, SOLUTION INTRAMUSCULAR at 06:59

## 2020-12-17 RX ADMIN — BUDESONIDE 500 MCG: 0.5 INHALANT RESPIRATORY (INHALATION) at 21:19

## 2020-12-17 RX ADMIN — Medication 10 ML: at 23:01

## 2020-12-17 RX ADMIN — BUDESONIDE 500 MCG: 0.5 INHALANT RESPIRATORY (INHALATION) at 07:47

## 2020-12-17 RX ADMIN — RISPERIDONE 2 MG: 1 TABLET ORAL at 08:58

## 2020-12-17 RX ADMIN — OXYCODONE AND ACETAMINOPHEN 2 TABLET: 5; 325 TABLET ORAL at 02:25

## 2020-12-17 RX ADMIN — CLONAZEPAM 0.5 MG: 0.5 TABLET ORAL at 04:16

## 2020-12-17 RX ADMIN — ATORVASTATIN CALCIUM 20 MG: 20 TABLET, FILM COATED ORAL at 08:58

## 2020-12-17 RX ADMIN — KETOROLAC TROMETHAMINE 30 MG: 30 INJECTION, SOLUTION INTRAMUSCULAR at 23:01

## 2020-12-17 RX ADMIN — GABAPENTIN 600 MG: 600 TABLET, FILM COATED ORAL at 23:01

## 2020-12-17 RX ADMIN — GABAPENTIN 600 MG: 600 TABLET, FILM COATED ORAL at 15:43

## 2020-12-17 RX ADMIN — IPRATROPIUM BROMIDE 0.5 MG: 0.5 SOLUTION RESPIRATORY (INHALATION) at 21:18

## 2020-12-17 RX ADMIN — ASPIRIN 81 MG: 81 TABLET, COATED ORAL at 09:02

## 2020-12-17 RX ADMIN — KETOROLAC TROMETHAMINE 30 MG: 30 INJECTION, SOLUTION INTRAMUSCULAR at 12:29

## 2020-12-17 RX ADMIN — ARFORMOTEROL TARTRATE 15 MCG: 15 SOLUTION RESPIRATORY (INHALATION) at 07:47

## 2020-12-17 RX ADMIN — OXYCODONE AND ACETAMINOPHEN 2 TABLET: 5; 325 TABLET ORAL at 20:43

## 2020-12-17 RX ADMIN — BENZTROPINE MESYLATE 0.5 MG: 1 TABLET ORAL at 08:58

## 2020-12-17 RX ADMIN — LORATADINE 10 MG: 10 TABLET ORAL at 08:58

## 2020-12-17 RX ADMIN — BENZTROPINE MESYLATE 0.5 MG: 1 TABLET ORAL at 17:29

## 2020-12-17 RX ADMIN — CARVEDILOL 12.5 MG: 12.5 TABLET, FILM COATED ORAL at 08:59

## 2020-12-17 RX ADMIN — CARVEDILOL 12.5 MG: 12.5 TABLET, FILM COATED ORAL at 17:29

## 2020-12-17 RX ADMIN — DULOXETINE HYDROCHLORIDE 30 MG: 30 CAPSULE, DELAYED RELEASE ORAL at 06:59

## 2020-12-17 NOTE — PROGRESS NOTES
REJI:    RUR 12%    Patient lives at [facility]. His caregiver is Benjamin Cooper [phone]. Patient will need transportation at discharge. Per caregiver, Nadir Hernandez or Shea Platt is fine. Care Management Interventions  PCP Verified by CM: Yes  Mode of Transport at Discharge: (Patient will need transportation arranged at discharge.)  MyChart Signup: No  Discharge Durable Medical Equipment: No  Physical Therapy Consult: No  Occupational Therapy Consult: No  Speech Therapy Consult: No  Current Support Network: Adult Group Home  Confirm Follow Up Transport: (Caretaker, Benjamin Cooper provides transportation.)  Discharge Location  Discharge Placement: Group home    Reason for Admission:   Xi ROBOTIC LEFT LOWER LOBE SEGMENTECTOMY                   RUR Score:   12%                  Plan for utilizing home health:     No orders     PCP: First and Last name:  Caroline Alfaro   Name of Practice:    Are you a current patient: Yes/No: Yes   Approximate date of last visit: 11/15/2020   Can you participate in a virtual visit with your PCP: Unknown                    Current Advanced Directive/Advance Care Plan:      None                    Transition of Care Plan:                    CM met with patient to introduce CM role, verify demographics and begin discharge planning. Patient lives in a group home called [facility]. His caregiver is Benjamin Cooper [phone]. Patient performs ADLs independently. He stated that he has been living at the group home for about 5 years. He has a brother, Traci Sidhu, who lives in [place]. Patient said that he talks to him often. Patient gets prescriptions filled at [facility]. Insurance verified: [employer]    Patient will need transportation(Uber or Lyft) at discharge.     Domingo Box RN/CRM

## 2020-12-17 NOTE — PROGRESS NOTES
Pt ambulated in guillen with tech. Pt was able to do one lap around nurses station before tiring out. No sob reported. Provider made aware and is not on unit to remove chest tube.

## 2020-12-17 NOTE — PROGRESS NOTES
Problem: Falls - Risk of  Goal: *Absence of Falls  Description: Document Juwan Tom Fall Risk and appropriate interventions in the flowsheet.   Outcome: Progressing Towards Goal  Note: Fall Risk Interventions:            Medication Interventions: Patient to call before getting OOB                   Problem: Patient Education: Go to Patient Education Activity  Goal: Patient/Family Education  Outcome: Progressing Towards Goal     Problem: Breathing Pattern - Ineffective  Goal: *Absence of hypoxia  Outcome: Progressing Towards Goal     Problem: Discharge Planning  Goal: *Discharge to safe environment  Outcome: Progressing Towards Goal

## 2020-12-17 NOTE — PROGRESS NOTES
Bedside and Verbal shift change report given to mikal (oncoming nurse) by Harsha Osman (offgoing nurse). Report included the following information SBAR, Kardex, Intake/Output, MAR, Recent Results and Cardiac Rhythm NSR.

## 2020-12-17 NOTE — PROGRESS NOTES
Thoracic Surgery Simple Progress Note    Admit Date: 2020  POD: 1 Day Post-Op      Procedure:  Procedure(s):  Xi ROBOTIC LEFT LOWER LOBE SEGMENTECTOMY      Subjective:     Patient has complaints: No significant medical complaints  states he dis=dn't sleep much last night    Review of Systems:    CARDIAC: positive for H/O HTN and tachycardia  RESP: positive for lung mass  NEURO:  positive for paranoid schizophrenia  INCISION: Clean, dry, and intact  EXT: Denies new swelling or pain in the legs or calves. Objective:     Blood pressure (!) 146/81, pulse 88, temperature 97.5 °F (36.4 °C), resp. rate 16, height 5' 8\" (1.727 m), weight 171 lb 1.2 oz (77.6 kg), SpO2 100 %. Temp (24hrs), Av.9 °F (36.6 °C), Min:97.5 °F (36.4 °C), Max:98.3 °F (36.8 °C)        Hemodynamics    PAP    CO    CI    No intake/output data recorded. 12/15 1901 -  0700  In: 540 [P.O.:240; I.V.:300]  Out: 1175 [Urine:1150]    EXAM:  GENERAL: VSS, afrible, alert and cooperative  HEART:  regular rate and rhythm  LUNG: clear to auscultation bilaterally, chest tube in place on left, no air leak, CXR reviewed, minimal output  NEURO:  mental status, speech normal, alert and oriented x iii  INCISION: Clean, dry, and intact  EXTREMITIES:No evidence of DVT seen on physical exam.  GI/: Abd soft, nonterder with + bowel sounds. Voiding    Labs:  Recent Results (from the past 24 hour(s))   GLUCOSE, POC    Collection Time: 20  6:13 PM   Result Value Ref Range    Glucose (POC) 163 (H) 65 - 100 mg/dL    Performed by Katarzyna Rosario  PCT        Assessment:   No evidence of DVT.   Active Problems:    Lung cancer [facility]) (2020)      PATH:  pending                                    Plan/Recommendations:   Plan to remove chest tube later this morning  Discharge home tomorrow     See orders    Signed By: Aurelio WONG

## 2020-12-18 ENCOUNTER — APPOINTMENT (OUTPATIENT)
Dept: GENERAL RADIOLOGY | Age: 55
DRG: 164 | End: 2020-12-18
Attending: NURSE PRACTITIONER
Payer: MEDICARE

## 2020-12-18 VITALS
SYSTOLIC BLOOD PRESSURE: 163 MMHG | RESPIRATION RATE: 16 BRPM | WEIGHT: 171 LBS | TEMPERATURE: 97.4 F | HEART RATE: 84 BPM | BODY MASS INDEX: 25.91 KG/M2 | HEIGHT: 68 IN | DIASTOLIC BLOOD PRESSURE: 101 MMHG | OXYGEN SATURATION: 96 %

## 2020-12-18 PROCEDURE — 94640 AIRWAY INHALATION TREATMENT: CPT

## 2020-12-18 PROCEDURE — 99024 POSTOP FOLLOW-UP VISIT: CPT | Performed by: PHYSICIAN ASSISTANT

## 2020-12-18 PROCEDURE — 74011250636 HC RX REV CODE- 250/636: Performed by: THORACIC SURGERY (CARDIOTHORACIC VASCULAR SURGERY)

## 2020-12-18 PROCEDURE — 74011250637 HC RX REV CODE- 250/637: Performed by: THORACIC SURGERY (CARDIOTHORACIC VASCULAR SURGERY)

## 2020-12-18 PROCEDURE — 74011000250 HC RX REV CODE- 250: Performed by: THORACIC SURGERY (CARDIOTHORACIC VASCULAR SURGERY)

## 2020-12-18 PROCEDURE — 71045 X-RAY EXAM CHEST 1 VIEW: CPT

## 2020-12-18 RX ORDER — OXYCODONE AND ACETAMINOPHEN 5; 325 MG/1; MG/1
1-2 TABLET ORAL
Qty: 40 TAB | Refills: 0 | Status: SHIPPED | OUTPATIENT
Start: 2020-12-18 | End: 2020-12-25

## 2020-12-18 RX ORDER — AMOXICILLIN 250 MG
1 CAPSULE ORAL DAILY
Qty: 14 TAB | Refills: 0 | Status: SHIPPED | OUTPATIENT
Start: 2020-12-18 | End: 2022-02-26

## 2020-12-18 RX ADMIN — OXYCODONE AND ACETAMINOPHEN 2 TABLET: 5; 325 TABLET ORAL at 09:54

## 2020-12-18 RX ADMIN — ENOXAPARIN SODIUM 40 MG: 100 INJECTION SUBCUTANEOUS at 09:50

## 2020-12-18 RX ADMIN — ARFORMOTEROL TARTRATE 15 MCG: 15 SOLUTION RESPIRATORY (INHALATION) at 08:24

## 2020-12-18 RX ADMIN — CARVEDILOL 12.5 MG: 12.5 TABLET, FILM COATED ORAL at 09:48

## 2020-12-18 RX ADMIN — BUDESONIDE 500 MCG: 0.5 INHALANT RESPIRATORY (INHALATION) at 08:24

## 2020-12-18 RX ADMIN — ATORVASTATIN CALCIUM 20 MG: 20 TABLET, FILM COATED ORAL at 09:48

## 2020-12-18 RX ADMIN — GABAPENTIN 600 MG: 600 TABLET, FILM COATED ORAL at 09:48

## 2020-12-18 RX ADMIN — Medication 10 ML: at 05:59

## 2020-12-18 RX ADMIN — OXYCODONE AND ACETAMINOPHEN 2 TABLET: 5; 325 TABLET ORAL at 03:26

## 2020-12-18 RX ADMIN — RISPERIDONE 2 MG: 1 TABLET ORAL at 09:48

## 2020-12-18 RX ADMIN — KETOROLAC TROMETHAMINE 30 MG: 30 INJECTION, SOLUTION INTRAMUSCULAR at 05:59

## 2020-12-18 RX ADMIN — LORATADINE 10 MG: 10 TABLET ORAL at 09:49

## 2020-12-18 RX ADMIN — IPRATROPIUM BROMIDE 0.5 MG: 0.5 SOLUTION RESPIRATORY (INHALATION) at 08:24

## 2020-12-18 RX ADMIN — CLONAZEPAM 0.5 MG: 0.5 TABLET ORAL at 03:26

## 2020-12-18 RX ADMIN — DULOXETINE HYDROCHLORIDE 30 MG: 30 CAPSULE, DELAYED RELEASE ORAL at 06:00

## 2020-12-18 RX ADMIN — HYDROMORPHONE HYDROCHLORIDE 1 MG: 1 INJECTION, SOLUTION INTRAMUSCULAR; INTRAVENOUS; SUBCUTANEOUS at 04:32

## 2020-12-18 RX ADMIN — BENZTROPINE MESYLATE 0.5 MG: 1 TABLET ORAL at 09:48

## 2020-12-18 RX ADMIN — ASPIRIN 81 MG: 81 TABLET, COATED ORAL at 09:48

## 2020-12-18 NOTE — DISCHARGE SUMMARY
Physician Discharge Summary     Patient ID:  Grace Adamson  [id number]  53 y.o.  1965    Allergies: Tuberculin ppd    Admit Date: 12/16/2020    Discharge Date: 12/18/2020    * Admission Diagnoses: Lung cancer [facility] [place] [facility]) [C34.90]    * Discharge Diagnoses:    Hospital Problems as of 12/18/2020 Date Reviewed: 12/16/2020          Codes Class Noted - Resolved POA    Lung cancer ([facility] [address] ICD-10-CM: C34.90  ICD-9-CM: 162.9  12/16/2020 - Present Unknown               Admission Condition: Good    * Discharge Condition: good    * Procedures: Procedure(s):  Xi ROBOTIC LEFT LOWER LOBE SEGMENTECTOMY    * Hospital Course:   Normal hospital course for this procedure. Transitioned to oral analgesics and pain well controlled. He ambulated in halls & used IS frequently. Patient had chest tube removed POD #1 and DC to home  POD #2. Post-pull CXR stable. He is doing well with follow up appointments in place. Consults: None    Significant Diagnostic Studies: labs: Surgical pathology--pending and radiology: Daily CXR & telemetry    * Disposition: Group Home/ Prior residence    Discharge Medications:   Discharge Medication List as of 12/18/2020 11:30 AM      START taking these medications    Details   oxyCODONE-acetaminophen (PERCOCET) 5-325 mg per tablet Take 1-2 Tabs by mouth every four (4) hours as needed for Pain for up to 7 days. Max Daily Amount: 12 Tabs., Normal, Disp-40 Tab, R-0      senna-docusate (PERICOLACE) 8.6-50 mg per tablet Take 1 Tab by mouth daily. Indications: constipation, Normal, Disp-14 Tab, R-0         CONTINUE these medications which have NOT CHANGED    Details   clonazePAM (KlonoPIN) 0.5 mg tablet Take  by mouth nightly as needed for Anxiety. , Historical Med      carvediloL (COREG) 12.5 mg tablet Take 1 Tab by mouth two (2) times daily (with meals).  Increased 11/6/2020, Normal, Disp-60 Tab,R-1      benztropine (COGENTIN) 0.5 mg tablet Take 0.5 mg by mouth two (2) times a day., Historical Med      gabapentin (NEURONTIN) 600 mg tablet Take 600 mg by mouth three (3) times daily. , Historical Med      atorvastatin (LIPITOR) 20 mg tablet Take 1 Tab by mouth daily. , Normal, Disp-30 Tab,R-5      aspirin delayed-release 81 mg tablet Take 1 Tab by mouth daily. , Normal, Disp-30 Tab, R-5      diphenhydrAMINE (BENADRYL) 25 mg capsule Take 25 mg by mouth nightly as needed. One to two tablets by mouth at bedtime as needed for insomnia. , Historical Med      !! DULoxetine (CYMBALTA) 60 mg capsule Take 60 mg by mouth nightly., Historical Med      ANORO ELLIPTA 62.5-25 mcg/actuation inhaler Take 1 Puff by inhalation daily. , Historical Med, ARMANDO      !! DULoxetine (CYMBALTA) 30 mg capsule Take 1 Cap by mouth daily. One daily in the morning, two at HS, Normal, Disp-90 Cap, R-3      zolpidem (AMBIEN) 10 mg tablet Take 1 Tab by mouth nightly as needed for Sleep. Max Daily Amount: 10 mg., Print, Disp-30 Tab, R-3      risperiDONE (RISPERDAL) 2 mg tablet Take 2 mg by mouth two (2) times a day., Historical Med      haloperidol decanoate (HALDOL DECANOATE) 100 mg/mL injection every twenty-eight (28) days. , Historical Med      fluticasone propion-salmeteroL (ADVAIR/WIXELA) 250-50 mcg/dose diskus inhaler Take 1 Puff by inhalation two (2) times a day., Historical Med      triamcinolone acetonide (KENALOG) 0.1 % topical cream Apply  to affected area two (2) times daily as needed., Historical Med      traMADoL (ULTRAM) 50 mg tablet Take 50 mg by mouth three (3) times daily as needed for Pain., Historical Med      acetaminophen (TYLENOL) 500 mg tablet Take 1 Tab by mouth every six (6) hours as needed for Pain (headache). Indications: head pain, Normal, Disp-60 Tab, R-3      FLOVENT  mcg/actuation inhaler Take 2 Puffs by inhalation two (2) times a day., Historical Med, ARMANDO      ATROVENT HFA 17 mcg/actuation inhaler Take 2 Puffs by inhalation four (4) times daily. , Historical Med, ARMANDO      nicotine (NICODERM CQ) 14 mg/24 hr patch 1 Patch by TransDERmal route every twenty-four (24) hours. , Historical Med      loratadine (CLARITIN) 10 mg tablet Take 1 Tab by mouth daily. , Normal, Disp-30 Tab, R-3      naproxen (NAPROSYN) 500 mg tablet Take 1 Tab by mouth two (2) times daily as needed. FOR PAIN   Indications: Pain, Normal, Disp-60 Tab, R-3      tiotropium (SPIRIVA WITH HANDIHALER) 18 mcg inhalation capsule Take 1 Cap by inhalation daily. , Historical Med      albuterol (VENTOLIN HFA) 90 mcg/actuation inhaler Take 2 Puffs by inhalation as needed for Wheezing., Normal, Disp-1 Inhaler, R-6      TUSSIN DM  mg/5 mL liqd TAKE 10 ML BY MOUTH EVERY 6 HOURS AS NEEDED, Historical Med, R-0, ARMANDO       !! - Potential duplicate medications found. Please discuss with provider. * Follow-up Care/Patient Instructions: Activity: No heavy lifting >5#, pushing, pulling for 2 weeks. No driving while on analgesics.  See surgical instructions  Diet: Resume previous diet  Wound Care: Keep wound clean and dry    Follow-up Information     Follow up With Specialties Details Why Contact Jose Garcia MD Internal Medicine On 12/22/2020 Hospital follow up Virtual PCP appointment Tuesday, 12/22/20 @ 4:50 p.m.  [address] [place] [id number] [phone]          Follow-up appts:  Future Appointments   Date Time Provider Sarai Ryan   12/22/2020  8:00 AM [facility] NM INJ RM 1 [address] [facility] REG   12/22/2020 10:00 AM STRESS ROOM 2 [address]  [address]   12/30/2020  1:45 PM GRETA Huddleston BS AMB   5/21/2021  2:00 PM Ada Gamboa NP [facility] BS AMB     Signed:  NASH Nice  12/18/2020  10:13 AM

## 2020-12-18 NOTE — PROGRESS NOTES
Thoracic Surgery Simple Progress Note    Admit Date: 2020  POD: 2 Days Post-Op      Procedure:  Procedure(s):  Xi ROBOTIC LEFT LOWER LOBE SEGMENTECTOMY      Subjective:     Patient has complaints: No significant medical complaints    Review of Systems:  CARDIAC: positive for H/O HTN and tachycardia  RESP: positive for lung mass  NEURO:  positive for paranoid schizophrenia  INCISION: Clean, dry, and intact  EXT: Denies new swelling or pain in the legs or calves. Objective:     Blood pressure (!) 163/101, pulse 84, temperature 97.4 °F (36.3 °C), resp. rate 16, height 5' 8\" (1.727 m), weight 171 lb (77.6 kg), SpO2 96 %. Temp (24hrs), Av.2 °F (36.8 °C), Min:97.4 °F (36.3 °C), Max:98.6 °F (37 °C)        Hemodynamics    PAP    CO    CI    No intake/output data recorded.  1901 -  0700  In: 1980 [P.O.:1980]  Out: 605 [Urine:600]    EXAM:  GENERAL: VSS, afrible, alert and cooperative  HEART:  regular rate and rhythm  LUNG: clear to auscultation bilaterally, CXR reviewed  NEURO:  normal without focal findings  mental status, speech normal,A&O x3  gait and station normal  INCISION: Clean, dry, and intact  EXTREMITIES:No evidence of DVT seen on physical exam.  GI/: Abd soft, nontender. Voiding    Labs:No results found for this or any previous visit (from the past 24 hour(s)). Assessment:   No evidence of DVT. Active Problems:    Lung cancer ([address] (2020)      PATH:  pending  Plan/Recommendations:   D/c to home today  Office follow-up w CXR 2 weeks.     Future Appointments   Date Time Provider Sarai Ryan   2020  8:00 AM [facility] NM INJ RM 1 [facility]   2020 10:00 AM STRESS ROOM 2 [facility] [address] REG   2020  1:45 PM GRETA Askew BS AMB   2021  2:00 PM Juan Connor NP [facility] BS AMB     See orders    Sujatha Anderson [place]  2020

## 2020-12-18 NOTE — PROGRESS NOTES
CM assisted with transport back to group home. [facility] [phone]    12/18/2020   18m 51s   11.3 mi  Location  Requested Time  Transport Actual  [employer]. Anders [place]illas, [address]   Fri, Dec 18  1:00pm EST  [address]   [address]   [address] [facility], [address]   Fri, Dec 18  1:23pm EST    CM to call  when [facility] calls with description of the car and exact time.     Jayna Mclean, RN, BSN, [facility]  ED Care Management  [phone]

## 2020-12-18 NOTE — PROGRESS NOTES
Bedside and Verbal shift change report given to katie (oncoming nurse) by Dmiple Alfonso (offgoing nurse). Report included the following information SBAR, Kardex, Intake/Output, MAR, Recent Results and Cardiac Rhythm NSR.

## 2020-12-18 NOTE — ROUTINE PROCESS
4168:  Cm TO ASSIST WITH discharge planning. 36  Taking pt down to meet with LIFT ride. Unable to sign discharge paper as signature pad does not work. Pt did not sign hard copy because printer stopped working after printing out his first set of discharge paper work. Discharge instructions reviewed with patient and hard copy given to patient. Pt 's incision site was dressed with new dressing prior to discharge.

## 2020-12-18 NOTE — PROGRESS NOTES
Hospital follow-up Virtual PCP transitional care appointment has been scheduled with Dr. Shari Marquez for Tuesday, 12/22/20 at 4:50 p.m. Pending patient discharge.   Ford Duarte, Care Management Specialist.

## 2020-12-18 NOTE — PROGRESS NOTES
REJI: d/c to [facility] [address] [facility] [address]; Lyft/UberTransport to be arranged for 1pm; Caregiver, Namrata Richardson made aware of plan; IMM copy provided 12/18    RUR: 12%     1040-CM reviewed pt chart & discussed d/c plan with Nurse. D/c order in [address] [facility] for pt to d/c to [address] [address] [facility] [address]. CM to arrange Lyft or [employer]. Cm contacted Mary Rodas) [phone] and informed of plan for d/c. CM to follow for transitions of care. Medicare pt has received, reviewed, and signed 2nd IM letter informing them of their right to appeal the discharge. Signed copy has been placed on pt bedside chart.     Uziel Avendano RN BSN [facility]  CRM

## 2020-12-18 NOTE — PROGRESS NOTES
SBAR at bedside given to oncoming RN and care transferred at this time. Pt resting quietly in bed. All medications reviewed and POC for tomorrow.

## 2020-12-18 NOTE — PROGRESS NOTES
Problem: Falls - Risk of  Goal: *Absence of Falls  Description: Document Jese Fall Risk and appropriate interventions in the flowsheet.  Outcome: Resolved/Met     Problem: Patient Education: Go to Patient Education Activity  Goal: Patient/Family Education  Outcome: Resolved/Met     Problem: Breathing Pattern - Ineffective  Goal: *Absence of hypoxia  Outcome: Resolved/Met     Problem: Discharge Planning  Goal: *Discharge to safe environment  Outcome: Resolved/Met

## 2020-12-18 NOTE — DISCHARGE INSTRUCTIONS
DISCHARGE SUMMARY from Nurse    PATIENT INSTRUCTIONS:      These are general instructions for a healthy lifestyle:    No smoking/ No tobacco products/ Avoid exposure to second hand smoke  Surgeon General's Warning:  Quitting smoking now greatly reduces serious risk to your health. Obesity, smoking, and sedentary lifestyle greatly increases your risk for illness    A healthy diet, regular physical exercise & weight monitoring are important for maintaining a healthy lifestyle    You may be retaining fluid if you have a history of heart failure or if you experience any of the following symptoms:  Weight gain of 3 pounds or more overnight or 5 pounds in a week, increased swelling in our hands or feet or shortness of breath while lying flat in bed. Please call your doctor as soon as you notice any of these symptoms; do not wait until your next office visit. The discharge information has been reviewed with the patient. The patient verbalized understanding. Discharge medications reviewed with the patient and appropriate educational materials and side effects teaching were provided. ___________________________________________________________________________________________________________________________________*DISCHARGE INSTRUCTIONS AFTER LUNG [address] [facility]  [address] [place] [phone]    Leave the chest tube dressing in place for 48 hours(12/19/2020), then you can remove it and shower. SURGICAL INCISION:    You will have two or three small incisions. These incisions are sealed with sutures that dissolve. The lower incision is from the chest tube. This lower incision will seal itself in 5 to 7 days. Until then you may have drainage from that incision. The drainage will be thin yellowish or red in color and may drain for 5 to 7 days. This is normal and expected. INCISION CARE:    Wash incisions daily with soap. Pat dry. Showers only, no tub baths. If you have drainage, you can place a bandage over the area, otherwise keep open to air. You may experience drainage of clear-strawberry colored fluid from the chest tube site. This is to be expected and will stop in 24-48 hours. PAIN:    What you will experience:     Tenderness and soreness around incisions   Burning and/or numbness   Soreness around front/back of chest    For relief of discomfort:     Take the pain medicine you were given at discharge   Aleve one or two tablets every 12 hrs (with food) along with pain medicine (this can be purchased over the counter at your local pharmacy/drug store). Advil may be substituted. Start this after you finish taking Toradol if prescribed.  Heating pad 10 minutes at a time to the upper incision area as often as you wish.  For the Ladies: Spandex or elastic sports bra will give you the support you need without pressure on the incision. Most will find this to be a great comfort. COUGHING:    Coughing is helpful after lung surgery. Place a pillow over your incision and apply pressure when coughing to reduce pain. ACTIVITY:    DO: 1. Walk daily outside if weather permits, at a mall if not. Increase your distance                       each day. Exercise has many benefits. It promotes healing, expands your lungs,                helps you cough, relaxes your body, tones muscles, lowers blood pressure and               improves your appetite. After you exercise expect to feel tired and probably short                of breath. Plan to take a nap 1-2 times a day to regain your strength . 2. Climb stairs           3. Ride in a car           4. Perform breathing exercises (incentive spirometer)    DO NOT:               1. Lift heavy objects (8-10 pounds)  2. Drive a car/truck until after your post-op visit  3. Do heavy yard or housework     APPETITE:    It is usual to have a decreased appetite after surgery. Exercise is the best stimulant. You may expect to slowly improve over 4-10 days. CALL THE OFFICE IF YOU DEVELOP:     Increasing pain that is not controlled by the pain medicine.  Increasing redness or drainage around the incision.  Unusual or increasing shortness of breath   Fever greater than 101 degrees   Change in the color of your sputum to yellow or green, especially if you also have increasing shortness of breath and a fever greater than 101. YOUR MEDICATIONS:  As instructed        FOLLOW-UP APPOINTMENT:  Future Appointments   Date Time Provider Sarai Ryan   12/22/2020  8:00 AM [facility] INJ RM 1 [facility]   12/22/2020 10:00 AM STRESS ROOM 2 [facility] [address] REG   12/30/2020  1:45 PM GRETA Abreu BS AMB   5/21/2021  2:00 PM Zainab Shea [facility]       On 12/30/20, Please arrive 45 minutes prior to you appointment time in order to have a chest X-Ray. Check in at [address] [facility] on the ground floor of the [facility]. After your X-ray come to the [address] [place] [address] for your appointment.       Physician Signature

## 2020-12-22 DIAGNOSIS — R91.1 NODULE OF LEFT LUNG: Primary | ICD-10-CM

## 2020-12-26 RX ORDER — OXYCODONE AND ACETAMINOPHEN 5; 325 MG/1; MG/1
TABLET ORAL
Qty: 20 TAB | Refills: 0 | Status: CANCELLED | OUTPATIENT
Start: 2020-12-26 | End: 2020-12-31

## 2020-12-26 RX ORDER — OXYCODONE HYDROCHLORIDE 5 MG/1
5 TABLET ORAL
Status: DISCONTINUED | OUTPATIENT
Start: 2020-12-26 | End: 2022-03-02 | Stop reason: SDUPTHER

## 2020-12-28 DIAGNOSIS — C34.32 MALIGNANT NEOPLASM OF LOWER LOBE OF LEFT LUNG (HCC): Primary | ICD-10-CM

## 2020-12-28 RX ORDER — OXYCODONE HYDROCHLORIDE 5 MG/1
5 TABLET ORAL
Qty: 42 TAB | Refills: 0 | Status: SHIPPED | OUTPATIENT
Start: 2020-12-28 | End: 2021-01-04

## 2020-12-30 ENCOUNTER — OFFICE VISIT (OUTPATIENT)
Dept: SURGERY | Age: 55
End: 2020-12-30
Payer: MEDICARE

## 2020-12-30 ENCOUNTER — HOSPITAL ENCOUNTER (OUTPATIENT)
Dept: GENERAL RADIOLOGY | Age: 55
Discharge: HOME | End: 2020-12-30
Payer: MEDICARE

## 2020-12-30 VITALS
WEIGHT: 174.2 LBS | HEIGHT: 68 IN | SYSTOLIC BLOOD PRESSURE: 124 MMHG | BODY MASS INDEX: 26.4 KG/M2 | TEMPERATURE: 97.6 F | HEART RATE: 103 BPM | DIASTOLIC BLOOD PRESSURE: 83 MMHG | RESPIRATION RATE: 18 BRPM | OXYGEN SATURATION: 98 %

## 2020-12-30 DIAGNOSIS — C34.32 MALIGNANT NEOPLASM OF LOWER LOBE OF LEFT LUNG (HCC): ICD-10-CM

## 2020-12-30 DIAGNOSIS — C34.92 ADENOCARCINOMA OF LEFT LUNG, STAGE 1 (HCC): Primary | ICD-10-CM

## 2020-12-30 PROCEDURE — 71046 X-RAY EXAM CHEST 2 VIEWS: CPT

## 2020-12-30 PROCEDURE — 99024 POSTOP FOLLOW-UP VISIT: CPT | Performed by: NURSE PRACTITIONER

## 2020-12-30 NOTE — PROGRESS NOTES
Subjective:      Ashish Hernandez is a 63 y.o. male presents for postop care . Procedure: 12/16/2020 Robotic left lower lobe segmentectomy by Dr Nitish Block  Path: Adenocarcinoma pT1 pN0 stage 1A2    Appetite is good. Eating a regular diet without difficulty. Bowel movements are regular. The patient is voiding without difficulty. Pain is controlled with current analgesics. Medication(s) being used: narcotic analgesics including oxycodone/acetaminophen (Percocet, Roxicet, Tylox). .    Objective:     Visit Vitals  /83 (BP 1 Location: Right arm, BP Patient Position: Sitting)   Pulse (!) 103   Temp 97.6 °F (36.4 °C) (Oral)   Resp 18   Ht 5' 8\" (1.727 m)   Wt 174 lb 3.2 oz (79 kg)   SpO2 98%   BMI 26.49 kg/m²       Physical Exam:  GENERAL: alert, cooperative, no distress, appears stated age, LUNG: clear to auscultation bilaterally, HEART: regular rate and rhythm, S1, S2 normal, no murmur, click, rub or gallop, ABDOMEN: soft, non-tender. Bowel sounds normal. No masses,  no organomegaly, EXTREMITIES:  extremities normal, atraumatic, no cyanosis or edema, SKIN: Normal. and no rash or abnormalities. Sutures removed from chest tube site. Data Review images and reports reviewed CXR 12/30/2020  COMPARISON: 12/18/2020     PA and lateral views demonstrate normal heart size. There is no acute process in  the lung fields. Surgical clips in left perihilar region are noted. There is no  pneumothorax. No pleural effusions. There are findings consistent with  emphysema.     Osseous structures are unremarkable.     IMPRESSION  IMPRESSION:  1. No acute process is identified. No pneumothorax or pleural effusion  Assessment:     Patient Active Problem List   Diagnosis Code    Dizziness R42    Hypertrophic cardiomyopathy (HCC) I42.2    SOB (shortness of breath) R06.02    Tachycardia R00.0    Essential hypertension I10    High cholesterol E78.00    Lung cancer ([facility] [address] C34.90       Postoperative course complicated by pain. Discussed applying heat to the incision area and taking Naproxen BID to assist with pain. I sent in a RX for Oxycodone IR 5 mg on 12/28/20. He continues to smoke 5 cigarettes a day. Asked him to try to quit all together as it increases his chances for recurrence of cancer. He states he understands and will try. Plan/Recommendations/Medical Decision Making:     Per the NCCN guidelines: Rodolfo Morales will be followed routinely with a history and physical and imaging (CT Chest) every 6 months for the first 2 years then yearly. I will see him in 3 months with CXR    We discussed the importance of proper diet and 30 minutes/day of proper exercise. All questions were personally answered. Thank you for allowing us to participate in the care of your patient.     Jermaine Claytonverick [address]  Thoracic Surgery

## 2020-12-30 NOTE — PROGRESS NOTES
1. Have you been to the ER, urgent care clinic since your last visit? Hospitalized since your last visit? No    2. Have you seen or consulted any other health care providers outside of the [address] [place] [address] since your last visit? Include any pap smears or colon screening.  No

## 2021-01-04 ENCOUNTER — TELEPHONE (OUTPATIENT)
Dept: SURGERY | Age: 56
End: 2021-01-04

## 2021-01-04 DIAGNOSIS — G89.18 POST-OP PAIN: Primary | ICD-10-CM

## 2021-01-04 RX ORDER — HYDROCODONE BITARTRATE AND ACETAMINOPHEN 5; 325 MG/1; MG/1
1 TABLET ORAL
Qty: 28 TAB | Refills: 0 | Status: SHIPPED | OUTPATIENT
Start: 2021-01-04 | End: 2021-01-11

## 2021-01-04 NOTE — TELEPHONE ENCOUNTER
Sana Workman called today saying that neither Aleve or Advil are helping with his pain. He is requesting another refill of Oxycodone. I will not refill the oxycodone. I will send in a Rx a week of Hydrocodone.

## 2021-01-16 RX ORDER — ACETAMINOPHEN 325 MG/1
650 TABLET ORAL
Status: ACTIVE | OUTPATIENT
Start: 2021-01-16 | End: 2021-02-06

## 2021-01-16 RX ORDER — IBUPROFEN 600 MG/1
600 TABLET ORAL
Status: ACTIVE | OUTPATIENT
Start: 2021-01-16 | End: 2021-02-06

## 2021-01-21 ENCOUNTER — TELEPHONE (OUTPATIENT)
Dept: SURGERY | Age: 56
End: 2021-01-21

## 2021-01-21 DIAGNOSIS — M62.838 MUSCLE SPASM: Primary | ICD-10-CM

## 2021-01-21 RX ORDER — METHOCARBAMOL 500 MG/1
500 TABLET, FILM COATED ORAL 3 TIMES DAILY
Qty: 42 TAB | Refills: 0 | Status: SHIPPED | OUTPATIENT
Start: 2021-01-21 | End: 2021-02-04

## 2021-01-21 NOTE — PROGRESS NOTES
Mr Shannon Deleon called the office this morning requesting more pain medication. States his has rib pain. I will give him Robaxin for muscle spasms but no narcotics as its been almost 6 weeks since surgery.

## 2021-01-21 NOTE — TELEPHONE ENCOUNTER
Called patient back. Advised him, as per Viviana Welch NP, that we cannot Rx any more narcotics b/c it's been too long since his surgery. What he may be feeling are muscle spasms and Mildredkailey Lillybakari will Rx a muscle relaxer, methocarbamol. The patient verbalized understanding of all.

## 2021-01-21 NOTE — TELEPHONE ENCOUNTER
Patient called c/o \"unbearable rib pain.\"  Stated that he \"can't cough, it hurts to get up and it hurts to walk around.\"      He asked about getting more pain medication.      I advised him that we will call him back.  I need to consult with Paradise Barbosa NP.    The patient verbalized understanding.

## 2021-01-27 NOTE — PROGRESS NOTES
Physician Progress Note      Neftali RIZVI #:                  [id number]  :                       1965  ADMIT DATE:       2020 6:33 AM  DISCH DATE:        2020 1:09 PM  RESPONDING  PROVIDER #:        Leobardo Walls MD          QUERY TEXT:      Dear attending,    Pt admitted with a pulmonary nodule, and underwent a robotic LLL wedge resection on 20. Noted documentation in the H&P that Mr. Liz Hardin had a  ?suspicious LLL pulmonary nodule. Plan for a robotic LLL superior segmentectomy this month. ?  If possible, please document in progress notes and discharge summary:    The medical record reflects the following:  Risk Factors: Hx. pulmonary nodule  Clinical Indicators: Per op note-The patient is a 50-year-old gentleman referred from Pulmonary Medicine for a PET-avid solitary pulmonary nodule in the superior segment of the left lower lobe. He was seen in preop clinic, and after a thorough workup, decision was made to proceed to the operating room for definitive resection. The left lower lobe was inspected. There was a very well developed posterior fissure between upper lobe and left lower lobe. We were able to identify the nodule by haptic feedback in the superior segment of the left lower lobe. Due to its positioning and due to the very well developed fissure, we were able to perform a very generous wedge resection of the superior segment of the left lower lobe. This was extirpated through a specimen bag through the assist port. Nodule was easily palpated, and this was sent to anatomic pathology.   Treatment: robotic RUL wedge resection, specimens to pathology    Thank you,  Miranda Swartz RN, BSN  Clinical documentation Improvement  [phone]  Options provided:  -- Wedge resection completed to diagnose the nature of the pulmonary nodule  -- A Therapeutic wedge resection completed to diagnose and remove the pulmonary nodule  -- Other - I will add my own diagnosis  -- Disagree - Not applicable / Not valid  -- Disagree - Clinically unable to determine / Unknown  -- Refer to Clinical Documentation Reviewer    PROVIDER RESPONSE TEXT:    The wedge resection was both diagnostic and therapeutic.     Query created by: Irma Primrose on 1/27/2021 11:33 AM      Electronically signed by:  Marisol Moreno MD 1/27/2021 1:29 PM

## 2021-02-04 ENCOUNTER — TELEPHONE (OUTPATIENT)
Dept: SURGERY | Age: 56
End: 2021-02-04

## 2021-02-04 NOTE — TELEPHONE ENCOUNTER
I attempted to reach Mr Tarun Briceno via phone, no answer. He had called the office earlier requesting more pain medication. His surgery was on 12/16/20 and at this point he should not need narcotic pain meds. If he wants I can refer him to a pain management physician.

## 2021-03-29 ENCOUNTER — TELEPHONE (OUTPATIENT)
Dept: SURGERY | Age: 56
End: 2021-03-29

## 2021-03-29 NOTE — TELEPHONE ENCOUNTER
Called pt regarding his missed appt again for today 03/29/2021 @ 3PM with Akanksha CAO. Pt stated that he no longer needs to follow up with us. Pt stated that he is healing and breathing good. He has been following up with [facility] twice a month and they do his check ups. Pt refused to reschedule his appt. Carolina Garcia aware of situation.

## 2021-04-01 DIAGNOSIS — I42.2 HYPERTROPHIC CARDIOMYOPATHY (HCC): ICD-10-CM

## 2021-04-01 RX ORDER — CARVEDILOL 12.5 MG/1
12.5 TABLET ORAL 2 TIMES DAILY WITH MEALS
Qty: 60 TAB | Refills: 1 | Status: SHIPPED | OUTPATIENT
Start: 2021-04-01 | End: 2022-03-17

## 2021-04-01 NOTE — TELEPHONE ENCOUNTER
PCP: Henry Curran MD    Last appt: 11/20/2020  Future Appointments   Date Time Provider Sarai Ryan   5/21/2021  2:00 PM [facility]       Requested Prescriptions     Pending Prescriptions Disp Refills    carvediloL (COREG) 12.5 mg tablet 60 Tab 1     Sig: Take 1 Tab by mouth two (2) times daily (with meals).  Increased 11/6/2020       Prior labs and Blood pressures:  BP Readings from Last 3 Encounters:   12/30/20 124/83   12/18/20 (!) 163/101   12/08/20 135/84     Lab Results   Component Value Date/Time    Sodium 129 (L) 12/08/2020 02:54 PM    Potassium 4.4 12/08/2020 02:54 PM    Chloride 94 (L) 12/08/2020 02:54 PM    CO2 28 12/08/2020 02:54 PM    Anion gap 7 12/08/2020 02:54 PM    Glucose 78 12/08/2020 02:54 PM    BUN 10 12/08/2020 02:54 PM    Creatinine 0.81 12/08/2020 02:54 PM    BUN/Creatinine ratio 12 12/08/2020 02:54 PM    GFR est AA >60 12/08/2020 02:54 PM    GFR est non-AA >60 12/08/2020 02:54 PM    Calcium 8.5 12/08/2020 02:54 PM     Lab Results   Component Value Date/Time    Hemoglobin A1c 5.5 04/05/2017 04:40 PM     No results found for: CHOL, CHOLPOCT, CHOLX, CHLST, CHOLV, HDL, HDLPOC, HDLP, LDL, LDLCPOC, LDLC, DLDLP, VLDLC, VLDL, TGLX, TRIGL, TRIGP, TGLPOCT, CHHD, CHHDX  No results found for: Madeline Riedel, [id number]    Lab Results   Component Value Date/Time    TSH 5.050 (H) 03/15/2017 12:00 PM

## 2021-08-27 ENCOUNTER — DOCUMENTATION ONLY (OUTPATIENT)
Dept: CARDIOLOGY CLINIC | Age: 56
End: 2021-08-27

## 2021-08-27 ENCOUNTER — TELEPHONE (OUTPATIENT)
Dept: CARDIOLOGY CLINIC | Age: 56
End: 2021-08-27

## 2021-08-27 DIAGNOSIS — I42.2 HYPERTROPHIC CARDIOMYOPATHY (HCC): ICD-10-CM

## 2021-08-27 NOTE — PROGRESS NOTES
Received fax from patient's pharmacy for refill of carvedilol. Patient overdue for follow-up visit. Patient advised PSR that he was not returning to this office. Discussed with patient's pharmacy to have carvedilol managed by primary care as patient defers follow-up for his history of cardiomyopathy.

## 2021-12-30 ENCOUNTER — APPOINTMENT (OUTPATIENT)
Dept: GENERAL RADIOLOGY | Age: 56
End: 2021-12-30
Attending: NURSE PRACTITIONER
Payer: MEDICARE

## 2021-12-30 ENCOUNTER — HOSPITAL ENCOUNTER (EMERGENCY)
Age: 56
Discharge: HOME | End: 2021-12-30
Attending: EMERGENCY MEDICINE
Payer: MEDICARE

## 2021-12-30 ENCOUNTER — APPOINTMENT (OUTPATIENT)
Dept: CT IMAGING | Age: 56
End: 2021-12-30
Attending: NURSE PRACTITIONER
Payer: MEDICARE

## 2021-12-30 VITALS
WEIGHT: 150 LBS | HEART RATE: 96 BPM | BODY MASS INDEX: 24.11 KG/M2 | SYSTOLIC BLOOD PRESSURE: 150 MMHG | OXYGEN SATURATION: 98 % | HEIGHT: 66 IN | DIASTOLIC BLOOD PRESSURE: 105 MMHG | RESPIRATION RATE: 19 BRPM | TEMPERATURE: 97.9 F

## 2021-12-30 DIAGNOSIS — R42 DIZZINESS: ICD-10-CM

## 2021-12-30 DIAGNOSIS — Z20.822 PERSON UNDER INVESTIGATION FOR COVID-19: Primary | ICD-10-CM

## 2021-12-30 LAB
ALBUMIN SERPL-MCNC: 3.9 G/DL (ref 3.5–5)
ALBUMIN/GLOB SERPL: 1 {RATIO} (ref 1.1–2.2)
ALP SERPL-CCNC: 78 U/L (ref 45–117)
ALT SERPL-CCNC: 42 U/L (ref 12–78)
ANION GAP SERPL CALC-SCNC: 13 MMOL/L (ref 5–15)
AST SERPL-CCNC: 26 U/L (ref 15–37)
ATRIAL RATE: 96 BPM
BASOPHILS # BLD: 0.1 K/UL (ref 0–0.1)
BASOPHILS NFR BLD: 1 % (ref 0–1)
BILIRUB SERPL-MCNC: 1.1 MG/DL (ref 0.2–1)
BUN SERPL-MCNC: 6 MG/DL (ref 6–20)
BUN/CREAT SERPL: 6 (ref 12–20)
CALCIUM SERPL-MCNC: 8.7 MG/DL (ref 8.5–10.1)
CALCULATED P AXIS, ECG09: 88 DEGREES
CALCULATED R AXIS, ECG10: 95 DEGREES
CALCULATED T AXIS, ECG11: 82 DEGREES
CHLORIDE SERPL-SCNC: 93 MMOL/L (ref 97–108)
CK SERPL-CCNC: 97 U/L (ref 39–308)
CO2 SERPL-SCNC: 23 MMOL/L (ref 21–32)
CREAT SERPL-MCNC: 0.93 MG/DL (ref 0.7–1.3)
DIAGNOSIS, 93000: NORMAL
DIFFERENTIAL METHOD BLD: ABNORMAL
EOSINOPHIL # BLD: 0.2 K/UL (ref 0–0.4)
EOSINOPHIL NFR BLD: 3 % (ref 0–7)
ERYTHROCYTE [DISTWIDTH] IN BLOOD BY AUTOMATED COUNT: 12.7 % (ref 11.5–14.5)
GLOBULIN SER CALC-MCNC: 3.8 G/DL (ref 2–4)
GLUCOSE SERPL-MCNC: 117 MG/DL (ref 65–100)
HCT VFR BLD AUTO: 47.1 % (ref 36.6–50.3)
HGB BLD-MCNC: 17 G/DL (ref 12.1–17)
IMM GRANULOCYTES # BLD AUTO: 0.1 K/UL (ref 0–0.04)
IMM GRANULOCYTES NFR BLD AUTO: 1 % (ref 0–0.5)
LYMPHOCYTES # BLD: 2.3 K/UL (ref 0.8–3.5)
LYMPHOCYTES NFR BLD: 25 % (ref 12–49)
MCH RBC QN AUTO: 32.9 PG (ref 26–34)
MCHC RBC AUTO-ENTMCNC: 36.1 G/DL (ref 30–36.5)
MCV RBC AUTO: 91.3 FL (ref 80–99)
MONOCYTES # BLD: 1 K/UL (ref 0–1)
MONOCYTES NFR BLD: 11 % (ref 5–13)
NEUTS SEG # BLD: 5.6 K/UL (ref 1.8–8)
NEUTS SEG NFR BLD: 59 % (ref 32–75)
NRBC # BLD: 0 K/UL (ref 0–0.01)
NRBC BLD-RTO: 0 PER 100 WBC
P-R INTERVAL, ECG05: 134 MS
PLATELET # BLD AUTO: 266 K/UL (ref 150–400)
PMV BLD AUTO: 10.1 FL (ref 8.9–12.9)
POTASSIUM SERPL-SCNC: 4.3 MMOL/L (ref 3.5–5.1)
PROT SERPL-MCNC: 7.7 G/DL (ref 6.4–8.2)
Q-T INTERVAL, ECG07: 384 MS
QRS DURATION, ECG06: 80 MS
QTC CALCULATION (BEZET), ECG08: 485 MS
RBC # BLD AUTO: 5.16 M/UL (ref 4.1–5.7)
SODIUM SERPL-SCNC: 129 MMOL/L (ref 136–145)
VENTRICULAR RATE, ECG03: 96 BPM
WBC # BLD AUTO: 9.3 K/UL (ref 4.1–11.1)

## 2021-12-30 PROCEDURE — U0005 INFEC AGEN DETEC AMPLI PROBE: HCPCS

## 2021-12-30 PROCEDURE — 82550 ASSAY OF CK (CPK): CPT

## 2021-12-30 PROCEDURE — 99283 EMERGENCY DEPT VISIT LOW MDM: CPT

## 2021-12-30 PROCEDURE — 70450 CT HEAD/BRAIN W/O DYE: CPT

## 2021-12-30 PROCEDURE — 36415 COLL VENOUS BLD VENIPUNCTURE: CPT

## 2021-12-30 PROCEDURE — 85025 COMPLETE CBC W/AUTO DIFF WBC: CPT

## 2021-12-30 PROCEDURE — 80053 COMPREHEN METABOLIC PANEL: CPT

## 2021-12-30 PROCEDURE — 71045 X-RAY EXAM CHEST 1 VIEW: CPT

## 2021-12-30 PROCEDURE — 93005 ELECTROCARDIOGRAM TRACING: CPT

## 2021-12-30 NOTE — ED NOTES
Patient complains of SOB, chest pain, leg pain and dizzyness. Patient reports ETOH use daily. Patient is a+ox4 and agitated. Patient has shaking in his hands. Skin is warm and dry. respirations are even and unlabored. Emergency Department Nursing Plan of Care       The Nursing Plan of Care is developed from the Nursing assessment and Emergency Department Attending provider initial evaluation. The plan of care may be reviewed in the ED Provider note.     The Plan of Care was developed with the following considerations:   Patient / Family readiness to learn indicated by:verbalized understanding  Persons(s) to be included in education: patient  Barriers to Learning/Limitations:No    Signed     Timoteo Smith RN    12/30/2021   2:36 PM

## 2021-12-30 NOTE — ED TRIAGE NOTES
Pt witnessed by RN lying himself on the floor in the ED lobby. Pt immediately brought into triage, pt is stable and reporting chest pain and headache.

## 2022-01-01 LAB
SARS-COV-2, XPLCVT: NOT DETECTED
SOURCE, COVRS: NORMAL

## 2022-01-02 NOTE — ED PROVIDER NOTES
EMERGENCY DEPARTMENT HISTORY AND PHYSICAL EXAM    Date: 12/30/2021  Patient Name: Alo Mcadams    History of Presenting Illness     Chief Complaint   Patient presents with    Chest Pain    Headache         History Provided By: Patient    Chief Complaint:dizziness  Duration: onset today   Timing:  Acute  Quality: stats I just feel dizzy  Severity: Mild  Modifying Factors: states he drinks every day  Associated Symptoms: headache      HPI: Alo Mcadams is a 61 y.o. male with a PMH of lung cancer htn and as below who presents with dizziness onset today. Denies falling. States he drinks daily. Reports headache. PCP: Shae Pérez MD    Current Outpatient Medications   Medication Sig Dispense Refill    carvediloL (COREG) 12.5 mg tablet Take 1 Tab by mouth two (2) times daily (with meals). Increased 11/6/2020 60 Tab 1    senna-docusate (PERICOLACE) 8.6-50 mg per tablet Take 1 Tab by mouth daily. Indications: constipation 14 Tab 0    haloperidol decanoate (HALDOL DECANOATE) 100 mg/mL injection every twenty-eight (28) days.  clonazePAM (KlonoPIN) 0.5 mg tablet Take  by mouth nightly as needed for Anxiety.  benztropine (COGENTIN) 0.5 mg tablet Take 0.5 mg by mouth two (2) times a day.  fluticasone propion-salmeteroL (ADVAIR/WIXELA) 250-50 mcg/dose diskus inhaler Take 1 Puff by inhalation two (2) times a day.  gabapentin (NEURONTIN) 600 mg tablet Take 600 mg by mouth three (3) times daily.  triamcinolone acetonide (KENALOG) 0.1 % topical cream Apply  to affected area two (2) times daily as needed.  atorvastatin (LIPITOR) 20 mg tablet Take 1 Tab by mouth daily. 30 Tab 5    aspirin delayed-release 81 mg tablet Take 1 Tab by mouth daily. 30 Tab 5    diphenhydrAMINE (BENADRYL) 25 mg capsule Take 25 mg by mouth nightly as needed. One to two tablets by mouth at bedtime as needed for insomnia.        acetaminophen (TYLENOL) 500 mg tablet Take 1 Tab by mouth every six (6) hours as needed for Pain (headache). Indications: head pain 60 Tab 3    FLOVENT  mcg/actuation inhaler Take 2 Puffs by inhalation two (2) times a day.  ATROVENT HFA 17 mcg/actuation inhaler Take 2 Puffs by inhalation four (4) times daily.  DULoxetine (CYMBALTA) 60 mg capsule Take 60 mg by mouth nightly.  ANORO ELLIPTA 62.5-25 mcg/actuation inhaler Take 1 Puff by inhalation daily.  nicotine (NICODERM CQ) 14 mg/24 hr patch 1 Patch by TransDERmal route every twenty-four (24) hours.  DULoxetine (CYMBALTA) 30 mg capsule Take 1 Cap by mouth daily. One daily in the morning, two at HS (Patient taking differently: Take 30 mg by mouth every morning. One daily in the morning, two at HS) 90 Cap 3    loratadine (CLARITIN) 10 mg tablet Take 1 Tab by mouth daily. 30 Tab 3    zolpidem (AMBIEN) 10 mg tablet Take 1 Tab by mouth nightly as needed for Sleep. Max Daily Amount: 10 mg. 30 Tab 3    naproxen (NAPROSYN) 500 mg tablet Take 1 Tab by mouth two (2) times daily as needed. FOR PAIN   Indications: Pain 60 Tab 3    tiotropium (SPIRIVA WITH HANDIHALER) 18 mcg inhalation capsule Take 1 Cap by inhalation daily.  albuterol (VENTOLIN HFA) 90 mcg/actuation inhaler Take 2 Puffs by inhalation as needed for Wheezing. (Patient taking differently: Take 2 Puffs by inhalation. EVERY 4-6 HOURS AS NEEDED) 1 Inhaler 6    risperiDONE (RISPERDAL) 2 mg tablet Take 2 mg by mouth two (2) times a day.       TUSSIN DM  mg/5 mL liqd TAKE 10 ML BY MOUTH EVERY 6 HOURS AS NEEDED  0     Facility-Administered Medications Ordered in Other Encounters   Medication Dose Route Frequency Provider Last Rate Last Admin    oxyCODONE IR (ROXICODONE) tablet 5 mg  5 mg Oral Q4H PRN Geo Davis MD           Past History     Past Medical History:  Past Medical History:   Diagnosis Date    Cancer ([facility] [address] 12/2020    LUNG CANCER    Chronic obstructive pulmonary disease ([facility] [address]     Essential hypertension 11/19/2020    High cholesterol 11/19/2020  Hypertension     [facility]pert[facility] cardiomyopathy ([facility] [address]     Paranoid schizophrenia ([facility] [address]     Psychiatric disorder        Past Surgical History:  No past surgical history on file. Family History:  Family History   Problem Relation Age of Onset    Heart Surgery Mother     Depression Father     Emphysema Father     Anesth Problems Neg Hx        Social History:  Social History     Tobacco Use    Smoking status: Current Every Day Smoker     Packs/day: 0.10    Smokeless tobacco: Never Used   Substance Use Topics    Alcohol use: Yes     Alcohol/week: 3.0 standard drinks     Types: 3 Cans of beer per week    Drug use: No       Allergies: Allergies   Allergen Reactions    Tuberculin Ppd Swelling     NEEDS CXR         Review of Systems   Review of Systems   Constitutional: Negative for chills, fatigue and fever. HENT: Negative for congestion and sore throat. Eyes: Negative for redness. Respiratory: Negative for cough, chest tightness and wheezing. Cardiovascular: Negative for chest pain. Gastrointestinal: Negative for abdominal pain. Genitourinary: Negative for dysuria. Musculoskeletal: Negative for arthralgias, back pain, myalgias, neck pain and neck stiffness. Skin: Negative for rash. Neurological: Positive for dizziness and headaches. Negative for syncope, weakness, light-headedness and numbness. Hematological: Negative for adenopathy. Psychiatric/Behavioral: Negative for agitation and behavioral problems. All other systems reviewed and are negative. Physical Exam     Vitals:    12/30/21 1243   BP: (!) 150/105   Pulse: 96   Resp: 19   Temp: 97.9 °F (36.6 °C)   SpO2: 98%   Weight: 68 kg (150 lb)   Height: 5' 6\" (1.676 m)     Physical Exam  Vitals and nursing note reviewed. Constitutional:       Appearance: He is well-developed. HENT:      Head: Normocephalic and atraumatic. Right Ear: External ear normal.   Eyes:      General:         Right eye: No discharge. Left eye: No discharge. Conjunctiva/sclera: Conjunctivae normal.   Cardiovascular:      Rate and Rhythm: Normal rate and regular rhythm. Heart sounds: Normal heart sounds. Pulmonary:      Effort: Pulmonary effort is normal. No respiratory distress. Breath sounds: Normal breath sounds. No wheezing. Abdominal:      General: Bowel sounds are normal.      Palpations: Abdomen is soft. Tenderness: There is no abdominal tenderness. Musculoskeletal:         General: Normal range of motion. Cervical back: Normal range of motion and neck supple. Lymphadenopathy:      Cervical: No cervical adenopathy. Skin:     General: Skin is warm and dry. Neurological:      Mental Status: He is alert and oriented to person, place, and time. Cranial Nerves: No cranial nerve deficit. Psychiatric:         Behavior: Behavior normal.         Thought Content: Thought content normal.         Judgment: Judgment normal.           Diagnostic Study Results     Labs -   No results found for this or any previous visit (from the past 12 hour(s)). Radiologic Studies -   CT HEAD WO CONT   Final Result   1. No evidence of intracranial hemorrhage. 2. Asymmetry of the left side of the nasopharynx as described above. Clinical   correlation is indicated. 3. Nasal septal deviation as described above. XR CHEST PORT   Final Result   No acute cardiopulmonary disease. CT Results  (Last 48 hours)    None        CXR Results  (Last 48 hours)    None            Medical Decision Making   I am the first provider for this patient. I reviewed the vital signs, available nursing notes, past medical history, past surgical history, family history and social history. Vital Signs-Reviewed the patient's vital signs. Records Reviewed: Nursing Notes    Provider Notes (Medical Decision Making):             Disposition:  home    DISCHARGE NOTE:           Care plan outlined and precautions discussed.   Patient has no new complaints, changes, or physical findings. Results of tests were reviewed with the patient. All medications were reviewed with the patient; will d/c home . All of pt's questions and concerns were addressed. Patient was instructed and agrees to follow up with PCP, as well as to return to the ED upon further deterioration. Patient is ready to go home. Discharged ambulatory . Gait steady    Follow-up Information     Follow up With Specialties Details Why Contact Info    Andressa Colmenares MD Internal Medicine In 1 week  [address] [facility]  [phone]            Discharge Medication List as of 12/30/2021  4:06 PM          Procedures:  Procedures    Please note that this dictation was completed with Dragon, computer voice recognition software. Quite often unanticipated grammatical, syntax, homophones, and other interpretive errors are inadvertently transcribed by the computer software. Please disregard these errors. Additionally, please excuse any errors that have escaped final proofreading. Diagnosis     Clinical Impression:   1. Person under investigation for COVID-19    2.  Dizziness

## 2022-02-25 ENCOUNTER — APPOINTMENT (OUTPATIENT)
Dept: CT IMAGING | Age: 57
DRG: 329 | End: 2022-02-25
Attending: EMERGENCY MEDICINE
Payer: MEDICARE

## 2022-02-25 ENCOUNTER — APPOINTMENT (OUTPATIENT)
Dept: GENERAL RADIOLOGY | Age: 57
DRG: 329 | End: 2022-02-25
Attending: NURSE PRACTITIONER
Payer: MEDICARE

## 2022-02-25 ENCOUNTER — HOSPITAL ENCOUNTER (INPATIENT)
Age: 57
LOS: 19 days | Discharge: HOME | DRG: 329 | End: 2022-03-17
Attending: EMERGENCY MEDICINE | Admitting: INTERNAL MEDICINE
Payer: MEDICARE

## 2022-02-25 DIAGNOSIS — K56.609 SBO (SMALL BOWEL OBSTRUCTION) (HCC): ICD-10-CM

## 2022-02-25 DIAGNOSIS — K56.2 SIGMOID VOLVULUS (HCC): Primary | ICD-10-CM

## 2022-02-25 DIAGNOSIS — E78.00 HIGH CHOLESTEROL: ICD-10-CM

## 2022-02-25 DIAGNOSIS — K56.609 SMALL BOWEL OBSTRUCTION (HCC): ICD-10-CM

## 2022-02-25 DIAGNOSIS — T81.30XA DEHISCENCE OF FASCIA, INITIAL ENCOUNTER: ICD-10-CM

## 2022-02-25 DIAGNOSIS — I10 ESSENTIAL HYPERTENSION: ICD-10-CM

## 2022-02-25 DIAGNOSIS — E87.1 HYPONATREMIA: ICD-10-CM

## 2022-02-25 PROCEDURE — 85025 COMPLETE CBC W/AUTO DIFF WBC: CPT

## 2022-02-25 PROCEDURE — 36415 COLL VENOUS BLD VENIPUNCTURE: CPT

## 2022-02-25 PROCEDURE — 74176 CT ABD & PELVIS W/O CONTRAST: CPT

## 2022-02-25 PROCEDURE — 96361 HYDRATE IV INFUSION ADD-ON: CPT

## 2022-02-25 PROCEDURE — 83605 ASSAY OF LACTIC ACID: CPT

## 2022-02-25 PROCEDURE — 80053 COMPREHEN METABOLIC PANEL: CPT

## 2022-02-25 PROCEDURE — 0D9770Z DRAINAGE OF STOMACH, PYLORUS WITH DRAINAGE DEVICE, VIA NATURAL OR ARTIFICIAL OPENING: ICD-10-PCS | Performed by: EMERGENCY MEDICINE

## 2022-02-25 PROCEDURE — 96374 THER/PROPH/DIAG INJ IV PUSH: CPT

## 2022-02-25 PROCEDURE — 74018 RADEX ABDOMEN 1 VIEW: CPT

## 2022-02-25 PROCEDURE — 85610 PROTHROMBIN TIME: CPT

## 2022-02-25 PROCEDURE — 86900 BLOOD TYPING SEROLOGIC ABO: CPT

## 2022-02-25 PROCEDURE — 96375 TX/PRO/DX INJ NEW DRUG ADDON: CPT

## 2022-02-25 PROCEDURE — 93005 ELECTROCARDIOGRAM TRACING: CPT

## 2022-02-25 PROCEDURE — 85730 THROMBOPLASTIN TIME PARTIAL: CPT

## 2022-02-25 PROCEDURE — 99285 EMERGENCY DEPT VISIT HI MDM: CPT

## 2022-02-25 PROCEDURE — 94761 N-INVAS EAR/PLS OXIMETRY MLT: CPT

## 2022-02-26 ENCOUNTER — APPOINTMENT (OUTPATIENT)
Dept: GENERAL RADIOLOGY | Age: 57
DRG: 329 | End: 2022-02-26
Attending: EMERGENCY MEDICINE
Payer: MEDICARE

## 2022-02-26 ENCOUNTER — APPOINTMENT (OUTPATIENT)
Dept: CT IMAGING | Age: 57
DRG: 329 | End: 2022-02-26
Attending: INTERNAL MEDICINE
Payer: MEDICARE

## 2022-02-26 ENCOUNTER — APPOINTMENT (OUTPATIENT)
Dept: GENERAL RADIOLOGY | Age: 57
DRG: 329 | End: 2022-02-26
Attending: HOSPITALIST
Payer: MEDICARE

## 2022-02-26 PROBLEM — K56.2 SIGMOID VOLVULUS (HCC): Status: ACTIVE | Noted: 2022-02-26

## 2022-02-26 LAB
ABO + RH BLD: NORMAL
ALBUMIN SERPL-MCNC: 3.5 G/DL (ref 3.5–5)
ALBUMIN SERPL-MCNC: 4 G/DL (ref 3.5–5)
ALBUMIN/GLOB SERPL: 1 {RATIO} (ref 1.1–2.2)
ALBUMIN/GLOB SERPL: 1 {RATIO} (ref 1.1–2.2)
ALP SERPL-CCNC: 76 U/L (ref 45–117)
ALP SERPL-CCNC: 89 U/L (ref 45–117)
ALT SERPL-CCNC: 24 U/L (ref 12–78)
ALT SERPL-CCNC: 31 U/L (ref 12–78)
AMPHET UR QL SCN: NEGATIVE
ANION GAP SERPL CALC-SCNC: 4 MMOL/L (ref 5–15)
ANION GAP SERPL CALC-SCNC: 4 MMOL/L (ref 5–15)
ANION GAP SERPL CALC-SCNC: 6 MMOL/L (ref 5–15)
APPEARANCE UR: CLEAR
APTT PPP: 30.1 SEC (ref 22.1–31)
AST SERPL-CCNC: 16 U/L (ref 15–37)
AST SERPL-CCNC: 18 U/L (ref 15–37)
ATRIAL RATE: 84 BPM
BACTERIA URNS QL MICRO: NEGATIVE /HPF
BARBITURATES UR QL SCN: NEGATIVE
BASOPHILS # BLD: 0 K/UL (ref 0–0.1)
BASOPHILS # BLD: 0.1 K/UL (ref 0–0.1)
BASOPHILS NFR BLD: 0 % (ref 0–1)
BASOPHILS NFR BLD: 0 % (ref 0–1)
BENZODIAZ UR QL: POSITIVE
BILIRUB SERPL-MCNC: 1.3 MG/DL (ref 0.2–1)
BILIRUB SERPL-MCNC: 1.4 MG/DL (ref 0.2–1)
BILIRUB UR QL: NEGATIVE
BLOOD GROUP ANTIBODIES SERPL: NORMAL
BUN SERPL-MCNC: 10 MG/DL (ref 6–20)
BUN SERPL-MCNC: 11 MG/DL (ref 6–20)
BUN SERPL-MCNC: 9 MG/DL (ref 6–20)
BUN/CREAT SERPL: 10 (ref 12–20)
BUN/CREAT SERPL: 12 (ref 12–20)
BUN/CREAT SERPL: 13 (ref 12–20)
CALCIUM SERPL-MCNC: 8.4 MG/DL (ref 8.5–10.1)
CALCIUM SERPL-MCNC: 8.5 MG/DL (ref 8.5–10.1)
CALCIUM SERPL-MCNC: 9.2 MG/DL (ref 8.5–10.1)
CALCULATED P AXIS, ECG09: 67 DEGREES
CALCULATED R AXIS, ECG10: 82 DEGREES
CALCULATED T AXIS, ECG11: 70 DEGREES
CANNABINOIDS UR QL SCN: NEGATIVE
CHLORIDE SERPL-SCNC: 89 MMOL/L (ref 97–108)
CHLORIDE SERPL-SCNC: 93 MMOL/L (ref 97–108)
CHLORIDE SERPL-SCNC: 95 MMOL/L (ref 97–108)
CO2 SERPL-SCNC: 25 MMOL/L (ref 21–32)
CO2 SERPL-SCNC: 28 MMOL/L (ref 21–32)
CO2 SERPL-SCNC: 30 MMOL/L (ref 21–32)
COCAINE UR QL SCN: NEGATIVE
COLOR UR: ABNORMAL
CREAT SERPL-MCNC: 0.82 MG/DL (ref 0.7–1.3)
CREAT SERPL-MCNC: 0.87 MG/DL (ref 0.7–1.3)
CREAT SERPL-MCNC: 0.92 MG/DL (ref 0.7–1.3)
CREAT UR-MCNC: 135 MG/DL
DIAGNOSIS, 93000: NORMAL
DIFFERENTIAL METHOD BLD: ABNORMAL
DIFFERENTIAL METHOD BLD: ABNORMAL
DRUG SCRN COMMENT,DRGCM: ABNORMAL
EOSINOPHIL # BLD: 0 K/UL (ref 0–0.4)
EOSINOPHIL # BLD: 0.2 K/UL (ref 0–0.4)
EOSINOPHIL NFR BLD: 0 % (ref 0–7)
EOSINOPHIL NFR BLD: 1 % (ref 0–7)
EPITH CASTS URNS QL MICRO: ABNORMAL /LPF
ERYTHROCYTE [DISTWIDTH] IN BLOOD BY AUTOMATED COUNT: 12.4 % (ref 11.5–14.5)
ERYTHROCYTE [DISTWIDTH] IN BLOOD BY AUTOMATED COUNT: 12.7 % (ref 11.5–14.5)
EST. AVERAGE GLUCOSE BLD GHB EST-MCNC: 114 MG/DL
GLOBULIN SER CALC-MCNC: 3.6 G/DL (ref 2–4)
GLOBULIN SER CALC-MCNC: 4.2 G/DL (ref 2–4)
GLUCOSE SERPL-MCNC: 124 MG/DL (ref 65–100)
GLUCOSE SERPL-MCNC: 126 MG/DL (ref 65–100)
GLUCOSE SERPL-MCNC: 132 MG/DL (ref 65–100)
GLUCOSE UR STRIP.AUTO-MCNC: NEGATIVE MG/DL
HBA1C MFR BLD: 5.6 % (ref 4–5.6)
HCT VFR BLD AUTO: 42.6 % (ref 36.6–50.3)
HCT VFR BLD AUTO: 46.7 % (ref 36.6–50.3)
HGB BLD-MCNC: 15.3 G/DL (ref 12.1–17)
HGB BLD-MCNC: 16.4 G/DL (ref 12.1–17)
HGB UR QL STRIP: NEGATIVE
HYALINE CASTS URNS QL MICRO: ABNORMAL /LPF (ref 0–5)
IMM GRANULOCYTES # BLD AUTO: 0.1 K/UL (ref 0–0.04)
IMM GRANULOCYTES # BLD AUTO: 0.1 K/UL (ref 0–0.04)
IMM GRANULOCYTES NFR BLD AUTO: 1 % (ref 0–0.5)
IMM GRANULOCYTES NFR BLD AUTO: 1 % (ref 0–0.5)
INR PPP: 1 (ref 0.9–1.1)
KETONES UR QL STRIP.AUTO: ABNORMAL MG/DL
LACTATE SERPL-SCNC: 1 MMOL/L (ref 0.4–2)
LEUKOCYTE ESTERASE UR QL STRIP.AUTO: NEGATIVE
LIPASE SERPL-CCNC: 45 U/L (ref 73–393)
LYMPHOCYTES # BLD: 1 K/UL (ref 0.8–3.5)
LYMPHOCYTES # BLD: 1.8 K/UL (ref 0.8–3.5)
LYMPHOCYTES NFR BLD: 11 % (ref 12–49)
LYMPHOCYTES NFR BLD: 6 % (ref 12–49)
MAGNESIUM SERPL-MCNC: 2.4 MG/DL (ref 1.6–2.4)
MCH RBC QN AUTO: 33.1 PG (ref 26–34)
MCH RBC QN AUTO: 33.5 PG (ref 26–34)
MCHC RBC AUTO-ENTMCNC: 35.1 G/DL (ref 30–36.5)
MCHC RBC AUTO-ENTMCNC: 35.9 G/DL (ref 30–36.5)
MCV RBC AUTO: 93.2 FL (ref 80–99)
MCV RBC AUTO: 94.2 FL (ref 80–99)
METHADONE UR QL: NEGATIVE
MONOCYTES # BLD: 1.3 K/UL (ref 0–1)
MONOCYTES # BLD: 1.3 K/UL (ref 0–1)
MONOCYTES NFR BLD: 8 % (ref 5–13)
MONOCYTES NFR BLD: 8 % (ref 5–13)
NEUTS SEG # BLD: 13.5 K/UL (ref 1.8–8)
NEUTS SEG # BLD: 14.4 K/UL (ref 1.8–8)
NEUTS SEG NFR BLD: 79 % (ref 32–75)
NEUTS SEG NFR BLD: 85 % (ref 32–75)
NITRITE UR QL STRIP.AUTO: NEGATIVE
NRBC # BLD: 0 K/UL (ref 0–0.01)
NRBC # BLD: 0 K/UL (ref 0–0.01)
NRBC BLD-RTO: 0 PER 100 WBC
NRBC BLD-RTO: 0 PER 100 WBC
OPIATES UR QL: NEGATIVE
OSMOLALITY SERPL: 272 MOSM/KG H2O
P-R INTERVAL, ECG05: 142 MS
PCP UR QL: NEGATIVE
PH UR STRIP: 6 [PH] (ref 5–8)
PHOSPHATE SERPL-MCNC: 2.6 MG/DL (ref 2.6–4.7)
PLATELET # BLD AUTO: 241 K/UL (ref 150–400)
PLATELET # BLD AUTO: 286 K/UL (ref 150–400)
PMV BLD AUTO: 10.2 FL (ref 8.9–12.9)
PMV BLD AUTO: 9.9 FL (ref 8.9–12.9)
POTASSIUM SERPL-SCNC: 3.8 MMOL/L (ref 3.5–5.1)
POTASSIUM SERPL-SCNC: 4 MMOL/L (ref 3.5–5.1)
POTASSIUM SERPL-SCNC: 4 MMOL/L (ref 3.5–5.1)
PROT SERPL-MCNC: 7.1 G/DL (ref 6.4–8.2)
PROT SERPL-MCNC: 8.2 G/DL (ref 6.4–8.2)
PROT UR STRIP-MCNC: NEGATIVE MG/DL
PROTHROMBIN TIME: 10.3 SEC (ref 9–11.1)
Q-T INTERVAL, ECG07: 384 MS
QRS DURATION, ECG06: 82 MS
QTC CALCULATION (BEZET), ECG08: 453 MS
RBC # BLD AUTO: 4.57 M/UL (ref 4.1–5.7)
RBC # BLD AUTO: 4.96 M/UL (ref 4.1–5.7)
RBC #/AREA URNS HPF: ABNORMAL /HPF (ref 0–5)
SODIUM SERPL-SCNC: 123 MMOL/L (ref 136–145)
SODIUM SERPL-SCNC: 125 MMOL/L (ref 136–145)
SODIUM SERPL-SCNC: 126 MMOL/L (ref 136–145)
SODIUM UR-SCNC: <5 MMOL/L
SP GR UR REFRACTOMETRY: 1.02 (ref 1–1.03)
SPECIMEN EXP DATE BLD: NORMAL
THERAPEUTIC RANGE,PTTT: NORMAL SECS (ref 58–77)
TSH SERPL DL<=0.05 MIU/L-ACNC: 1.96 UIU/ML (ref 0.36–3.74)
UROBILINOGEN UR QL STRIP.AUTO: 1 EU/DL (ref 0.2–1)
VENTRICULAR RATE, ECG03: 84 BPM
WBC # BLD AUTO: 16.8 K/UL (ref 4.1–11.1)
WBC # BLD AUTO: 16.9 K/UL (ref 4.1–11.1)
WBC URNS QL MICRO: ABNORMAL /HPF (ref 0–4)

## 2022-02-26 PROCEDURE — 36415 COLL VENOUS BLD VENIPUNCTURE: CPT

## 2022-02-26 PROCEDURE — 81001 URINALYSIS AUTO W/SCOPE: CPT

## 2022-02-26 PROCEDURE — 65270000032 HC RM SEMIPRIVATE

## 2022-02-26 PROCEDURE — 84443 ASSAY THYROID STIM HORMONE: CPT

## 2022-02-26 PROCEDURE — 84100 ASSAY OF PHOSPHORUS: CPT

## 2022-02-26 PROCEDURE — 74011000250 HC RX REV CODE- 250: Performed by: INTERNAL MEDICINE

## 2022-02-26 PROCEDURE — 84300 ASSAY OF URINE SODIUM: CPT

## 2022-02-26 PROCEDURE — 74011000250 HC RX REV CODE- 250: Performed by: EMERGENCY MEDICINE

## 2022-02-26 PROCEDURE — 80053 COMPREHEN METABOLIC PANEL: CPT

## 2022-02-26 PROCEDURE — 77030008634 HC TU COLON DCOMPRS COOK -C: Performed by: INTERNAL MEDICINE

## 2022-02-26 PROCEDURE — 83735 ASSAY OF MAGNESIUM: CPT

## 2022-02-26 PROCEDURE — 74018 RADEX ABDOMEN 1 VIEW: CPT

## 2022-02-26 PROCEDURE — 74011250637 HC RX REV CODE- 250/637: Performed by: HOSPITALIST

## 2022-02-26 PROCEDURE — 83690 ASSAY OF LIPASE: CPT

## 2022-02-26 PROCEDURE — 74011250637 HC RX REV CODE- 250/637: Performed by: INTERNAL MEDICINE

## 2022-02-26 PROCEDURE — 82570 ASSAY OF URINE CREATININE: CPT

## 2022-02-26 PROCEDURE — 74011250636 HC RX REV CODE- 250/636: Performed by: INTERNAL MEDICINE

## 2022-02-26 PROCEDURE — 74011250636 HC RX REV CODE- 250/636: Performed by: NURSE PRACTITIONER

## 2022-02-26 PROCEDURE — 83930 ASSAY OF BLOOD OSMOLALITY: CPT

## 2022-02-26 PROCEDURE — C9113 INJ PANTOPRAZOLE SODIUM, VIA: HCPCS | Performed by: INTERNAL MEDICINE

## 2022-02-26 PROCEDURE — 83036 HEMOGLOBIN GLYCOSYLATED A1C: CPT

## 2022-02-26 PROCEDURE — 85025 COMPLETE CBC W/AUTO DIFF WBC: CPT

## 2022-02-26 PROCEDURE — 74011250636 HC RX REV CODE- 250/636

## 2022-02-26 PROCEDURE — 76040000007: Performed by: INTERNAL MEDICINE

## 2022-02-26 PROCEDURE — 0DJD8ZZ INSPECTION OF LOWER INTESTINAL TRACT, VIA NATURAL OR ARTIFICIAL OPENING ENDOSCOPIC: ICD-10-PCS | Performed by: INTERNAL MEDICINE

## 2022-02-26 PROCEDURE — 83935 ASSAY OF URINE OSMOLALITY: CPT

## 2022-02-26 PROCEDURE — 99221 1ST HOSP IP/OBS SF/LOW 40: CPT | Performed by: SURGERY

## 2022-02-26 PROCEDURE — 80307 DRUG TEST PRSMV CHEM ANLYZR: CPT

## 2022-02-26 PROCEDURE — 71045 X-RAY EXAM CHEST 1 VIEW: CPT

## 2022-02-26 RX ORDER — CLONAZEPAM 1 MG/1
1 TABLET ORAL
Status: DISCONTINUED | OUTPATIENT
Start: 2022-02-26 | End: 2022-03-17 | Stop reason: HOSPADM

## 2022-02-26 RX ORDER — SODIUM CHLORIDE 9 MG/ML
100 INJECTION, SOLUTION INTRAVENOUS CONTINUOUS
Status: DISCONTINUED | OUTPATIENT
Start: 2022-02-26 | End: 2022-02-27

## 2022-02-26 RX ORDER — IPRATROPIUM BROMIDE AND ALBUTEROL SULFATE 2.5; .5 MG/3ML; MG/3ML
3 SOLUTION RESPIRATORY (INHALATION)
Status: DISCONTINUED | OUTPATIENT
Start: 2022-02-26 | End: 2022-03-17 | Stop reason: HOSPADM

## 2022-02-26 RX ORDER — KETOROLAC TROMETHAMINE 30 MG/ML
15 INJECTION, SOLUTION INTRAMUSCULAR; INTRAVENOUS
Status: DISCONTINUED | OUTPATIENT
Start: 2022-02-26 | End: 2022-02-28

## 2022-02-26 RX ORDER — ONDANSETRON 2 MG/ML
4 INJECTION INTRAMUSCULAR; INTRAVENOUS
Status: COMPLETED | OUTPATIENT
Start: 2022-02-26 | End: 2022-02-26

## 2022-02-26 RX ORDER — SODIUM CHLORIDE 0.9 % (FLUSH) 0.9 %
10 SYRINGE (ML) INJECTION DAILY
Status: DISCONTINUED | OUTPATIENT
Start: 2022-02-26 | End: 2022-03-17 | Stop reason: HOSPADM

## 2022-02-26 RX ORDER — SODIUM CHLORIDE 0.9 % (FLUSH) 0.9 %
5-40 SYRINGE (ML) INJECTION AS NEEDED
Status: DISCONTINUED | OUTPATIENT
Start: 2022-02-26 | End: 2022-03-14 | Stop reason: HOSPADM

## 2022-02-26 RX ORDER — DULOXETIN HYDROCHLORIDE 30 MG/1
30 CAPSULE, DELAYED RELEASE ORAL
Status: DISCONTINUED | OUTPATIENT
Start: 2022-02-27 | End: 2022-03-17 | Stop reason: HOSPADM

## 2022-02-26 RX ORDER — BUDESONIDE 0.5 MG/2ML
500 INHALANT ORAL
Status: DISCONTINUED | OUTPATIENT
Start: 2022-02-26 | End: 2022-03-17 | Stop reason: HOSPADM

## 2022-02-26 RX ORDER — ONDANSETRON 2 MG/ML
4 INJECTION INTRAMUSCULAR; INTRAVENOUS
Status: DISCONTINUED | OUTPATIENT
Start: 2022-02-26 | End: 2022-03-17 | Stop reason: HOSPADM

## 2022-02-26 RX ORDER — GABAPENTIN 600 MG/1
600 TABLET ORAL 3 TIMES DAILY
Status: DISCONTINUED | OUTPATIENT
Start: 2022-02-26 | End: 2022-03-17 | Stop reason: HOSPADM

## 2022-02-26 RX ORDER — DICLOFENAC SODIUM 10 MG/G
4 GEL TOPICAL
COMMUNITY

## 2022-02-26 RX ORDER — ARFORMOTEROL TARTRATE 15 UG/2ML
15 SOLUTION RESPIRATORY (INHALATION)
Status: DISCONTINUED | OUTPATIENT
Start: 2022-02-26 | End: 2022-03-17 | Stop reason: HOSPADM

## 2022-02-26 RX ORDER — BENZTROPINE MESYLATE 1 MG/1
1 TABLET ORAL 2 TIMES DAILY
Status: DISCONTINUED | OUTPATIENT
Start: 2022-02-26 | End: 2022-03-17 | Stop reason: HOSPADM

## 2022-02-26 RX ORDER — RISPERIDONE 1 MG/1
2 TABLET, FILM COATED ORAL 2 TIMES DAILY
Status: DISCONTINUED | OUTPATIENT
Start: 2022-02-26 | End: 2022-03-17 | Stop reason: HOSPADM

## 2022-02-26 RX ORDER — PANTOPRAZOLE SODIUM 40 MG/10ML
40 INJECTION, POWDER, LYOPHILIZED, FOR SOLUTION INTRAVENOUS DAILY
Status: DISCONTINUED | OUTPATIENT
Start: 2022-02-26 | End: 2022-03-17 | Stop reason: HOSPADM

## 2022-02-26 RX ORDER — BUDESONIDE AND FORMOTEROL FUMARATE DIHYDRATE 160; 4.5 UG/1; UG/1
2 AEROSOL RESPIRATORY (INHALATION) 2 TIMES DAILY
COMMUNITY

## 2022-02-26 RX ORDER — IBUPROFEN 800 MG/1
800 TABLET ORAL
COMMUNITY

## 2022-02-26 RX ORDER — ACETAMINOPHEN 650 MG/1
650 SUPPOSITORY RECTAL
Status: DISCONTINUED | OUTPATIENT
Start: 2022-02-26 | End: 2022-03-17 | Stop reason: HOSPADM

## 2022-02-26 RX ORDER — SODIUM CHLORIDE 0.9 % (FLUSH) 0.9 %
5-40 SYRINGE (ML) INJECTION EVERY 8 HOURS
Status: DISCONTINUED | OUTPATIENT
Start: 2022-02-26 | End: 2022-03-14 | Stop reason: HOSPADM

## 2022-02-26 RX ORDER — GUAIFENESIN 100 MG/5ML
81 LIQUID (ML) ORAL DAILY
COMMUNITY

## 2022-02-26 RX ORDER — NALOXONE HYDROCHLORIDE 0.4 MG/ML
0.4 INJECTION, SOLUTION INTRAMUSCULAR; INTRAVENOUS; SUBCUTANEOUS
Status: ACTIVE | OUTPATIENT
Start: 2022-02-26 | End: 2022-02-26

## 2022-02-26 RX ORDER — ONDANSETRON 4 MG/1
4 TABLET, ORALLY DISINTEGRATING ORAL
Status: DISCONTINUED | OUTPATIENT
Start: 2022-02-26 | End: 2022-03-17 | Stop reason: HOSPADM

## 2022-02-26 RX ORDER — MIDAZOLAM HYDROCHLORIDE 1 MG/ML
10 INJECTION, SOLUTION INTRAMUSCULAR; INTRAVENOUS
Status: DISPENSED | OUTPATIENT
Start: 2022-02-26 | End: 2022-02-26

## 2022-02-26 RX ORDER — ATROPINE SULFATE 0.1 MG/ML
0.5 INJECTION INTRAVENOUS
Status: ACTIVE | OUTPATIENT
Start: 2022-02-26 | End: 2022-02-27

## 2022-02-26 RX ORDER — ONDANSETRON 2 MG/ML
INJECTION INTRAMUSCULAR; INTRAVENOUS
Status: COMPLETED
Start: 2022-02-26 | End: 2022-02-26

## 2022-02-26 RX ORDER — FLUMAZENIL 0.1 MG/ML
0.2 INJECTION INTRAVENOUS
Status: ACTIVE | OUTPATIENT
Start: 2022-02-26 | End: 2022-02-26

## 2022-02-26 RX ORDER — SODIUM CHLORIDE 0.9 % (FLUSH) 0.9 %
5-40 SYRINGE (ML) INJECTION AS NEEDED
Status: DISCONTINUED | OUTPATIENT
Start: 2022-02-26 | End: 2022-03-17 | Stop reason: HOSPADM

## 2022-02-26 RX ORDER — EPINEPHRINE 0.1 MG/ML
1 INJECTION INTRACARDIAC; INTRAVENOUS
Status: DISPENSED | OUTPATIENT
Start: 2022-02-26 | End: 2022-02-27

## 2022-02-26 RX ORDER — IBUPROFEN 200 MG
1 TABLET ORAL DAILY
Status: DISCONTINUED | OUTPATIENT
Start: 2022-02-26 | End: 2022-03-17 | Stop reason: HOSPADM

## 2022-02-26 RX ORDER — ACETAMINOPHEN 325 MG/1
650 TABLET ORAL
Status: DISCONTINUED | OUTPATIENT
Start: 2022-02-26 | End: 2022-03-17 | Stop reason: HOSPADM

## 2022-02-26 RX ORDER — FENTANYL CITRATE 50 UG/ML
200 INJECTION, SOLUTION INTRAMUSCULAR; INTRAVENOUS ONCE
Status: COMPLETED | OUTPATIENT
Start: 2022-02-26 | End: 2022-02-26

## 2022-02-26 RX ORDER — TRAMADOL HYDROCHLORIDE 50 MG/1
50 TABLET ORAL
COMMUNITY

## 2022-02-26 RX ORDER — DEXTROMETHORPHAN/PSEUDOEPHED 2.5-7.5/.8
1.2 DROPS ORAL
Status: DISCONTINUED | OUTPATIENT
Start: 2022-02-26 | End: 2022-03-02

## 2022-02-26 RX ORDER — DULOXETIN HYDROCHLORIDE 30 MG/1
60 CAPSULE, DELAYED RELEASE ORAL
Status: DISCONTINUED | OUTPATIENT
Start: 2022-02-26 | End: 2022-03-17 | Stop reason: HOSPADM

## 2022-02-26 RX ORDER — LIDOCAINE HYDROCHLORIDE 10 MG/ML
10 INJECTION INFILTRATION; PERINEURAL ONCE
Status: COMPLETED | OUTPATIENT
Start: 2022-02-26 | End: 2022-02-26

## 2022-02-26 RX ORDER — SODIUM CHLORIDE 0.9 % (FLUSH) 0.9 %
5-40 SYRINGE (ML) INJECTION EVERY 8 HOURS
Status: DISCONTINUED | OUTPATIENT
Start: 2022-02-26 | End: 2022-03-17 | Stop reason: HOSPADM

## 2022-02-26 RX ORDER — CLONAZEPAM 1 MG/1
1 TABLET ORAL
COMMUNITY

## 2022-02-26 RX ORDER — HYDRALAZINE HYDROCHLORIDE 20 MG/ML
10 INJECTION INTRAMUSCULAR; INTRAVENOUS
Status: DISCONTINUED | OUTPATIENT
Start: 2022-02-26 | End: 2022-03-17 | Stop reason: HOSPADM

## 2022-02-26 RX ADMIN — GABAPENTIN 600 MG: 600 TABLET, FILM COATED ORAL at 16:58

## 2022-02-26 RX ADMIN — Medication 10 ML: at 21:06

## 2022-02-26 RX ADMIN — SODIUM CHLORIDE 1000 ML: 900 INJECTION, SOLUTION INTRAVENOUS at 00:56

## 2022-02-26 RX ADMIN — ONDANSETRON HYDROCHLORIDE 4 MG: 2 SOLUTION INTRAMUSCULAR; INTRAVENOUS at 02:57

## 2022-02-26 RX ADMIN — Medication 10 ML: at 02:58

## 2022-02-26 RX ADMIN — LIDOCAINE HYDROCHLORIDE 10 ML: 10 INJECTION, SOLUTION INFILTRATION; PERINEURAL at 02:57

## 2022-02-26 RX ADMIN — MIDAZOLAM 1 MG: 1 INJECTION INTRAMUSCULAR; INTRAVENOUS at 02:28

## 2022-02-26 RX ADMIN — MIDAZOLAM 2 MG: 1 INJECTION INTRAMUSCULAR; INTRAVENOUS at 02:03

## 2022-02-26 RX ADMIN — MIDAZOLAM 1 MG: 1 INJECTION INTRAMUSCULAR; INTRAVENOUS at 02:23

## 2022-02-26 RX ADMIN — SODIUM CHLORIDE 100 ML/HR: 9 INJECTION, SOLUTION INTRAVENOUS at 05:44

## 2022-02-26 RX ADMIN — BENZTROPINE MESYLATE 1 MG: 1 TABLET ORAL at 20:01

## 2022-02-26 RX ADMIN — Medication 10 ML: at 05:54

## 2022-02-26 RX ADMIN — PANTOPRAZOLE SODIUM 40 MG: 40 INJECTION, POWDER, FOR SOLUTION INTRAVENOUS at 08:10

## 2022-02-26 RX ADMIN — RISPERIDONE 2 MG: 1 TABLET, FILM COATED ORAL at 18:45

## 2022-02-26 RX ADMIN — ONDANSETRON 4 MG: 2 INJECTION INTRAMUSCULAR; INTRAVENOUS at 08:13

## 2022-02-26 RX ADMIN — Medication 10 ML: at 16:30

## 2022-02-26 RX ADMIN — SODIUM CHLORIDE, PRESERVATIVE FREE 10 ML: 5 INJECTION INTRAVENOUS at 08:13

## 2022-02-26 RX ADMIN — DULOXETINE 60 MG: 30 CAPSULE, DELAYED RELEASE ORAL at 21:04

## 2022-02-26 RX ADMIN — FENTANYL CITRATE 125 MCG: 50 INJECTION INTRAMUSCULAR; INTRAVENOUS at 01:57

## 2022-02-26 RX ADMIN — ONDANSETRON 4 MG: 2 INJECTION INTRAMUSCULAR; INTRAVENOUS at 02:57

## 2022-02-26 RX ADMIN — MIDAZOLAM 2 MG: 1 INJECTION INTRAMUSCULAR; INTRAVENOUS at 01:56

## 2022-02-26 RX ADMIN — SODIUM CHLORIDE, PRESERVATIVE FREE 10 ML: 5 INJECTION INTRAVENOUS at 05:53

## 2022-02-26 RX ADMIN — GABAPENTIN 600 MG: 600 TABLET, FILM COATED ORAL at 21:04

## 2022-02-26 NOTE — CONSULTS
Surgery Consult    Subjective:      Lola Pete is a 61 y.o. male who presents complaining of constipation for 6 days. Patient states that it is common for him not to have a bowel movement for 5 days but once it turned into 6 he was concerned. He states that his abdomen does take extended a couple times per month. Never as bad as it did when he came in. He has never had a colonoscopy before. He has a history of schizophrenia and is on multiple psych medications. Patient underwent colonoscopic decompression last evening and was found to have large colotomy colon. He additionally started vomiting and had NG tube placed and had about 1 to 2 L out. Patient Active Problem List    Diagnosis Date Noted    Sigmoid volvulus ([facility] [address] 02/26/2022    Lung cancer ([address] 12/16/2020    Essential hypertension 11/19/2020    High cholesterol 11/19/2020    Dizziness 12/18/2017    Hypertrophic cardiomyopathy ([address] 12/18/2017    SOB (shortness of breath) 12/18/2017    Tachycardia 12/18/2017     Past Medical History:   Diagnosis Date    Cancer ([address] 12/2020    LUNG CANCER    Chronic obstructive pulmonary disease ([address]     Essential hypertension 11/19/2020    High cholesterol 11/19/2020    Hypertension     Hypertrophic cardiomyopathy ([address]     Paranoid schizophrenia ([facility] [address]     Psychiatric disorder       No past surgical history on file. Social History     Tobacco Use    Smoking status: Current Every Day Smoker     Packs/day: 0.10    Smokeless tobacco: Never Used   Substance Use Topics    Alcohol use:  Yes     Alcohol/week: 3.0 standard drinks     Types: 3 Cans of beer per week      Family History   Problem Relation Age of Onset    Heart Surgery Mother     Depression Father     Emphysema Father     Anesth Problems Neg Hx       Current Facility-Administered Medications   Medication Dose Route Frequency    sodium chloride (NS) flush 5-40 mL  5-40 mL IntraVENous Q8H    sodium chloride (NS) flush 5-40 mL  5-40 mL IntraVENous PRN    simethicone (MYLICON) 89UR/4.1SO oral drops 80 mg  1.2 mL Oral Multiple    atropine injection 0.5 mg  0.5 mg IntraVENous ONCE PRN    EPINEPHrine (ADRENALIN) 0.1 mg/mL syringe 1 mg  1 mg Endoscopically ONCE PRN    albuterol-ipratropium (DUO-NEB) 2.5 MG-0.5 MG/3 ML  3 mL Nebulization Q4H PRN    sodium chloride (NS) flush 5-40 mL  5-40 mL IntraVENous Q8H    sodium chloride (NS) flush 5-40 mL  5-40 mL IntraVENous PRN    acetaminophen (TYLENOL) tablet 650 mg  650 mg Oral Q6H PRN    Or    acetaminophen (TYLENOL) suppository 650 mg  650 mg Rectal Q6H PRN    ondansetron (ZOFRAN ODT) tablet 4 mg  4 mg Oral Q8H PRN    Or    ondansetron (ZOFRAN) injection 4 mg  4 mg IntraVENous Q6H PRN    0.9% sodium chloride infusion  100 mL/hr IntraVENous CONTINUOUS    ketorolac (TORADOL) injection 15 mg  15 mg IntraVENous Q6H PRN    nicotine (NICODERM CQ) 21 mg/24 hr patch 1 Patch  1 Patch TransDERmal DAILY    hydrALAZINE (APRESOLINE) 20 mg/mL injection 10 mg  10 mg IntraVENous Q6H PRN    pantoprazole (PROTONIX) injection 40 mg  40 mg IntraVENous DAILY    And    sodium chloride (NS) flush 10 mL  10 mL IntraVENous DAILY    iopamidoL (ISOVUE-370) 76 % injection 100 mL  100 mL IntraVENous RAD ONCE     Current Outpatient Medications   Medication Sig    carvediloL (COREG) 12.5 mg tablet Take 1 Tab by mouth two (2) times daily (with meals). Increased 11/6/2020    senna-docusate (PERICOLACE) 8.6-50 mg per tablet Take 1 Tab by mouth daily. Indications: constipation    haloperidol decanoate (HALDOL DECANOATE) 100 mg/mL injection every twenty-eight (28) days.  clonazePAM (KlonoPIN) 0.5 mg tablet Take  by mouth nightly as needed for Anxiety.  benztropine (COGENTIN) 0.5 mg tablet Take 0.5 mg by mouth two (2) times a day.  fluticasone propion-salmeteroL (ADVAIR/WIXELA) 250-50 mcg/dose diskus inhaler Take 1 Puff by inhalation two (2) times a day.     gabapentin (NEURONTIN) 600 mg tablet Take 600 mg by mouth three (3) times daily.  triamcinolone acetonide (KENALOG) 0.1 % topical cream Apply  to affected area two (2) times daily as needed.  atorvastatin (LIPITOR) 20 mg tablet Take 1 Tab by mouth daily.  aspirin delayed-release 81 mg tablet Take 1 Tab by mouth daily.  diphenhydrAMINE (BENADRYL) 25 mg capsule Take 25 mg by mouth nightly as needed. One to two tablets by mouth at bedtime as needed for insomnia.  acetaminophen (TYLENOL) 500 mg tablet Take 1 Tab by mouth every six (6) hours as needed for Pain (headache). Indications: head pain    FLOVENT  mcg/actuation inhaler Take 2 Puffs by inhalation two (2) times a day.  ATROVENT HFA 17 mcg/actuation inhaler Take 2 Puffs by inhalation four (4) times daily.  DULoxetine (CYMBALTA) 60 mg capsule Take 60 mg by mouth nightly.  ANORO ELLIPTA 62.5-25 mcg/actuation inhaler Take 1 Puff by inhalation daily.  nicotine (NICODERM CQ) 14 mg/24 hr patch 1 Patch by TransDERmal route every twenty-four (24) hours.  DULoxetine (CYMBALTA) 30 mg capsule Take 1 Cap by mouth daily. One daily in the morning, two at HS (Patient taking differently: Take 30 mg by mouth every morning. One daily in the morning, two at HS)    loratadine (CLARITIN) 10 mg tablet Take 1 Tab by mouth daily.  zolpidem (AMBIEN) 10 mg tablet Take 1 Tab by mouth nightly as needed for Sleep. Max Daily Amount: 10 mg.    naproxen (NAPROSYN) 500 mg tablet Take 1 Tab by mouth two (2) times daily as needed. FOR PAIN   Indications: Pain    tiotropium (SPIRIVA WITH HANDIHALER) 18 mcg inhalation capsule Take 1 Cap by inhalation daily.  albuterol (VENTOLIN HFA) 90 mcg/actuation inhaler Take 2 Puffs by inhalation as needed for Wheezing. (Patient taking differently: Take 2 Puffs by inhalation. EVERY 4-6 HOURS AS NEEDED)    risperiDONE (RISPERDAL) 2 mg tablet Take 2 mg by mouth two (2) times a day.     TUSSIN DM  mg/5 mL liqd TAKE 10 ML BY MOUTH EVERY 6 HOURS AS NEEDED Facility-Administered Medications Ordered in Other Encounters   Medication Dose Route Frequency    oxyCODONE IR (ROXICODONE) tablet 5 mg  5 mg Oral Q4H PRN      Allergies   Allergen Reactions    Tuberculin Ppd Swelling     NEEDS CXR       Review of Systems:    Pertinent items are noted in the History of Present Illness. Objective:        Visit Vitals  /85 (BP 1 Location: Left upper arm, BP Patient Position: At rest)   Pulse 92   Temp 97.7 °F (36.5 °C)   Resp 20   Ht 5' 7\" (1.702 m)   Wt 68 kg (150 lb)   SpO2 98%   BMI 23.49 kg/m²       Physical Exam:  GENERAL: alert, cooperative, no distress, appears stated age, EYE: negative, THROAT & NECK: normal, LUNG: clear to auscultation bilaterally, HEART: regular rate and rhythm, ABDOMEN: soft, non-tender. Bowel sounds normal. No masses,  no organomegaly, EXTREMITIES:  no edema, SKIN: Normal., PSYCH: non focal    Imaging:  images and reports reviewed  CT- Sigmoid volvulus with small bowel obstruction. AXR- Decreased sigmoid colon dilatation with a rectal tube in place. Lab/Data Review: All lab results for the last 24 hours reviewed. Recent Results (from the past 24 hour(s))   CBC WITH AUTOMATED DIFF    Collection Time: 02/25/22 11:50 PM   Result Value Ref Range    WBC 16.9 (H) 4.1 - 11.1 K/uL    RBC 4.96 4.10 - 5.70 M/uL    HGB 16.4 12.1 - 17.0 g/dL    HCT 46.7 36.6 - 50.3 %    MCV 94.2 80.0 - 99.0 FL    MCH 33.1 26.0 - 34.0 PG    MCHC 35.1 30.0 - 36.5 g/dL    RDW 12.4 11.5 - 14.5 %    PLATELET 004 345 - 076 K/uL    MPV 9.9 8.9 - 12.9 FL    NRBC 0.0 0  WBC    ABSOLUTE NRBC 0.00 0.00 - 0.01 K/uL    NEUTROPHILS 79 (H) 32 - 75 %    LYMPHOCYTES 11 (L) 12 - 49 %    MONOCYTES 8 5 - 13 %    EOSINOPHILS 1 0 - 7 %    BASOPHILS 0 0 - 1 %    IMMATURE GRANULOCYTES 1 (H) 0.0 - 0.5 %    ABS. NEUTROPHILS 13.5 (H) 1.8 - 8.0 K/UL    ABS. LYMPHOCYTES 1.8 0.8 - 3.5 K/UL    ABS. MONOCYTES 1.3 (H) 0.0 - 1.0 K/UL    ABS. EOSINOPHILS 0.2 0.0 - 0.4 K/UL    ABS.  BASOPHILS 0.1 0.0 - 0.1 K/UL    ABS. IMM. GRANS. 0.1 (H) 0.00 - 0.04 K/UL    DF AUTOMATED     METABOLIC PANEL, COMPREHENSIVE    Collection Time: 02/25/22 11:50 PM   Result Value Ref Range    Sodium 123 (L) 136 - 145 mmol/L    Potassium 4.0 3.5 - 5.1 mmol/L    Chloride 89 (L) 97 - 108 mmol/L    CO2 30 21 - 32 mmol/L    Anion gap 4 (L) 5 - 15 mmol/L    Glucose 126 (H) 65 - 100 mg/dL    BUN 9 6 - 20 MG/DL    Creatinine 0.92 0.70 - 1.30 MG/DL    BUN/Creatinine ratio 10 (L) 12 - 20      GFR est AA >60 >60 ml/min/1.73m2    GFR est non-AA >60 >60 ml/min/1.73m2    Calcium 9.2 8.5 - 10.1 MG/DL    Bilirubin, total 1.3 (H) 0.2 - 1.0 MG/DL    ALT (SGPT) 31 12 - 78 U/L    AST (SGOT) 18 15 - 37 U/L    Alk.  phosphatase 89 45 - 117 U/L    Protein, total 8.2 6.4 - 8.2 g/dL    Albumin 4.0 3.5 - 5.0 g/dL    Globulin 4.2 (H) 2.0 - 4.0 g/dL    A-G Ratio 1.0 (L) 1.1 - 2.2     LACTIC ACID    Collection Time: 02/25/22 11:50 PM   Result Value Ref Range    Lactic acid 1.0 0.4 - 2.0 MMOL/L   TYPE & SCREEN    Collection Time: 02/25/22 11:50 PM   Result Value Ref Range    Crossmatch Expiration 02/28/2022,2359     ABO/Rh(D) A POSITIVE     Antibody screen NEG    PTT    Collection Time: 02/25/22 11:50 PM   Result Value Ref Range    aPTT 30.1 22.1 - 31.0 sec    aPTT, therapeutic range     58.0 - 77.0 SECS   PROTHROMBIN TIME + INR    Collection Time: 02/25/22 11:50 PM   Result Value Ref Range    INR 1.0 0.9 - 1.1      Prothrombin time 10.3 9.0 - 11.1 sec   EKG, 12 LEAD, INITIAL    Collection Time: 02/25/22 11:52 PM   Result Value Ref Range    Ventricular Rate 84 BPM    Atrial Rate 84 BPM    P-R Interval 142 ms    QRS Duration 82 ms    Q-T Interval 384 ms    QTC Calculation (Bezet) 453 ms    Calculated P Axis 67 degrees    Calculated R Axis 82 degrees    Calculated T Axis 70 degrees    Diagnosis       ** Poor data quality, interpretation may be adversely affected  Normal sinus rhythm  Normal ECG  When compared with ECG of 30-DEC-2021 14:20,  No significant change was found     METABOLIC PANEL, COMPREHENSIVE    Collection Time: 02/26/22  5:44 AM   Result Value Ref Range    Sodium 125 (L) 136 - 145 mmol/L    Potassium 4.0 3.5 - 5.1 mmol/L    Chloride 93 (L) 97 - 108 mmol/L    CO2 28 21 - 32 mmol/L    Anion gap 4 (L) 5 - 15 mmol/L    Glucose 132 (H) 65 - 100 mg/dL    BUN 10 6 - 20 MG/DL    Creatinine 0.82 0.70 - 1.30 MG/DL    BUN/Creatinine ratio 12 12 - 20      GFR est AA >60 >60 ml/min/1.73m2    GFR est non-AA >60 >60 ml/min/1.73m2    Calcium 8.5 8.5 - 10.1 MG/DL    Bilirubin, total 1.4 (H) 0.2 - 1.0 MG/DL    ALT (SGPT) 24 12 - 78 U/L    AST (SGOT) 16 15 - 37 U/L    Alk. phosphatase 76 45 - 117 U/L    Protein, total 7.1 6.4 - 8.2 g/dL    Albumin 3.5 3.5 - 5.0 g/dL    Globulin 3.6 2.0 - 4.0 g/dL    A-G Ratio 1.0 (L) 1.1 - 2.2     CBC WITH AUTOMATED DIFF    Collection Time: 02/26/22  5:44 AM   Result Value Ref Range    WBC 16.8 (H) 4.1 - 11.1 K/uL    RBC 4.57 4.10 - 5.70 M/uL    HGB 15.3 12.1 - 17.0 g/dL    HCT 42.6 36.6 - 50.3 %    MCV 93.2 80.0 - 99.0 FL    MCH 33.5 26.0 - 34.0 PG    MCHC 35.9 30.0 - 36.5 g/dL    RDW 12.7 11.5 - 14.5 %    PLATELET 859 836 - 082 K/uL    MPV 10.2 8.9 - 12.9 FL    NRBC 0.0 0  WBC    ABSOLUTE NRBC 0.00 0.00 - 0.01 K/uL    NEUTROPHILS 85 (H) 32 - 75 %    LYMPHOCYTES 6 (L) 12 - 49 %    MONOCYTES 8 5 - 13 %    EOSINOPHILS 0 0 - 7 %    BASOPHILS 0 0 - 1 %    IMMATURE GRANULOCYTES 1 (H) 0.0 - 0.5 %    ABS. NEUTROPHILS 14.4 (H) 1.8 - 8.0 K/UL    ABS. LYMPHOCYTES 1.0 0.8 - 3.5 K/UL    ABS. MONOCYTES 1.3 (H) 0.0 - 1.0 K/UL    ABS. EOSINOPHILS 0.0 0.0 - 0.4 K/UL    ABS. BASOPHILS 0.0 0.0 - 0.1 K/UL    ABS. IMM.  GRANS. 0.1 (H) 0.00 - 0.04 K/UL    DF AUTOMATED     TSH 3RD GENERATION    Collection Time: 02/26/22  5:44 AM   Result Value Ref Range    TSH 1.96 0.36 - 3.74 uIU/mL   MAGNESIUM    Collection Time: 02/26/22  5:44 AM   Result Value Ref Range    Magnesium 2.4 1.6 - 2.4 mg/dL   PHOSPHORUS    Collection Time: 02/26/22  5:44 AM   Result Value Ref Range    Phosphorus 2.6 2.6 - 4.7 MG/DL   LIPASE    Collection Time: 02/26/22  5:44 AM   Result Value Ref Range    Lipase 45 (L) 73 - 393 U/L   HEMOGLOBIN A1C WITH EAG    Collection Time: 02/26/22  5:44 AM   Result Value Ref Range    Hemoglobin A1c 5.6 4.0 - 5.6 %    Est. average glucose 114 mg/dL       Assessment:PLan    Sigmoid volvulus  Now status post endoscopic decompression. From the patient's history it seems as though this is a recurrent problem. He will benefit from undergoing a sigmoid resection. Hopefully we can keep him decompressed and do some sort of bowel prep in order to increase the chance of being able to do this without a colostomy. Though this may become necessary. Would recommend cardiology consultation given history of cardiomyopathy. Will work on timing of surgery.

## 2022-02-26 NOTE — ED NOTES
Emergency Room Nursing Communication Tool        Bedside and Verbal shift change report given to DARCI Duran (incoming nurse) by Leidy Adames RN (outgoing nurse) on Vinh Vega a 61 y.o. male and born 1965 who arrived at the hospital on 2/25/2022 10:35 PM. Report included the following information SBAR, Kardex, [facility] and Recent Results. Significant changes during shift: NGT & Fecal management tube in place, General Surgery to see patient. Issues for physician to address: none            Code Status: Prior     Chief Complaint: Constipation     Admit Diagnosis: No admission diagnoses are documented for this encounter. Admitting Provider: No admitting provider for patient encounter.      Surgery: Procedure(s):  COLON DECOMPRESSION  SIGMOIDOSCOPY FLEXIBLE     Infections: No current active infections     Allergies: Tuberculin ppd     Current diet: DIET NPO     Lines:   Peripheral IV Distal;Right Cephalic (Active)                Vital Signs:     Patient Vitals for the past 12 hrs:   Temp Pulse Resp BP SpO2   02/26/22 0415 -- 89 18 123/80 94 %   02/26/22 0400 -- 88 16 113/75 93 %   02/26/22 0345 -- 88 17 116/74 94 %   02/26/22 0330 -- 85 23 102/69 --   02/26/22 0315 -- 89 23 117/86 97 %   02/26/22 0300 -- 87 22 122/74 --   02/26/22 0245 -- 88 27 125/78 100 %   02/26/22 0235 -- 84 17 115/78 98 %   02/26/22 0230 -- 85 17 125/78 98 %   02/26/22 0225 -- 83 17 120/85 99 %   02/26/22 0220 -- 82 17 90/75 98 %   02/26/22 0215 -- 83 -- 90/74 98 %   02/26/22 0210 -- 84 -- 91/70 98 %   02/26/22 0205 -- 82 -- 94/71 --   02/26/22 0200 -- -- 22 114/74 --   02/26/22 0140 -- 83 -- 118/74 --   02/26/22 0130 -- 83 22 121/74 --   02/26/22 0115 -- 86 30 120/76 --   02/25/22 2238 98 °F (36.7 °C) 87 18 110/69 99 %      Intake & Output:       Intake/Output Summary (Last 24 hours) at 2/26/2022 0432  Last data filed at 2/26/2022 0419  Gross per 24 hour   Intake --   Output 2000 ml   Net -2000 ml Laboratory Results:     Recent Results (from the past 12 hour(s))   CBC WITH AUTOMATED DIFF    Collection Time: 02/25/22 11:50 PM   Result Value Ref Range    WBC 16.9 (H) 4.1 - 11.1 K/uL    RBC 4.96 4.10 - 5.70 M/uL    HGB 16.4 12.1 - 17.0 g/dL    HCT 46.7 36.6 - 50.3 %    MCV 94.2 80.0 - 99.0 FL    MCH 33.1 26.0 - 34.0 PG    MCHC 35.1 30.0 - 36.5 g/dL    RDW 12.4 11.5 - 14.5 %    PLATELET 788 558 - 433 K/uL    MPV 9.9 8.9 - 12.9 FL    NRBC 0.0 0  WBC    ABSOLUTE NRBC 0.00 0.00 - 0.01 K/uL    NEUTROPHILS 79 (H) 32 - 75 %    LYMPHOCYTES 11 (L) 12 - 49 %    MONOCYTES 8 5 - 13 %    EOSINOPHILS 1 0 - 7 %    BASOPHILS 0 0 - 1 %    IMMATURE GRANULOCYTES 1 (H) 0.0 - 0.5 %    ABS. NEUTROPHILS 13.5 (H) 1.8 - 8.0 K/UL    ABS. LYMPHOCYTES 1.8 0.8 - 3.5 K/UL    ABS. MONOCYTES 1.3 (H) 0.0 - 1.0 K/UL    ABS. EOSINOPHILS 0.2 0.0 - 0.4 K/UL    ABS. BASOPHILS 0.1 0.0 - 0.1 K/UL    ABS. IMM. GRANS. 0.1 (H) 0.00 - 0.04 K/UL    DF AUTOMATED     METABOLIC PANEL, COMPREHENSIVE    Collection Time: 02/25/22 11:50 PM   Result Value Ref Range    Sodium 123 (L) 136 - 145 mmol/L    Potassium 4.0 3.5 - 5.1 mmol/L    Chloride 89 (L) 97 - 108 mmol/L    CO2 30 21 - 32 mmol/L    Anion gap 4 (L) 5 - 15 mmol/L    Glucose 126 (H) 65 - 100 mg/dL    BUN 9 6 - 20 MG/DL    Creatinine 0.92 0.70 - 1.30 MG/DL    BUN/Creatinine ratio 10 (L) 12 - 20      GFR est AA >60 >60 ml/min/1.73m2    GFR est non-AA >60 >60 ml/min/1.73m2    Calcium 9.2 8.5 - 10.1 MG/DL    Bilirubin, total 1.3 (H) 0.2 - 1.0 MG/DL    ALT (SGPT) 31 12 - 78 U/L    AST (SGOT) 18 15 - 37 U/L    Alk.  phosphatase 89 45 - 117 U/L    Protein, total 8.2 6.4 - 8.2 g/dL    Albumin 4.0 3.5 - 5.0 g/dL    Globulin 4.2 (H) 2.0 - 4.0 g/dL    A-G Ratio 1.0 (L) 1.1 - 2.2     LACTIC ACID    Collection Time: 02/25/22 11:50 PM   Result Value Ref Range    Lactic acid 1.0 0.4 - 2.0 MMOL/L   TYPE & SCREEN    Collection Time: 02/25/22 11:50 PM   Result Value Ref Range    Crossmatch Expiration 02/28/2022,2359     ABO/Rh(D) A POSITIVE     Antibody screen NEG    PTT    Collection Time: 02/25/22 11:50 PM   Result Value Ref Range    aPTT 30.1 22.1 - 31.0 sec    aPTT, therapeutic range     58.0 - 77.0 SECS   PROTHROMBIN TIME + INR    Collection Time: 02/25/22 11:50 PM   Result Value Ref Range    INR 1.0 0.9 - 1.1      Prothrombin time 10.3 9.0 - 11.1 sec   EKG, 12 LEAD, INITIAL    Collection Time: 02/25/22 11:52 PM   Result Value Ref Range    Ventricular Rate 84 BPM    Atrial Rate 84 BPM    P-R Interval 142 ms    QRS Duration 82 ms    Q-T Interval 384 ms    QTC Calculation (Bezet) 453 ms    Calculated P Axis 67 degrees    Calculated R Axis 82 degrees    Calculated T Axis 70 degrees    Diagnosis       ** Poor data quality, interpretation may be adversely affected  Normal sinus rhythm  Normal ECG  When compared with ECG of 30-DEC-2021 14:20,  No significant change was found              Opportunity for questions and clarifications were given to the incoming nurse. Patient's bed locked and is in low position, side rails up x2, door open PRN, call bell within reach of patient and patient not in distress.       Signed by: Uzma Xiao RN, GA, BSN, [facility]                       2/26/2022 at 4:32 AM

## 2022-02-26 NOTE — CONSULTS
Gastroenterology Consult     Referring Physician: Dr. Pamella Bernabe     Consult Date: 2/26/2022     Subjective:     Chief Complaint: Abdominal pain, obstipation    History of Present Illness: Madilyn Brittle is a 61 y.o. male who is seen in consultation for sigmoid volvulus. 62 y/o M h/o tobacco use, ETOH use, lung ca s/p partial left lobectomy (states he is in remission), Hypertrophic cardiomyopathy, COPD, Schizophrenia who presents with severe diffuse abdominal pain. He states no BM in the last 5 days. Denies any blood in the stool. Has associated nausea. Had large amount of non bloody emesis in the ER. He notes usually he has a BM q 3-4 days but never had symptoms like this. He denies any wt loss. Denies prior h/o EGD or colonoscopy. Past Medical History:   Diagnosis Date    Cancer [facility]) 12/2020    LUNG CANCER    Chronic obstructive [facility] disease ([facility] [address]     Essential hypertension 11/19/2020    High cholesterol 11/19/2020    Hypertension     Hypertrophic cardiomyopathy ([facility] [address]     Paranoid schizophrenia ([facility] [address]     Psychiatric disorder      No past surgical history on file. Family History   Problem Relation Age of Onset    Heart Surgery Mother     Depression Father     Emphysema Father     Anesth Problems Neg Hx      Social History     Tobacco Use    Smoking status: Current Every Day Smoker     Packs/day: 0.10    Smokeless tobacco: Never Used   Substance Use Topics    Alcohol use:  Yes     Alcohol/week: 3.0 standard drinks     Types: 3 Cans of beer per week      Allergies   Allergen Reactions    Tuberculin Ppd Swelling     NEEDS CXR     Current Facility-Administered Medications   Medication Dose Route Frequency    sodium chloride 0.9 % bolus infusion 1,000 mL  1,000 mL IntraVENous ONCE    sodium chloride (NS) flush 5-40 mL  5-40 mL IntraVENous Q8H    sodium chloride (NS) flush 5-40 mL  5-40 mL IntraVENous PRN    naloxone (NARCAN) injection 0.4 mg  0.4 mg IntraVENous Multiple    flumazeniL (ROMAZICON) 0.1 mg/mL injection 0.2 mg  0.2 mg IntraVENous Multiple    simethicone (MYLICON) 64RL/9.3HN oral drops 80 mg  1.2 mL Oral Multiple    atropine injection 0.5 mg  0.5 mg IntraVENous ONCE PRN    EPINEPHrine (ADRENALIN) 0.1 mg/mL syringe 1 mg  1 mg Endoscopically ONCE PRN    midazolam (VERSED) injection 10 mg  10 mg IntraVENous Multiple    fentaNYL citrate (PF) injection 200 mcg  200 mcg IntraVENous ONCE     Current Outpatient Medications   Medication Sig    carvediloL (COREG) 12.5 mg tablet Take 1 Tab by mouth two (2) times daily (with meals). Increased 11/6/2020    senna-docusate (PERICOLACE) 8.6-50 mg per tablet Take 1 Tab by mouth daily. Indications: constipation    haloperidol decanoate (HALDOL DECANOATE) 100 mg/mL injection every twenty-eight (28) days.  clonazePAM (KlonoPIN) 0.5 mg tablet Take  by mouth nightly as needed for Anxiety.  benztropine (COGENTIN) 0.5 mg tablet Take 0.5 mg by mouth two (2) times a day.  fluticasone propion-salmeteroL (ADVAIR/WIXELA) 250-50 mcg/dose diskus inhaler Take 1 Puff by inhalation two (2) times a day.  gabapentin (NEURONTIN) 600 mg tablet Take 600 mg by mouth three (3) times daily.  triamcinolone acetonide (KENALOG) 0.1 % topical cream Apply  to affected area two (2) times daily as needed.  atorvastatin (LIPITOR) 20 mg tablet Take 1 Tab by mouth daily.  aspirin delayed-release 81 mg tablet Take 1 Tab by mouth daily.  diphenhydrAMINE (BENADRYL) 25 mg capsule Take 25 mg by mouth nightly as needed. One to two tablets by mouth at bedtime as needed for insomnia.  acetaminophen (TYLENOL) 500 mg tablet Take 1 Tab by mouth every six (6) hours as needed for Pain (headache). Indications: head pain    FLOVENT  mcg/actuation inhaler Take 2 Puffs by inhalation two (2) times a day.  ATROVENT HFA 17 mcg/actuation inhaler Take 2 Puffs by inhalation four (4) times daily.     DULoxetine (CYMBALTA) 60 mg capsule Take 60 mg by mouth nightly.  ANORO ELLIPTA 62.5-25 mcg/actuation inhaler Take 1 Puff by inhalation daily.  nicotine (NICODERM CQ) 14 mg/24 hr patch 1 Patch by TransDERmal route every twenty-four (24) hours.  DULoxetine (CYMBALTA) 30 mg capsule Take 1 Cap by mouth daily. One daily in the morning, two at HS (Patient taking differently: Take 30 mg by mouth every morning. One daily in the morning, two at HS)    loratadine (CLARITIN) 10 mg tablet Take 1 Tab by mouth daily.  zolpidem (AMBIEN) 10 mg tablet Take 1 Tab by mouth nightly as needed for Sleep. Max Daily Amount: 10 mg.    naproxen (NAPROSYN) 500 mg tablet Take 1 Tab by mouth two (2) times daily as needed. FOR PAIN   Indications: Pain    tiotropium (SPIRIVA WITH HANDIHALER) 18 mcg inhalation capsule Take 1 Cap by inhalation daily.  albuterol (VENTOLIN HFA) 90 mcg/actuation inhaler Take 2 Puffs by inhalation as needed for Wheezing. (Patient taking differently: Take 2 Puffs by inhalation. EVERY 4-6 HOURS AS NEEDED)    risperiDONE (RISPERDAL) 2 mg tablet Take 2 mg by mouth two (2) times a day.  TUSSIN DM  mg/5 mL liqd TAKE 10 ML BY MOUTH EVERY 6 HOURS AS NEEDED     Facility-Administered Medications Ordered in Other Encounters   Medication Dose Route Frequency    oxyCODONE IR (ROXICODONE) tablet 5 mg  5 mg Oral Q4H PRN        Review of Systems:  A detailed 10 organ review of systems is obtained with pertinent positives as listed in the History of Present Illness and Past Medical History. All others are negative. Objective:     Physical Exam:  Visit Vitals  /69 (BP 1 Location: Right upper arm, BP Patient Position: At rest)   Pulse 87   Temp 98 °F (36.7 °C)   Resp 18   SpO2 99%        Skin:  Extremities and face reveal no rashes. No salguero erythema. No telangiectasias on the chest wall. HEENT: Sclerae anicteric. Extra-occular muscles are intact. No oral ulcers. No abnormal pigmentation of the lips. The neck is supple.   Cardiovascular: Regular rate and rhythm. No murmurs, gallops, or rubs. PMI nondisplaced. Carotids without bruits. Respiratory:  Comfortable breathing with no accessory muscle use. Clear breath sounds with no wheezes, rales, or rhonchi. GI:  Abdomen firm and distended, tympanic to percussion. Positive bowel sounds. No enlargement of the liver or spleen. No masses palpable. Abd tender to palpation with guarding. No signs of rigidity. Rectal:  Deferred  Musculoskeletal:  No pitting edema of the lower legs. Extremities have good range of motion. No costovertebral tenderness. Neurological:  Gross memory appears intact. Patient is alert and oriented. Psychiatric:  Mood appears appropriate with judgement intact. Lymphatic:  No cervical or supraclavicular adenopathy. Lab/Data Review: All lab results for the last 24 hours reviewed. Assessment/Plan:     Active Problems:    * No active hospital problems. *       KUB and CT A/P confirm sigmoid volvulus. A/P  Sigmoid volvulus-  Plan for emergent flexible sigmoidoscopy with detorsion of volvulus. Benefits and risks of the procedure in addition to risk of sigmoid volvulus discussed ini t detail with the patient. He agrees to proceed. Recommend surgery consultation, as one endoscopic detorsion is performed would recommend evaluation for sigmoid resection to prevent recurrence.      Recommend IVF, repleting electrolytes if abnormal.     Avoid anti motility agents (narcotics, etc.)

## 2022-02-26 NOTE — ED NOTES
NG tube marked at 77    While using the bedside commode, colon decompression tube came out of the patient's rectum. Dr. Barnard Right paged. [phone]- Dr. Ariadne Lockett with GI notified about colon decompression tube. Per Dr. Ariadne Lockett, the decompression tube does not need to be replaced and continue to monitor patient.

## 2022-02-26 NOTE — CONSULTS
Consultation Note    NAME: Audrey Snyder   :  1965   MRN:  [id number]     Date/Time:  2022 1:32 PM    I have been asked to see this patient by Dr. Marisa Sorto  for advice/opinion re: hyponatremia. Assessment :    Plan:  Hyponatremia - chronic - mildly symptomatic  Sigmoid volvulus     His sodium was 129 in late December. 123 Friday and a little better at 125 this AM.  He may have underlying SIADH with acute worsening from volume depletion. I will check serum and urine osm as well as urine electrolytes. I would continue IVF for now. Repeat BMP this afternoon. Subjective:   CHIEF COMPLAINT:  \"I need to get a phone. \"    HISTORY OF PRESENT ILLNESS:     Linda Boss is a 61 y.o.   male who has a history of schizophrenia admitted with hyponatremia. He is a poor historian. He tells me that he has had N/V for the past week, but other notes do not indicate that. On admission his sodium was 123 and up to 125 this AM.    Past Medical History:   Diagnosis Date    Cancer ([address] 2020    LUNG CANCER    Chronic obstructive pulmonary disease ([address]     Essential hypertension 2020    High cholesterol 2020    Hypertension     [facility] cardiomyopathy ([facility] [address]     Paranoid schizophrenia ([facility] [address]     Psychiatric disorder       No past surgical history on file. Social History     Tobacco Use    Smoking status: Current Every Day Smoker     Packs/day: 0.10    Smokeless tobacco: Never Used   Substance Use Topics    Alcohol use: Yes     Alcohol/week: 3.0 standard drinks     Types: 3 Cans of beer per week      Family History   Problem Relation Age of Onset    Heart Surgery Mother     Depression Father     Emphysema Father     Anesth Problems Neg Hx       Allergies   Allergen Reactions    Tuberculin Ppd Swelling     NEEDS CXR      Prior to Admission medications    Medication Sig Start Date End Date Taking?  Authorizing Provider   carvediloL (COREG) 12.5 mg tablet Take 1 Tab by mouth two (2) times daily (with meals). Increased 11/6/2020 4/1/21   Tanya Mcclendon NP   senna-docusate (PERICOLACE) 8.6-50 mg per tablet Take 1 Tab by mouth daily. Indications: constipation 12/18/20   NASH Mina   haloperidol decanoate (HALDOL DECANOATE) 100 mg/mL injection every twenty-eight (28) days. 11/30/20   Provider, Historical   clonazePAM (KlonoPIN) 0.5 mg tablet Take  by mouth nightly as needed for Anxiety. Provider, Historical   benztropine (COGENTIN) 0.5 mg tablet Take 0.5 mg by mouth two (2) times a day. 10/19/20   Provider, Historical   fluticasone propion-salmeteroL (ADVAIR/WIXELA) 250-50 mcg/dose diskus inhaler Take 1 Puff by inhalation two (2) times a day. 7/24/20   Provider, Historical   gabapentin (NEURONTIN) 600 mg tablet Take 600 mg by mouth three (3) times daily. 10/19/20   Provider, Historical   triamcinolone acetonide (KENALOG) 0.1 % topical cream Apply  to affected area two (2) times daily as needed. 7/22/20   Provider, Historical   atorvastatin (LIPITOR) 20 mg tablet Take 1 Tab by mouth daily. 3/26/20   Tanya Mcclendon NP   aspirin delayed-release 81 mg tablet Take 1 Tab by mouth daily. 3/25/20   Tanya Mcclendon NP   diphenhydrAMINE (BENADRYL) 25 mg capsule Take 25 mg by mouth nightly as needed. One to two tablets by mouth at bedtime as needed for insomnia. Provider, Historical   acetaminophen (TYLENOL) 500 mg tablet Take 1 Tab by mouth every six (6) hours as needed for Pain (headache). Indications: head pain 8/9/19   Carmen Morrison MD   FLOVENT  mcg/actuation inhaler Take 2 Puffs by inhalation two (2) times a day. 3/21/19   Provider, Historical   ATROVENT HFA 17 mcg/actuation inhaler Take 2 Puffs by inhalation four (4) times daily. 3/21/19   Provider, Historical   DULoxetine (CYMBALTA) 60 mg capsule Take 60 mg by mouth nightly. 3/22/19   Provider, Historical   ANORO ELLIPTA 62.5-25 mcg/actuation inhaler Take 1 Puff by inhalation daily.  3/21/19   Provider, Historical   nicotine (NICODERM CQ) 14 mg/24 hr patch 1 Patch by TransDERmal route every twenty-four (24) hours. Provider, Historical   DULoxetine (CYMBALTA) 30 mg capsule Take 1 Cap by mouth daily. One daily in the morning, two at [facility]  Patient taking differently: Take 30 mg by mouth every morning. One daily in the morning, two at [facility] 5/14/18   Tonia Hale MD   loratadine (CLARITIN) 10 mg tablet Take 1 Tab by mouth daily. 5/14/18   Tonia Hale MD   zolpidem (AMBIEN) 10 mg tablet Take 1 Tab by mouth nightly as needed for Sleep. Max Daily Amount: 10 mg. 5/14/18   Tonia Hale MD   naproxen (NAPROSYN) 500 mg tablet Take 1 Tab by mouth two (2) times daily as needed. FOR PAIN   Indications: Pain 5/14/18   Tonia Hale MD   tiotropium [facility] WITH HANDIHALER) 18 mcg inhalation capsule Take 1 Cap by inhalation daily. Provider, Historical   albuterol (VENTOLIN HFA) 90 mcg/actuation inhaler Take 2 Puffs by inhalation as needed for Wheezing. Patient taking differently: Take 2 Puffs by inhalation. EVERY 4-6 HOURS AS NEEDED 5/2/17   Naila Jackson PA-C   risperiDONE (RISPERDAL) 2 mg tablet Take 2 mg by mouth two (2) times a day.     Provider, Historical   TUSSIN DM  mg/5 mL liqd TAKE 10 ML BY MOUTH EVERY 6 HOURS AS NEEDED 2/21/17   Provider, Historical     REVIEW OF SYSTEMS:     [x]  Unable to obtain reliable ROS due to  [x] mental status  [] sedated   [] intubated   [] Total of 12 systems reviewed as follows:  Constitutional: negative fever, negative chills, negative weight loss  Eyes:   negative visual changes  ENT:   negative sore throat, tongue or lip swelling  Respiratory:  negative cough, negative dyspnea  Cards:  negative for chest pain, palpitations, lower extremity edema  GI:   negative for nausea, vomiting, diarrhea, and abdominal pain  :  negative for frequency, dysuria  Integument:  negative for rash and pruritus  Heme:  negative for easy bruising and gum/nose bleeding  Musculoskel: negative for myalgias,  back pain and muscle weakness  Neuro:  negative for headaches, dizziness, vertigo  Psych:  negative for feelings of anxiety, depression   Travel?: none    Objective:   VITALS:    Visit Vitals  /86   Pulse 89   Temp 97.7 °F (36.5 °C)   Resp 16   Ht 5' 7\" (1.702 m)   Wt 68 kg (150 lb)   SpO2 96%   BMI 23.49 kg/m²     PHYSICAL EXAM:  Gen:  []  WD []  WN  [] cachectic []  thin []  obese []  disheveled             []  ill apearing  []   Critical  [x]   Chronic    [x]  No acute distress    HEENT:   [x] NC/AT/PERRL    [] pink conjunctivae      [] pale conjunctivae                  PERRL  [] yes  [] no      [] moist mucosa    [] dry mucosa    hearing intact to voice [] yes  [] No                 NECK:   supple [x] yes  [] no        masses [] yes  [x] No               thyroid  []  non tender  []  tender    RESP:   [x] CTA bilaterally/no wheezing/rhonchi/rales/crackles    [] rhonchi bilaterally - no dullness  [] wheezing   [] rhonchi   [] crackles     use of accessory muscles [] yes [] no    CARD:   [x]  regular rate and rhythm/No murmurs/rubs/gallops    murmur  [] yes ()  [] no      Rubs  [] yes  [] no       Gallops [] yes  [] no    Rate []  regular  []  irregular        carotid bruits  [] Right  []  Left                 LE edema [] yes  [x] no           JVP  []  yes   []  no    ABD:    [x] soft/non distended/non tender/+bowel sounds/no HSM    []  Rigid    tenderness [] yes [] no   Liver enlargement  []  yes []  no                Spleen enlargement  []  yes []  no     distended []  yes [] no     bowel sound  [] hypoactive   [] hyperactive    LYMPH:    Neck []  yes [x]  no       Axillae []  yes [x]  no    SKIN:   Rashes []  yes   [x]  no    Ulcers []  yes   [x]  no               [] tight to palpitation    skin turgor []  good  [] poor  [] decreased               Cyanosis/clubbing []  yes []  no    NEUR:   [x] cranial nerves II-XII grossly intact       [] Cranial nerves deficit                 []  facial droop []  slurred speech   [] aphasic     [] Strength normal     []  weakness  []  LUE  []   RUE/ []  LLE  []   RLE    follows commands  [x]  yes []  no           PSYCH:   insight [x] poor [] good   Alert and Oriented to  [x] person  [x] place  []  time                    [] depressed [] anxious [] agitated  [] lethargic [] stuporous  [] sedated     LAB DATA REVIEWED:    Recent Labs     02/26/22 0544 02/25/22 2350   WBC 16.8* 16.9*   HGB 15.3 16.4   HCT 42.6 46.7    286     Recent Labs     02/26/22 0544 02/25/22 2350   * 123*   K 4.0 4.0   CL 93* 89*   CO2 28 30   BUN 10 9   CREA 0.82 0.92   * 126*   CA 8.5 9.2   MG 2.4  --    PHOS 2.6  --      Recent Labs     02/26/22 0544 02/25/22 2350   ALT 24 31   AP 76 89   TBILI 1.4* 1.3*   ALB 3.5 4.0   GLOB 3.6 4.2*   LPSE 45*  --      Recent Labs     02/25/22 2350   INR 1.0   PTP 10.3   APTT 30.1      No results for input(s): FE, TIBC, PSAT, FERR in the last 72 hours. No results for input(s): PH, PCO2, PO2 in the last 72 hours. No results for input(s): CPK, CKMB in the last 72 hours.     No lab exists for component: TROPONINI  Lab Results   Component Value Date/Time    Glucose (POC) 163 (H) 12/16/2020 06:13 PM    Glucose (POC) 93 03/07/2017 08:25 AM       Procedures: see electronic medical records for all procedures/Xrays and details which were not copied into this note but were reviewed prior to creation of Plan.    ________________________________________________________________________       ___________________________________________________  Consulting Physician: Flakito Barragan MD

## 2022-02-26 NOTE — H&P
[address]  HISTORY AND PHYSICAL    Name:  Paola Matta  MR#:  [id number]  :  1965  ACCOUNT #:  [de-identified]  ADMIT DATE:  2022      The patient was seen, evaluated, and admitted by me on 2022. PRIMARY CARE PHYSICIAN:  Balaji Chairez MD    SOURCE OF INFORMATION:  Patient and review of ED and old electronic medical records. CHIEF COMPLAINT:  Abdominal pain. HISTORY OF PRESENT ILLNESS:  This is a 48-year-old man with a past medical history significant for left lung cancer, status post lobectomy, COPD, hypertension, dyslipidemia, hypertrophic cardiomyopathy, paranoid schizophrenia, who was in his usual state of health until a few days ago when the patient developed abdominal pain. The pain is diffuse in location, constant, dull ache, 8/10 in severity, no known aggravating or relieving factors, associated with constipation, but no nausea, no vomiting. The patient took over-the-counter laxative for the constipation without any significant relief. The patient was brought to the emergency room from the correction for further evaluation. When the patient arrived at the emergency room, CT scan of the abdomen and pelvis as well as KUB suggested sigmoid volvulus with small-bowel obstruction. Emergency room physician consulted gastroenterologist.  The patient was seen by Gastroenterology while he was still in the emergency room and underwent decompression. The patient was subsequently referred to the hospitalist service for evaluation for admission. No associated fever, rigors, or chills. The patient was last admitted to the hospital from 2020 to 2020. The patient was admitted to the Surgical Service and underwent robotic left lower lobe segmentectomy for left lung cancer.     PAST MEDICAL HISTORY:  Left lung cancer, status post left lower lobe lumpectomy, COPD, hypertension, dyslipidemia, hypertrophic cardiomyopathy, and paranoid schizophrenia. ALLERGIES:  THE PATIENT IS ALLERGIC TO PPD. MEDICATIONS:  1. Tylenol 500 mg every 6 hours as needed for pain. 2.  Albuterol 90 mcg 2 puffs by inhalation as needed for wheezing. 3.  Anoro 1 puff by inhalation daily. 4.  Aspirin 81 mg daily. 5.  Lipitor 20 mg daily. 6.  Cogentin 0.5 mg twice daily. 7.  Coreg 12.5 mg twice daily. 8.  Klonopin 0.5 mg as needed for anxiety. 9.  Benadryl 25 mg daily at bedtime as needed. 10.  Cymbalta 30 mg daily. 11.  Advair 250/50 inhalation twice daily. 12.  Neurontin 600 mg 3 times daily. 13.   mg every 28 days. 14.  Claritin 10 mg daily. 15.  Naprosyn 500 mg twice daily. 16.  Nicoderm patch 14 mg daily. 17.  Risperdal 2 mg twice daily. 18.  Carolyn-Colace 1 tablet daily. 19.  Spiriva inhalation daily. 20.  Ambien 10 mg daily at bedtime as needed for sleep. FAMILY HISTORY:  This was reviewed. His mother had heart disease. His father had depression and emphysema. PAST SURGICAL HISTORY:  This is significant for robotic left lower lobe segmentectomy. SOCIAL HISTORY:  The patient smokes about a pack of cigarettes daily. Admits to social consumption of alcohol. REVIEW OF SYSTEMS:  HEAD, EYES, EARS, NOSE, AND THROAT:  No headache. No dizziness. No blurring of vision. No photophobia. RESPIRATORY:  No cough, no shortness of breath, no hemoptysis. CARDIOVASCULAR:  No chest pain, no orthopnea, no palpitation. GASTROINTESTINAL:  This is positive for abdominal pain and constipation. No nausea or vomiting. No diarrhea. GENITOURINARY:  No dysuria, no urgency, and no frequency. All other systems are reviewed and they are negative. PHYSICAL EXAMINATION:  GENERAL APPEARANCE:  The patient appeared ill, in moderate distress. VITAL SIGNS:  On arrival at the emergency room, temperature 98, pulse 87, respiratory rate 18, blood pressure 110/69, and oxygen saturation 99% on room air. HEAD:  Normocephalic, atraumatic.   EYES:  Normal eye movement. No redness, no drainage, no discharge. EARS:  Normal external ears with no obvious drainage. NOSE:  No deformity and no drainage. MOUTH AND THROAT:  No visible oral lesion. Dry oral mucosa. NECK:  Neck is supple. No JVD, no thyromegaly. CHEST:  Clear breath sounds. No wheezing, no crackles. HEART:  Normal S1 and S2, regular. No clinically appreciable murmur. ABDOMEN:  Distended. Diffuse tenderness. No rebound tenderness. Reduced bowel sounds. CENTRAL NERVOUS SYSTEM:  Alert and oriented x3. No gross focal neurological deficit. EXTREMITIES:  No edema. Pulses 2+ bilaterally. MUSCULOSKELETAL:  No obvious joint deformity or swelling. SKIN:  No active skin lesions seen in the exposed parts of the body. PSYCHIATRIC:  Normal mood and affect. LYMPHATIC:  No cervical lymphadenopathy. DIAGNOSTIC DATA:  EKG shows sinus rhythm and nonspecific ST and T-wave abnormalities. KUB shows findings concerning for sigmoid volvulus. CT of the abdomen and pelvis without contrast shows sigmoid volvulus with small-bowel obstruction. LABORATORY DATA:  Hematology, WBC 16.9, hemoglobin 16.4, hematocrit 46.7, platelets 840. Coagulation profile, INR 1.0, PT 10.6. Lactic acid level 1.0. Chemistry, sodium 123, potassium 4.0, chloride 89, CO2 of 30, glucose 126, BUN 9, creatinine 0.92, calcium 9.2. Total bilirubin 1.3, ALT 31, AST 18, alkaline phosphatase 89, total protein 8.2, albumin level 4.0, globulin 4.2. ASSESSMENT:  1. Sigmoid volvulus. 2.  Hyponatremia. 3.  Small-bowel obstruction. 4.  Paranoid schizophrenia. 5.  Chronic obstructive pulmonary disease. 6.  Hypertension. 7.  Dyslipidemia. 8.  Tobacco abuse. 9.  Hyperglycemia. 10.  Leukocytosis. 11.  Left lung cancer, status post left lower lobe resection. PLAN:  1. Sigmoid volvulus. This is the most likely cause of the patient's abdominal pain and distention.   The patient was seen by gastroenterologist and underwent decompression with rectal tube placement in the emergency room. We will continue conservative treatment which includes pain control, fluid therapy. We will await further recommendation from the gastroenterologist.  2.  Hyponatremia. This may be due to volume depletion. We will carry out fluid therapy and monitor the patient's sodium level closely. We will obtain CT scan of the chest to evaluate the patient for mass lesion. Nephrology consult will be requested to assist in further evaluation and treatment. 3.  Small-bowel obstruction. We will carry out conservative treatment which includes pain control, NG tube insertion connected to low suction. The patient will be n.p.o. and we will await further recommendation from the general surgeon consulted by the emergency room physician. 4.  Paranoid schizophrenia. We will resume preadmission medication once the patient is no longer n.p.o.  5.  COPD. We will place the patient on DuoNeb as needed. The patient is not in acute exacerbation of COPD at this time. 6.  Hypertension. We will resume home medication once the patient is no longer n.p.o. We will place the patient on hydralazine as needed. 7.  Dyslipidemia. We will resume home medication once the patient is no longer n.p.o.  8.  Tobacco abuse. The patient advised to quit smoking. We will place the patient on Nicoderm patch. 9.  Hyperglycemia. We will check hemoglobin A1c level. The patient has no history of diabetes. 10.  Leukocytosis. The patient is afebrile, not toxic. Lactic acid level is within normal limits. We will check urinalysis, lipase level, and await the results of CT scan of the chest to evaluate the patient for pneumonia as a possible cause of leukocytosis. 11.  Left lung cancer, status post resection. We will continue to monitor. We will await the results of CT scan of the chest.  12.  Other issues:  Code status, the patient is a full code. We will request SCD for DVT prophylaxis.   The patient will require prophylaxis with heparin or Lovenox once it was determined that the patient will not require further intervention for the sigmoid volvulus and also for the small-bowel obstruction. FUNCTIONAL STATUS PRIOR TO ADMISSION:  The patient came from a group home. The patient is ambulatory with no assistive device. COVID PRECAUTION:  The patient was wearing a face mask. I was wearing a face mask and gloves for this patient's encounter.         Iris Camacho MD      [id number]  D:  02/26/2022 3:45  T:  02/26/2022 4:57  JOB #:  [id number]  CC:  Derek Hernandez MD

## 2022-02-26 NOTE — PERIOP NOTES
0210: TRANSFER - IN REPORT:    Verbal report received from Berhane(name) on Ted Baires  being received from ED 4(unit) for ordered procedure      Report consisted of patients Situation, Background, Assessment and   Recommendations(SBAR). Information from the following report(s) Procedure Summary was reviewed with the receiving nurse. Opportunity for questions and clarification was provided. Assessment completed upon patients arrival to unit and care assumed. Pt's abd rigid. 245: . TRANSFER - OUT REPORT:    Verbal report given to Berhane(name) on Ted Baires  being transferred to ED 4(unit) for routine post - op       Report consisted of patients Situation, Background, Assessment and   Recommendations(SBAR). Pt's abd taught. Decompression tube to rees drainage bag with brow liquid drainage. Tube taped to back with micropore tape. Information from the following report(s) Procedure Summary was reviewed with the receiving nurse. Lines:   Peripheral IV Distal;Right Cephalic (Active)        Opportunity for questions and clarification was provided.

## 2022-02-26 NOTE — PROCEDURES
Flexible Sigmoidoscopy Procedure Note    Procedure: Flexible Sigmoidoscopy    Pre-Procedure Diagnosis: sigmoid volvulus    Post-Procedure Diagnosis: Sigmoid volvulus, decompressio tube placed    Indications: Sigmoid volvulus    Meds: Versed 6mg IV, Fentanyl 125 mcg IV, Zofran 4mg IV    Procedure in Detail:   Informed consent was obtained for the procedure. Risks of perforation and hemorrhage were discussed. The patient was placed in the left lateral decubitus position. The anal region was examined and a rectal exam was performed. Then the pediatric colonoscope was inserted and advanced to the distal sigmoid colon where there was narrowing of colon noted at 25 cm from the anal verge. The scope was able to traverse this area with dilated colon noted proximal to the region. Large amount of stool was seen proximal to the volvulus. Although no necrotic appearing bowel seen there was evidence of mild to moderate colitis with erythema, granularity and edema proximal to the volvulus. Decompression of the dilated colon was performed with suctioning. Detorsion was performed with anti clockwise withdrawal of the colonoscope. The abdomen appeared softer after completion, however upon withdrawal of the scope repeat abdominal exam after 5 min demonstrated the abdomen was again distended. Detorsion of the volvulus was repeat two separate times with similar results. Finally a colon decompression tube was successfully place by first advancing a guidewire proximal to the sigmoid volvulus. Once the wire was in place the scope was completely withdrawn and the colon decompression tube was advanced with ease. The end of the tube was taped to the patient's thigh The preparation was unprepped. Findings:  Sigmoid volvulus in the distal sigmoid, 25-30 cm from the anal verge. Redundant sigmoid colon. Mild to moderate inflammation noted proximal to the detorsion site, however the mucosa appeared viable with no evidence of necrosis. Detorsion performed 3 separate times, finally colon decompression tube placed. Specimens: none           EBL:  None    Complications:  None; patient tolerated the procedure well. Impression:      - Sigmoid Volvulus s/p Detorsion. Colon decompression tube placed. Recommendations:  Recommend KUB post procedure to confirm location of the colon decompression tube and to re-evaluate abdomen. Pt should have serial KUB daily afterward. Although abdomen appears less distended post procedure, I am concerned patient will have recurrence of his sigmoid volvulus given repeat attempts at endoscopic detorsion x3  over a course of 1 hour. I do not think repeat attempts at endoscopic detorsion will be beneficial if it does recur. I have talked with surgeon consulted, Dr. Alma Elizabeth about patient's status and findings. Recommend NG tube placed with ILWS. As patient did vomit. No issues with hypoxia during the procedure as pt was in left later decubitis position.

## 2022-02-26 NOTE — ED PROVIDER NOTES
HPI   Jason Jacinto is a 61 y.o. male with Hx of lung CA, COPD, HTN, HLD, hypertrophic cardiomyopathy, paranoid schizophrenia who presents via EMS to [facility] ED with cc of constipation and abd pain. Patient states that he resides in a group home. Reports that he usually has a bowel movement every 4 to 5 days but has not had one for 5 to 6 days. States that he is having severe abdominal cramping and feels as if he needs to have a bowel movement. Reports his pain is worsened throughout the course of today. States that he was able to eat dinner tonight without difficulty. He denies any nausea or vomiting. He reports that he has taken over-the-counter laxatives, drink Epson salt, used an enema with no relief. He does take chronic opioids for arthritis pain. He states that he is able to make his own medical decisions does not have a power of . He denies any recent history of abdominal surgeries. He takes aspirin, no other anticoagulation or blood thinners. He denies any fevers, chills, cough, congestion, difficulty breathing, shortness of breath, dysuria or urinary frequency. He denies any recent blood in stool. He denies tobacco, alcohol, substance abuse (reports former history). PCP: Chinmay Nieves MD    There are no other complaints, changes or physical findings at this time. Past Medical History:   Diagnosis Date    Cancer [facility]) 12/2020    LUNG CANCER    [facility]     Essential hypertension 11/19/2020    High cholesterol 11/19/2020    Hypertension     Hypertrophic cardiomyopathy ([facility] [facility]     Paranoid schizophrenia ([facility]     Psychiatric disorder        No past surgical history on file.       Family History:   Problem Relation Age of Onset    Heart Surgery Mother     Depression Father     Emphysema Father     Anesth Problems Neg Hx        Social History     Socioeconomic History    Marital status:      Spouse name: Not on file    Number of children: Not on file    Years of education: Not on file    Highest education level: Not on file   Occupational History    Not on file   Tobacco Use    Smoking status: Current Every Day Smoker     Packs/day: 0.10    Smokeless tobacco: Never Used   Substance and Sexual Activity    Alcohol use: Yes     Alcohol/week: 3.0 standard drinks     Types: 3 Cans of beer per week    Drug use: No    Sexual activity: Not on file   Other Topics Concern    Not on file   Social History Narrative    ** Merged History Encounter **          Social Determinants of Health     Financial Resource Strain:     Difficulty of Paying Living Expenses: Not on file   Food Insecurity:     Worried About Running Out of Food in the Last Year: Not on file    Ann of Food in the Last Year: Not on file   Transportation Needs:     Lack of Transportation (Medical): Not on file    Lack of Transportation (Non-Medical): Not on file   Physical Activity:     Days of Exercise per Week: Not on file    Minutes of Exercise per Session: Not on file   Stress:     Feeling of Stress : Not on file   Social Connections:     Frequency of Communication with Friends and Family: Not on file    Frequency of Social Gatherings with Friends and Family: Not on file    Attends Yazdanism Services: Not on file    Active Member of [address] [place] [address] [employer] or Organizations: Not on file    Attends Club or Organization Meetings: Not on file    Marital Status: Not on file   Intimate Partner Violence:     Fear of Current or Ex-Partner: Not on file    Emotionally Abused: Not on file    Physically Abused: Not on file    Sexually Abused: Not on file   Housing Stability:     Unable to Pay for Housing in the Last Year: Not on file    Number of [place] in the Last Year: Not on file    Unstable Housing in the Last Year: Not on file         ALLERGIES: Tuberculin ppd    Review of Systems   Constitutional: Negative for activity change, appetite change, chills and fever. HENT: Negative for congestion, rhinorrhea and sore throat. Eyes: Negative for visual disturbance. Respiratory: Negative for cough and shortness of breath. Cardiovascular: Negative for chest pain. Gastrointestinal: Positive for abdominal pain and constipation. Negative for diarrhea, nausea and vomiting. Genitourinary: Negative for dysuria, flank pain, frequency and urgency. Musculoskeletal: Negative for arthralgias, back pain and neck pain. Skin: Negative for color change and rash. Neurological: Negative for dizziness, weakness, light-headedness and headaches. Psychiatric/Behavioral: Negative for agitation, behavioral problems and confusion. All other systems reviewed and are negative. Vitals:    02/25/22 2238 02/26/22 0115 02/26/22 0130   BP: 110/69 120/76 121/74   Pulse: 87 86 83   Resp: 18 30 22   Temp: 98 °F (36.7 °C)     SpO2: 99%              Physical Exam  Vitals and nursing note reviewed. Constitutional:       General: He is not in acute distress. Appearance: He is well-developed. HENT:      Head: Normocephalic and atraumatic. Right Ear: External ear normal.      Left Ear: External ear normal.   Eyes:      Conjunctiva/sclera: Conjunctivae normal.      Pupils: Pupils are equal, round, and reactive to light. Cardiovascular:      Rate and Rhythm: Normal rate and regular rhythm. Heart sounds: Normal heart sounds. Pulmonary:      Effort: Pulmonary effort is normal.      Breath sounds: Normal breath sounds. Abdominal:      General: Abdomen is protuberant. There is distension. Palpations: Abdomen is rigid. Tenderness: There is no abdominal tenderness. There is no guarding or rebound. Musculoskeletal:         General: Normal range of motion. Cervical back: Normal range of motion and neck supple. Skin:     General: Skin is warm and dry. Neurological:      Mental Status: He is alert and oriented to person, place, and time.    Psychiatric: Behavior: Behavior normal.         Thought Content: Thought content normal.         Judgment: Judgment normal.          MDM  Number of Diagnoses or Management Options  Hyponatremia  Sigmoid volvulus (HCC)  Small bowel obstruction ([address]  Diagnosis management comments: DDx: constipation, SBO, ileus, volvulus       Patient presents to the emergency department with constipation and diffuse abdominal pain. Concern for sigmoid volvulus on x-ray and CT scan. CT scan note concern for small bowel obstruction as well. Dr. Levy Colon has spoken with both surgery and GI medicine. Surgery deferred management of this patient to GI Daryl Gomez at this time. Dr. Samson Heredia presented to the emergency department for decompression in the ER under conscious sedation. We have discussed admission with the hospitalist.         Amount and/or Complexity of Data Reviewed  Clinical lab tests: ordered and reviewed  Tests in the radiology section of CPT®: reviewed and ordered  Tests in the medicine section of CPT®: reviewed  Review and summarize past medical records: yes  Discuss the patient with other providers: yes Alexandra Llanes, surgery  Samson Heredia, GI Medicine   Yared Flavio, hospitalist )      ED Course as of 02/26/22 0146   Fri Feb 25, 2022   2333 Received call from radiologist.  Tomeka Ferraro appears to be sigmoid volvulus. Paged surgery. Ordered labs and CAT scan. [ZD]   0713 Spoke with surgery reporting that this is something that gastroenterology takes care of. We will paged GI. [ZD]   Sat Feb 26, 2022   0006 Spoke with DR. Timothy Rey, will get the team involved to perform a decompression. [ZD]   0115 1:15 AM  EKG: NSR. Rate 84. Normal intervals, normal axis, no ST-elevations or depressions. No signs of ischemia. Interpreted by Berny Canales MD       [ZD]      ED Course User Index  [ZD] Sarah García MD       Procedures    Presentation, management, and disposition were discussed with the attending physician, Dr. Levy Colon, who is in agreement with plan of care. Patient is being admitted to the hospital , and the attending will see the patient. LABORATORY TESTS:  Recent Results (from the past 12 hour(s))   CBC WITH AUTOMATED DIFF    Collection Time: 02/25/22 11:50 PM   Result Value Ref Range    WBC 16.9 (H) 4.1 - 11.1 K/uL    RBC 4.96 4.10 - 5.70 M/uL    HGB 16.4 12.1 - 17.0 g/dL    HCT 46.7 36.6 - 50.3 %    MCV 94.2 80.0 - 99.0 FL    MCH 33.1 26.0 - 34.0 PG    MCHC 35.1 30.0 - 36.5 g/dL    RDW 12.4 11.5 - 14.5 %    PLATELET 537 980 - 592 K/uL    MPV 9.9 8.9 - 12.9 FL    NRBC 0.0 0  WBC    ABSOLUTE NRBC 0.00 0.00 - 0.01 K/uL    NEUTROPHILS 79 (H) 32 - 75 %    LYMPHOCYTES 11 (L) 12 - 49 %    MONOCYTES 8 5 - 13 %    EOSINOPHILS 1 0 - 7 %    BASOPHILS 0 0 - 1 %    IMMATURE GRANULOCYTES 1 (H) 0.0 - 0.5 %    ABS. NEUTROPHILS 13.5 (H) 1.8 - 8.0 K/UL    ABS. LYMPHOCYTES 1.8 0.8 - 3.5 K/UL    ABS. MONOCYTES 1.3 (H) 0.0 - 1.0 K/UL    ABS. EOSINOPHILS 0.2 0.0 - 0.4 K/UL    ABS. BASOPHILS 0.1 0.0 - 0.1 K/UL    ABS. IMM. GRANS. 0.1 (H) 0.00 - 0.04 K/UL    DF AUTOMATED     METABOLIC PANEL, COMPREHENSIVE    Collection Time: 02/25/22 11:50 PM   Result Value Ref Range    Sodium 123 (L) 136 - 145 mmol/L    Potassium 4.0 3.5 - 5.1 mmol/L    Chloride 89 (L) 97 - 108 mmol/L    CO2 30 21 - 32 mmol/L    Anion gap 4 (L) 5 - 15 mmol/L    Glucose 126 (H) 65 - 100 mg/dL    BUN 9 6 - 20 MG/DL    Creatinine 0.92 0.70 - 1.30 MG/DL    BUN/Creatinine ratio 10 (L) 12 - 20      GFR est AA >60 >60 ml/min/1.73m2    GFR est non-AA >60 >60 ml/min/1.73m2    Calcium 9.2 8.5 - 10.1 MG/DL    Bilirubin, total 1.3 (H) 0.2 - 1.0 MG/DL    ALT (SGPT) 31 12 - 78 U/L    AST (SGOT) 18 15 - 37 U/L    Alk.  phosphatase 89 45 - 117 U/L    Protein, total 8.2 6.4 - 8.2 g/dL    Albumin 4.0 3.5 - 5.0 g/dL    Globulin 4.2 (H) 2.0 - 4.0 g/dL    A-G Ratio 1.0 (L) 1.1 - 2.2     LACTIC ACID    Collection Time: 02/25/22 11:50 PM   Result Value Ref Range    Lactic acid 1.0 0.4 - 2.0 MMOL/L   TYPE & SCREEN    Collection Time: 02/25/22 11:50 PM   Result Value Ref Range    Crossmatch Expiration 02/28/2022,2359     ABO/Rh(D) A POSITIVE     Antibody screen NEG    PTT    Collection Time: 02/25/22 11:50 PM   Result Value Ref Range    aPTT 30.1 22.1 - 31.0 sec    aPTT, therapeutic range     58.0 - 77.0 SECS   PROTHROMBIN TIME + INR    Collection Time: 02/25/22 11:50 PM   Result Value Ref Range    INR 1.0 0.9 - 1.1      Prothrombin time 10.3 9.0 - 11.1 sec   EKG, 12 LEAD, INITIAL    Collection Time: 02/25/22 11:52 PM   Result Value Ref Range    Ventricular Rate 84 BPM    Atrial Rate 84 BPM    P-R Interval 142 ms    QRS Duration 82 ms    Q-T Interval 384 ms    QTC Calculation (Bezet) 453 ms    Calculated P Axis 67 degrees    Calculated R Axis 82 degrees    Calculated T Axis 70 degrees    Diagnosis       ** Poor data quality, interpretation may be adversely affected  Normal sinus rhythm  Normal ECG  When compared with ECG of 30-DEC-2021 14:20,  No significant change was found         IMAGING RESULTS:  CT ABD PELV WO CONT   Final Result   Sigmoid volvulus with small bowel obstruction. XR ABD (KUB)   Final Result   Findings concerning for sigmoid volvulus. The findings were called to Dr. Glendia Brittle on 2/25/2022 at 11:30 PM by myself.   [address] [place] [address]:  Medications   sodium chloride 0.9 % bolus infusion 1,000 mL (has no administration in time range)   sodium chloride (NS) flush 5-40 mL (has no administration in time range)   sodium chloride (NS) flush 5-40 mL (has no administration in time range)   naloxone (NARCAN) injection 0.4 mg (has no administration in time range)   flumazeniL (ROMAZICON) 0.1 mg/mL injection 0.2 mg (has no administration in time range)   simethicone (MYLICON) 26HH/0.8QA oral drops 80 mg (has no administration in time range)   atropine injection 0.5 mg (has no administration in time range)   EPINEPHrine (ADRENALIN) 0.1 mg/mL syringe 1 mg (has no administration in time range) midazolam (VERSED) injection 10 mg (has no administration in time range)   fentaNYL citrate (PF) injection 200 mcg (has no administration in time range)       IMPRESSION:  1. Sigmoid volvulus ([address]    2. Hyponatremia    3. Small bowel obstruction (HCC)        PLAN:  Perfect Serve Consult for Admission  12:10 AM    ED Room Number: ER03/03  Patient Name and age:  Nenita Barragan 61 y.o.  male  Working Diagnosis:   1. Sigmoid volvulus ([address]    2. Hyponatremia    3. Small bowel obstruction ([address]        COVID-19 Suspicion:  no  Sepsis present:  no  Reassessment needed: no  Code Status:  Full Code  Readmission: no  Isolation Requirements:  no  Recommended Level of Care:  med/surg  Department:[facility] Adult ED - (21 [phone]  Other:  Hx of paranoid schizophrenia,HLD, HTN, hypertrophic cardiomyopathy, COPD - lives in group home; no BM 5-6d- states worsening abdominal pain. Sigmoid volvulus on KUB. Called surgery referred to GI. Spoke to Michael Harkins from GI who is coming for decompression. Will need admission after GI recovery.    Jovanny Vázquez, GRETA

## 2022-02-26 NOTE — PROGRESS NOTES
Admission Medication Reconciliation:    Information obtained from:  [address]:  YES    Comments/Recommendations: Updated PTA meds/reviewed patient's allergies. 1)  Reviewed patient's medication orders from [facility] and [address] [place] [address] ([phone]). Patient's caretaker reports patient received all his medications last night PTA. 2)  Medication changes (since last review): Added  - Symbicort 160-4.5mcg/actuation 2puffs twice daily  - Ibuprofen 800mg 1tab po twice daily as needed  - Diclofenac 1% gel 4g topically to affected area three times daily as needed  - Tramadol 50mg 1tab po three times daily as needed    Adjusted  - Aspirin 81mg DR once daily --> now taking the chewable 81mg 1tab po daily  - Clonazepam 0.5mg 1tab po daily --> 1mg 1tab po twice daily as needed  - Benztropine 0.5mg 1tab po twice daily --> 1mg 1tab po twice daily  - Diphenhydramine 25mg 1tab po nightly as needed --> 50mg 1tab po nightly as needed    Removed  - Anoro Ellipta 62.5-25mcg/actuation inhaler  - Atrovent HFA 17mcg/actuation inhaler  - Flovent HFA 110mcg/actuation inhaler  - Fluticasone-salmeterol 250-50mcg/dose diskus inhaler  - Naproxen 500mg twice daily as needed  - Nicotine 14mg/24hr patch  - Senna-docusate 8.6-50mg 1tab po daily  - Tiotropium 18mcg inhaler  - Tussin DM 10-100mg/5mL liquid as needed     ¹[employer] pharmacy benefit data reflects medications filled and processed through the patient's insurance, however   this data does NOT capture whether the medication was picked up or is currently being taken by the patient.     Allergies:  Tuberculin ppd    Significant PMH/Disease States:   Past Medical History:   Diagnosis Date    Cancer ([facility] [address] 12/2020    LUNG CANCER    Chronic obstructive pulmonary disease ([facility] [address]     Essential hypertension 11/19/2020    High cholesterol 11/19/2020    Hypertension     Hypertrophic cardiomyopathy ([facility] [address]     Paranoid schizophrenia ([facility] [address]     Psychiatric disorder Chief Complaint for this Admission:    Chief Complaint   Patient presents with    Constipation     Prior to Admission Medications:   Prior to Admission Medications   Prescriptions Last Dose Informant Taking? DULoxetine (CYMBALTA) 30 mg capsule   Yes   Sig: Take 1 Cap by mouth daily. One daily in the morning, two at [facility]   Patient taking differently: Take 30 mg by mouth every morning. One daily in the morning, two at    DULoxetine (CYMBALTA) 60 mg capsule   Yes   Sig: Take 60 mg by mouth nightly. albuterol (VENTOLIN HFA) 90 mcg/actuation inhaler   Yes   Sig: Take 2 Puffs by inhalation as needed for Wheezing. Patient taking differently: Take 2 Puffs by inhalation. EVERY 4-6 HOURS AS NEEDED   aspirin 81 mg chewable tablet   Yes   Sig: Take 81 mg by mouth daily. atorvastatin (LIPITOR) 20 mg tablet   Yes   Sig: Take 1 Tab by mouth daily. benztropine (COGENTIN) 1 mg tablet   Yes   Sig: Take 1 mg by mouth two (2) times a day. budesonide-formoteroL (Symbicort) 160-4.5 mcg/actuation HFAA   Yes   Sig: Take 2 Puffs by inhalation two (2) times a day. carvediloL (COREG) 12.5 mg tablet   Yes   Sig: Take 1 Tab by mouth two (2) times daily (with meals). Increased 11/6/2020   clonazePAM (KlonoPIN) 1 mg tablet   Yes   Sig: Take 1 mg by mouth two (2) times daily as needed for Anxiety. clonazePAM (KlonoPIN) 1 mg tablet   Yes   Sig: Take 1 mg by mouth two (2) times daily as needed for Anxiety. diclofenac (Voltaren) 1 % gel   Yes   Sig: Apply 4 g to affected area three (3) times daily as needed for Pain (Joint pain or rib pain). diphenhydrAMINE (BENADRYL) 50 mg capsule   Yes   Sig: Take 50 mg by mouth nightly as needed. One to two tablets by mouth at bedtime as needed for insomnia.    gabapentin (NEURONTIN) 600 mg tablet   Yes   Sig: Take 600 mg by mouth three (3) times daily. haloperidol decanoate (HALDOL DECANOATE) 100 mg/mL injection   Yes   Sig: every twenty-eight (28) days.    ibuprofen (MOTRIN) 800 mg tablet   Yes   Sig: Take 800 mg by mouth two (2) times daily as needed for Pain.   loratadine (CLARITIN) 10 mg tablet   Yes   Sig: Take 1 Tab by mouth daily. risperiDONE (RISPERDAL) 2 mg tablet   Yes   Sig: Take 2 mg by mouth two (2) times a day. traMADoL (ULTRAM) 50 mg tablet   Yes   Sig: Take 50 mg by mouth three (3) times daily as needed for Pain.   triamcinolone acetonide (KENALOG) 0.1 % topical cream   Yes   Sig: Apply  to affected area two (2) times daily as needed. zolpidem (AMBIEN) 10 mg tablet   Yes   Sig: Take 1 Tab by mouth nightly as needed for Sleep. Max Daily Amount: 10 mg. Facility-Administered Medications: None     Please contact the main inpatient pharmacy with any questions or concerns at [phone] and we will direct you to the clinical pharmacist covering this patient's care while in-house.    Lili Keys, PHARMD

## 2022-02-26 NOTE — ED NOTES
TRANSFER - OUT REPORT:    Verbal report given to DARCI Aguero(name) on Emaline Loosen  being transferred to  for routine progression of care       Report consisted of patients Situation, Background, Assessment and   Recommendations(SBAR). Information from the following report(s) ED Summary was reviewed with the receiving nurse. Lines:   Peripheral IV Distal;Right Cephalic (Active)        Opportunity for questions and clarification was provided.       Patient transported with:  Transport

## 2022-02-27 ENCOUNTER — APPOINTMENT (OUTPATIENT)
Dept: NON INVASIVE DIAGNOSTICS | Age: 57
DRG: 329 | End: 2022-02-27
Attending: HOSPITALIST
Payer: MEDICARE

## 2022-02-27 ENCOUNTER — APPOINTMENT (OUTPATIENT)
Dept: GENERAL RADIOLOGY | Age: 57
DRG: 329 | End: 2022-02-27
Attending: SURGERY
Payer: MEDICARE

## 2022-02-27 ENCOUNTER — APPOINTMENT (OUTPATIENT)
Dept: GENERAL RADIOLOGY | Age: 57
DRG: 329 | End: 2022-02-27
Attending: HOSPITALIST
Payer: MEDICARE

## 2022-02-27 ENCOUNTER — APPOINTMENT (OUTPATIENT)
Dept: CT IMAGING | Age: 57
DRG: 329 | End: 2022-02-27
Attending: INTERNAL MEDICINE
Payer: MEDICARE

## 2022-02-27 LAB
ANION GAP SERPL CALC-SCNC: 3 MMOL/L (ref 5–15)
ANION GAP SERPL CALC-SCNC: 4 MMOL/L (ref 5–15)
BUN SERPL-MCNC: 7 MG/DL (ref 6–20)
BUN SERPL-MCNC: 9 MG/DL (ref 6–20)
BUN/CREAT SERPL: 10 (ref 12–20)
BUN/CREAT SERPL: 17 (ref 12–20)
CALCIUM SERPL-MCNC: 7.7 MG/DL (ref 8.5–10.1)
CALCIUM SERPL-MCNC: 7.9 MG/DL (ref 8.5–10.1)
CHLORIDE SERPL-SCNC: 100 MMOL/L (ref 97–108)
CHLORIDE SERPL-SCNC: 101 MMOL/L (ref 97–108)
CO2 SERPL-SCNC: 26 MMOL/L (ref 21–32)
CO2 SERPL-SCNC: 27 MMOL/L (ref 21–32)
CREAT SERPL-MCNC: 0.53 MG/DL (ref 0.7–1.3)
CREAT SERPL-MCNC: 0.67 MG/DL (ref 0.7–1.3)
ECHO AO ROOT DIAM: 3.9 CM
ECHO AO ROOT INDEX: 2.18 CM/M2
ECHO AV PEAK GRADIENT: 4 MMHG
ECHO AV PEAK VELOCITY: 1 M/S
ECHO AV VELOCITY RATIO: 0.8
ECHO EST RA PRESSURE: 3 MMHG
ECHO LA DIAMETER INDEX: 2.23 CM/M2
ECHO LA DIAMETER: 4 CM
ECHO LA TO AORTIC ROOT RATIO: 1.03
ECHO LV E' LATERAL VELOCITY: 11 CM/S
ECHO LV E' SEPTAL VELOCITY: 7 CM/S
ECHO LV FRACTIONAL SHORTENING: 40 % (ref 28–44)
ECHO LV INTERNAL DIMENSION DIASTOLE INDEX: 2.23 CM/M2
ECHO LV INTERNAL DIMENSION DIASTOLIC: 4 CM (ref 4.2–5.9)
ECHO LV INTERNAL DIMENSION SYSTOLIC INDEX: 1.34 CM/M2
ECHO LV INTERNAL DIMENSION SYSTOLIC: 2.4 CM
ECHO LV IVSD: 0.8 CM (ref 0.6–1)
ECHO LV MASS 2D: 109.7 G (ref 88–224)
ECHO LV MASS INDEX 2D: 61.3 G/M2 (ref 49–115)
ECHO LV POSTERIOR WALL DIASTOLIC: 1 CM (ref 0.6–1)
ECHO LV RELATIVE WALL THICKNESS RATIO: 0.5
ECHO LVOT PEAK GRADIENT: 2 MMHG
ECHO LVOT PEAK VELOCITY: 0.8 M/S
ECHO MV A VELOCITY: 0.89 M/S
ECHO MV AREA PHT: 5.6 CM2
ECHO MV E DECELERATION TIME (DT): 134.5 MS
ECHO MV E VELOCITY: 0.82 M/S
ECHO MV E/A RATIO: 0.92
ECHO MV E/E' LATERAL: 7.45
ECHO MV E/E' RATIO (AVERAGED): 9.58
ECHO MV E/E' SEPTAL: 11.71
ECHO MV PRESSURE HALF TIME (PHT): 39 MS
ECHO PV MAX VELOCITY: 0.6 M/S
ECHO PV PEAK GRADIENT: 1 MMHG
ECHO RIGHT VENTRICULAR SYSTOLIC PRESSURE (RVSP): 28 MMHG
ECHO RV TAPSE: 1.7 CM (ref 1.5–2)
ECHO TV REGURGITANT MAX VELOCITY: 2.49 M/S
ECHO TV REGURGITANT PEAK GRADIENT: 25 MMHG
GLUCOSE SERPL-MCNC: 100 MG/DL (ref 65–100)
GLUCOSE SERPL-MCNC: 90 MG/DL (ref 65–100)
OSMOLALITY UR: 458 MOSM/KG H2O
POTASSIUM SERPL-SCNC: 3.2 MMOL/L (ref 3.5–5.1)
POTASSIUM SERPL-SCNC: 3.4 MMOL/L (ref 3.5–5.1)
SODIUM SERPL-SCNC: 130 MMOL/L (ref 136–145)
SODIUM SERPL-SCNC: 131 MMOL/L (ref 136–145)

## 2022-02-27 PROCEDURE — 74011000250 HC RX REV CODE- 250: Performed by: INTERNAL MEDICINE

## 2022-02-27 PROCEDURE — 94640 AIRWAY INHALATION TREATMENT: CPT

## 2022-02-27 PROCEDURE — 74019 RADEX ABDOMEN 2 VIEWS: CPT

## 2022-02-27 PROCEDURE — 71275 CT ANGIOGRAPHY CHEST: CPT

## 2022-02-27 PROCEDURE — 94760 N-INVAS EAR/PLS OXIMETRY 1: CPT

## 2022-02-27 PROCEDURE — 74018 RADEX ABDOMEN 1 VIEW: CPT

## 2022-02-27 PROCEDURE — 74011000250 HC RX REV CODE- 250: Performed by: HOSPITALIST

## 2022-02-27 PROCEDURE — 93306 TTE W/DOPPLER COMPLETE: CPT | Performed by: INTERNAL MEDICINE

## 2022-02-27 PROCEDURE — 94664 DEMO&/EVAL PT USE INHALER: CPT

## 2022-02-27 PROCEDURE — 74011250637 HC RX REV CODE- 250/637: Performed by: INTERNAL MEDICINE

## 2022-02-27 PROCEDURE — 36415 COLL VENOUS BLD VENIPUNCTURE: CPT

## 2022-02-27 PROCEDURE — 99232 SBSQ HOSP IP/OBS MODERATE 35: CPT | Performed by: SURGERY

## 2022-02-27 PROCEDURE — 80048 BASIC METABOLIC PNL TOTAL CA: CPT

## 2022-02-27 PROCEDURE — 74011000636 HC RX REV CODE- 636: Performed by: RADIOLOGY

## 2022-02-27 PROCEDURE — C9113 INJ PANTOPRAZOLE SODIUM, VIA: HCPCS | Performed by: INTERNAL MEDICINE

## 2022-02-27 PROCEDURE — 93306 TTE W/DOPPLER COMPLETE: CPT

## 2022-02-27 PROCEDURE — 77010033678 HC OXYGEN DAILY

## 2022-02-27 PROCEDURE — 74011250637 HC RX REV CODE- 250/637: Performed by: HOSPITALIST

## 2022-02-27 PROCEDURE — 65270000032 HC RM SEMIPRIVATE

## 2022-02-27 PROCEDURE — 74011250636 HC RX REV CODE- 250/636: Performed by: INTERNAL MEDICINE

## 2022-02-27 PROCEDURE — 74011000250 HC RX REV CODE- 250: Performed by: SURGERY

## 2022-02-27 RX ADMIN — Medication 10 ML: at 06:42

## 2022-02-27 RX ADMIN — SODIUM CHLORIDE 100 ML/HR: 9 INJECTION, SOLUTION INTRAVENOUS at 03:22

## 2022-02-27 RX ADMIN — GABAPENTIN 600 MG: 600 TABLET, FILM COATED ORAL at 23:00

## 2022-02-27 RX ADMIN — IOPAMIDOL 80 ML: 755 INJECTION, SOLUTION INTRAVENOUS at 10:10

## 2022-02-27 RX ADMIN — GABAPENTIN 600 MG: 600 TABLET, FILM COATED ORAL at 09:36

## 2022-02-27 RX ADMIN — BENZTROPINE MESYLATE 1 MG: 1 TABLET ORAL at 09:38

## 2022-02-27 RX ADMIN — POLYETHYLENE GLYCOL 3350, SODIUM SULFATE ANHYDROUS, SODIUM BICARBONATE, SODIUM CHLORIDE, POTASSIUM CHLORIDE 240 ML: 236; 22.74; 6.74; 5.86; 2.97 POWDER, FOR SOLUTION ORAL at 21:10

## 2022-02-27 RX ADMIN — POLYETHYLENE GLYCOL 3350, SODIUM SULFATE ANHYDROUS, SODIUM BICARBONATE, SODIUM CHLORIDE, POTASSIUM CHLORIDE 240 ML: 236; 22.74; 6.74; 5.86; 2.97 POWDER, FOR SOLUTION ORAL at 18:25

## 2022-02-27 RX ADMIN — PANTOPRAZOLE SODIUM 40 MG: 40 INJECTION, POWDER, FOR SOLUTION INTRAVENOUS at 09:38

## 2022-02-27 RX ADMIN — BENZTROPINE MESYLATE 1 MG: 1 TABLET ORAL at 19:02

## 2022-02-27 RX ADMIN — POLYETHYLENE GLYCOL 3350, SODIUM SULFATE ANHYDROUS, SODIUM BICARBONATE, SODIUM CHLORIDE, POTASSIUM CHLORIDE 240 ML: 236; 22.74; 6.74; 5.86; 2.97 POWDER, FOR SOLUTION ORAL at 18:31

## 2022-02-27 RX ADMIN — GABAPENTIN 600 MG: 600 TABLET, FILM COATED ORAL at 17:16

## 2022-02-27 RX ADMIN — POLYETHYLENE GLYCOL 3350, SODIUM SULFATE ANHYDROUS, SODIUM BICARBONATE, SODIUM CHLORIDE, POTASSIUM CHLORIDE 240 ML: 236; 22.74; 6.74; 5.86; 2.97 POWDER, FOR SOLUTION ORAL at 21:40

## 2022-02-27 RX ADMIN — DULOXETINE HYDROCHLORIDE 30 MG: 30 CAPSULE, DELAYED RELEASE ORAL at 06:38

## 2022-02-27 RX ADMIN — RISPERIDONE 2 MG: 1 TABLET, FILM COATED ORAL at 09:35

## 2022-02-27 RX ADMIN — Medication 10 ML: at 14:00

## 2022-02-27 RX ADMIN — SODIUM CHLORIDE, PRESERVATIVE FREE 10 ML: 5 INJECTION INTRAVENOUS at 09:38

## 2022-02-27 RX ADMIN — RISPERIDONE 2 MG: 1 TABLET, FILM COATED ORAL at 17:21

## 2022-02-27 RX ADMIN — ARFORMOTEROL TARTRATE 15 MCG: 15 SOLUTION RESPIRATORY (INHALATION) at 22:09

## 2022-02-27 RX ADMIN — DULOXETINE 60 MG: 30 CAPSULE, DELAYED RELEASE ORAL at 23:00

## 2022-02-27 RX ADMIN — POLYETHYLENE GLYCOL 3350, SODIUM SULFATE ANHYDROUS, SODIUM BICARBONATE, SODIUM CHLORIDE, POTASSIUM CHLORIDE 240 ML: 236; 22.74; 6.74; 5.86; 2.97 POWDER, FOR SOLUTION ORAL at 19:28

## 2022-02-27 RX ADMIN — Medication 10 ML: at 06:00

## 2022-02-27 RX ADMIN — BUDESONIDE 500 MCG: 0.5 INHALANT RESPIRATORY (INHALATION) at 22:09

## 2022-02-27 NOTE — PROGRESS NOTES
[facility]  Hospitalist Group                                                                                          Hospitalist Progress Note  Heather Shankar MD  Answering service: [phone] from in house phone        Date of Service:  2022  NAME:  Olayinka Andrew  :  1965  MRN:  [id number]      Admission Summary:   Olayinka Andrew is a 61 y.o. male who presents complaining of constipation for 6 days. Patient states that it is common for him not to have a bowel movement for 5 days but once it turned into 6 he was concerned. He states that his abdomen does take extended a couple times per month. Never as bad as it did when he came in. He has never had a colonoscopy before. He has a history of schizophrenia and is on multiple psych medications. CT noticed Sigmoid volvulus with small bowel obstruction in ER ,   Patient underwent colonoscopic decompression   midnight and was found to have large colotomy colon. He additionally started vomiting and had NG tube placed  am        Interval history / Subjective:   Feeling better   Had BM today. NGT      Assessment & Plan:      Sigmoid volvulus  -status post endoscopic decompression  -general surgeon consulted: He will benefit from undergoing a sigmoid resection  And surgery Will work on timing of surgery    SBO:  NGT, WS with low pressure  Follow KUB   General surgeon following     H/o cardiomyopathy  EF was 30% per Echo in , repeat Echo  EF normal at 55%  Will repeat echo  Currently no chf      Hyponatremia. This may be due to volume depletion. Improved after IVF. Paranoid schizophrenia. resume preadmission medication     COPD. DuoNeb as needed. The patient is not in acute exacerbation of COPD at this time. Hypertension. Monitor BP. Dyslipidemia.     will resume home medication once the patient is no longer n.p.o. Tobacco abuse. The patient advised to quit smoking.   We will place the patient on Nicoderm patch. Hyperglycemia. We will check hemoglobin A1c level. The patient has no history of diabetes. Leukocytosis. The patient is afebrile, not toxic. Lactic acid level is within normal limits. UA negative,   chest xray, chest ct lung clear. Hold abx and monitor        Left lung cancer, status post resection. .             Code status: full   Prophylaxis: scd   Care Plan discussed with: patient   Anticipated Disposition: TBD      Hospital Problems  Date Reviewed: 2/26/2022          Codes Class Noted POA    * (Principal) Sigmoid volvulus ([facility] [address] ICD-10-CM: K56.2  ICD-9-CM: 560.2  2/26/2022 Yes                Review of Systems:   A comprehensive review of systems was negative except for that written in the HPI. Vital Signs:    Last 24hrs VS reviewed since prior progress note. Most recent are:  Visit Vitals  BP (!) 148/91   Pulse 86   Temp 98.2 °F (36.8 °C)   Resp 16   Ht 5' 7\" (1.702 m)   Wt 68 kg (150 lb)   SpO2 98%   BMI 23.49 kg/m²         Intake/Output Summary (Last 24 hours) at 2/27/2022 1022  Last data filed at 2/27/2022 1327  Gross per 24 hour   Intake 10 ml   Output 550 ml   Net -540 ml        Physical Examination:     I had a face to face encounter with this patient and independently examined them on 2/27/2022 as outlined below:          Constitutional:  No acute distress, cooperative, pleasant    ENT:  Oral mucosa moist, oropharynx benign. NGT    Resp:  CTA bilaterally. No wheezing/rhonchi/rales. No accessory muscle use. CV:  Regular rhythm, normal rate, no murmurs, gallops, rubs    GI:  Soft, mild distended, non tender. + bowel sounds, no hepatosplenomegaly     Musculoskeletal:  No edema, warm, 2+ pulses throughout    Neurologic:  Moves all extremities.   AAOx3, CN II-XII reviewed            Data Review:    Review and/or order of clinical lab test      Labs:     Recent Labs     02/26/22  0544 02/25/22  2350   WBC 16.8* 16.9*   HGB 15.3 16.4   HCT 42.6 46.7    286     Recent Labs     02/27/22  0328 02/26/22  1607 02/26/22  0544   * 126* 125*   K 3.2* 3.8 4.0    95* 93*   CO2 27 25 28   BUN 9 11 10   CREA 0.53* 0.87 0.82    124* 132*   CA 7.7* 8.4* 8.5   MG  --   --  2.4   PHOS  --   --  2.6     Recent Labs     02/26/22  0544 02/25/22  2350   ALT 24 31   AP 76 89   TBILI 1.4* 1.3*   TP 7.1 8.2   ALB 3.5 4.0   GLOB 3.6 4.2*   LPSE 45*  --      Recent Labs     02/25/22  2350   INR 1.0   PTP 10.3   APTT 30.1      No results for input(s): FE, TIBC, PSAT, FERR in the last 72 hours. No results found for: FOL, RBCF   No results for input(s): PH, PCO2, PO2 in the last 72 hours. No results for input(s): CPK, CKNDX, TROIQ in the last 72 hours.     No lab exists for component: CPKMB  No results found for: CHOL, CHOLX, CHLST, CHOLV, HDL, HDLP, LDL, LDLC, DLDLP, TGLX, TRIGL, TRIGP, CHHD, CHHDX  Lab Results   Component Value Date/Time    Glucose (POC) 163 (H) 12/16/2020 06:13 PM    Glucose (POC) 93 03/07/2017 08:25 AM     Lab Results   Component Value Date/Time    Color YELLOW/STRAW 02/26/2022 11:15 PM    Appearance CLEAR 02/26/2022 11:15 PM    Specific gravity 1.017 02/26/2022 11:15 PM    pH (UA) 6.0 02/26/2022 11:15 PM    Protein Negative 02/26/2022 11:15 PM    Glucose Negative 02/26/2022 11:15 PM    Ketone TRACE (A) 02/26/2022 11:15 PM    Bilirubin Negative 02/26/2022 11:15 PM    Urobilinogen 1.0 02/26/2022 11:15 PM    Nitrites Negative 02/26/2022 11:15 PM    Leukocyte Esterase Negative 02/26/2022 11:15 PM    Epithelial cells FEW 02/26/2022 11:15 PM    Bacteria Negative 02/26/2022 11:15 PM    WBC 0-4 02/26/2022 11:15 PM    RBC 0-5 02/26/2022 11:15 PM         Medications Reviewed:     Current Facility-Administered Medications   Medication Dose Route Frequency    sodium chloride (NS) flush 5-40 mL  5-40 mL IntraVENous Q8H    sodium chloride (NS) flush 5-40 mL  5-40 mL IntraVENous PRN    simethicone (MYLICON) 54BD/4.6EU oral drops 80 mg  1.2 mL Oral Multiple    albuterol-ipratropium (DUO-NEB) 2.5 MG-0.5 MG/3 ML  3 mL Nebulization Q4H PRN    sodium chloride (NS) flush 5-40 mL  5-40 mL IntraVENous Q8H    sodium chloride (NS) flush 5-40 mL  5-40 mL IntraVENous PRN    acetaminophen (TYLENOL) tablet 650 mg  650 mg Oral Q6H PRN    Or    acetaminophen (TYLENOL) suppository 650 mg  650 mg Rectal Q6H PRN    ondansetron (ZOFRAN ODT) tablet 4 mg  4 mg Oral Q8H PRN    Or    ondansetron (ZOFRAN) injection 4 mg  4 mg IntraVENous Q6H PRN    0.9% sodium chloride infusion  100 mL/hr IntraVENous CONTINUOUS    ketorolac (TORADOL) injection 15 mg  15 mg IntraVENous Q6H PRN    nicotine (NICODERM CQ) 21 mg/24 hr patch 1 Patch  1 Patch TransDERmal DAILY    hydrALAZINE (APRESOLINE) 20 mg/mL injection 10 mg  10 mg IntraVENous Q6H PRN    pantoprazole (PROTONIX) injection 40 mg  40 mg IntraVENous DAILY    And    sodium chloride (NS) flush 10 mL  10 mL IntraVENous DAILY    benztropine (COGENTIN) tablet 1 mg  1 mg Oral BID    clonazePAM (KlonoPIN) tablet 1 mg  1 mg Oral BID PRN    DULoxetine (CYMBALTA) capsule 30 mg  30 mg Oral 7am    DULoxetine (CYMBALTA) capsule 60 mg  60 mg Oral QHS    gabapentin (NEURONTIN) tablet 600 mg  600 mg Oral TID    risperiDONE (RisperDAL) tablet 2 mg  2 mg Oral BID    albuterol-ipratropium (DUO-NEB) 2.5 MG-0.5 MG/3 ML  3 mL Nebulization Q6H PRN    arformoteroL (BROVANA) neb solution 15 mcg  15 mcg Nebulization BID RT    And    budesonide (PULMICORT) 500 mcg/2 ml nebulizer suspension  500 mcg Nebulization BID RT     Facility-Administered Medications Ordered in Other Encounters   Medication Dose Route Frequency    oxyCODONE IR (ROXICODONE) tablet 5 mg  5 mg Oral Q4H PRN   XR ABD FLAT/ ERECT    Result Date: 2/27/2022  Overall improvement in bowel distention compared to the prior exam.     CTA CHEST W OR W WO CONT    Result Date: 2/27/2022  No acute process or evidence of pulmonary embolism. Postoperative changes left lower lobe.     XR CHEST PORT    Result Date: 2/26/2022  Lung hyperinflation.  No infiltrate    ______________________________________________________________________  EXPECTED LENGTH OF STAY: - - -  ACTUAL LENGTH OF STAY:          1                 Laura Phillips MD

## 2022-02-27 NOTE — PROGRESS NOTES
NAME: Cal Rose        :  1965        MRN:  [id number]                     Assessment   :                                               Plan:  Hyponatremia - chronic - mildly symptomatic  Sigmoid volvulus       His sodium was 129 in late December. 123 Friday and a little better at 125 Saturday. He may have underlying SIADH with acute worsening from volume depletion. Sodium better this AM. Stop IVF. Check BMP this afternoon and in AM                 Subjective:     Chief Complaint:  \" Can someone fix this IV pump? It keeps going off. \"  No N/V. No dyspnea. No pain. No edema. Review of Systems:    Symptom Y/N Comments  Symptom Y/N Comments   Fever/Chills    Chest Pain     Poor Appetite    Edema     Cough    Abdominal Pain     Sputum    Joint Pain     SOB/CHOPRA    Pruritis/Rash     Nausea/vomit    Tolerating PT/OT     Diarrhea    Tolerating Diet     Constipation    Other       Could not obtain due to:      Objective:     VITALS:   Last 24hrs VS reviewed since prior progress note.  Most recent are:  Visit Vitals  BP (!) 148/91   Pulse 86   Temp 98.2 °F (36.8 °C)   Resp 16   Ht 5' 7\" (1.702 m)   Wt 68 kg (150 lb)   SpO2 98%   BMI 23.49 kg/m²       Intake/Output Summary (Last 24 hours) at 2022 1212  Last data filed at 2022 2928  Gross per 24 hour   Intake 10 ml   Output 550 ml   Net -540 ml      Telemetry Reviewed:     PHYSICAL EXAM:  General: NAD  No edema      Lab Data Reviewed: (see below)    Medications Reviewed: (see below)    PMH/SH reviewed - no change compared to H&P  ________________________________________________________________________  Care Plan discussed with:  Patient     Family      RN     Care Manager                    Consultant:          Comments   >50% of visit spent in counseling and coordination of care       ________________________________________________________________________  Shobha Stevenson Derrell Morales MD     Procedures: see electronic medical records for all procedures/Xrays and details which  were not copied into this note but were reviewed prior to creation of Plan. LABS:  Recent Labs     02/26/22 0544 02/25/22 2350   WBC 16.8* 16.9*   HGB 15.3 16.4   HCT 42.6 46.7    286     Recent Labs     02/27/22  0328 02/26/22  1607 02/26/22 0544   * 126* 125*   K 3.2* 3.8 4.0    95* 93*   CO2 27 25 28   BUN 9 11 10   CREA 0.53* 0.87 0.82    124* 132*   CA 7.7* 8.4* 8.5   MG  --   --  2.4   PHOS  --   --  2.6     Recent Labs     02/26/22 0544 02/25/22 2350   AP 76 89   TP 7.1 8.2   ALB 3.5 4.0   GLOB 3.6 4.2*   LPSE 45*  --      Recent Labs     02/25/22 2350   INR 1.0   PTP 10.3   APTT 30.1      No results for input(s): FE, TIBC, PSAT, FERR in the last 72 hours. No results found for: FOL, RBCF   No results for input(s): PH, PCO2, PO2 in the last 72 hours. No results for input(s): CPK, CKMB in the last 72 hours.     No lab exists for component: TROPONINI  No components found for: [place]  Lab Results   Component Value Date/Time    Color YELLOW/STRAW 02/26/2022 11:15 PM    Appearance CLEAR 02/26/2022 11:15 PM    Specific gravity 1.017 02/26/2022 11:15 PM    pH (UA) 6.0 02/26/2022 11:15 PM    Protein Negative 02/26/2022 11:15 PM    Glucose Negative 02/26/2022 11:15 PM    Ketone TRACE (A) 02/26/2022 11:15 PM    Bilirubin Negative 02/26/2022 11:15 PM    Urobilinogen 1.0 02/26/2022 11:15 PM    Nitrites Negative 02/26/2022 11:15 PM    Leukocyte Esterase Negative 02/26/2022 11:15 PM    Epithelial cells FEW 02/26/2022 11:15 PM    Bacteria Negative 02/26/2022 11:15 PM    WBC 0-4 02/26/2022 11:15 PM    RBC 0-5 02/26/2022 11:15 PM       MEDICATIONS:  Current Facility-Administered Medications   Medication Dose Route Frequency    sodium chloride (NS) flush 5-40 mL  5-40 mL IntraVENous Q8H    sodium chloride (NS) flush 5-40 mL  5-40 mL IntraVENous PRN    simethicone (MYLICON) 40mg/0.6mL oral drops 80 mg  1.2 mL Oral Multiple    albuterol-ipratropium (DUO-NEB) 2.5 MG-0.5 MG/3 ML  3 mL Nebulization Q4H PRN    sodium chloride (NS) flush 5-40 mL  5-40 mL IntraVENous Q8H    sodium chloride (NS) flush 5-40 mL  5-40 mL IntraVENous PRN    acetaminophen (TYLENOL) tablet 650 mg  650 mg Oral Q6H PRN    Or    acetaminophen (TYLENOL) suppository 650 mg  650 mg Rectal Q6H PRN    ondansetron (ZOFRAN ODT) tablet 4 mg  4 mg Oral Q8H PRN    Or    ondansetron (ZOFRAN) injection 4 mg  4 mg IntraVENous Q6H PRN    ketorolac (TORADOL) injection 15 mg  15 mg IntraVENous Q6H PRN    nicotine (NICODERM CQ) 21 mg/24 hr patch 1 Patch  1 Patch TransDERmal DAILY    hydrALAZINE (APRESOLINE) 20 mg/mL injection 10 mg  10 mg IntraVENous Q6H PRN    pantoprazole (PROTONIX) injection 40 mg  40 mg IntraVENous DAILY    And    sodium chloride (NS) flush 10 mL  10 mL IntraVENous DAILY    benztropine (COGENTIN) tablet 1 mg  1 mg Oral BID    clonazePAM (KlonoPIN) tablet 1 mg  1 mg Oral BID PRN    DULoxetine (CYMBALTA) capsule 30 mg  30 mg Oral 7am    DULoxetine (CYMBALTA) capsule 60 mg  60 mg Oral QHS    gabapentin (NEURONTIN) tablet 600 mg  600 mg Oral TID    risperiDONE (RisperDAL) tablet 2 mg  2 mg Oral BID    albuterol-ipratropium (DUO-NEB) 2.5 MG-0.5 MG/3 ML  3 mL Nebulization Q6H PRN    arformoteroL (BROVANA) neb solution 15 mcg  15 mcg Nebulization BID RT    And    budesonide (PULMICORT) 500 mcg/2 ml nebulizer suspension  500 mcg Nebulization BID RT     Facility-Administered Medications Ordered in Other Encounters   Medication Dose Route Frequency    oxyCODONE IR (ROXICODONE) tablet 5 mg  5 mg Oral Q4H PRN

## 2022-02-27 NOTE — PROGRESS NOTES
Pt is AOx4, VSS. Pt coming from ED, received report from [facility]. Pt has NG tube in place. Colon decompression tube came out before coming up to the floor. No nausea or vomiting at this time. Abdomen is semi-soft.

## 2022-02-27 NOTE — PROGRESS NOTES
Progress Note    Patient: Vicky Dorantes MRN: [id number]  SSN: [id number]    YOB: 1965  Age: 61 y.o. Sex: male      Admit Date: 2022    1 Day Post-Op    Procedure:  Procedure(s):  COLON DECOMPRESSION  SIGMOIDOSCOPY FLEXIBLE    Subjective:     Patient without complaints. No nausea or vomiting. Has been passing gas and having bowel movements. Objective:     Visit Vitals  BP (!) 148/91   Pulse 86   Temp 98.2 °F (36.8 °C)   Resp 16   Ht 5' 7\" (1.702 m)   Wt 68 kg (150 lb)   SpO2 98%   BMI 23.49 kg/m²       Temp (24hrs), Av.4 °F (36.9 °C), Min:97.8 °F (36.6 °C), Max:98.9 °F (37.2 °C)      Physical Exam:    General alert and oriented in no acute distress   NG tube Clear drainage  Lungs clear bilaterally  Heart regular rate rhythm  Abdomen soft nontender nondistended  Data Review: images and reports reviewed  axr-IMPRESSION  Overall improvement in bowel distention compared to the prior exam.  Lab Review: All lab results for the last 24 hours reviewed.   Recent Results (from the past 24 hour(s))   OSMOLALITY, SERUM/PLASMA    Collection Time: 22  4:07 PM   Result Value Ref Range    Osmolality, serum/plasma 272 mOsm/kg I4L   METABOLIC PANEL, BASIC    Collection Time: 22  4:07 PM   Result Value Ref Range    Sodium 126 (L) 136 - 145 mmol/L    Potassium 3.8 3.5 - 5.1 mmol/L    Chloride 95 (L) 97 - 108 mmol/L    CO2 25 21 - 32 mmol/L    Anion gap 6 5 - 15 mmol/L    Glucose 124 (H) 65 - 100 mg/dL    BUN 11 6 - 20 MG/DL    Creatinine 0.87 0.70 - 1.30 MG/DL    BUN/Creatinine ratio 13 12 - 20      GFR est AA >60 >60 ml/min/1.73m2    GFR est non-AA >60 >60 ml/min/1.73m2    Calcium 8.4 (L) 8.5 - 10.1 MG/DL   DRUG SCREEN, URINE    Collection Time: 22 10:58 PM   Result Value Ref Range    AMPHETAMINES Negative NEG      BARBITURATES Negative NEG      BENZODIAZEPINES Positive (A) NEG      COCAINE Negative NEG      METHADONE Negative NEG      OPIATES Negative NEG      PCP(PHENCYCLIDINE) Negative NEG      THC (TH-CANNABINOL) Negative NEG      Drug screen comment (NOTE)    CREATININE, UR, RANDOM    Collection Time: 02/26/22 10:59 PM   Result Value Ref Range    Creatinine, urine 135.00 mg/dL   SODIUM, UR, RANDOM    Collection Time: 02/26/22 10:59 PM   Result Value Ref Range    Sodium,urine random <5 MMOL/L   OSMOLALITY, UR    Collection Time: 02/26/22 10:59 PM   Result Value Ref Range    Osmolality,urine 458 MOSM/kg H2O   URINALYSIS W/MICROSCOPIC    Collection Time: 02/26/22 11:15 PM   Result Value Ref Range    Color YELLOW/STRAW      Appearance CLEAR CLEAR      Specific gravity 1.017 1.003 - 1.030      pH (UA) 6.0 5.0 - 8.0      Protein Negative NEG mg/dL    Glucose Negative NEG mg/dL    Ketone TRACE (A) NEG mg/dL    Bilirubin Negative NEG      Blood Negative NEG      Urobilinogen 1.0 0.2 - 1.0 EU/dL    Nitrites Negative NEG      Leukocyte Esterase Negative NEG      WBC 0-4 0 - 4 /hpf    RBC 0-5 0 - 5 /hpf    Epithelial cells FEW FEW /lpf    Bacteria Negative NEG /hpf    Hyaline cast 0-2 0 - 5 /lpf   METABOLIC PANEL, BASIC    Collection Time: 02/27/22  3:28 AM   Result Value Ref Range    Sodium 130 (L) 136 - 145 mmol/L    Potassium 3.2 (L) 3.5 - 5.1 mmol/L    Chloride 100 97 - 108 mmol/L    CO2 27 21 - 32 mmol/L    Anion gap 3 (L) 5 - 15 mmol/L    Glucose 100 65 - 100 mg/dL    BUN 9 6 - 20 MG/DL    Creatinine 0.53 (L) 0.70 - 1.30 MG/DL    BUN/Creatinine ratio 17 12 - 20      GFR est AA >60 >60 ml/min/1.73m2    GFR est non-AA >60 >60 ml/min/1.73m2    Calcium 7.7 (L) 8.5 - 10.1 MG/DL       Assessment:     Hospital Problems  Date Reviewed: 2/26/2022          Codes Class Noted POA    * (Principal[facility] ICD-10-CM: T26.8  ICD-9-CM: 560.2  2/26/2022 Yes              Plan/Recommendations/Medical Decision Making:   Sigmoid volvulus seems to have resolved. We will plan For surgery later this week. We will clamp NG tube for now to make sure he does not have a small bowel obstruction.   Assuming that he does not we will begin prep which may take several days to get him fully cleared out. Needs cardiology consultation given history of cardiomyopathy for surgery.

## 2022-02-27 NOTE — PROGRESS NOTES
Gastrointestinal Progress Note    2/27/2022    Admit Date: 2/25/2022    Subjective:     Patient is on NPO. Pain: Patient complains of abdominal pain no. Bowel Movements: Multiple bms since yesterday.     Bleeding:  None    Current Facility-Administered Medications   Medication Dose Route Frequency    peg 3350-electrolytes (COLYTE) 4000 mL  240 mL Oral Q1H    [START ON 2/28/2022] peg 3350-electrolytes (COLYTE) 4000 mL  240 mL Oral Q1H    sodium chloride (NS) flush 5-40 mL  5-40 mL IntraVENous Q8H    sodium chloride (NS) flush 5-40 mL  5-40 mL IntraVENous PRN    simethicone (MYLICON) 29MY/8.0HD oral drops 80 mg  1.2 mL Oral Multiple    albuterol-ipratropium (DUO-NEB) 2.5 MG-0.5 MG/3 ML  3 mL Nebulization Q4H PRN    sodium chloride (NS) flush 5-40 mL  5-40 mL IntraVENous Q8H    sodium chloride (NS) flush 5-40 mL  5-40 mL IntraVENous PRN    acetaminophen (TYLENOL) tablet 650 mg  650 mg Oral Q6H PRN    Or    acetaminophen (TYLENOL) suppository 650 mg  650 mg Rectal Q6H PRN    ondansetron (ZOFRAN ODT) tablet 4 mg  4 mg Oral Q8H PRN    Or    ondansetron (ZOFRAN) injection 4 mg  4 mg IntraVENous Q6H PRN    ketorolac (TORADOL) injection 15 mg  15 mg IntraVENous Q6H PRN    nicotine (NICODERM CQ) 21 mg/24 hr patch 1 Patch  1 Patch TransDERmal DAILY    hydrALAZINE (APRESOLINE) 20 mg/mL injection 10 mg  10 mg IntraVENous Q6H PRN    pantoprazole (PROTONIX) injection 40 mg  40 mg IntraVENous DAILY    And    sodium chloride (NS) flush 10 mL  10 mL IntraVENous DAILY    benztropine (COGENTIN) tablet 1 mg  1 mg Oral BID    clonazePAM (KlonoPIN) tablet 1 mg  1 mg Oral BID PRN    DULoxetine (CYMBALTA) capsule 30 mg  30 mg Oral 7am    DULoxetine (CYMBALTA) capsule 60 mg  60 mg Oral QHS    gabapentin (NEURONTIN) tablet 600 mg  600 mg Oral TID    risperiDONE (RisperDAL) tablet 2 mg  2 mg Oral BID    albuterol-ipratropium (DUO-NEB) 2.5 MG-0.5 MG/3 ML  3 mL Nebulization Q6H PRN    arformoteroL (BROVANA) neb solution 15 mcg  15 mcg Nebulization BID RT    And    budesonide (PULMICORT) 500 mcg/2 ml nebulizer suspension  500 mcg Nebulization BID RT     Facility-Administered Medications Ordered in Other Encounters   Medication Dose Route Frequency    oxyCODONE IR (ROXICODONE) tablet 5 mg  5 mg Oral Q4H PRN        Objective:     Blood pressure (!) 174/96, pulse 84, temperature 98 °F (36.7 °C), resp. rate 16, height 5' 7\" (1.702 m), weight 68 kg (149 lb 14.6 oz), SpO2 99 %.     02/27 0701 - 02/27 1900  In: -   Out: 300     02/25 1901 - 02/27 0700  In: 10   Out: 2550     EXAM:  GENERAL: alert, no distress, HEART: regular rate and rhythm, S1, S2 normal, no murmur, click, rub or gallop and ABDOMEN:  Bowel sounds are normal, liver is not enlarged, spleen is not enlarged    Data Review    Recent Results (from the past 24 hour(s))   OSMOLALITY, SERUM/PLASMA    Collection Time: 02/26/22  4:07 PM   Result Value Ref Range    Osmolality, serum/plasma 272 mOsm/kg U8R   METABOLIC PANEL, BASIC    Collection Time: 02/26/22  4:07 PM   Result Value Ref Range    Sodium 126 (L) 136 - 145 mmol/L    Potassium 3.8 3.5 - 5.1 mmol/L    Chloride 95 (L) 97 - 108 mmol/L    CO2 25 21 - 32 mmol/L    Anion gap 6 5 - 15 mmol/L    Glucose 124 (H) 65 - 100 mg/dL    BUN 11 6 - 20 MG/DL    Creatinine 0.87 0.70 - 1.30 MG/DL    BUN/Creatinine ratio 13 12 - 20      GFR est AA >60 >60 ml/min/1.73m2    GFR est non-AA >60 >60 ml/min/1.73m2    Calcium 8.4 (L) 8.5 - 10.1 MG/DL   DRUG SCREEN, URINE    Collection Time: 02/26/22 10:58 PM   Result Value Ref Range    AMPHETAMINES Negative NEG      BARBITURATES Negative NEG      BENZODIAZEPINES Positive (A) NEG      COCAINE Negative NEG      METHADONE Negative NEG      OPIATES Negative NEG      PCP(PHENCYCLIDINE) Negative NEG      THC (TH-CANNABINOL) Negative NEG      Drug screen comment (NOTE)    CREATININE, UR, RANDOM    Collection Time: 02/26/22 10:59 PM   Result Value Ref Range    Creatinine, urine 135.00 mg/dL SODIUM, UR, RANDOM    Collection Time: 02/26/22 10:59 PM   Result Value Ref Range    Sodium,urine random <5 MMOL/L   OSMOLALITY, UR    Collection Time: 02/26/22 10:59 PM   Result Value Ref Range    Osmolality,urine 458 MOSM/kg H2O   URINALYSIS W/MICROSCOPIC    Collection Time: 02/26/22 11:15 PM   Result Value Ref Range    Color YELLOW/STRAW      Appearance CLEAR CLEAR      Specific gravity 1.017 1.003 - 1.030      pH (UA) 6.0 5.0 - 8.0      Protein Negative NEG mg/dL    Glucose Negative NEG mg/dL    Ketone TRACE (A) NEG mg/dL    Bilirubin Negative NEG      Blood Negative NEG      Urobilinogen 1.0 0.2 - 1.0 EU/dL    Nitrites Negative NEG      Leukocyte Esterase Negative NEG      WBC 0-4 0 - 4 /hpf    RBC 0-5 0 - 5 /hpf    Epithelial cells FEW FEW /lpf    Bacteria Negative NEG /hpf    Hyaline cast 0-2 0 - 5 /lpf   METABOLIC PANEL, BASIC    Collection Time: 02/27/22  3:28 AM   Result Value Ref Range    Sodium 130 (L) 136 - 145 mmol/L    Potassium 3.2 (L) 3.5 - 5.1 mmol/L    Chloride 100 97 - 108 mmol/L    CO2 27 21 - 32 mmol/L    Anion gap 3 (L) 5 - 15 mmol/L    Glucose 100 65 - 100 mg/dL    BUN 9 6 - 20 MG/DL    Creatinine 0.53 (L) 0.70 - 1.30 MG/DL    BUN/Creatinine ratio 17 12 - 20      GFR est AA >60 >60 ml/min/1.73m2    GFR est non-AA >60 >60 ml/min/1.73m2    Calcium 7.7 (L) 8.5 - 10.1 MG/DL   ECHO ADULT COMPLETE    Collection Time: 02/27/22  2:05 PM   Result Value Ref Range    IVSd 0.8 0.6 - 1.0 cm    LVIDd 4.0 (A) 4.2 - 5.9 cm    LVIDs 2.4 cm    LVPWd 1.0 0.6 - 1.0 cm    LVOT Peak Gradient 2 mmHg    LVOT Peak Velocity 0.8 m/s    RVSP 28 mmHg    LA Diameter 4.0 cm    Est. RA Pressure 3 mmHg    AV Peak Gradient 4 mmHg    AV Peak Velocity 1.0 m/s    MV A Velocity 0.89 m/s    MV E Wave Deceleration Time 134.5 ms    MV E Velocity 0.82 m/s    LV E' Lateral Velocity 11 cm/s    LV E' Septal Velocity 7 cm/s    MV PHT 39.0 ms    MV Area by PHT 5.6 cm2    PV Peak Gradient 1 mmHg    PV Max Velocity 0.6 m/s    TAPSE 1.7 1.5 - 2.0 cm    TR Peak Gradient 25 mmHg    TR Max Velocity 2.49 m/s    Aortic Root 3.9 cm    Fractional Shortening 2D 40 28 - 44 %    LVIDd Index 2.23 cm/m2    LVIDs Index 1.34 cm/m2    LV RWT Ratio 0.50     LV Mass 2D 109.7 88 - 224 g    LV Mass 2D Index 61.3 49 - 115 g/m2    MV E/A 0.92     E/E' Ratio (Averaged) 9.58     E/E' Lateral 7.45     E/E' Septal 11.71     LA Size Index 2.23 cm/m2    LA/AO Root Ratio 1.03     Ao Root Index 2.18 cm/m2    AV Velocity Ratio 0.80        Assessment:     Principal Problem:    Sigmoid volvulus (HCC) (2/26/2022)        Plan:     Sigmoid volvulus- s/p flex sig with detorsion. Colon decompression tube has migrated out. Abdomen much softer and now with multiple Bms. Xray with improved bowel distension    Can consider trial of clear liquid diet    Surgery following for sigmoid resection later this week. We will sign off, please call if needed. Dr. Qasim Raya will take over for [facility] GI service starting on Monday.

## 2022-02-27 NOTE — PROGRESS NOTES
Transition of Care Plan   RUR- 13% Moderate Risk   DISPOSITION: The disposition plan is pending medical progression and recommendation.  F/U with PCP/Specialist     Transport: [facility]/family Medicare pt has received, reviewed, and signed [employer] letter informing them of their right to appeal the discharge. Signed copy has been placed on pt bedside chart. At 10:15am - CM attempted to meet with patient at bedside to complete the initial assessment. Patient was off unit during this.     Reina VERN Marie, CRM, LMHP-e  Available in Perfect Serve

## 2022-02-27 NOTE — ROUTINE PROCESS
Bedside shift change report given to Clementeen Mortimer, RN (oncoming nurse) by DARCI Camarillo (offgoing nurse).  Report included the following information SBAR, Kardex, ED Summary, OR Summary, Procedure Summary, Intake/Output, MAR, Recent Results and Med Rec Status.

## 2022-02-28 LAB
ALBUMIN SERPL-MCNC: 2.8 G/DL (ref 3.5–5)
ALBUMIN/GLOB SERPL: 0.8 {RATIO} (ref 1.1–2.2)
ALP SERPL-CCNC: 58 U/L (ref 45–117)
ALT SERPL-CCNC: 20 U/L (ref 12–78)
ANION GAP SERPL CALC-SCNC: 3 MMOL/L (ref 5–15)
AST SERPL-CCNC: 16 U/L (ref 15–37)
BASOPHILS # BLD: 0 K/UL (ref 0–0.1)
BASOPHILS NFR BLD: 1 % (ref 0–1)
BILIRUB SERPL-MCNC: 0.6 MG/DL (ref 0.2–1)
BUN SERPL-MCNC: 5 MG/DL (ref 6–20)
BUN/CREAT SERPL: 8 (ref 12–20)
CALCIUM SERPL-MCNC: 8 MG/DL (ref 8.5–10.1)
CHLORIDE SERPL-SCNC: 103 MMOL/L (ref 97–108)
CO2 SERPL-SCNC: 27 MMOL/L (ref 21–32)
CREAT SERPL-MCNC: 0.64 MG/DL (ref 0.7–1.3)
DIFFERENTIAL METHOD BLD: ABNORMAL
EOSINOPHIL # BLD: 0.2 K/UL (ref 0–0.4)
EOSINOPHIL NFR BLD: 3 % (ref 0–7)
ERYTHROCYTE [DISTWIDTH] IN BLOOD BY AUTOMATED COUNT: 12.6 % (ref 11.5–14.5)
GLOBULIN SER CALC-MCNC: 3.6 G/DL (ref 2–4)
GLUCOSE SERPL-MCNC: 93 MG/DL (ref 65–100)
HCT VFR BLD AUTO: 41.9 % (ref 36.6–50.3)
HGB BLD-MCNC: 14.4 G/DL (ref 12.1–17)
IMM GRANULOCYTES # BLD AUTO: 0 K/UL (ref 0–0.04)
IMM GRANULOCYTES NFR BLD AUTO: 0 % (ref 0–0.5)
LYMPHOCYTES # BLD: 1.4 K/UL (ref 0.8–3.5)
LYMPHOCYTES NFR BLD: 23 % (ref 12–49)
MAGNESIUM SERPL-MCNC: 2.1 MG/DL (ref 1.6–2.4)
MCH RBC QN AUTO: 32.8 PG (ref 26–34)
MCHC RBC AUTO-ENTMCNC: 34.4 G/DL (ref 30–36.5)
MCV RBC AUTO: 95.4 FL (ref 80–99)
MONOCYTES # BLD: 0.8 K/UL (ref 0–1)
MONOCYTES NFR BLD: 14 % (ref 5–13)
NEUTS SEG # BLD: 3.6 K/UL (ref 1.8–8)
NEUTS SEG NFR BLD: 59 % (ref 32–75)
NRBC # BLD: 0 K/UL (ref 0–0.01)
NRBC BLD-RTO: 0 PER 100 WBC
PHOSPHATE SERPL-MCNC: 1.7 MG/DL (ref 2.6–4.7)
PLATELET # BLD AUTO: 226 K/UL (ref 150–400)
PMV BLD AUTO: 9.7 FL (ref 8.9–12.9)
POTASSIUM SERPL-SCNC: 3.3 MMOL/L (ref 3.5–5.1)
PROT SERPL-MCNC: 6.4 G/DL (ref 6.4–8.2)
RBC # BLD AUTO: 4.39 M/UL (ref 4.1–5.7)
SODIUM SERPL-SCNC: 133 MMOL/L (ref 136–145)
WBC # BLD AUTO: 6 K/UL (ref 4.1–11.1)

## 2022-02-28 PROCEDURE — 74011000250 HC RX REV CODE- 250: Performed by: INTERNAL MEDICINE

## 2022-02-28 PROCEDURE — 74011000250 HC RX REV CODE- 250: Performed by: HOSPITALIST

## 2022-02-28 PROCEDURE — 83735 ASSAY OF MAGNESIUM: CPT

## 2022-02-28 PROCEDURE — 74011250636 HC RX REV CODE- 250/636: Performed by: INTERNAL MEDICINE

## 2022-02-28 PROCEDURE — APPSS45 APP SPLIT SHARED TIME 31-45 MINUTES: Performed by: NURSE PRACTITIONER

## 2022-02-28 PROCEDURE — 84100 ASSAY OF PHOSPHORUS: CPT

## 2022-02-28 PROCEDURE — 74011000250 HC RX REV CODE- 250: Performed by: SURGERY

## 2022-02-28 PROCEDURE — 74011250637 HC RX REV CODE- 250/637: Performed by: HOSPITALIST

## 2022-02-28 PROCEDURE — 74011250637 HC RX REV CODE- 250/637: Performed by: INTERNAL MEDICINE

## 2022-02-28 PROCEDURE — 85025 COMPLETE CBC W/AUTO DIFF WBC: CPT

## 2022-02-28 PROCEDURE — C9113 INJ PANTOPRAZOLE SODIUM, VIA: HCPCS | Performed by: INTERNAL MEDICINE

## 2022-02-28 PROCEDURE — 80053 COMPREHEN METABOLIC PANEL: CPT

## 2022-02-28 PROCEDURE — 94640 AIRWAY INHALATION TREATMENT: CPT

## 2022-02-28 PROCEDURE — 94760 N-INVAS EAR/PLS OXIMETRY 1: CPT

## 2022-02-28 PROCEDURE — 99231 SBSQ HOSP IP/OBS SF/LOW 25: CPT | Performed by: SURGERY

## 2022-02-28 PROCEDURE — 99223 1ST HOSP IP/OBS HIGH 75: CPT | Performed by: INTERNAL MEDICINE

## 2022-02-28 PROCEDURE — 65270000032 HC RM SEMIPRIVATE

## 2022-02-28 PROCEDURE — 36415 COLL VENOUS BLD VENIPUNCTURE: CPT

## 2022-02-28 PROCEDURE — 74011250637 HC RX REV CODE- 250/637: Performed by: STUDENT IN AN ORGANIZED HEALTH CARE EDUCATION/TRAINING PROGRAM

## 2022-02-28 RX ORDER — BUTALBITAL, ACETAMINOPHEN AND CAFFEINE 50; 325; 40 MG/1; MG/1; MG/1
1 TABLET ORAL
Status: DISCONTINUED | OUTPATIENT
Start: 2022-02-28 | End: 2022-03-17 | Stop reason: HOSPADM

## 2022-02-28 RX ADMIN — Medication 10 ML: at 12:45

## 2022-02-28 RX ADMIN — HYDRALAZINE HYDROCHLORIDE 10 MG: 20 INJECTION INTRAMUSCULAR; INTRAVENOUS at 16:27

## 2022-02-28 RX ADMIN — POLYETHYLENE GLYCOL-3350 AND ELECTROLYTES 240 ML: 236; 6.74; 5.86; 2.97; 22.74 POWDER, FOR SOLUTION ORAL at 11:16

## 2022-02-28 RX ADMIN — SODIUM CHLORIDE, PRESERVATIVE FREE 10 ML: 5 INJECTION INTRAVENOUS at 11:15

## 2022-02-28 RX ADMIN — ARFORMOTEROL TARTRATE 15 MCG: 15 SOLUTION RESPIRATORY (INHALATION) at 07:33

## 2022-02-28 RX ADMIN — POLYETHYLENE GLYCOL-3350 AND ELECTROLYTES 240 ML: 236; 6.74; 5.86; 2.97; 22.74 POWDER, FOR SOLUTION ORAL at 14:00

## 2022-02-28 RX ADMIN — RISPERIDONE 2 MG: 1 TABLET, FILM COATED ORAL at 11:09

## 2022-02-28 RX ADMIN — PANTOPRAZOLE SODIUM 40 MG: 40 INJECTION, POWDER, FOR SOLUTION INTRAVENOUS at 11:15

## 2022-02-28 RX ADMIN — POLYETHYLENE GLYCOL-3350 AND ELECTROLYTES 240 ML: 236; 6.74; 5.86; 2.97; 22.74 POWDER, FOR SOLUTION ORAL at 12:43

## 2022-02-28 RX ADMIN — POLYETHYLENE GLYCOL-3350 AND ELECTROLYTES 240 ML: 236; 6.74; 5.86; 2.97; 22.74 POWDER, FOR SOLUTION ORAL at 09:00

## 2022-02-28 RX ADMIN — POLYETHYLENE GLYCOL-3350 AND ELECTROLYTES 240 ML: 236; 6.74; 5.86; 2.97; 22.74 POWDER, FOR SOLUTION ORAL at 17:18

## 2022-02-28 RX ADMIN — GABAPENTIN 600 MG: 600 TABLET, FILM COATED ORAL at 22:10

## 2022-02-28 RX ADMIN — BUTALBITAL, ACETAMINOPHEN, AND CAFFEINE 1 TABLET: 50; 325; 40 TABLET ORAL at 12:43

## 2022-02-28 RX ADMIN — DULOXETINE HYDROCHLORIDE 30 MG: 30 CAPSULE, DELAYED RELEASE ORAL at 07:13

## 2022-02-28 RX ADMIN — BENZTROPINE MESYLATE 1 MG: 1 TABLET ORAL at 18:34

## 2022-02-28 RX ADMIN — RISPERIDONE 2 MG: 1 TABLET, FILM COATED ORAL at 18:34

## 2022-02-28 RX ADMIN — DULOXETINE 60 MG: 30 CAPSULE, DELAYED RELEASE ORAL at 22:10

## 2022-02-28 RX ADMIN — POLYETHYLENE GLYCOL-3350 AND ELECTROLYTES 240 ML: 236; 6.74; 5.86; 2.97; 22.74 POWDER, FOR SOLUTION ORAL at 16:38

## 2022-02-28 RX ADMIN — POLYETHYLENE GLYCOL-3350 AND ELECTROLYTES 240 ML: 236; 6.74; 5.86; 2.97; 22.74 POWDER, FOR SOLUTION ORAL at 18:39

## 2022-02-28 RX ADMIN — GABAPENTIN 600 MG: 600 TABLET, FILM COATED ORAL at 11:08

## 2022-02-28 RX ADMIN — POLYETHYLENE GLYCOL-3350 AND ELECTROLYTES 240 ML: 236; 6.74; 5.86; 2.97; 22.74 POWDER, FOR SOLUTION ORAL at 07:16

## 2022-02-28 RX ADMIN — POLYETHYLENE GLYCOL-3350 AND ELECTROLYTES 240 ML: 236; 6.74; 5.86; 2.97; 22.74 POWDER, FOR SOLUTION ORAL at 18:38

## 2022-02-28 RX ADMIN — BENZTROPINE MESYLATE 1 MG: 1 TABLET ORAL at 11:08

## 2022-02-28 RX ADMIN — BUDESONIDE 500 MCG: 0.5 INHALANT RESPIRATORY (INHALATION) at 07:33

## 2022-02-28 RX ADMIN — GABAPENTIN 600 MG: 600 TABLET, FILM COATED ORAL at 16:27

## 2022-02-28 RX ADMIN — POTASSIUM PHOSPHATE, MONOBASIC AND POTASSIUM PHOSPHATE, DIBASIC: 224; 236 INJECTION, SOLUTION, CONCENTRATE INTRAVENOUS at 11:31

## 2022-02-28 RX ADMIN — POLYETHYLENE GLYCOL-3350 AND ELECTROLYTES 240 ML: 236; 6.74; 5.86; 2.97; 22.74 POWDER, FOR SOLUTION ORAL at 15:39

## 2022-02-28 NOTE — PROGRESS NOTES
Patient had a BP of 217/105, then was rechecked at 195/104. Other vitals were stable and at baseline. He was given PRN hydralazine around 1630. BP came down to 174/92. Will continue to monitor. Notified Dr. Claude Raspberry with no new orders. Patient has no physical symptoms or complaints.

## 2022-02-28 NOTE — PROGRESS NOTES
[address] Adult  Hospitalist Group                                                                                          Hospitalist Progress Note  Jalil Holguin MD  Answering service: [phone] from in house phone        Date of Service:  2022  NAME:  Cal Rose  :  1965  MRN:  [id number]      Admission Summary:   Cal Rose is a 61 y.o. male who presents complaining of constipation for 6 days. Patient states that it is common for him not to have a bowel movement for 5 days but once it turned into 6 he was concerned. He states that his abdomen does take extended a couple times per month. Never as bad as it did when he came in. He has never had a colonoscopy before. He has a history of schizophrenia and is on multiple psych medications. CT noticed Sigmoid volvulus with small bowel obstruction in ER ,   Patient underwent colonoscopic decompression   midnight and was found to have large colotomy colon. He additionally started vomiting and had NG tube placed  am        Interval history / Subjective:   Patient complaining of headache     Assessment & Plan:        #Sigmoid volvulus POA resolved  -status post endoscopic decompression  -General surgery on board appreciate help  -Patient will need bowel prep before going for surgery    #SBO resolving  -NG tube out  -Clear liquids      #H/o cardiomyopathy  -TTE with EF 55- 60%  -Cardiology on board for preop clearance for general surgery request      #Hypotonic hyponatremia POA resolved    #Paranoid schizophrenia. resume preadmission medication     #COPD.     - DuoNeb as needed. The patient is not in acute exacerbation of COPD at this time. #Hypertension. Monitor BP. #Dyslipidemia.     will resume home medication once the patient is no longer n.p.o. Tobacco abuse. The patient advised to quit smoking. We will place the patient on Nicoderm patch. Hyperglycemia.   We will check hemoglobin A1c level.  The patient has no history of diabetes. #Leukocytosis. The patient is afebrile, not toxic. Lactic acid level is within normal limits. UA negative,   chest xray, chest ct lung clear. Hold abx and monitor          #Left lung cancer, status post resection. .        Code status: full   Prophylaxis: scd   Care Plan discussed with: patient   Anticipated Disposition: [facility]      Hospital Problems  Date Reviewed: 2/26/2022          Codes Class Noted POA    * (Principal) Sigmoid volvulus ([facility] [address] ICD-10-CM: K56.2  ICD-9-CM: 560.2  2/26/2022 Yes                Review of Systems:   A comprehensive review of systems was negative except for that written in the HPI. Vital Signs:    Last 24hrs VS reviewed since prior progress note. Most recent are:  Visit Vitals  BP (!) 150/98   Pulse (!) 107   Temp 98 °F (36.7 °C)   Resp 16   Ht 5' 7\" (1.702 m)   Wt 68 kg (149 lb 14.6 oz)   SpO2 97%   BMI 23.48 kg/m²         Intake/Output Summary (Last 24 hours) at 2/28/2022 0844  Last data filed at 2/27/2022 1819  Gross per 24 hour   Intake --   Output 400 ml   Net -400 ml        Physical Examination:     I had a face to face encounter with this patient and independently examined them on 2/28/2022 as outlined below:          Constitutional:  No acute distress, cooperative, pleasant    ENT:  Oral mucosa moist, oropharynx benign. NGT    Resp:  CTA bilaterally. No wheezing/rhonchi/rales. No accessory muscle use. CV:  Regular rhythm, normal rate, no murmurs, gallops, rubs    GI:  Soft, mild distended, non tender. + bowel sounds, no hepatosplenomegaly     Musculoskeletal:  No edema, warm, 2+ pulses throughout    Neurologic:  Moves all extremities.   AAOx3, CN II-XII reviewed            Data Review:    Review and/or order of clinical lab test      Labs:     Recent Labs     02/28/22  0451 02/26/22  0544   WBC 6.0 16.8*   HGB 14.4 15.3   HCT 41.9 42.6    241     Recent Labs     02/28/22  0451 02/27/22  1749 02/27/22  0328 02/26/22  1607 02/26/22  0544   * 131* 130*   < > 125*   K 3.3* 3.4* 3.2*   < > 4.0    101 100   < > 93*   CO2 27 26 27   < > 28   BUN 5* 7 9   < > 10   CREA 0.64* 0.67* 0.53*   < > 0.82   GLU 93 90 100   < > 132*   CA 8.0* 7.9* 7.7*   < > 8.5   MG 2.1  --   --   --  2.4   PHOS 1.7*  --   --   --  2.6    < > = values in this interval not displayed. Recent Labs     02/28/22  0451 02/26/22  0544 02/25/22  2350   ALT 20 24 31   AP 58 76 89   TBILI 0.6 1.4* 1.3*   TP 6.4 7.1 8.2   ALB 2.8* 3.5 4.0   GLOB 3.6 3.6 4.2*   LPSE  --  45*  --      Recent Labs     02/25/22 2350   INR 1.0   PTP 10.3   APTT 30.1      No results for input(s): FE, TIBC, PSAT, FERR in the last 72 hours. No results found for: FOL, RBCF   No results for input(s): PH, PCO2, PO2 in the last 72 hours. No results for input(s): CPK, CKNDX, TROIQ in the last 72 hours.     No lab exists for component: CPKMB  No results found for: CHOL, CHOLX, CHLST, CHOLV, HDL, HDLP, LDL, LDLC, DLDLP, TGLX, TRIGL, TRIGP, CHHD, CHHDX  Lab Results   Component Value Date/Time    Glucose (POC) 163 (H) 12/16/2020 06:13 PM    Glucose (POC) 93 03/07/2017 08:25 AM     Lab Results   Component Value Date/Time    Color YELLOW/STRAW 02/26/2022 11:15 PM    Appearance CLEAR 02/26/2022 11:15 PM    Specific gravity 1.017 02/26/2022 11:15 PM    pH (UA) 6.0 02/26/2022 11:15 PM    Protein Negative 02/26/2022 11:15 PM    Glucose Negative 02/26/2022 11:15 PM    Ketone TRACE (A) 02/26/2022 11:15 PM    Bilirubin Negative 02/26/2022 11:15 PM    Urobilinogen 1.0 02/26/2022 11:15 PM    Nitrites Negative 02/26/2022 11:15 PM    Leukocyte Esterase Negative 02/26/2022 11:15 PM    Epithelial cells FEW 02/26/2022 11:15 PM    Bacteria Negative 02/26/2022 11:15 PM    WBC 0-4 02/26/2022 11:15 PM    RBC 0-5 02/26/2022 11:15 PM         Medications Reviewed:     Current Facility-Administered Medications   Medication Dose Route Frequency    potassium phosphate 20 mmol in 0.9% sodium chloride 250 mL infusion   IntraVENous ONCE    peg 3350-electrolytes (COLYTE) 4000 mL  240 mL Oral Q1H    sodium chloride (NS) flush 5-40 mL  5-40 mL IntraVENous Q8H    sodium chloride (NS) flush 5-40 mL  5-40 mL IntraVENous PRN    simethicone (MYLICON) 38AM/6.7IF oral drops 80 mg  1.2 mL Oral Multiple    albuterol-ipratropium (DUO-NEB) 2.5 MG-0.5 MG/3 ML  3 mL Nebulization Q4H PRN    sodium chloride (NS) flush 5-40 mL  5-40 mL IntraVENous Q8H    sodium chloride (NS) flush 5-40 mL  5-40 mL IntraVENous PRN    acetaminophen (TYLENOL) tablet 650 mg  650 mg Oral Q6H PRN    Or    acetaminophen (TYLENOL) suppository 650 mg  650 mg Rectal Q6H PRN    ondansetron (ZOFRAN ODT) tablet 4 mg  4 mg Oral Q8H PRN    Or    ondansetron (ZOFRAN) injection 4 mg  4 mg IntraVENous Q6H PRN    ketorolac (TORADOL) injection 15 mg  15 mg IntraVENous Q6H PRN    nicotine (NICODERM CQ) 21 mg/24 hr patch 1 Patch  1 Patch TransDERmal DAILY    hydrALAZINE (APRESOLINE) 20 mg/mL injection 10 mg  10 mg IntraVENous Q6H PRN    pantoprazole (PROTONIX) injection 40 mg  40 mg IntraVENous DAILY    And    sodium chloride (NS) flush 10 mL  10 mL IntraVENous DAILY    benztropine (COGENTIN) tablet 1 mg  1 mg Oral BID    clonazePAM (KlonoPIN) tablet 1 mg  1 mg Oral BID PRN    DULoxetine (CYMBALTA) capsule 30 mg  30 mg Oral 7am    DULoxetine (CYMBALTA) capsule 60 mg  60 mg Oral QHS    gabapentin (NEURONTIN) tablet 600 mg  600 mg Oral TID    risperiDONE (RisperDAL) tablet 2 mg  2 mg Oral BID    albuterol-ipratropium (DUO-NEB) 2.5 MG-0.5 MG/3 ML  3 mL Nebulization Q6H PRN    arformoteroL (BROVANA) neb solution 15 mcg  15 mcg Nebulization BID RT    And    budesonide (PULMICORT) 500 mcg/2 ml nebulizer suspension  500 mcg Nebulization BID RT     Facility-Administered Medications Ordered in Other Encounters   Medication Dose Route Frequency    oxyCODONE IR (ROXICODONE) tablet 5 mg  5 mg Oral Q4H PRN   XR ABD (NEETA)    Result Date: 2/27/2022  Persistent diffuse small bowel distention. CTA CHEST W OR W WO CONT    Result Date: 2/27/2022  No acute process or evidence of pulmonary embolism.  Postoperative changes left lower lobe.    ______________________________________________________________________  EXPECTED LENGTH OF STAY: - - -  ACTUAL LENGTH OF STAY:          2                 Davon Antunez MD

## 2022-02-28 NOTE — PROGRESS NOTES
Bedside and Verbal shift change report given to Clarisse Ty RN (oncoming nurse) by Heavenly Haines RN (offgoing nurse). Report included the following information SBAR, Kardex, Intake/Output and MAR.

## 2022-02-28 NOTE — PROGRESS NOTES
Bedside and Verbal shift change report given to Eddie Galan RN (oncoming nurse) by Sudhakar Silva RN (offgoing nurse). Report included the following information SBAR, Kardex, Intake/Output and MAR.

## 2022-02-28 NOTE — PROGRESS NOTES
Transition of Care: TBD; patient normally lives in an adult group home called [address] at [facility] [address]    Transport Plan: TBD; likely roundtrip ride via lyft or [employer]    RUR: 13%    Main contact is caregiver and owner of group home- Emmett Ching [phone]    Discharge pending:  -pending laparoscopic sigmoid resection (scheduled for Wednesday 3/3)  -pending medical progress and care recs    1600: this CM met with pleasant patient at bedside; he is alert and oriented x 3; patient states he lives at stated address (which is a group home) for several years; he is normally independent in his ADLs; he does not drive; his caregiver takes him to appointments;  the group home helps him with medication management;  he does not own a walker or cane to ambulate; he confirms he sees Dr. Chito Martínez in the same practice as his listed pcp- Faby Villarreal; patient states he does not have a history with any home health agency    Reason for Admission:  Sigmoid volvulus                     RUR Score:      13%               Plan for utilizing home health:  Possibly will need [facility]        PCP: First and Last name:  Virginia Spivey MD     Name of Practice: or Dr. Chito Martínez   Are you a current patient: Yes/No: yes   Approximate date of last visit:    Can you participate in a virtual visit with your PCP: unknown                    Current Advanced Directive/Advance Care Plan: Full Code      Healthcare Decision Maker:   Click here to complete [address] including selection of the Healthcare Decision Maker Relationship (ie \"Primary\")                             Transition of Care Plan:                    TBD; possibly back to group home with f/u with specialist/ or [facility] vs rehab    Care Management Interventions  PCP Verified by CM: Yes  Mode of Transport at Discharge:  Other (see comment) (TBD)  Physical Therapy Consult: No  Occupational Therapy Consult: No  Speech Therapy Consult: No  Support Systems: Adult Group Home  Discharge Location  Patient Expects to be Discharged to[de-identified] Other: (TBD; likely back to his group home- [facility])     CM following  Olu Canela RN, CRM

## 2022-02-28 NOTE — PROGRESS NOTES
Bedside and Verbal shift change report given to Rivas Garland (oncoming nurse) by Cristina Frausto (offgoing nurse). Report included the following information SBAR, Kardex, MAR and Recent Results.

## 2022-02-28 NOTE — CONSULTS
Dr. [facility]. [phone]            Cardiology Consult/Progress Note      Requesting/referring provider: Som Patel MD      Reason for Consult: hx of NICM, cardiac clearance for sigmoid resection. Assessment/Plan:  1. Sigmoid Volvulus-  s/p flex sig with detorsion, plan for  Sigmoid resection later this week. 2.  History of nonischemic cardiomyopathy: Diagnosed 2017 ejection fraction 30-35%, however most recent echocardiogram now suggests normalization of EF. Osmar Garcia 3.  Hypertension:  was on carvedilol in the past. Recommend restarting at discharge. 4.  Hyperlipidemia: Was on  statin therapy labs followed by primary care  5.  History of COPD: Active smoker, on inhalers         Mr. Karlie Schafer has history of nonischemic cardiomyopathy but subsequently his EF improved to normal.  Currently he is not in congestive heart failure, report no symptoms of chest pain consistent with acute coronary syndrome and has no severe aortic stenosis. He can proceed with upcoming intermediate risk surgery at usual cardiac risk without any preoperative cardiac imaging/testing. Investigations ordered    []    High complexity decision making was performed  []    Patient is at high-risk of decompensation with multiple organ involvement  []    Complex/difficult social determinants of health outcomes  Total of ** minutes were spent on reviewing the records, analyzing investigations and documentation in the chart, on the day of visit including time for examination and time spent with the patient  Investigations personally reviewed and interpreted    Investigations reviewed  02/25/22    ECHO ADULT COMPLETE 02/27/2022 2/27/2022    Interpretation Summary    Left Ventricle: Left ventricle size is normal. Normal wall thickness. Normal wall motion. Normal left ventricular systolic function with a visually estimated EF of 55 - 60%.     Signed by: Runell Sever, MD on 2/27/2022 3:57 PM        HPI: Temitope Houhg, a 61y.o. year-old who is seen for evaluation of NICM and cardiac clearance for surgery on Wed. Wound pt presented ot ED on 2/26 for evaluation of abdominal pain. He noted pain diffuse in location, constant, dull ache, 8/10 in severity, no known aggravating or relieving factors, associated with constipation, but no nausea, no vomiting. The patient took over-the-counter laxative for the constipation without any significant relief. The patient was brought to the emergency room from the [facility] for further evaluation. When the patient arrived at the emergency room, CT scan of the abdomen and pelvis as well as KUB suggested sigmoid volvulus with small-bowel obstruction. Emergency room physician consulted gastroenterologist.  The patient was seen by Gastroenterology while he was still in the emergency room and underwent decompression. He is s/p flex sig with detorsion. GI requesting cardiac clearance for likely need for bowel resection given above history. Today, the patient denies chest pain/ shortness of breath, orthopnea, PND, LE edema, palpitations, syncope, presyncope or fatigue. He is awaiting surgery on Wed. Notes that aside for shortness of breath when he is worked up or agitated, he feels well. He continues to smoke cigarettes, drinks 6 beers daily. Denies any recent drug or liquor. He  has a past medical history of Cancer ([address] (12/2020), Chronic obstructive pulmonary disease ([address] Essential hypertension (11/19/2020), High cholesterol (11/19/2020), Hypertension, Hypertrophic cardiomyopathy ([address] Paranoid schizophrenia ([address] and Psychiatric disorder. Review of system:Patient reports no dyspnea/PND/Orthpnea/CP. He reports no cough/fever/focal neurological deficits/abdominal pain. All other systems negative except as above.    Family History   Problem Relation Age of Onset    Heart Surgery Mother     Depression Father     Emphysema Father    [employer] Anesth Problems Neg Hx       Social History     Socioeconomic History    Marital status:    Tobacco Use    Smoking status: Current Every Day Smoker     Packs/day: 0.10    Smokeless tobacco: Never Used   Substance and Sexual Activity    Alcohol use: Yes     Alcohol/week: 3.0 standard drinks     Types: 3 Cans of beer per week    Drug use: No   Social History Narrative    ** Merged History Encounter **           PE  Vitals:    02/27/22 2006 02/27/22 2209 02/28/22 0005 02/28/22 0812   BP: (!) 149/88  (!) 153/81 (!) 150/98   Pulse: 100  86 (!) 107   Resp: 16  16 16   Temp: 97.9 °F (36.6 °C)  97.6 °F (36.4 °C) 98 °F (36.7 °C)   SpO2: 98% 98% 96% 97%   Weight:       Height:        Body mass index is 23.48 kg/m². General:    Alert, cooperative, no distress. Psychiatric:    Normal Mood and affect    Eye/ENT:      Pupils equal, No asymmetry, Conjunctival pink. Able to hear voice at normal amplitude   Lungs:      Visibly symmetric chest expansion, No palpable tenderness. Clear to auscultation bilaterally. Heart[de-identified]    Regular rate and rhythm, S1, S2 normal, no murmur, click, rub or gallop. No JVD, Normal palpable peripheral pulses. No cyanosis   Abdomen:     Soft, non-tender. Bowel sounds normal. No masses,  No      organomegaly. Extremities:   Extremities normal, atraumatic, no edema. Neurologic:   CN II-XII grossly intact.  No gross focal deficits           Recent Labs:  No results found for: CHOL, CHOLX, CHLST, CHOLV, 409543, HDL, HDLP, LDL, LDLC, DLDLP, TGLX, TRIGL, TRIGP, CHHD, CHHDX  Lab Results   Component Value Date/Time    Creatinine (POC) 0.9 03/07/2017 08:25 AM    Creatinine 0.64 (L) 02/28/2022 04:51 AM     Lab Results   Component Value Date/Time    BUN 5 (L) 02/28/2022 04:51 AM    BUN (POC) 8 (L) 03/07/2017 08:25 AM     Lab Results   Component Value Date/Time    Potassium 3.3 (L) 02/28/2022 04:51 AM     Lab Results   Component Value Date/Time    Hemoglobin A1c 5.6 02/26/2022 05:44 AM     Lab Results Component Value Date/Time    Hemoglobin (POC) 16.0 03/07/2017 08:25 AM    HGB 14.4 02/28/2022 04:51 AM     Lab Results   Component Value Date/Time    PLATELET 170 80/66/8743 04:51 AM       Reviewed:  Past Medical History:   Diagnosis Date    Cancer ([address] 12/2020    LUNG CANCER    Chronic obstructive pulmonary disease (HCC)     Essential hypertension 11/19/2020    High cholesterol 11/19/2020    Hypertension     Hypertrophic cardiomyopathy ([address]     Paranoid schizophrenia ([address]     Psychiatric disorder      Social History     Tobacco Use   Smoking Status Current Every Day Smoker    Packs/day: 0.10   Smokeless Tobacco Never Used     Social History     Substance and Sexual Activity   Alcohol Use Yes    Alcohol/week: 3.0 standard drinks    Types: 3 Cans of beer per week     Allergies   Allergen Reactions    Tuberculin Ppd Swelling     NEEDS CXR     Family History   Problem Relation Age of Onset    Heart Surgery Mother     Depression Father     Emphysema Father     Anesth Problems Neg Hx         Current Facility-Administered Medications   Medication Dose Route Frequency    potassium phosphate 20 mmol in 0.9% sodium chloride 250 mL infusion   IntraVENous ONCE    peg 3350-electrolytes (COLYTE) 4000 mL  240 mL Oral Q1H    sodium chloride (NS) flush 5-40 mL  5-40 mL IntraVENous Q8H    sodium chloride (NS) flush 5-40 mL  5-40 mL IntraVENous PRN    simethicone (MYLICON) 05CP/7.5MY oral drops 80 mg  1.2 mL Oral Multiple    albuterol-ipratropium (DUO-NEB) 2.5 MG-0.5 MG/3 ML  3 mL Nebulization Q4H PRN    sodium chloride (NS) flush 5-40 mL  5-40 mL IntraVENous Q8H    sodium chloride (NS) flush 5-40 mL  5-40 mL IntraVENous PRN    acetaminophen (TYLENOL) tablet 650 mg  650 mg Oral Q6H PRN    Or    acetaminophen (TYLENOL) suppository 650 mg  650 mg Rectal Q6H PRN    ondansetron (ZOFRAN ODT) tablet 4 mg  4 mg Oral Q8H PRN    Or    ondansetron (ZOFRAN) injection 4 mg  4 mg IntraVENous Q6H PRN    ketorolac (TORADOL) injection 15 mg  15 mg IntraVENous Q6H PRN    nicotine (NICODERM CQ) 21 mg/24 hr patch 1 Patch  1 Patch TransDERmal DAILY    hydrALAZINE (APRESOLINE) 20 mg/mL injection 10 mg  10 mg IntraVENous Q6H PRN    pantoprazole (PROTONIX) injection 40 mg  40 mg IntraVENous DAILY    And    sodium chloride (NS) flush 10 mL  10 mL IntraVENous DAILY    benztropine (COGENTIN) tablet 1 mg  1 mg Oral BID    clonazePAM (KlonoPIN) tablet 1 mg  1 mg Oral BID PRN    DULoxetine (CYMBALTA) capsule 30 mg  30 mg Oral 7am    DULoxetine (CYMBALTA) capsule 60 mg  60 mg Oral QHS    gabapentin (NEURONTIN) tablet 600 mg  600 mg Oral TID    risperiDONE (RisperDAL) tablet 2 mg  2 mg Oral BID    albuterol-ipratropium (DUO-NEB) 2.5 MG-0.5 MG/3 ML  3 mL Nebulization Q6H PRN    arformoteroL (BROVANA) neb solution 15 mcg  15 mcg Nebulization BID RT    And    budesonide (PULMICORT) 500 mcg/2 ml nebulizer suspension  500 mcg Nebulization BID RT     Facility-Administered Medications Ordered in Other Encounters   Medication Dose Route Frequency    oxyCODONE IR (ROXICODONE) tablet 5 mg  5 mg Oral Q4H PRN         ATTENTION:   This medical record was transcribed using an electronic medical records/speech recognition system. Although proofread, it may and can contain electronic, spelling and other errors. Corrections may be executed at a later time. Please feel free to contact us for any clarifications as needed.     [facility] [place]  [facility], [employer],  [address] [phone]

## 2022-02-28 NOTE — PROGRESS NOTES
Progress Note    Patient: Nenita Barragan MRN: [id number]  SSN: [id number]    YOB: 1965  Age: 61 y.o. Sex: male      Admit Date: 2022    2 Days Post-Op    Procedure:  Procedure(s):  COLON DECOMPRESSION  SIGMOIDOSCOPY FLEXIBLE    Subjective:     Patient is tolerating bowel prep; no nausea or vomiting with NGT clamped; no abdominal pain or bloating. Objective:     Visit Vitals  BP (!) 150/98   Pulse (!) 107   Temp 98 °F (36.7 °C)   Resp 16   Ht 5' 7\" (1.702 m)   Wt 149 lb 14.6 oz (68 kg)   SpO2 97%   BMI 23.48 kg/m²       Temp (24hrs), Av.9 °F (36.6 °C), Min:97.6 °F (36.4 °C), Max:98 °F (36.7 °C)    Physical Exam:    ABDOMEN: Soft, non-distended; no pain with palpation. Data Review: VS, I/O's, Labs    Lab Review:   Recent Results (from the past 12 hour(s))   CBC WITH AUTOMATED DIFF    Collection Time: 22  4:51 AM   Result Value Ref Range    WBC 6.0 4.1 - 11.1 K/uL    RBC 4.39 4. 10 - 5.70 M/uL    HGB 14.4 12.1 - 17.0 g/dL    HCT 41.9 36.6 - 50.3 %    MCV 95.4 80.0 - 99.0 FL    MCH 32.8 26.0 - 34.0 PG    MCHC 34.4 30.0 - 36.5 g/dL    RDW 12.6 11.5 - 14.5 %    PLATELET 697 964 - 430 K/uL    MPV 9.7 8.9 - 12.9 FL    NRBC 0.0 0  WBC    ABSOLUTE NRBC 0.00 0.00 - 0.01 K/uL    NEUTROPHILS 59 32 - 75 %    LYMPHOCYTES 23 12 - 49 %    MONOCYTES 14 (H) 5 - 13 %    EOSINOPHILS 3 0 - 7 %    BASOPHILS 1 0 - 1 %    IMMATURE GRANULOCYTES 0 0.0 - 0.5 %    ABS. NEUTROPHILS 3.6 1.8 - 8.0 K/UL    ABS. LYMPHOCYTES 1.4 0.8 - 3.5 K/UL    ABS. MONOCYTES 0.8 0.0 - 1.0 K/UL    ABS. EOSINOPHILS 0.2 0.0 - 0.4 K/UL    ABS. BASOPHILS 0.0 0.0 - 0.1 K/UL    ABS. IMM.  GRANS. 0.0 0.00 - 0.04 K/UL    DF AUTOMATED     METABOLIC PANEL, COMPREHENSIVE    Collection Time: 22  4:51 AM   Result Value Ref Range    Sodium 133 (L) 136 - 145 mmol/L    Potassium 3.3 (L) 3.5 - 5.1 mmol/L    Chloride 103 97 - 108 mmol/L    CO2 27 21 - 32 mmol/L    Anion gap 3 (L) 5 - 15 mmol/L    Glucose 93 65 - 100 mg/dL    BUN 5 (L) 6 - 20 MG/DL    Creatinine 0.64 (L) 0.70 - 1.30 MG/DL    BUN/Creatinine ratio 8 (L) 12 - 20      GFR est AA >60 >60 ml/min/1.73m2    GFR est non-AA >60 >60 ml/min/1.73m2    Calcium 8.0 (L) 8.5 - 10.1 MG/DL    Bilirubin, total 0.6 0.2 - 1.0 MG/DL    ALT (SGPT) 20 12 - 78 U/L    AST (SGOT) 16 15 - 37 U/L    Alk. phosphatase 58 45 - 117 U/L    Protein, total 6.4 6.4 - 8.2 g/dL    Albumin 2.8 (L) 3.5 - 5.0 g/dL    Globulin 3.6 2.0 - 4.0 g/dL    A-G Ratio 0.8 (L) 1.1 - 2.2     MAGNESIUM    Collection Time: 02/28/22  4:51 AM   Result Value Ref Range    Magnesium 2.1 1.6 - 2.4 mg/dL   PHOSPHORUS    Collection Time: 02/28/22  4:51 AM   Result Value Ref Range    Phosphorus 1.7 (L) 2.6 - 4.7 MG/DL         Assessment:     Hospital Problems  Date Reviewed: 2/26/2022          Codes Class Noted POA    * (Principal) Sigmoid [facility]ulus [facility] ICD-10-CM: U98.3  ICD-9-CM: 560.2  2/26/2022 Yes             Serum sodium improving. Plan/Recommendations/Medical Decision Making:     Remove NGT, continue bowel prep. Clear liquids. IV fluids per Nephrology. Cardiology evaluation pending. Out of bed in chair. Plan laparoscopic sigmoid resection on Wednesday.

## 2022-02-28 NOTE — PROGRESS NOTES
NAME: Cal Rose        :  1965        MRN:  [id number]                     Assessment   :                                               Plan:  Hyponatremia - chronic - mildly symptomatic  Sigmoid volvulus  Hypokalemia/Hypophosphatemia       His sodium was 129 in late December. Na 123 Friday . Chronicity points towards SIADH but may have a dual component. Low urine sodium suggests poor solute intake in the setting of sigmoid volvulus. Sodium up to 133 today. Supplement IV KPhos 20mmol x1 dose    Awaiting surgery on Wednesday                 Subjective:     Chief Complaint:  \" I'm just waiting on surgery now. They are letting me have liquids\". No N/V. No dyspnea. No pain. No edema. Review of Systems:    Symptom Y/N Comments  Symptom Y/N Comments   Fever/Chills    Chest Pain     Poor Appetite    Edema     Cough    Abdominal Pain     Sputum    Joint Pain     SOB/CHOPRA    Pruritis/Rash     Nausea/vomit    Tolerating PT/OT     Diarrhea    Tolerating Diet     Constipation    Other       Could not obtain due to:      Objective:     VITALS:   Last 24hrs VS reviewed since prior progress note.  Most recent are:  Visit Vitals  BP (!) 150/98   Pulse (!) 107   Temp 98 °F (36.7 °C)   Resp 16   Ht 5' 7\" (1.702 m)   Wt 68 kg (149 lb 14.6 oz)   SpO2 97%   BMI 23.48 kg/m²       Intake/Output Summary (Last 24 hours) at 2022 1057  Last data filed at 2022 1819  Gross per 24 hour   Intake --   Output 400 ml   Net -400 ml      Telemetry Reviewed:     PHYSICAL EXAM:  General: NAD  No edema      Lab Data Reviewed: (see below)    Medications Reviewed: (see below)    PMH/SH reviewed - no change compared to H&P  ________________________________________________________________________  Care Plan discussed with:  Patient Y    Family      RN     Care Manager                    Consultant:          Comments   >50% of visit spent in counseling and coordination of care       ________________________________________________________________________  Aruna Barlow MD     Procedures: see electronic medical records for all procedures/Xrays and details which  were not copied into this note but were reviewed prior to creation of Plan. LABS:  Recent Labs     02/28/22 0451 02/26/22  0544   WBC 6.0 16.8*   HGB 14.4 15.3   HCT 41.9 42.6    241     Recent Labs     02/28/22  0451 02/27/22  1749 02/27/22  0328 02/26/22  1607 02/26/22  0544   * 131* 130*   < > 125*   K 3.3* 3.4* 3.2*   < > 4.0    101 100   < > 93*   CO2 27 26 27   < > 28   BUN 5* 7 9   < > 10   CREA 0.64* 0.67* 0.53*   < > 0.82   GLU 93 90 100   < > 132*   CA 8.0* 7.9* 7.7*   < > 8.5   MG 2.1  --   --   --  2.4   PHOS 1.7*  --   --   --  2.6    < > = values in this interval not displayed. Recent Labs     02/28/22 0451 02/26/22  0544 02/25/22  2350   AP 58 76 89   TP 6.4 7.1 8.2   ALB 2.8* 3.5 4.0   GLOB 3.6 3.6 4.2*   LPSE  --  45*  --      Recent Labs     02/25/22  2350   INR 1.0   PTP 10.3   APTT 30.1      No results for input(s): FE, TIBC, PSAT, FERR in the last 72 hours. No results found for: FOL, RBCF   No results for input(s): PH, PCO2, PO2 in the last 72 hours. No results for input(s): CPK, CKMB in the last 72 hours.     No lab exists for component: TROPONINI  No components found for: [place]  Lab Results   Component Value Date/Time    Color YELLOW/STRAW 02/26/2022 11:15 PM    Appearance CLEAR 02/26/2022 11:15 PM    Specific gravity 1.017 02/26/2022 11:15 PM    pH (UA) 6.0 02/26/2022 11:15 PM    Protein Negative 02/26/2022 11:15 PM    Glucose Negative 02/26/2022 11:15 PM    Ketone TRACE (A) 02/26/2022 11:15 PM    Bilirubin Negative 02/26/2022 11:15 PM    Urobilinogen 1.0 02/26/2022 11:15 PM    Nitrites Negative 02/26/2022 11:15 PM    Leukocyte Esterase Negative 02/26/2022 11:15 PM    Epithelial cells FEW 02/26/2022 11:15 PM    Bacteria Negative 02/26/2022 11:15 PM    WBC 0-4 02/26/2022 11:15 PM    RBC 0-5 02/26/2022 11:15 PM       MEDICATIONS:  Current Facility-Administered Medications   Medication Dose Route Frequency    potassium phosphate 20 mmol in 0.9% sodium chloride 250 mL infusion   IntraVENous ONCE    peg 3350-electrolytes (COLYTE) 4000 mL  240 mL Oral Q1H    sodium chloride (NS) flush 5-40 mL  5-40 mL IntraVENous Q8H    sodium chloride (NS) flush 5-40 mL  5-40 mL IntraVENous PRN    simethicone (MYLICON) 20AW/8.5HX oral drops 80 mg  1.2 mL Oral Multiple    albuterol-ipratropium (DUO-NEB) 2.5 MG-0.5 MG/3 ML  3 mL Nebulization Q4H PRN    sodium chloride (NS) flush 5-40 mL  5-40 mL IntraVENous Q8H    sodium chloride (NS) flush 5-40 mL  5-40 mL IntraVENous PRN    acetaminophen (TYLENOL) tablet 650 mg  650 mg Oral Q6H PRN    Or    acetaminophen (TYLENOL) suppository 650 mg  650 mg Rectal Q6H PRN    ondansetron (ZOFRAN ODT) tablet 4 mg  4 mg Oral Q8H PRN    Or    ondansetron (ZOFRAN) injection 4 mg  4 mg IntraVENous Q6H PRN    ketorolac (TORADOL) injection 15 mg  15 mg IntraVENous Q6H PRN    nicotine (NICODERM CQ) 21 mg/24 hr patch 1 Patch  1 Patch TransDERmal DAILY    hydrALAZINE (APRESOLINE) 20 mg/mL injection 10 mg  10 mg IntraVENous Q6H PRN    pantoprazole (PROTONIX) injection 40 mg  40 mg IntraVENous DAILY    And    sodium chloride (NS) flush 10 mL  10 mL IntraVENous DAILY    benztropine (COGENTIN) tablet 1 mg  1 mg Oral BID    clonazePAM (KlonoPIN) tablet 1 mg  1 mg Oral BID PRN    DULoxetine (CYMBALTA) capsule 30 mg  30 mg Oral 7am    DULoxetine (CYMBALTA) capsule 60 mg  60 mg Oral QHS    gabapentin (NEURONTIN) tablet 600 mg  600 mg Oral TID    risperiDONE (RisperDAL) tablet 2 mg  2 mg Oral BID    albuterol-ipratropium (DUO-NEB) 2.5 MG-0.5 MG/3 ML  3 mL Nebulization Q6H PRN    arformoteroL (BROVANA) neb solution 15 mcg  15 mcg Nebulization BID RT    And    budesonide (PULMICORT) 500 mcg/2 ml nebulizer suspension  500 mcg Nebulization BID RT     Facility-Administered Medications Ordered in Other Encounters   Medication Dose Route Frequency    oxyCODONE IR (ROXICODONE) tablet 5 mg  5 mg Oral Q4H PRN

## 2022-03-01 LAB
ANION GAP SERPL CALC-SCNC: 5 MMOL/L (ref 5–15)
BUN SERPL-MCNC: 3 MG/DL (ref 6–20)
BUN/CREAT SERPL: 5 (ref 12–20)
CALCIUM SERPL-MCNC: 8.3 MG/DL (ref 8.5–10.1)
CHLORIDE SERPL-SCNC: 103 MMOL/L (ref 97–108)
CO2 SERPL-SCNC: 25 MMOL/L (ref 21–32)
COMMENT, HOLDF: NORMAL
COVID-19 RAPID TEST, COVR: NOT DETECTED
CREAT SERPL-MCNC: 0.55 MG/DL (ref 0.7–1.3)
GLUCOSE SERPL-MCNC: 92 MG/DL (ref 65–100)
MAGNESIUM SERPL-MCNC: 1.8 MG/DL (ref 1.6–2.4)
PHOSPHATE SERPL-MCNC: 2.8 MG/DL (ref 2.6–4.7)
POTASSIUM SERPL-SCNC: 3.1 MMOL/L (ref 3.5–5.1)
SAMPLES BEING HELD,HOLD: NORMAL
SODIUM SERPL-SCNC: 133 MMOL/L (ref 136–145)
SOURCE, COVRS: NORMAL

## 2022-03-01 PROCEDURE — 84100 ASSAY OF PHOSPHORUS: CPT

## 2022-03-01 PROCEDURE — 74011250637 HC RX REV CODE- 250/637: Performed by: SURGERY

## 2022-03-01 PROCEDURE — 80048 BASIC METABOLIC PNL TOTAL CA: CPT

## 2022-03-01 PROCEDURE — 36415 COLL VENOUS BLD VENIPUNCTURE: CPT

## 2022-03-01 PROCEDURE — 74011000250 HC RX REV CODE- 250: Performed by: HOSPITALIST

## 2022-03-01 PROCEDURE — 99024 POSTOP FOLLOW-UP VISIT: CPT | Performed by: SURGERY

## 2022-03-01 PROCEDURE — C9113 INJ PANTOPRAZOLE SODIUM, VIA: HCPCS | Performed by: INTERNAL MEDICINE

## 2022-03-01 PROCEDURE — 74011250637 HC RX REV CODE- 250/637: Performed by: INTERNAL MEDICINE

## 2022-03-01 PROCEDURE — 74011250637 HC RX REV CODE- 250/637: Performed by: STUDENT IN AN ORGANIZED HEALTH CARE EDUCATION/TRAINING PROGRAM

## 2022-03-01 PROCEDURE — 94760 N-INVAS EAR/PLS OXIMETRY 1: CPT

## 2022-03-01 PROCEDURE — 94640 AIRWAY INHALATION TREATMENT: CPT

## 2022-03-01 PROCEDURE — 74011250636 HC RX REV CODE- 250/636: Performed by: STUDENT IN AN ORGANIZED HEALTH CARE EDUCATION/TRAINING PROGRAM

## 2022-03-01 PROCEDURE — 74011250636 HC RX REV CODE- 250/636: Performed by: INTERNAL MEDICINE

## 2022-03-01 PROCEDURE — 94664 DEMO&/EVAL PT USE INHALER: CPT

## 2022-03-01 PROCEDURE — 74011250637 HC RX REV CODE- 250/637: Performed by: HOSPITALIST

## 2022-03-01 PROCEDURE — 83735 ASSAY OF MAGNESIUM: CPT

## 2022-03-01 PROCEDURE — 65270000032 HC RM SEMIPRIVATE

## 2022-03-01 PROCEDURE — 87635 SARS-COV-2 COVID-19 AMP PRB: CPT

## 2022-03-01 PROCEDURE — 74011000250 HC RX REV CODE- 250: Performed by: INTERNAL MEDICINE

## 2022-03-01 RX ORDER — BUPIVACAINE HYDROCHLORIDE 5 MG/ML
50 INJECTION, SOLUTION EPIDURAL; INTRACAUDAL ONCE
Status: CANCELLED | OUTPATIENT
Start: 2022-03-01 | End: 2022-03-01

## 2022-03-01 RX ORDER — METRONIDAZOLE 250 MG/1
500 TABLET ORAL ONCE
Status: COMPLETED | OUTPATIENT
Start: 2022-03-01 | End: 2022-03-01

## 2022-03-01 RX ORDER — POTASSIUM CHLORIDE 750 MG/1
40 TABLET, FILM COATED, EXTENDED RELEASE ORAL 2 TIMES DAILY
Status: DISPENSED | OUTPATIENT
Start: 2022-03-01 | End: 2022-03-02

## 2022-03-01 RX ORDER — NEOMYCIN SULFATE 500 MG/1
1000 TABLET ORAL ONCE
Status: COMPLETED | OUTPATIENT
Start: 2022-03-01 | End: 2022-03-01

## 2022-03-01 RX ORDER — MAGNESIUM SULFATE HEPTAHYDRATE 40 MG/ML
2 INJECTION, SOLUTION INTRAVENOUS ONCE
Status: COMPLETED | OUTPATIENT
Start: 2022-03-01 | End: 2022-03-01

## 2022-03-01 RX ORDER — POTASSIUM CHLORIDE 750 MG/1
40 TABLET, FILM COATED, EXTENDED RELEASE ORAL
Status: COMPLETED | OUTPATIENT
Start: 2022-03-01 | End: 2022-03-01

## 2022-03-01 RX ADMIN — GABAPENTIN 600 MG: 600 TABLET, FILM COATED ORAL at 17:18

## 2022-03-01 RX ADMIN — ARFORMOTEROL TARTRATE 15 MCG: 15 SOLUTION RESPIRATORY (INHALATION) at 23:17

## 2022-03-01 RX ADMIN — GABAPENTIN 600 MG: 600 TABLET, FILM COATED ORAL at 21:57

## 2022-03-01 RX ADMIN — SODIUM CHLORIDE, PRESERVATIVE FREE 10 ML: 5 INJECTION INTRAVENOUS at 09:35

## 2022-03-01 RX ADMIN — POTASSIUM CHLORIDE 40 MEQ: 750 TABLET, EXTENDED RELEASE ORAL at 12:00

## 2022-03-01 RX ADMIN — DULOXETINE HYDROCHLORIDE 30 MG: 30 CAPSULE, DELAYED RELEASE ORAL at 06:51

## 2022-03-01 RX ADMIN — Medication 10 ML: at 17:19

## 2022-03-01 RX ADMIN — DULOXETINE 60 MG: 30 CAPSULE, DELAYED RELEASE ORAL at 21:57

## 2022-03-01 RX ADMIN — BENZTROPINE MESYLATE 1 MG: 1 TABLET ORAL at 09:34

## 2022-03-01 RX ADMIN — Medication 5 ML: at 22:00

## 2022-03-01 RX ADMIN — POTASSIUM CHLORIDE 40 MEQ: 750 TABLET, EXTENDED RELEASE ORAL at 12:01

## 2022-03-01 RX ADMIN — ARFORMOTEROL TARTRATE 15 MCG: 15 SOLUTION RESPIRATORY (INHALATION) at 07:20

## 2022-03-01 RX ADMIN — METRONIDAZOLE 500 MG: 250 TABLET ORAL at 12:00

## 2022-03-01 RX ADMIN — NEOMYCIN SULFATE 1000 MG: 500 TABLET ORAL at 20:27

## 2022-03-01 RX ADMIN — GABAPENTIN 600 MG: 600 TABLET, FILM COATED ORAL at 09:34

## 2022-03-01 RX ADMIN — RISPERIDONE 2 MG: 1 TABLET, FILM COATED ORAL at 17:18

## 2022-03-01 RX ADMIN — POTASSIUM CHLORIDE 40 MEQ: 750 TABLET, EXTENDED RELEASE ORAL at 09:34

## 2022-03-01 RX ADMIN — NEOMYCIN SULFATE 1000 MG: 500 TABLET ORAL at 11:59

## 2022-03-01 RX ADMIN — METRONIDAZOLE 500 MG: 250 TABLET ORAL at 13:28

## 2022-03-01 RX ADMIN — Medication 10 ML: at 06:00

## 2022-03-01 RX ADMIN — METRONIDAZOLE 500 MG: 250 TABLET ORAL at 20:27

## 2022-03-01 RX ADMIN — PANTOPRAZOLE SODIUM 40 MG: 40 INJECTION, POWDER, FOR SOLUTION INTRAVENOUS at 09:34

## 2022-03-01 RX ADMIN — BUDESONIDE 500 MCG: 0.5 INHALANT RESPIRATORY (INHALATION) at 23:17

## 2022-03-01 RX ADMIN — NEOMYCIN SULFATE 1000 MG: 500 TABLET ORAL at 13:28

## 2022-03-01 RX ADMIN — BUDESONIDE 500 MCG: 0.5 INHALANT RESPIRATORY (INHALATION) at 07:20

## 2022-03-01 RX ADMIN — RISPERIDONE 2 MG: 1 TABLET, FILM COATED ORAL at 09:34

## 2022-03-01 RX ADMIN — MAGNESIUM SULFATE HEPTAHYDRATE 2 G: 40 INJECTION, SOLUTION INTRAVENOUS at 09:35

## 2022-03-01 RX ADMIN — BENZTROPINE MESYLATE 1 MG: 1 TABLET ORAL at 17:18

## 2022-03-01 NOTE — PROGRESS NOTES
Bedside and Verbal shift change report given to Zulema Velarde RN (oncoming nurse) by Halina Thibodeaux RN (offgoing nurse). Report included the following information SBAR, Kardex, Intake/Output and MAR.

## 2022-03-01 NOTE — PROGRESS NOTES
[facility] Adult  Hospitalist Group                                                                                          Hospitalist Progress Note  Jose Luis Russell DO  Answering service: [phone] from in house phone        Date of Service:  3/1/2022  NAME:  Margareth Hewitt  :  1965  MRN:  [id number]      Admission Summary:   Margareth Hewitt is a 61 y.o. male who presents complaining of constipation for 6 days. Patient states that it is common for him not to have a bowel movement for 5 days but once it turned into 6 he was concerned. He states that his abdomen does take extended a couple times per month. Never as bad as it did when he came in. He has never had a colonoscopy before. He has a history of schizophrenia and is on multiple psych medications. CT noticed Sigmoid volvulus with small bowel obstruction in ER ,   Patient underwent colonoscopic decompression   midnight and was found to have large colotomy colon. He additionally started vomiting and had NG tube placed  am        Interval history / Subjective:   Patient was seen and examined this morning. NG tube removed patient saw him that without nausea vomiting. Denies any complaints. Assessment & Plan:        #Sigmoid volvulus POA resolved  -status post endoscopic decompression  -General surgery following with plans for sigmoid resection tomorrow  -Prophylactic antibiotics    #SBO resolving  -NG tube out patient is tolerating diet  -Clear liquids      #H/o cardiomyopathy  -TTE with EF 55- 60%  -Cardiology on board for preop clearance for general surgery request      #Hypotonic hyponatremia POA resolved    #Paranoid schizophrenia. resume preadmission medication     #COPD.     - DuoNeb as needed. The patient is not in acute exacerbation of COPD at this time. #Hypertension. Monitor BP. #Dyslipidemia.     will resume home medication once the patient is no longer n.p.o. Tobacco abuse.   The patient advised to quit smoking. We will place the patient on Nicoderm patch. Hyperglycemia. We will check hemoglobin A1c level. The patient has no history of diabetes. #Leukocytosis. The patient is afebrile, not toxic. Lactic acid level is within normal limits. UA negative,   chest xray, chest ct lung clear. Hold abx and monitor          #Left lung cancer, status post resection. .        Code status: full   Prophylaxis: scd   Care Plan discussed with: patient   Anticipated Disposition: TBD      Hospital Problems  Date Reviewed: 2/26/2022          Codes Class Noted POA    * (Principal) Sigmoid volvulus ([facility] [address] ICD-10-CM: K56.2  ICD-9-CM: 560.2  2/26/2022 Yes                Review of Systems:   A comprehensive review of systems was negative except for that written in the HPI. Vital Signs:    Last 24hrs VS reviewed since prior progress note. Most recent are:  Visit Vitals  /89   Pulse (!) 111   Temp 98.2 °F (36.8 °C)   Resp 18   Ht 5' 7\" (1.702 m)   Wt 68 kg (149 lb 14.6 oz)   SpO2 98%   BMI 23.48 kg/m²       No intake or output data in the 24 hours ending 03/01/22 1422     Physical Examination:     I had a face to face encounter with this patient and independently examined them on 3/1/2022 as outlined below:          Constitutional:  No acute distress, cooperative, pleasant    ENT:  Oral mucosa moist, oropharynx benign. NGT    Resp:  CTA bilaterally. No wheezing/rhonchi/rales. No accessory muscle use. CV:  Regular rhythm, normal rate, no murmurs, gallops, rubs    GI:  Soft, mild distended, non tender. + bowel sounds, no hepatosplenomegaly     Musculoskeletal:  No edema, warm, 2+ pulses throughout    Neurologic:  Moves all extremities.   AAOx3, CN II-XII reviewed            Data Review:    Review and/or order of clinical lab test      Labs:     Recent Labs     02/28/22  0451   WBC 6.0   HGB 14.4   HCT 41.9        Recent Labs     03/01/22  0315 02/28/22  0451 02/27/22  1749   NA 133* 133* 131*   K 3.1* 3.3* 3.4*    103 101   CO2 25 27 26   BUN 3* 5* 7   CREA 0.55* 0.64* 0.67*   GLU 92 93 90   CA 8.3* 8.0* 7.9*   MG 1.8 2.1  --    PHOS 2.8 1.7*  --      Recent Labs     02/28/22  0451   ALT 20   AP 58   TBILI 0.6   TP 6.4   ALB 2.8*   GLOB 3.6     No results for input(s): INR, PTP, APTT, INREXT, INREXT in the last 72 hours. No results for input(s): FE, TIBC, PSAT, FERR in the last 72 hours. No results found for: FOL, RBCF   No results for input(s): PH, PCO2, PO2 in the last 72 hours. No results for input(s): CPK, CKNDX, TROIQ in the last 72 hours.     No lab exists for component: CPKMB  No results found for: CHOL, CHOLX, CHLST, CHOLV, HDL, HDLP, LDL, LDLC, DLDLP, TGLX, TRIGL, TRIGP, CHHD, CHHDX  Lab Results   Component Value Date/Time    Glucose (POC) 163 (H) 12/16/2020 06:13 PM    Glucose (POC) 93 03/07/2017 08:25 AM     Lab Results   Component Value Date/Time    Color YELLOW/STRAW 02/26/2022 11:15 PM    Appearance CLEAR 02/26/2022 11:15 PM    Specific gravity 1.017 02/26/2022 11:15 PM    pH (UA) 6.0 02/26/2022 11:15 PM    Protein Negative 02/26/2022 11:15 PM    Glucose Negative 02/26/2022 11:15 PM    Ketone TRACE (A) 02/26/2022 11:15 PM    Bilirubin Negative 02/26/2022 11:15 PM    Urobilinogen 1.0 02/26/2022 11:15 PM    Nitrites Negative 02/26/2022 11:15 PM    Leukocyte Esterase Negative 02/26/2022 11:15 PM    Epithelial cells FEW 02/26/2022 11:15 PM    Bacteria Negative 02/26/2022 11:15 PM    WBC 0-4 02/26/2022 11:15 PM    RBC 0-5 02/26/2022 11:15 PM         Medications Reviewed:     Current Facility-Administered Medications   Medication Dose Route Frequency    metroNIDAZOLE (FLAGYL) tablet 500 mg  500 mg Oral ONCE    neomycin (MYCIFRADIN) tablet 1,000 mg  1,000 mg Oral ONCE    potassium chloride SR (KLOR-CON 10) tablet 40 mEq  40 mEq Oral BID    butalbital-acetaminophen-caffeine (FIORICET, ESGIC) -40 mg per tablet 1 Tablet  1 Tablet Oral Q4H PRN    sodium chloride (NS) flush 5-40 mL  5-40 mL IntraVENous Q8H    sodium chloride (NS) flush 5-40 mL  5-40 mL IntraVENous PRN    simethicone (MYLICON) 59AW/9.9IF oral drops 80 mg  1.2 mL Oral Multiple    albuterol-ipratropium (DUO-NEB) 2.5 MG-0.5 MG/3 ML  3 mL Nebulization Q4H PRN    sodium chloride (NS) flush 5-40 mL  5-40 mL IntraVENous Q8H    sodium chloride (NS) flush 5-40 mL  5-40 mL IntraVENous PRN    acetaminophen (TYLENOL) tablet 650 mg  650 mg Oral Q6H PRN    Or    acetaminophen (TYLENOL) suppository 650 mg  650 mg Rectal Q6H PRN    ondansetron (ZOFRAN ODT) tablet 4 mg  4 mg Oral Q8H PRN    Or    ondansetron (ZOFRAN) injection 4 mg  4 mg IntraVENous Q6H PRN    nicotine (NICODERM CQ) 21 mg/24 hr patch 1 Patch  1 Patch TransDERmal DAILY    hydrALAZINE (APRESOLINE) 20 mg/mL injection 10 mg  10 mg IntraVENous Q6H PRN    pantoprazole (PROTONIX) injection 40 mg  40 mg IntraVENous DAILY    And    sodium chloride (NS) flush 10 mL  10 mL IntraVENous DAILY    benztropine (COGENTIN) tablet 1 mg  1 mg Oral BID    clonazePAM (KlonoPIN) tablet 1 mg  1 mg Oral BID PRN    DULoxetine (CYMBALTA) capsule 30 mg  30 mg Oral 7am    DULoxetine (CYMBALTA) capsule 60 mg  60 mg Oral QHS    gabapentin (NEURONTIN) tablet 600 mg  600 mg Oral TID    risperiDONE (RisperDAL) tablet 2 mg  2 mg Oral BID    albuterol-ipratropium (DUO-NEB) 2.5 MG-0.5 MG/3 ML  3 mL Nebulization Q6H PRN    arformoteroL (BROVANA) neb solution 15 mcg  15 mcg Nebulization BID RT    And    budesonide (PULMICORT) 500 mcg/2 ml nebulizer suspension  500 mcg Nebulization BID RT     Facility-Administered Medications Ordered in Other Encounters   Medication Dose Route Frequency    oxyCODONE IR (ROXICODONE) tablet 5 mg  5 mg Oral Q4H PRN   No results found.   ______________________________________________________________________  EXPECTED LENGTH OF STAY: 4d 7h  ACTUAL LENGTH OF STAY:          Robert Alva DO

## 2022-03-01 NOTE — PROGRESS NOTES
NAME: Danette Bai        :  1965        MRN:  [id number]                     Assessment   :                                               Plan:  Hyponatremia - chronic - mildly symptomatic  Sigmoid volvulus  Hypokalemia/Hypophosphatemia       His sodium was 129 in late December. Na 123 Friday . Chronicity points towards SIADH but may have a dual component. Low urine sodium suggests poor solute intake in the setting of sigmoid volvulus. Sodium up to 133 today and holding    Supplement oral KCl 40meq x2 doses    Awaiting surgery tomorrow     Will sign off. Call us back if needed.            Subjective:     Chief Complaint:  Sitting up in the chair. No N/V. No dyspnea. No pain. No edema. Review of Systems:    Symptom Y/N Comments  Symptom Y/N Comments   Fever/Chills    Chest Pain     Poor Appetite    Edema     Cough    Abdominal Pain     Sputum    Joint Pain     SOB/CHOPRA    Pruritis/Rash     Nausea/vomit    Tolerating PT/OT     Diarrhea    Tolerating Diet     Constipation    Other       Could not obtain due to:      Objective:     VITALS:   Last 24hrs VS reviewed since prior progress note.  Most recent are:  Visit Vitals  /89   Pulse (!) 111   Temp 98.2 °F (36.8 °C)   Resp 18   Ht 5' 7\" (1.702 m)   Wt 68 kg (149 lb 14.6 oz)   SpO2 98%   BMI 23.48 kg/m²       Intake/Output Summary (Last 24 hours) at 3/1/2022 1343  Last data filed at 2022 1400  Gross per 24 hour   Intake 1000 ml   Output --   Net 1000 ml      Telemetry Reviewed:     PHYSICAL EXAM:  General: NAD  No edema      Lab Data Reviewed: (see below)    Medications Reviewed: (see below)    PMH/SH reviewed - no change compared to H&P  ________________________________________________________________________  Care Plan discussed with:  Patient Y    Family      RN     Care Manager                    Consultant:          Comments   >50% of visit spent in counseling and coordination of care       ________________________________________________________________________  David Wong MD     Procedures: see electronic medical records for all procedures/Xrays and details which  were not copied into this note but were reviewed prior to creation of Plan. LABS:  Recent Labs     02/28/22 0451   WBC 6.0   HGB 14.4   HCT 41.9        Recent Labs     03/01/22  0315 02/28/22 0451 02/27/22  1749   * 133* 131*   K 3.1* 3.3* 3.4*    103 101   CO2 25 27 26   BUN 3* 5* 7   CREA 0.55* 0.64* 0.67*   GLU 92 93 90   CA 8.3* 8.0* 7.9*   MG 1.8 2.1  --    PHOS 2.8 1.7*  --      Recent Labs     02/28/22 0451   AP 58   TP 6.4   ALB 2.8*   GLOB 3.6     No results for input(s): INR, PTP, APTT, INREXT, INREXT in the last 72 hours. No results for input(s): FE, TIBC, PSAT, FERR in the last 72 hours. No results found for: FOL, RBCF   No results for input(s): PH, PCO2, PO2 in the last 72 hours. No results for input(s): CPK, CKMB in the last 72 hours.     No lab exists for component: TROPONINI  No components found for: [place]  Lab Results   Component Value Date/Time    Color YELLOW/STRAW 02/26/2022 11:15 PM    Appearance CLEAR 02/26/2022 11:15 PM    Specific gravity 1.017 02/26/2022 11:15 PM    pH (UA) 6.0 02/26/2022 11:15 PM    Protein Negative 02/26/2022 11:15 PM    Glucose Negative 02/26/2022 11:15 PM    Ketone TRACE (A) 02/26/2022 11:15 PM    Bilirubin Negative 02/26/2022 11:15 PM    Urobilinogen 1.0 02/26/2022 11:15 PM    Nitrites Negative 02/26/2022 11:15 PM    Leukocyte Esterase Negative 02/26/2022 11:15 PM    Epithelial cells FEW 02/26/2022 11:15 PM    Bacteria Negative 02/26/2022 11:15 PM    WBC 0-4 02/26/2022 11:15 PM    RBC 0-5 02/26/2022 11:15 PM       MEDICATIONS:  Current Facility-Administered Medications   Medication Dose Route Frequency    metroNIDAZOLE (FLAGYL) tablet 500 mg  500 mg Oral ONCE    neomycin (MYCIFRADIN) tablet 1,000 mg  1,000 mg Oral ONCE    potassium chloride SR (KLOR-CON 10) tablet 40 mEq  40 mEq Oral BID    butalbital-acetaminophen-caffeine (FIORICET, ESGIC) -40 mg per tablet 1 Tablet  1 Tablet Oral Q4H PRN    sodium chloride (NS) flush 5-40 mL  5-40 mL IntraVENous Q8H    sodium chloride (NS) flush 5-40 mL  5-40 mL IntraVENous PRN    simethicone (MYLICON) 59LG/4.1MY oral drops 80 mg  1.2 mL Oral Multiple    albuterol-ipratropium (DUO-NEB) 2.5 MG-0.5 MG/3 ML  3 mL Nebulization Q4H PRN    sodium chloride (NS) flush 5-40 mL  5-40 mL IntraVENous Q8H    sodium chloride (NS) flush 5-40 mL  5-40 mL IntraVENous PRN    acetaminophen (TYLENOL) tablet 650 mg  650 mg Oral Q6H PRN    Or    acetaminophen (TYLENOL) suppository 650 mg  650 mg Rectal Q6H PRN    ondansetron (ZOFRAN ODT) tablet 4 mg  4 mg Oral Q8H PRN    Or    ondansetron (ZOFRAN) injection 4 mg  4 mg IntraVENous Q6H PRN    nicotine (NICODERM CQ) 21 mg/24 hr patch 1 Patch  1 Patch TransDERmal DAILY    hydrALAZINE (APRESOLINE) 20 mg/mL injection 10 mg  10 mg IntraVENous Q6H PRN    pantoprazole (PROTONIX) injection 40 mg  40 mg IntraVENous DAILY    And    sodium chloride (NS) flush 10 mL  10 mL IntraVENous DAILY    benztropine (COGENTIN) tablet 1 mg  1 mg Oral BID    clonazePAM (KlonoPIN) tablet 1 mg  1 mg Oral BID PRN    DULoxetine (CYMBALTA) capsule 30 mg  30 mg Oral 7am    DULoxetine (CYMBALTA) capsule 60 mg  60 mg Oral QHS    gabapentin (NEURONTIN) tablet 600 mg  600 mg Oral TID    risperiDONE (RisperDAL) tablet 2 mg  2 mg Oral BID    albuterol-ipratropium (DUO-NEB) 2.5 MG-0.5 MG/3 ML  3 mL Nebulization Q6H PRN    arformoteroL (BROVANA) neb solution 15 mcg  15 mcg Nebulization BID RT    And    budesonide (PULMICORT) 500 mcg/2 ml nebulizer suspension  500 mcg Nebulization BID RT     Facility-Administered Medications Ordered in Other Encounters   Medication Dose Route Frequency    oxyCODONE IR (ROXICODONE) tablet 5 mg  5 mg Oral Q4H PRN

## 2022-03-01 NOTE — PROGRESS NOTES
Progress Note    Patient: Jason Jacinto MRN: [id number]  SSN: [id number]    YOB: 1965  Age: 61 y.o. Sex: male      Admit Date: 2022    3 Days Post-Op    Procedure:  Procedure(s):  COLON DECOMPRESSION  SIGMOIDOSCOPY FLEXIBLE    Subjective:     Patient tolerating clear liquids, passing gas, completed bowel prep. No abdominal pain or bloating. Objective:     Visit Vitals  /89   Pulse (!) 111   Temp 98.2 °F (36.8 °C)   Resp 18   Ht 5' 7\" (1.702 m)   Wt 149 lb 14.6 oz (68 kg)   SpO2 98%   BMI 23.48 kg/m²       Temp (24hrs), Av °F (36.7 °C), Min:97.5 °F (36.4 °C), Max:98.5 °F (36.9 °C)      Physical Exam:    ABDOMEN: Nondistended, soft. No pain with palpation. Data Review: Vital signs, ins/outs, echocardiogram    Lab Review:   Recent Results (from the past 24 hour(s))   MAGNESIUM    Collection Time: 22  3:15 AM   Result Value Ref Range    Magnesium 1.8 1.6 - 2.4 mg/dL   METABOLIC PANEL, BASIC    Collection Time: 22  3:15 AM   Result Value Ref Range    Sodium 133 (L) 136 - 145 mmol/L    Potassium 3.1 (L) 3.5 - 5.1 mmol/L    Chloride 103 97 - 108 mmol/L    CO2 25 21 - 32 mmol/L    Anion gap 5 5 - 15 mmol/L    Glucose 92 65 - 100 mg/dL    BUN 3 (L) 6 - 20 MG/DL    Creatinine 0.55 (L) 0.70 - 1.30 MG/DL    BUN/Creatinine ratio 5 (L) 12 - 20      GFR est AA >60 >60 ml/min/1.73m2    GFR est non-AA >60 >60 ml/min/1.73m2    Calcium 8.3 (L) 8.5 - 10.1 MG/DL   PHOSPHORUS    Collection Time: 22  3:15 AM   Result Value Ref Range    Phosphorus 2.8 2.6 - 4.7 MG/DL   SAMPLES BEING HELD    Collection Time: 22  3:15 AM   Result Value Ref Range    SAMPLES BEING HELD 1lav     COMMENT        Add-on orders for these samples will be processed based on acceptable specimen integrity and analyte stability, which may vary by analyte.          Assessment:     Hospital Problems  Date Reviewed: 2022          Codes Class Noted POA    * (Principal) Sigmoid volvulus ([facility] [address] ICD-10-CM: K56.2  ICD-9-CM: 560.2  2/26/2022 Yes            Determined to be an acceptable cardiology risk, now with resumption of normal left ventricular ejection fraction. He continues to desire proceeding with laparoscopic, possible open sigmoidectomy to avoid recurrent sigmoid volvulus. I agree with this plan. Plan/Recommendations/Medical Decision Making:     Clear liquid diet. COVID-19 testing. Neomycin, Flagyl oral antibiotics today. N.p.o. after midnight. We will proceed with above surgery tomorrow p.m.

## 2022-03-01 NOTE — PROGRESS NOTES
Bedside shift change report given to [address] (oncoming nurse) by Phil Mojica RN (offgoing nurse). Report included the following information SBAR, Kardex, Intake/Output, MAR and Recent Results.

## 2022-03-02 ENCOUNTER — ANESTHESIA (OUTPATIENT)
Dept: SURGERY | Age: 57
DRG: 329 | End: 2022-03-02
Payer: MEDICARE

## 2022-03-02 ENCOUNTER — ANESTHESIA EVENT (OUTPATIENT)
Dept: SURGERY | Age: 57
DRG: 329 | End: 2022-03-02
Payer: MEDICARE

## 2022-03-02 LAB
ALBUMIN SERPL-MCNC: 3.1 G/DL (ref 3.5–5)
ALBUMIN/GLOB SERPL: 0.9 {RATIO} (ref 1.1–2.2)
ALP SERPL-CCNC: 62 U/L (ref 45–117)
ALT SERPL-CCNC: 25 U/L (ref 12–78)
ANION GAP SERPL CALC-SCNC: 5 MMOL/L (ref 5–15)
AST SERPL-CCNC: 19 U/L (ref 15–37)
BILIRUB SERPL-MCNC: 0.4 MG/DL (ref 0.2–1)
BUN SERPL-MCNC: 2 MG/DL (ref 6–20)
BUN/CREAT SERPL: 4 (ref 12–20)
CALCIUM SERPL-MCNC: 8.5 MG/DL (ref 8.5–10.1)
CHLORIDE SERPL-SCNC: 102 MMOL/L (ref 97–108)
CO2 SERPL-SCNC: 24 MMOL/L (ref 21–32)
CREAT SERPL-MCNC: 0.55 MG/DL (ref 0.7–1.3)
ERYTHROCYTE [DISTWIDTH] IN BLOOD BY AUTOMATED COUNT: 12.4 % (ref 11.5–14.5)
GLOBULIN SER CALC-MCNC: 3.5 G/DL (ref 2–4)
GLUCOSE SERPL-MCNC: 111 MG/DL (ref 65–100)
HCT VFR BLD AUTO: 42.6 % (ref 36.6–50.3)
HGB BLD-MCNC: 14.8 G/DL (ref 12.1–17)
MAGNESIUM SERPL-MCNC: 1.8 MG/DL (ref 1.6–2.4)
MCH RBC QN AUTO: 32.7 PG (ref 26–34)
MCHC RBC AUTO-ENTMCNC: 34.7 G/DL (ref 30–36.5)
MCV RBC AUTO: 94 FL (ref 80–99)
NRBC # BLD: 0 K/UL (ref 0–0.01)
NRBC BLD-RTO: 0 PER 100 WBC
PHOSPHATE SERPL-MCNC: 2.8 MG/DL (ref 2.6–4.7)
PLATELET # BLD AUTO: 264 K/UL (ref 150–400)
PMV BLD AUTO: 9.7 FL (ref 8.9–12.9)
POTASSIUM SERPL-SCNC: 3.4 MMOL/L (ref 3.5–5.1)
PROT SERPL-MCNC: 6.6 G/DL (ref 6.4–8.2)
RBC # BLD AUTO: 4.53 M/UL (ref 4.1–5.7)
SODIUM SERPL-SCNC: 131 MMOL/L (ref 136–145)
WBC # BLD AUTO: 5.8 K/UL (ref 4.1–11.1)

## 2022-03-02 PROCEDURE — 65270000032 HC RM SEMIPRIVATE

## 2022-03-02 PROCEDURE — 74011250637 HC RX REV CODE- 250/637: Performed by: INTERNAL MEDICINE

## 2022-03-02 PROCEDURE — 0DTN4ZZ RESECTION OF SIGMOID COLON, PERCUTANEOUS ENDOSCOPIC APPROACH: ICD-10-PCS | Performed by: SURGERY

## 2022-03-02 PROCEDURE — 74011000250 HC RX REV CODE- 250: Performed by: SURGERY

## 2022-03-02 PROCEDURE — 80053 COMPREHEN METABOLIC PANEL: CPT

## 2022-03-02 PROCEDURE — 77030038157 HC DEV PWR CNTR DISP SIGNIA COVD -C: Performed by: SURGERY

## 2022-03-02 PROCEDURE — 0WUF4JZ SUPPLEMENT ABDOMINAL WALL WITH SYNTHETIC SUBSTITUTE, PERCUTANEOUS ENDOSCOPIC APPROACH: ICD-10-PCS | Performed by: SURGERY

## 2022-03-02 PROCEDURE — 2709999900 HC NON-CHARGEABLE SUPPLY

## 2022-03-02 PROCEDURE — 74011250636 HC RX REV CODE- 250/636: Performed by: NURSE ANESTHETIST, CERTIFIED REGISTERED

## 2022-03-02 PROCEDURE — 74011250637 HC RX REV CODE- 250/637: Performed by: HOSPITALIST

## 2022-03-02 PROCEDURE — 88307 TISSUE EXAM BY PATHOLOGIST: CPT

## 2022-03-02 PROCEDURE — 74011250636 HC RX REV CODE- 250/636: Performed by: ANESTHESIOLOGY

## 2022-03-02 PROCEDURE — 76210000000 HC OR PH I REC 2 TO 2.5 HR: Performed by: SURGERY

## 2022-03-02 PROCEDURE — 77030041238 HC TBNG INSUF MDII -A: Performed by: SURGERY

## 2022-03-02 PROCEDURE — 76010000133 HC OR TIME 3 TO 3.5 HR: Performed by: SURGERY

## 2022-03-02 PROCEDURE — 77030002895 HC DEV VASC CLOSR COVD -B: Performed by: SURGERY

## 2022-03-02 PROCEDURE — 74011250637 HC RX REV CODE- 250/637: Performed by: SURGERY

## 2022-03-02 PROCEDURE — 84100 ASSAY OF PHOSPHORUS: CPT

## 2022-03-02 PROCEDURE — 94640 AIRWAY INHALATION TREATMENT: CPT

## 2022-03-02 PROCEDURE — 77030042556 HC PNCL CAUT -B: Performed by: SURGERY

## 2022-03-02 PROCEDURE — 77030020829: Performed by: SURGERY

## 2022-03-02 PROCEDURE — 44204 LAPARO PARTIAL COLECTOMY: CPT | Performed by: SURGERY

## 2022-03-02 PROCEDURE — 77030012770 HC TRCR OPT FX AMR -B: Performed by: SURGERY

## 2022-03-02 PROCEDURE — 77030009965 HC RELD STPLR ENDOS COVD -D: Performed by: SURGERY

## 2022-03-02 PROCEDURE — 77030040922 HC BLNKT HYPOTHRM STRY -A

## 2022-03-02 PROCEDURE — 77030039895 HC SYST SMK EVAC LAP COVD -B: Performed by: SURGERY

## 2022-03-02 PROCEDURE — 77030008771 HC TU NG SALEM SUMP -A: Performed by: ANESTHESIOLOGY

## 2022-03-02 PROCEDURE — 77030040506 HC DRN WND MDII -A: Performed by: SURGERY

## 2022-03-02 PROCEDURE — 74011250636 HC RX REV CODE- 250/636: Performed by: INTERNAL MEDICINE

## 2022-03-02 PROCEDURE — 2709999900 HC NON-CHARGEABLE SUPPLY: Performed by: SURGERY

## 2022-03-02 PROCEDURE — 83735 ASSAY OF MAGNESIUM: CPT

## 2022-03-02 PROCEDURE — 85027 COMPLETE CBC AUTOMATED: CPT

## 2022-03-02 PROCEDURE — 77030009527 HC GEL PRT SYS AMR -E: Performed by: SURGERY

## 2022-03-02 PROCEDURE — P9045 ALBUMIN (HUMAN), 5%, 250 ML: HCPCS | Performed by: NURSE ANESTHETIST, CERTIFIED REGISTERED

## 2022-03-02 PROCEDURE — 74011000250 HC RX REV CODE- 250: Performed by: NURSE ANESTHETIST, CERTIFIED REGISTERED

## 2022-03-02 PROCEDURE — 77030008684 HC TU ET CUF COVD -B: Performed by: ANESTHESIOLOGY

## 2022-03-02 PROCEDURE — 77030037032 HC INSRT SCIS CLICKLLINE DISP STOR -B: Performed by: SURGERY

## 2022-03-02 PROCEDURE — 77030013079 HC BLNKT BAIR HGGR 3M -A: Performed by: ANESTHESIOLOGY

## 2022-03-02 PROCEDURE — 74011000250 HC RX REV CODE- 250: Performed by: INTERNAL MEDICINE

## 2022-03-02 PROCEDURE — 0DNE4ZZ RELEASE LARGE INTESTINE, PERCUTANEOUS ENDOSCOPIC APPROACH: ICD-10-PCS | Performed by: SURGERY

## 2022-03-02 PROCEDURE — 77030031139 HC SUT VCRL2 J&J -A: Performed by: SURGERY

## 2022-03-02 PROCEDURE — 77030034628 HC LIGASURE LAP SEAL DIV MD COVD -F: Performed by: SURGERY

## 2022-03-02 PROCEDURE — 77030008756 HC TU IRR SUC STRY -B: Performed by: SURGERY

## 2022-03-02 PROCEDURE — 74011000250 HC RX REV CODE- 250: Performed by: HOSPITALIST

## 2022-03-02 PROCEDURE — 77030040923 HC STPLR ENDOSC ECHELON J&J -E: Performed by: SURGERY

## 2022-03-02 PROCEDURE — 77030016151 HC PROTCTR LNS DFOG COVD -B: Performed by: SURGERY

## 2022-03-02 PROCEDURE — 77030040361 HC SLV COMPR DVT MDII -B: Performed by: SURGERY

## 2022-03-02 PROCEDURE — 74011250636 HC RX REV CODE- 250/636: Performed by: SURGERY

## 2022-03-02 PROCEDURE — 76060000037 HC ANESTHESIA 3 TO 3.5 HR: Performed by: SURGERY

## 2022-03-02 PROCEDURE — 77030009957 HC RELD ENDOSTCH COVD -C: Performed by: SURGERY

## 2022-03-02 PROCEDURE — 88305 TISSUE EXAM BY PATHOLOGIST: CPT

## 2022-03-02 PROCEDURE — 36415 COLL VENOUS BLD VENIPUNCTURE: CPT

## 2022-03-02 PROCEDURE — 77030040830 HC CATH URETH FOL MDII -A: Performed by: SURGERY

## 2022-03-02 PROCEDURE — C9113 INJ PANTOPRAZOLE SODIUM, VIA: HCPCS | Performed by: INTERNAL MEDICINE

## 2022-03-02 RX ORDER — BUPIVACAINE HYDROCHLORIDE 5 MG/ML
INJECTION, SOLUTION EPIDURAL; INTRACAUDAL AS NEEDED
Status: DISCONTINUED | OUTPATIENT
Start: 2022-03-02 | End: 2022-03-02 | Stop reason: HOSPADM

## 2022-03-02 RX ORDER — ACETAMINOPHEN 500 MG
1000 TABLET ORAL ONCE
Status: COMPLETED | OUTPATIENT
Start: 2022-03-02 | End: 2022-03-02

## 2022-03-02 RX ORDER — FENTANYL CITRATE 50 UG/ML
50 INJECTION, SOLUTION INTRAMUSCULAR; INTRAVENOUS AS NEEDED
Status: DISCONTINUED | OUTPATIENT
Start: 2022-03-02 | End: 2022-03-02 | Stop reason: HOSPADM

## 2022-03-02 RX ORDER — DEXAMETHASONE SODIUM PHOSPHATE 4 MG/ML
INJECTION, SOLUTION INTRA-ARTICULAR; INTRALESIONAL; INTRAMUSCULAR; INTRAVENOUS; SOFT TISSUE AS NEEDED
Status: DISCONTINUED | OUTPATIENT
Start: 2022-03-02 | End: 2022-03-02 | Stop reason: HOSPADM

## 2022-03-02 RX ORDER — DEXMEDETOMIDINE HYDROCHLORIDE 100 UG/ML
INJECTION, SOLUTION INTRAVENOUS AS NEEDED
Status: DISCONTINUED | OUTPATIENT
Start: 2022-03-02 | End: 2022-03-02 | Stop reason: HOSPADM

## 2022-03-02 RX ORDER — ONDANSETRON 2 MG/ML
INJECTION INTRAMUSCULAR; INTRAVENOUS AS NEEDED
Status: DISCONTINUED | OUTPATIENT
Start: 2022-03-02 | End: 2022-03-02 | Stop reason: HOSPADM

## 2022-03-02 RX ORDER — CELECOXIB 200 MG/1
200 CAPSULE ORAL ONCE
Status: COMPLETED | OUTPATIENT
Start: 2022-03-02 | End: 2022-03-02

## 2022-03-02 RX ORDER — FENTANYL CITRATE 50 UG/ML
INJECTION, SOLUTION INTRAMUSCULAR; INTRAVENOUS AS NEEDED
Status: DISCONTINUED | OUTPATIENT
Start: 2022-03-02 | End: 2022-03-02 | Stop reason: HOSPADM

## 2022-03-02 RX ORDER — MAGNESIUM SULFATE HEPTAHYDRATE 40 MG/ML
INJECTION, SOLUTION INTRAVENOUS AS NEEDED
Status: DISCONTINUED | OUTPATIENT
Start: 2022-03-02 | End: 2022-03-02 | Stop reason: HOSPADM

## 2022-03-02 RX ORDER — SODIUM CHLORIDE, SODIUM LACTATE, POTASSIUM CHLORIDE, CALCIUM CHLORIDE 600; 310; 30; 20 MG/100ML; MG/100ML; MG/100ML; MG/100ML
75 INJECTION, SOLUTION INTRAVENOUS CONTINUOUS
Status: DISCONTINUED | OUTPATIENT
Start: 2022-03-02 | End: 2022-03-02 | Stop reason: HOSPADM

## 2022-03-02 RX ORDER — PROPOFOL 10 MG/ML
INJECTION, EMULSION INTRAVENOUS AS NEEDED
Status: DISCONTINUED | OUTPATIENT
Start: 2022-03-02 | End: 2022-03-02 | Stop reason: HOSPADM

## 2022-03-02 RX ORDER — SODIUM CHLORIDE 0.9 % (FLUSH) 0.9 %
5-40 SYRINGE (ML) INJECTION EVERY 8 HOURS
Status: DISCONTINUED | OUTPATIENT
Start: 2022-03-02 | End: 2022-03-17 | Stop reason: HOSPADM

## 2022-03-02 RX ORDER — LIDOCAINE HYDROCHLORIDE 10 MG/ML
0.1 INJECTION, SOLUTION EPIDURAL; INFILTRATION; INTRACAUDAL; PERINEURAL AS NEEDED
Status: DISCONTINUED | OUTPATIENT
Start: 2022-03-02 | End: 2022-03-02 | Stop reason: HOSPADM

## 2022-03-02 RX ORDER — ONDANSETRON 4 MG/1
4 TABLET, ORALLY DISINTEGRATING ORAL
Status: DISCONTINUED | OUTPATIENT
Start: 2022-03-02 | End: 2022-03-17 | Stop reason: HOSPADM

## 2022-03-02 RX ORDER — HYDROMORPHONE HYDROCHLORIDE 2 MG/ML
INJECTION, SOLUTION INTRAMUSCULAR; INTRAVENOUS; SUBCUTANEOUS AS NEEDED
Status: DISCONTINUED | OUTPATIENT
Start: 2022-03-02 | End: 2022-03-02 | Stop reason: HOSPADM

## 2022-03-02 RX ORDER — ALBUMIN HUMAN 50 G/1000ML
SOLUTION INTRAVENOUS AS NEEDED
Status: DISCONTINUED | OUTPATIENT
Start: 2022-03-02 | End: 2022-03-02 | Stop reason: HOSPADM

## 2022-03-02 RX ORDER — SODIUM CHLORIDE, SODIUM LACTATE, POTASSIUM CHLORIDE, CALCIUM CHLORIDE 600; 310; 30; 20 MG/100ML; MG/100ML; MG/100ML; MG/100ML
25 INJECTION, SOLUTION INTRAVENOUS CONTINUOUS
Status: DISCONTINUED | OUTPATIENT
Start: 2022-03-02 | End: 2022-03-02 | Stop reason: HOSPADM

## 2022-03-02 RX ORDER — FENTANYL CITRATE 50 UG/ML
25 INJECTION, SOLUTION INTRAMUSCULAR; INTRAVENOUS
Status: COMPLETED | OUTPATIENT
Start: 2022-03-02 | End: 2022-03-02

## 2022-03-02 RX ORDER — SODIUM CHLORIDE, SODIUM LACTATE, POTASSIUM CHLORIDE, CALCIUM CHLORIDE 600; 310; 30; 20 MG/100ML; MG/100ML; MG/100ML; MG/100ML
INJECTION, SOLUTION INTRAVENOUS
Status: DISCONTINUED | OUTPATIENT
Start: 2022-03-02 | End: 2022-03-02 | Stop reason: HOSPADM

## 2022-03-02 RX ORDER — SODIUM CHLORIDE 9 MG/ML
75 INJECTION, SOLUTION INTRAVENOUS CONTINUOUS
Status: DISCONTINUED | OUTPATIENT
Start: 2022-03-02 | End: 2022-03-04

## 2022-03-02 RX ORDER — HYDROMORPHONE HYDROCHLORIDE 1 MG/ML
.2-.5 INJECTION, SOLUTION INTRAMUSCULAR; INTRAVENOUS; SUBCUTANEOUS
Status: DISCONTINUED | OUTPATIENT
Start: 2022-03-02 | End: 2022-03-02 | Stop reason: HOSPADM

## 2022-03-02 RX ORDER — MIDAZOLAM HYDROCHLORIDE 1 MG/ML
1 INJECTION, SOLUTION INTRAMUSCULAR; INTRAVENOUS AS NEEDED
Status: DISCONTINUED | OUTPATIENT
Start: 2022-03-02 | End: 2022-03-02 | Stop reason: HOSPADM

## 2022-03-02 RX ORDER — ONDANSETRON 2 MG/ML
4 INJECTION INTRAMUSCULAR; INTRAVENOUS
Status: DISCONTINUED | OUTPATIENT
Start: 2022-03-02 | End: 2022-03-17 | Stop reason: HOSPADM

## 2022-03-02 RX ORDER — MIDAZOLAM HYDROCHLORIDE 1 MG/ML
INJECTION, SOLUTION INTRAMUSCULAR; INTRAVENOUS AS NEEDED
Status: DISCONTINUED | OUTPATIENT
Start: 2022-03-02 | End: 2022-03-02 | Stop reason: HOSPADM

## 2022-03-02 RX ORDER — SUCCINYLCHOLINE CHLORIDE 20 MG/ML
INJECTION INTRAMUSCULAR; INTRAVENOUS AS NEEDED
Status: DISCONTINUED | OUTPATIENT
Start: 2022-03-02 | End: 2022-03-02 | Stop reason: HOSPADM

## 2022-03-02 RX ORDER — SODIUM CHLORIDE 0.9 % (FLUSH) 0.9 %
5-40 SYRINGE (ML) INJECTION AS NEEDED
Status: DISCONTINUED | OUTPATIENT
Start: 2022-03-02 | End: 2022-03-17 | Stop reason: HOSPADM

## 2022-03-02 RX ORDER — ROCURONIUM BROMIDE 10 MG/ML
INJECTION, SOLUTION INTRAVENOUS AS NEEDED
Status: DISCONTINUED | OUTPATIENT
Start: 2022-03-02 | End: 2022-03-02 | Stop reason: HOSPADM

## 2022-03-02 RX ORDER — KETAMINE HYDROCHLORIDE 50 MG/ML
INJECTION, SOLUTION INTRAMUSCULAR; INTRAVENOUS AS NEEDED
Status: DISCONTINUED | OUTPATIENT
Start: 2022-03-02 | End: 2022-03-02 | Stop reason: HOSPADM

## 2022-03-02 RX ORDER — PHENYLEPHRINE HCL IN 0.9% NACL 0.4MG/10ML
SYRINGE (ML) INTRAVENOUS AS NEEDED
Status: DISCONTINUED | OUTPATIENT
Start: 2022-03-02 | End: 2022-03-02 | Stop reason: HOSPADM

## 2022-03-02 RX ORDER — LIDOCAINE HYDROCHLORIDE 20 MG/ML
INJECTION, SOLUTION EPIDURAL; INFILTRATION; INTRACAUDAL; PERINEURAL AS NEEDED
Status: DISCONTINUED | OUTPATIENT
Start: 2022-03-02 | End: 2022-03-02 | Stop reason: HOSPADM

## 2022-03-02 RX ORDER — LIDOCAINE HYDROCHLORIDE ANHYDROUS AND DEXTROSE MONOHYDRATE .8; 5 G/100ML; G/100ML
INJECTION, SOLUTION INTRAVENOUS
Status: DISCONTINUED | OUTPATIENT
Start: 2022-03-02 | End: 2022-03-02 | Stop reason: HOSPADM

## 2022-03-02 RX ORDER — ENOXAPARIN SODIUM 100 MG/ML
40 INJECTION SUBCUTANEOUS DAILY
Status: DISCONTINUED | OUTPATIENT
Start: 2022-03-03 | End: 2022-03-10

## 2022-03-02 RX ORDER — HYDROMORPHONE HYDROCHLORIDE 1 MG/ML
1 INJECTION, SOLUTION INTRAMUSCULAR; INTRAVENOUS; SUBCUTANEOUS
Status: DISCONTINUED | OUTPATIENT
Start: 2022-03-02 | End: 2022-03-15

## 2022-03-02 RX ORDER — OXYCODONE HYDROCHLORIDE 5 MG/1
5 TABLET ORAL
Status: DISCONTINUED | OUTPATIENT
Start: 2022-03-02 | End: 2022-03-17 | Stop reason: HOSPADM

## 2022-03-02 RX ORDER — GABAPENTIN 300 MG/1
600 CAPSULE ORAL ONCE
Status: DISCONTINUED | OUTPATIENT
Start: 2022-03-02 | End: 2022-03-02 | Stop reason: HOSPADM

## 2022-03-02 RX ADMIN — SODIUM CHLORIDE, POTASSIUM CHLORIDE, SODIUM LACTATE AND CALCIUM CHLORIDE: 600; 310; 30; 20 INJECTION, SOLUTION INTRAVENOUS at 15:04

## 2022-03-02 RX ADMIN — SUGAMMADEX 400 MG: 100 INJECTION, SOLUTION INTRAVENOUS at 18:04

## 2022-03-02 RX ADMIN — ONDANSETRON HYDROCHLORIDE 4 MG: 2 INJECTION, SOLUTION INTRAMUSCULAR; INTRAVENOUS at 18:04

## 2022-03-02 RX ADMIN — ALBUMIN (HUMAN) 250 ML: 12.5 INJECTION, SOLUTION INTRAVENOUS at 17:46

## 2022-03-02 RX ADMIN — FENTANYL CITRATE 25 MCG: 50 INJECTION INTRAMUSCULAR; INTRAVENOUS at 19:35

## 2022-03-02 RX ADMIN — CELECOXIB 200 MG: 200 CAPSULE ORAL at 12:14

## 2022-03-02 RX ADMIN — FENTANYL CITRATE 100 MCG: 50 INJECTION, SOLUTION INTRAMUSCULAR; INTRAVENOUS at 15:21

## 2022-03-02 RX ADMIN — Medication 10 ML: at 21:16

## 2022-03-02 RX ADMIN — ONDANSETRON HYDROCHLORIDE 4 MG: 2 INJECTION, SOLUTION INTRAMUSCULAR; INTRAVENOUS at 15:42

## 2022-03-02 RX ADMIN — HYDROMORPHONE HYDROCHLORIDE 0.5 MG: 1 INJECTION, SOLUTION INTRAMUSCULAR; INTRAVENOUS; SUBCUTANEOUS at 19:15

## 2022-03-02 RX ADMIN — GABAPENTIN 600 MG: 600 TABLET, FILM COATED ORAL at 21:15

## 2022-03-02 RX ADMIN — CLONAZEPAM 1 MG: 1 TABLET ORAL at 23:33

## 2022-03-02 RX ADMIN — CEFOXITIN SODIUM 2 G: 2 POWDER, FOR SOLUTION INTRAVENOUS at 15:31

## 2022-03-02 RX ADMIN — CEFOXITIN SODIUM 2 G: 2 POWDER, FOR SOLUTION INTRAVENOUS at 17:31

## 2022-03-02 RX ADMIN — SODIUM CHLORIDE, PRESERVATIVE FREE 10 ML: 5 INJECTION INTRAVENOUS at 10:29

## 2022-03-02 RX ADMIN — FENTANYL CITRATE 25 MCG: 50 INJECTION INTRAMUSCULAR; INTRAVENOUS at 19:20

## 2022-03-02 RX ADMIN — SODIUM CHLORIDE 125 ML/HR: 9 INJECTION, SOLUTION INTRAVENOUS at 19:50

## 2022-03-02 RX ADMIN — KETAMINE HYDROCHLORIDE 40 MG: 50 INJECTION, SOLUTION INTRAMUSCULAR; INTRAVENOUS at 15:21

## 2022-03-02 RX ADMIN — FENTANYL CITRATE 25 MCG: 50 INJECTION INTRAMUSCULAR; INTRAVENOUS at 19:25

## 2022-03-02 RX ADMIN — SODIUM CHLORIDE, POTASSIUM CHLORIDE, SODIUM LACTATE AND CALCIUM CHLORIDE 75 ML/HR: 600; 310; 30; 20 INJECTION, SOLUTION INTRAVENOUS at 12:10

## 2022-03-02 RX ADMIN — ACETAMINOPHEN 1000 MG: 500 TABLET ORAL at 12:14

## 2022-03-02 RX ADMIN — ARFORMOTEROL TARTRATE 15 MCG: 15 SOLUTION RESPIRATORY (INHALATION) at 08:33

## 2022-03-02 RX ADMIN — FENTANYL CITRATE 25 MCG: 50 INJECTION INTRAMUSCULAR; INTRAVENOUS at 19:40

## 2022-03-02 RX ADMIN — LIDOCAINE HYDROCHLORIDE 2 MG/KG/HR: 8 INJECTION, SOLUTION INTRAVENOUS at 15:26

## 2022-03-02 RX ADMIN — PHENYLEPHRINE HYDROCHLORIDE 80 MCG: 10 INJECTION INTRAVENOUS at 15:41

## 2022-03-02 RX ADMIN — OXYCODONE 5 MG: 5 TABLET ORAL at 23:33

## 2022-03-02 RX ADMIN — BUDESONIDE 500 MCG: 0.5 INHALANT RESPIRATORY (INHALATION) at 08:33

## 2022-03-02 RX ADMIN — ROCURONIUM BROMIDE 20 MG: 10 SOLUTION INTRAVENOUS at 17:10

## 2022-03-02 RX ADMIN — DEXMEDETOMIDINE HYDROCHLORIDE 10 MCG: 100 INJECTION, SOLUTION, CONCENTRATE INTRAVENOUS at 16:48

## 2022-03-02 RX ADMIN — KETAMINE HYDROCHLORIDE 10 MG: 50 INJECTION, SOLUTION INTRAMUSCULAR; INTRAVENOUS at 16:04

## 2022-03-02 RX ADMIN — SODIUM CHLORIDE, PRESERVATIVE FREE 10 ML: 5 INJECTION INTRAVENOUS at 21:16

## 2022-03-02 RX ADMIN — DEXAMETHASONE SODIUM PHOSPHATE 4 MG: 4 INJECTION, SOLUTION INTRAMUSCULAR; INTRAVENOUS at 15:34

## 2022-03-02 RX ADMIN — DULOXETINE HYDROCHLORIDE 30 MG: 30 CAPSULE, DELAYED RELEASE ORAL at 06:13

## 2022-03-02 RX ADMIN — MIDAZOLAM 4 MG: 1 INJECTION INTRAMUSCULAR; INTRAVENOUS at 15:16

## 2022-03-02 RX ADMIN — FENTANYL CITRATE 25 MCG: 50 INJECTION INTRAMUSCULAR; INTRAVENOUS at 18:50

## 2022-03-02 RX ADMIN — HYDROMORPHONE HYDROCHLORIDE 0.5 MG: 2 INJECTION, SOLUTION INTRAMUSCULAR; INTRAVENOUS; SUBCUTANEOUS at 16:57

## 2022-03-02 RX ADMIN — SODIUM CHLORIDE, POTASSIUM CHLORIDE, SODIUM LACTATE AND CALCIUM CHLORIDE: 600; 310; 30; 20 INJECTION, SOLUTION INTRAVENOUS at 17:00

## 2022-03-02 RX ADMIN — FENTANYL CITRATE 50 MCG: 50 INJECTION, SOLUTION INTRAMUSCULAR; INTRAVENOUS at 16:12

## 2022-03-02 RX ADMIN — NALOXEGOL OXALATE 25 MG: 25 TABLET, FILM COATED ORAL at 12:14

## 2022-03-02 RX ADMIN — LIDOCAINE HYDROCHLORIDE 100 MG: 20 INJECTION, SOLUTION EPIDURAL; INFILTRATION; INTRACAUDAL; PERINEURAL at 15:21

## 2022-03-02 RX ADMIN — CLONAZEPAM 1 MG: 1 TABLET ORAL at 00:05

## 2022-03-02 RX ADMIN — DEXMEDETOMIDINE HYDROCHLORIDE 10 MCG: 100 INJECTION, SOLUTION, CONCENTRATE INTRAVENOUS at 16:05

## 2022-03-02 RX ADMIN — Medication 80 MCG: at 17:55

## 2022-03-02 RX ADMIN — HYDROMORPHONE HYDROCHLORIDE 1 MG: 1 INJECTION, SOLUTION INTRAMUSCULAR; INTRAVENOUS; SUBCUTANEOUS at 21:15

## 2022-03-02 RX ADMIN — PANTOPRAZOLE SODIUM 40 MG: 40 INJECTION, POWDER, FOR SOLUTION INTRAVENOUS at 10:28

## 2022-03-02 RX ADMIN — FENTANYL CITRATE 25 MCG: 50 INJECTION INTRAMUSCULAR; INTRAVENOUS at 19:10

## 2022-03-02 RX ADMIN — FENTANYL CITRATE 25 MCG: 50 INJECTION INTRAMUSCULAR; INTRAVENOUS at 19:00

## 2022-03-02 RX ADMIN — HYDROMORPHONE HYDROCHLORIDE 0.5 MG: 2 INJECTION, SOLUTION INTRAMUSCULAR; INTRAVENOUS; SUBCUTANEOUS at 18:23

## 2022-03-02 RX ADMIN — FENTANYL CITRATE 25 MCG: 50 INJECTION INTRAMUSCULAR; INTRAVENOUS at 18:55

## 2022-03-02 RX ADMIN — DULOXETINE 60 MG: 30 CAPSULE, DELAYED RELEASE ORAL at 21:15

## 2022-03-02 RX ADMIN — PHENYLEPHRINE HYDROCHLORIDE 20 MCG/MIN: 10 INJECTION INTRAVENOUS at 15:48

## 2022-03-02 RX ADMIN — PHENYLEPHRINE HYDROCHLORIDE 80 MCG: 10 INJECTION INTRAVENOUS at 15:45

## 2022-03-02 RX ADMIN — ROCURONIUM BROMIDE 45 MG: 10 SOLUTION INTRAVENOUS at 15:29

## 2022-03-02 RX ADMIN — SUCCINYLCHOLINE CHLORIDE 140 MG: 20 INJECTION, SOLUTION INTRAMUSCULAR; INTRAVENOUS at 15:21

## 2022-03-02 RX ADMIN — MAGNESIUM SULFATE 2 G: 2 INJECTION INTRAVENOUS at 15:48

## 2022-03-02 RX ADMIN — HYDROMORPHONE HYDROCHLORIDE 0.5 MG: 1 INJECTION, SOLUTION INTRAMUSCULAR; INTRAVENOUS; SUBCUTANEOUS at 19:30

## 2022-03-02 RX ADMIN — ROCURONIUM BROMIDE 5 MG: 10 SOLUTION INTRAVENOUS at 15:21

## 2022-03-02 RX ADMIN — PROPOFOL 200 MG: 10 INJECTION, EMULSION INTRAVENOUS at 15:21

## 2022-03-02 NOTE — ROUTINE PROCESS
TRANSFER - IN REPORT:    Verbal report received from Duarte Loyd on Clovia Mantle  being received from [facility](unit) for ordered procedure      Report consisted of patients Situation, Background, Assessment and   Recommendations(SBAR). Information from the following report(s) SBAR was reviewed with the receiving nurse. Opportunity for questions and clarification was provided. Assessment completed upon patients arrival to unit and care assumed.

## 2022-03-02 NOTE — PROGRESS NOTES
Rapid covid swab sent. Bedside and Verbal shift change report given to [facility]. RN  (oncoming nurse) by Lynn Burton (offgoing nurse). Report included the following information SBAR and Kardex.

## 2022-03-02 NOTE — PROGRESS NOTES
Bedside and Verbal shift change report given to Levi Lui RN (oncoming nurse) by Balbina Mohan RN (offgoing nurse). Report included the following information SBAR, Kardex, Intake/Output and MAR.

## 2022-03-02 NOTE — ANESTHESIA PREPROCEDURE EVALUATION
Relevant Problems   RESPIRATORY SYSTEM   (+) SOB (shortness of breath)      CARDIOVASCULAR   (+) Essential hypertension      PERSONAL HX & FAMILY HX OF CANCER   (+) Lung cancer (HCC)       Anesthetic History   No history of anesthetic complications            Review of Systems / Medical History  Patient summary reviewed, nursing notes reviewed and pertinent labs reviewed    Pulmonary  Within defined limits  COPD: moderate               Neuro/Psych   Within defined limits      Psychiatric history     Cardiovascular  Within defined limits  Hypertension              Exercise tolerance: >4 METS  Comments: Last Echo:  Left Ventricle Left ventricle size is normal. Normal wall thickness. Normal wall motion. Normal left ventricular systolic function with a visually estimated EF of 55 - 60%. Normal diastolic function. Left Atrium:Left atrium size is normal.  Right Ventricle Right ventricle size is normal. Normal wall thickness. Normal systolic function. Right Atrium Right atrium size is normal.  Aortic Valve Valve structure is normal. No transvalvular regurgitation. No stenosis. Mitral Valve:Valve structure is normal. No transvalvular regurgitation. No stenosis noted. Tricuspid Valve:Valve structure is normal. Trace transvalvular regurgitation. No stenosis noted. Pulmonic Valve: The pulmonic valve was not well visualized. Aorta:Mildly dilated annulus. No pericardial effusion.        GI/Hepatic/Renal  Within defined limits              Endo/Other  Within defined limits           Other Findings              Physical Exam    Airway  Mallampati: II  TM Distance: 4 - 6 cm  Neck ROM: normal range of motion   Mouth opening: Normal     Cardiovascular  Regular rate and rhythm,  S1 and S2 normal,  no murmur, click, rub, or gallop  Rhythm: regular  Rate: normal         Dental  No notable dental hx       Pulmonary  Breath sounds clear to auscultation               Abdominal  GI exam deferred       Other Findings Anesthetic Plan    ASA: 3  Anesthesia type: general    Monitoring Plan: BIS      Induction: Intravenous  Anesthetic plan and risks discussed with: Patient

## 2022-03-02 NOTE — PROGRESS NOTES
Transition of Care: TBD; patient normally lives in an adult group home called [address] at [facility] [address]     Transport Plan: TBD; likely roundtrip ride via [facility] or [address]: 13%    DX: Sigmoid volvulus     Main contact is caregiver and owner of group home- Yogi Thompson [phone]     Discharge pending:  -pending laparoscopic sigmoid resection (scheduled for today- Wednesday 3/3)  -pending medical progress and care recs    CM following  Sarah Gonzalez, RN, CRM     NOTE: pleasant patient at bedside; he is alert and oriented x 3; patient states he lives at stated address (which is a group home) for several years; he is normally independent in his ADLs; he does not drive; his caregiver- Yogi Thompson-  takes him to appointments;  the group home helps him with medication management;  he does not own a walker or cane to ambulate; he confirms he sees Dr. Nimo Crystal in the same practice as his listed pcp- Darryl Dobbins; patient states he does not have a history with any home health agency

## 2022-03-02 NOTE — ADT AUTH CERT NOTES
Comments  Comment            Patient Demographics    Patient Name   Jennifer Hardin   [id number] Legal Sex   Male    1965 Address   [address] [place] [facility] [address] Phone   [phone] (Home) *Preferred*   [phone] (Mobile)     Patient Demographics    Patient Name   Jennifer Hardin   [id number] Legal Sex   Male    1965 Address   [address] [place] [facility] [address] Phone   [phone] (Home) *Preferred*   [phone] (Mobile)   CSN:   [id number]     Admit Date: Admit Time Room Bed   2022 10:35  [] 02 [[id number]]       Attending Providers    Provider Pager From To   Vonnie Banks MD  22   Stephanie Hummel MD  22   Lynnwood Fothergill, MD  22   Stephanie Hummel MD  22   Lynnwood Fothergill, MD  22   Zo Teague MD  22   Bri Kaye DO  22      Emergency Contact(s)    Name Relation Home Work [address] Friend [phone]       Utilization Reviews         Intestinal Obstruction - Care Day 3 (2022) by Bar Barth RN       Review Entered Review Status   3/1/2022 13:02 Completed      Criteria Review      Care Day: 3 Care Date: 2022 Level of Care: Inpatient Floor    Guideline Day 3    Clinical Status    ( ) * Hemodynamic stability    3/1/2022 13:02:09 EST by Bar Barth      98.5;90;217/105, 174/93,150/98; 16;97% RA    (X) * Nausea and vomiting absent or controlled and acceptable for next level of care    3/1/2022 13:02:09 EST by Bar Barth      none noted    ( ) * Electrolyte abnormalities absent or acceptable for next level of care    3/1/2022 13:02:09 EST by Bar Barth      [id number] (L)  Potassium3.3 (L)    (X) * Oral hydration maintained    (X) * Pain absent or managed    3/1/2022 13:02:09 EST by Bar Barth      managed, headache    ( ) * Surgery not indicated    3/1/2022 13:02:09 EST by Oriana Cervantes scheduled wed    ( ) * Discharge plans and education understood    Activity    (X) * Ambulatory or acceptable for next level of care    3/1/2022 13:02:09 EST by Giovani Melgar      with assistance    Routes    (X) * Oral hydration    3/1/2022 13:02:09 EST by Giovani Melgar      clear liquid    (X) * Oral medications or regimen acceptable for next level of care    3/1/2022 13:02:09 EST by Giovani Melgar      risperiDONE (RisperDAL) tablet 2 mg  Dose: 2 mg  Freq: 2 TIMES DAILY Route: PO    ( ) * Oral diet or acceptable for next level of care    3/1/2022 13:02:09 EST [facility]      clear liquid    * Milestone   Additional Notes   Day 3   2/28/22   Inpt [facility]      Medications   arformoteroL (BROVANA) neb solution 15 mcg   Dose: 15 mcg   Freq: 2 TIMES DAILY RESP Route: NEBULIZATION      budesonide (PULMICORT) 500 mcg/2 ml nebulizer suspension   Dose: 500 mcg   Freq: 2 TIMES DAILY RESP Route: NEBULIZATION      benztropine (COGENTIN) tablet 1 mg   Dose: 1 mg   Freq: 2 TIMES DAILY Route: PO      butalbital-acetaminophen-caffeine (FIORICET, ESGIC) -40 mg per tablet 1 Tablet   Dose: 1 Tablet   Freq: EVERY 4 HOURS AS NEEDED Route: PO X 1      DULoxetine (CYMBALTA) capsule 30 mg   Dose: 30 mg   Freq: 7AM Route: PO      DULoxetine (CYMBALTA) capsule 60 mg   Dose: 60 mg   Freq: EVERY BEDTIME Route: PO      gabapentin (NEURONTIN) tablet 600 mg   Dose: 600 mg   Freq: 3 TIMES DAILY Route: PO      hydrALAZINE (APRESOLINE) 20 mg/mL injection 10 mg   Dose: 10 mg   Freq: EVERY 6 HOURS AS NEEDED Route: IV X 1      pantoprazole (PROTONIX) injection 40 mg   Dose: 40 mg   Freq: DAILY Route: IV      risperiDONE (RisperDAL) tablet 2 mg   Dose: 2 mg   Freq: 2 TIMES DAILY Route: PO      Vitals   98.5;90;217/105, 174/93,150/98; 16;97% RA      Labs      MONOCYTES 14 (H)   IMMATURE GRANULOCYTES 0   ABS. NEUTROPHILS 3.6   ABS. IMM. GRANS. 0.0   ABS.  MONOCYTES 0.8   Sodium 133 (L)   Potassium 3.3 (L)   Chloride 103   Anion gap 3 (L)   Glucose 93   BUN 5 (L) Creatinine 0.64 (L)   BUN/Creatinine ratio 8 (L)   Calcium 8.0 (L)   Phosphorus 1.7 (L)   Bilirubin, total 0.6   Albumin 2.8 (L)   Globulin 3.6   A-G Ratio 0.8 (L)         Attending   Patient complaining of headache       Assessment & Plan:           #Sigmoid volvulus POA resolved   -status post endoscopic decompression   -General surgery on board appreciate help   -Patient will need bowel prep before going for surgery       #SBO resolving   -NG tube out   -Clear liquids           #H/o cardiomyopathy   -TTE with EF 55- 60%   -Cardiology on board for preop clearance for general surgery request         General Surgeon        2 Days Post-Op       Procedure:  Procedure(s):   COLON DECOMPRESSION   SIGMOIDOSCOPY FLEXIBLE       Subjective:       Patient is tolerating bowel prep; no nausea or vomiting with NGT clamped; no abdominal pain or bloating. Plan/Recommendations/Medical Decision Making:       Remove NGT, continue bowel prep. Clear liquids. IV fluids per Nephrology. Cardiology evaluation pending. Out of bed in chair. Plan laparoscopic sigmoid resection on Wednesday. Cardiology   Reason for Consult: hx of NICM, cardiac clearance for sigmoid resection.        Assessment/Plan:   1. Sigmoid Volvulus-  s/p flex sig with detorsion, plan for  Sigmoid resection later this week.     2.  History of nonischemic cardiomyopathy: Diagnosed 2017 ejection fraction 30-35%, however most recent echocardiogram now suggests normalization of EF. .       Nephrology      Assessment   :                                               Plan:   Hyponatremia - chronic - mildly symptomatic   Sigmoid volvulus   Hypokalemia/Hypophosphatemia         His sodium was 129 in late December.  Na 123 Friday . Chronicity points towards SIADH but may have a dual component.   Low urine sodium suggests poor solute intake in the setting of sigmoid volvulus.        Sodium up to 133 today.       Supplement IV KPhos 20mmol x1 dose       Awaiting surgery on Wednesday                            Intestinal Obstruction - Care Day 2 (2/27/2022) by Obie Desir RN       Review Entered Review Status   2/28/2022 17:11 Completed      Criteria Review      Care Day: 2 Care Date: 2/27/2022 Level of Care: Inpatient Floor    Guideline Day 2    Clinical Status    (X) * Hypotension absent    2/28/2022 17:11:12 EST by Jordan Dalal      148/91    (X) * Nausea and vomiting absent or improved    2/28/2022 17:11:12 EST by Taylor Rios    (X) * Pain absent or managed    2/28/2022 17:11:12 EST by Jordan Dalal      Feeling better    (X) * Abdominal exam stable or improved    2/28/2022 17:11:12 EST by Jordan Dalal      GI: Soft, mild distended, non tender. + bowel sounds, no hepatosplenomegaly    Routes    (X) IV fluids    2/28/2022 17:11:12 EST by Jordan Dalal      0.9% sodium chloride infusion  Rate: 100 mL/hr Dose: 100 mL/hr  Freq: CONTINUOUS Route: IV    (X) IV medications    2/28/2022 17:11:12 EST by Gordon Ron injection 40 mg IV QD    Interventions    ( ) * NG tube absent    2/28/2022 17:11:12 EST by Jordan Dalal      NGT    (X) Possible follow-up abdominal imaging    2/28/2022 17:11:12 EST by Helga Ashley CTA:  IMPRESSION  No acute process or evidence of pulmonary embolism. Postoperative  changes left lower lobe. KUB: Persistent diffuse small bowel distention. * Milestone   Additional Notes   2/27   IP   Admission Summary:   Reyes Still a 61 y. o. male who presents complaining of constipation for 6 days.  Patient states that it is common for him not to have a bowel movement for 5 days but once it turned into 6 he was concerned. Torsten Egan states that his abdomen does take extended a couple times per month.  Never as bad as it did when he came in. Torsten Egan has never had a colonoscopy before. Torsten Egan has a history of schizophrenia and is on multiple psych medications.  CT noticed Sigmoid volvulus with small bowel obstruction in ER 2/25,    Patient underwent colonoscopic decompression  2/25 midnight and was found to have large colotomy colon.  He additionally started vomiting and had NG tube placed 2/26 am               Interval history / Subjective:   Feeling better    Had BM today. NGT        Assessment & Plan:        Sigmoid volvulus   -status post endoscopic decompression   -general surgeon consulted: He will benefit from undergoing a sigmoid resection   And surgery Will work on timing of surgery       SBO:   NGT, WS with low pressure   Follow KUB    General surgeon following        H/o cardiomyopathy   EF was 30% per Echo in 2017, repeat Echo 11/30 EF normal at 55%   Will repeat echo   Currently no chf         Hyponatremia.  This may be due to volume depletion.     Improved after IVF.        Paranoid schizophrenia.      resume preadmission medication        COPD.      DuoNeb as needed.  The patient is not in acute exacerbation of COPD at this time.       Hypertension. Monitor BP.         Dyslipidemia.      will resume home medication once the patient is no longer n.p.o.       Tobacco abuse.  The patient advised to quit smoking. Edelmira Man will place the patient on Nicoderm patch.        Hyperglycemia.  We will check hemoglobin A1c level.  The patient has no history of diabetes.       Leukocytosis.  The patient is afebrile, not toxic.  Lactic acid level is within normal limits. UA negative,   chest xray, chest ct lung clear.     Hold abx and monitor             Left lung cancer, status post resection.     .                   Code status: full    Prophylaxis: scd    Care Plan discussed with: patient    Anticipated Disposition: TBD        BP (!) 148/91   Pulse 86   Temp 98.2 °F (36.8 °C)   Resp 16   Ht 5' 7\" (1.702 m)   Wt 68 kg (150 lb)   SpO2 98%  RA   BMI 23.49 kg/m²               Physical Examination:                                                      Constitutional: No acute distress, cooperative, pleasant    ENT: Oral mucosa moist, oropharynx benign. NGT    Resp: CTA bilaterally. No wheezing/rhonchi/rales. No accessory muscle use. CV: Regular rhythm, normal rate, no murmurs, gallops, rubs    GI: Soft, mild distended, non tender. + bowel sounds, no hepatosplenomegaly     Musculoskeletal: No edema, warm, 2+ pulses throughout    Neurologic: Moves all extremities.  AAOx3, CN II-XII reviewed                               MEDS:   · nicotine (NICODERM CQ) 21 mg/24 hr patch 1 Patch 1 Patch TransDERmal DAILY   · pantoprazole (PROTONIX) injection 40 mg 40 mg IntraVENous DAILY   · benztropine (COGENTIN) tablet 1 mg 1 mg Oral BID   · DULoxetine (CYMBALTA) capsule 30 mg 30 mg Oral 7am   · DULoxetine (CYMBALTA) capsule 60 mg 60 mg Oral QHS   · gabapentin (NEURONTIN) tablet 600 mg 600 mg Oral TID   · risperiDONE (RisperDAL) tablet 2 mg 2 mg Oral BID   · arformoteroL (BROVANA) neb solution 15 mcg 15 mcg Nebulization BID RT     And   · budesonide (PULMICORT) 500 mcg/2 ml nebulizer suspension 500 mcg Nebulization BID RT      GENERAL SURGERY:   1 Day Post-Op       Procedure:  Procedure(s):   COLON DECOMPRESSION   SIGMOIDOSCOPY FLEXIBLE       Subjective:       Patient without complaints.  No nausea or vomiting.  Has been passing gas and having bowel movements.       Objective:       Visit Vitals   BP (!) 148/91   Pulse 86   Temp 98.2 °F (36.8 °C)   Resp 16   Ht 5' 7\" (1.702 m)   Wt 68 kg (150 lb)   SpO2 98%  RA   BMI 23.49 kg/m²               Physical Exam:     General alert and oriented in no acute distress    NG tube Clear drainage   Lungs clear bilaterally   Heart regular rate rhythm   Abdomen soft nontender nondistended   Data Review: images and reports reviewed   axr-IMPRESSION   Overall improvement in bowel distention compared to the prior exam.   Lab Review: All lab results for the last 24 hours reviewed.       Assessment:          Hospital Problems Date Reviewed: 2/26/2022     Codes Class Noted POA     * (Principal) Sigmoid volvulus ([facility] [address] ICD-10-CM: K56.2   ICD-9-CM: 560. 2   2/26/2022 Yes                           Plan/Recommendations/Medical Decision Making:   Sigmoid volvulus seems to have resolved.     We will plan For surgery later this week. We will clamp NG tube for now to make sure he does not have a small bowel obstruction.  Assuming that he does not we will begin prep which may take several days to get him fully cleared out. Needs cardiology consultation given history of cardiomyopathy for surgery.           LABS:   Sodium: 130 (L)   Potassium: 3.2 (L)   Chloride: 100   CO2: 27   Anion gap: 3 (L)   Glucose: 100   BUN: 9   Creatinine: 0.53 (L)   BUN/Creatinine ratio: 17   Calcium: 7.7 (L)   GFR est non-AA: >60   GFR est AA: >60

## 2022-03-02 NOTE — PROGRESS NOTES
[facility]ist [facility]                                                                                          Hospitalist Progress Note  Wing Alli DO  Answering service: [phone] from in house phone        Date of Service:  3/2/2022  NAME:  Sheree Roberson  :  1965  MRN:  [id number]      Admission Summary:   Sheree Roberson is a 61 y.o. male who presents complaining of constipation for 6 days. Patient states that it is common for him not to have a bowel movement for 5 days but once it turned into 6 he was concerned. He states that his abdomen does take extended a couple times per month. Never as bad as it did when he came in. He has never had a colonoscopy before. He has a history of schizophrenia and is on multiple psych medications. CT noticed Sigmoid volvulus with small bowel obstruction in ER ,   Patient underwent colonoscopic decompression   midnight and was found to have large colotomy colon. He additionally started vomiting and had NG tube placed  am        Interval history / Subjective:   Patient was seen and examined this morning. Plan for surgery today. Patient is n.p.o. Denies any new complaints. No overnight events reported by nursing staff. Assessment & Plan:        #Sigmoid volvulus POA resolved  -status post endoscopic decompression  -General surgery following with plans for sigmoid resection today, patient is NPO  -Prophylactic antibiotics    #SBO resolving  -NG tube out patient is tolerating diet  -Clear liquids, currently NPO for procedure today      #H/o cardiomyopathy  -TTE with EF 55- 60%  -Cardiology on board for preop clearance for general surgery request      #Hypotonic hyponatremia POA resolved    #Paranoid schizophrenia. resume preadmission medication     #COPD.     - DuoNeb as needed. The patient is not in acute exacerbation of COPD at this time. #Hypertension. Monitor BP.         #Dyslipidemia.     will resume home medication once the patient is no longer n.p.o. Tobacco abuse. The patient advised to quit smoking. We will place the patient on Nicoderm patch. Hyperglycemia. We will check hemoglobin A1c level. The patient has no history of diabetes. #Leukocytosis. The patient is afebrile, not toxic. Lactic acid level is within normal limits. UA negative,   chest xray, chest ct lung clear. Hold abx and monitor          #Left lung cancer, status post resection. .        Code status: full   Prophylaxis: scd   Care Plan discussed with: patient   Anticipated Disposition: D      Hospital Problems  Date Reviewed: 3/2/2022          Codes Class Noted POA    * (Principal) Sigmoid volvulus ([facility] [address] ICD-10-CM: K56.2  ICD-9-CM: 560.2  2/26/2022 Yes                Review of Systems:   A comprehensive review of systems was negative except for that written in the HPI. Vital Signs:    Last 24hrs VS reviewed since prior progress note. Most recent are:  Visit Vitals  /80   Pulse 96   Temp 98 °F (36.7 °C)   Resp 18   Ht 5' 7\" (1.702 m)   Wt 68 kg (149 lb 14.6 oz)   SpO2 95%   BMI 23.48 kg/m²       No intake or output data in the 24 hours ending 03/02/22 1109     Physical Examination:     I had a face to face encounter with this patient and independently examined them on 3/2/2022 as outlined below:          Constitutional:  No acute distress, cooperative, pleasant    ENT:  Oral mucosa moist, oropharynx benign. NGT    Resp:  CTA bilaterally. No wheezing/rhonchi/rales. No accessory muscle use. CV:  Regular rhythm, normal rate, no murmurs, gallops, rubs    GI:  Soft, mild distended, non tender. + bowel sounds, no hepatosplenomegaly     Musculoskeletal:  No edema, warm, 2+ pulses throughout    Neurologic:  Moves all extremities.   AAOx3, CN II-XII reviewed            Data Review:    Review and/or order of clinical lab test      Labs:     Recent Labs     03/02/22  0006 02/28/22  0451   WBC 5.8 6.0   HGB 14.8 14.4   HCT 42.6 41.9    226     Recent Labs     03/02/22  0006 03/01/22  0315 02/28/22  0451   * 133* 133*   K 3.4* 3.1* 3.3*    103 103   CO2 24 25 27   BUN 2* 3* 5*   CREA 0.55* 0.55* 0.64*   * 92 93   CA 8.5 8.3* 8.0*   MG 1.8 1.8 2.1   PHOS 2.8 2.8 1.7*     Recent Labs     03/02/22  0006 02/28/22  0451   ALT 25 20   AP 62 58   TBILI 0.4 0.6   TP 6.6 6.4   ALB 3.1* 2.8*   GLOB 3.5 3.6     No results for input(s): INR, PTP, APTT, INREXT, INREXT in the last 72 hours. No results for input(s): FE, TIBC, PSAT, FERR in the last 72 hours. No results found for: FOL, RBCF   No results for input(s): PH, PCO2, PO2 in the last 72 hours. No results for input(s): CPK, CKNDX, TROIQ in the last 72 hours.     No lab exists for component: CPKMB  No results found for: CHOL, CHOLX, CHLST, CHOLV, HDL, HDLP, LDL, LDLC, DLDLP, TGLX, TRIGL, TRIGP, CHHD, CHHDX  Lab Results   Component Value Date/Time    Glucose (POC) 163 (H) 12/16/2020 06:13 PM    Glucose (POC) 93 03/07/2017 08:25 AM     Lab Results   Component Value Date/Time    Color YELLOW/STRAW 02/26/2022 11:15 PM    Appearance CLEAR 02/26/2022 11:15 PM    Specific gravity 1.017 02/26/2022 11:15 PM    pH (UA) 6.0 02/26/2022 11:15 PM    Protein Negative 02/26/2022 11:15 PM    Glucose Negative 02/26/2022 11:15 PM    Ketone TRACE (A) 02/26/2022 11:15 PM    Bilirubin Negative 02/26/2022 11:15 PM    Urobilinogen 1.0 02/26/2022 11:15 PM    Nitrites Negative 02/26/2022 11:15 PM    Leukocyte Esterase Negative 02/26/2022 11:15 PM    Epithelial cells FEW 02/26/2022 11:15 PM    Bacteria Negative 02/26/2022 11:15 PM    WBC 0-4 02/26/2022 11:15 PM    RBC 0-5 02/26/2022 11:15 PM         Medications Reviewed:     Current Facility-Administered Medications   Medication Dose Route Frequency    butalbital-acetaminophen-caffeine (FIORICET, ESGIC) -40 mg per tablet 1 Tablet  1 Tablet Oral Q4H PRN    sodium chloride (NS) flush 5-40 mL  5-40 mL IntraVENous Q8H    sodium chloride (NS) flush 5-40 mL  5-40 mL IntraVENous PRN    simethicone (MYLICON) 97OX/3.3BC oral drops 80 mg  1.2 mL Oral Multiple    albuterol-ipratropium (DUO-NEB) 2.5 MG-0.5 MG/3 ML  3 mL Nebulization Q4H PRN    sodium chloride (NS) flush 5-40 mL  5-40 mL IntraVENous Q8H    sodium chloride (NS) flush 5-40 mL  5-40 mL IntraVENous PRN    acetaminophen (TYLENOL) tablet 650 mg  650 mg Oral Q6H PRN    Or    acetaminophen (TYLENOL) suppository 650 mg  650 mg Rectal Q6H PRN    ondansetron (ZOFRAN ODT) tablet 4 mg  4 mg Oral Q8H PRN    Or    ondansetron (ZOFRAN) injection 4 mg  4 mg IntraVENous Q6H PRN    nicotine (NICODERM CQ) 21 mg/24 hr patch 1 Patch  1 Patch TransDERmal DAILY    hydrALAZINE (APRESOLINE) 20 mg/mL injection 10 mg  10 mg IntraVENous Q6H PRN    pantoprazole (PROTONIX) injection 40 mg  40 mg IntraVENous DAILY    And    sodium chloride (NS) flush 10 mL  10 mL IntraVENous DAILY    benztropine (COGENTIN) tablet 1 mg  1 mg Oral BID    clonazePAM (KlonoPIN) tablet 1 mg  1 mg Oral BID PRN    DULoxetine (CYMBALTA) capsule 30 mg  30 mg Oral 7am    DULoxetine (CYMBALTA) capsule 60 mg  60 mg Oral QHS    gabapentin (NEURONTIN) tablet 600 mg  600 mg Oral TID    risperiDONE (RisperDAL) tablet 2 mg  2 mg Oral BID    albuterol-ipratropium (DUO-NEB) 2.5 MG-0.5 MG/3 ML  3 mL Nebulization Q6H PRN    arformoteroL (BROVANA) neb solution 15 mcg  15 mcg Nebulization BID RT    And    budesonide (PULMICORT) 500 mcg/2 ml nebulizer suspension  500 mcg Nebulization BID RT     Facility-Administered Medications Ordered in Other Encounters   Medication Dose Route Frequency    oxyCODONE IR (ROXICODONE) tablet 5 mg  5 mg Oral Q4H PRN   No results found.   ______________________________________________________________________  EXPECTED LENGTH OF STAY: 4d 7h  ACTUAL LENGTH OF STAY:          [address], DO

## 2022-03-02 NOTE — BRIEF OP NOTE
Brief Postoperative Note    Patient: Cal Rose  YOB: 1965  MRN: [id number]    Date of Procedure: 3/2/2022     Pre-Op Diagnosis: SIGMOID VOLVULUS    Post-Op Diagnosis: Same as preoperative diagnosis. Procedure(s):  LAPAROSCOPIC SIGMOIDECTOMY WITH RIGID PROCTOSCOPY    Surgeon(s):  William Levi MD    Surgical Assistant: Surg Asst-1: Obie Hook    Anesthesia: General     Estimated Blood Loss (mL): Minimal    Complications: None    Specimens:   ID Type Source Tests Collected by Time Destination   1 : SIGMOID COLON Fresh Sigmoid  William Levi MD 3/2/2022 1655 Pathology   2 : Anastamotic donuts Fresh Colon  William Levi MD 3/2/2022 1803 Pathology        Implants: * No implants in log *    Drains:   Drain 19 fr. Round channel full fluted drain 03/02/22 Right; Inferior Abdomen (Active)       [REMOVED] Nasogastric Tube 02/26/22 (Removed)   Site Assessment Clean, dry, & intact 02/27/22 2030   Securement Device Adhesive-based novak 02/27/22 2030   G Port Status Clamped 02/27/22 2030   External Insertion Yogi (cms) 77 cms 02/27/22 2030   Action Taken Placement verified (comment) 02/27/22 2030   Drainage Description Green 02/27/22 7230   Intake (ml) 10 ml 02/26/22 1700   Output (ml) 100 ml 02/27/22 1819       [REMOVED] Fecal Management (Removed)       Findings: Massive redundancy of sigmoid colon, resected. End-side coloproctostomy. No insufflation air leak on rigid proctoscopy.     Electronically Signed by Bayron Cho MD on 3/2/2022 at 6:10 PM

## 2022-03-03 LAB
ALBUMIN SERPL-MCNC: 3.2 G/DL (ref 3.5–5)
ALBUMIN/GLOB SERPL: 0.9 {RATIO} (ref 1.1–2.2)
ALP SERPL-CCNC: 58 U/L (ref 45–117)
ALT SERPL-CCNC: 31 U/L (ref 12–78)
ANION GAP SERPL CALC-SCNC: 4 MMOL/L (ref 5–15)
AST SERPL-CCNC: 26 U/L (ref 15–37)
BASOPHILS # BLD: 0 K/UL (ref 0–0.1)
BASOPHILS NFR BLD: 0 % (ref 0–1)
BILIRUB SERPL-MCNC: 0.4 MG/DL (ref 0.2–1)
BUN SERPL-MCNC: 4 MG/DL (ref 6–20)
BUN/CREAT SERPL: 6 (ref 12–20)
CALCIUM SERPL-MCNC: 8.6 MG/DL (ref 8.5–10.1)
CHLORIDE SERPL-SCNC: 106 MMOL/L (ref 97–108)
CO2 SERPL-SCNC: 24 MMOL/L (ref 21–32)
CREAT SERPL-MCNC: 0.67 MG/DL (ref 0.7–1.3)
DIFFERENTIAL METHOD BLD: ABNORMAL
EOSINOPHIL # BLD: 0 K/UL (ref 0–0.4)
EOSINOPHIL NFR BLD: 0 % (ref 0–7)
ERYTHROCYTE [DISTWIDTH] IN BLOOD BY AUTOMATED COUNT: 12.7 % (ref 11.5–14.5)
GLOBULIN SER CALC-MCNC: 3.6 G/DL (ref 2–4)
GLUCOSE SERPL-MCNC: 113 MG/DL (ref 65–100)
HCT VFR BLD AUTO: 45.4 % (ref 36.6–50.3)
HGB BLD-MCNC: 15.3 G/DL (ref 12.1–17)
IMM GRANULOCYTES # BLD AUTO: 0.1 K/UL (ref 0–0.04)
IMM GRANULOCYTES NFR BLD AUTO: 1 % (ref 0–0.5)
LYMPHOCYTES # BLD: 0.9 K/UL (ref 0.8–3.5)
LYMPHOCYTES NFR BLD: 5 % (ref 12–49)
MCH RBC QN AUTO: 32.4 PG (ref 26–34)
MCHC RBC AUTO-ENTMCNC: 33.7 G/DL (ref 30–36.5)
MCV RBC AUTO: 96.2 FL (ref 80–99)
MONOCYTES # BLD: 1.2 K/UL (ref 0–1)
MONOCYTES NFR BLD: 7 % (ref 5–13)
NEUTS SEG # BLD: 14.4 K/UL (ref 1.8–8)
NEUTS SEG NFR BLD: 87 % (ref 32–75)
NRBC # BLD: 0 K/UL (ref 0–0.01)
NRBC BLD-RTO: 0 PER 100 WBC
PLATELET # BLD AUTO: 258 K/UL (ref 150–400)
PMV BLD AUTO: 9.5 FL (ref 8.9–12.9)
POTASSIUM SERPL-SCNC: 4.4 MMOL/L (ref 3.5–5.1)
PROT SERPL-MCNC: 6.8 G/DL (ref 6.4–8.2)
RBC # BLD AUTO: 4.72 M/UL (ref 4.1–5.7)
SODIUM SERPL-SCNC: 134 MMOL/L (ref 136–145)
WBC # BLD AUTO: 16.6 K/UL (ref 4.1–11.1)

## 2022-03-03 PROCEDURE — 74011000250 HC RX REV CODE- 250: Performed by: SURGERY

## 2022-03-03 PROCEDURE — 94760 N-INVAS EAR/PLS OXIMETRY 1: CPT

## 2022-03-03 PROCEDURE — 65270000032 HC RM SEMIPRIVATE

## 2022-03-03 PROCEDURE — 80053 COMPREHEN METABOLIC PANEL: CPT

## 2022-03-03 PROCEDURE — 36415 COLL VENOUS BLD VENIPUNCTURE: CPT

## 2022-03-03 PROCEDURE — 74011250637 HC RX REV CODE- 250/637: Performed by: SURGERY

## 2022-03-03 PROCEDURE — 77030027138 HC INCENT SPIROMETER -A

## 2022-03-03 PROCEDURE — 74011250636 HC RX REV CODE- 250/636: Performed by: SURGERY

## 2022-03-03 PROCEDURE — 85025 COMPLETE CBC W/AUTO DIFF WBC: CPT

## 2022-03-03 PROCEDURE — 94640 AIRWAY INHALATION TREATMENT: CPT

## 2022-03-03 PROCEDURE — C9113 INJ PANTOPRAZOLE SODIUM, VIA: HCPCS | Performed by: SURGERY

## 2022-03-03 RX ADMIN — RISPERIDONE 2 MG: 1 TABLET, FILM COATED ORAL at 09:41

## 2022-03-03 RX ADMIN — BENZTROPINE MESYLATE 1 MG: 1 TABLET ORAL at 17:31

## 2022-03-03 RX ADMIN — GABAPENTIN 600 MG: 600 TABLET, FILM COATED ORAL at 21:15

## 2022-03-03 RX ADMIN — PANTOPRAZOLE SODIUM 40 MG: 40 INJECTION, POWDER, FOR SOLUTION INTRAVENOUS at 09:42

## 2022-03-03 RX ADMIN — DULOXETINE HYDROCHLORIDE 30 MG: 30 CAPSULE, DELAYED RELEASE ORAL at 06:24

## 2022-03-03 RX ADMIN — Medication 10 ML: at 06:23

## 2022-03-03 RX ADMIN — SODIUM CHLORIDE, PRESERVATIVE FREE 10 ML: 5 INJECTION INTRAVENOUS at 21:16

## 2022-03-03 RX ADMIN — OXYCODONE 5 MG: 5 TABLET ORAL at 18:45

## 2022-03-03 RX ADMIN — ENOXAPARIN SODIUM 40 MG: 100 INJECTION SUBCUTANEOUS at 09:40

## 2022-03-03 RX ADMIN — Medication 10 ML: at 21:17

## 2022-03-03 RX ADMIN — SODIUM CHLORIDE 125 ML/HR: 9 INJECTION, SOLUTION INTRAVENOUS at 17:34

## 2022-03-03 RX ADMIN — SODIUM CHLORIDE, PRESERVATIVE FREE 10 ML: 5 INJECTION INTRAVENOUS at 06:23

## 2022-03-03 RX ADMIN — NALOXEGOL OXALATE 25 MG: 25 TABLET, FILM COATED ORAL at 09:41

## 2022-03-03 RX ADMIN — BENZTROPINE MESYLATE 1 MG: 1 TABLET ORAL at 09:41

## 2022-03-03 RX ADMIN — ARFORMOTEROL TARTRATE 15 MCG: 15 SOLUTION RESPIRATORY (INHALATION) at 07:11

## 2022-03-03 RX ADMIN — HYDROMORPHONE HYDROCHLORIDE 1 MG: 1 INJECTION, SOLUTION INTRAMUSCULAR; INTRAVENOUS; SUBCUTANEOUS at 03:50

## 2022-03-03 RX ADMIN — OXYCODONE 5 MG: 5 TABLET ORAL at 06:24

## 2022-03-03 RX ADMIN — CLONAZEPAM 1 MG: 1 TABLET ORAL at 18:52

## 2022-03-03 RX ADMIN — GABAPENTIN 600 MG: 600 TABLET, FILM COATED ORAL at 09:40

## 2022-03-03 RX ADMIN — DULOXETINE 60 MG: 30 CAPSULE, DELAYED RELEASE ORAL at 21:15

## 2022-03-03 RX ADMIN — GABAPENTIN 600 MG: 600 TABLET, FILM COATED ORAL at 17:30

## 2022-03-03 RX ADMIN — SODIUM CHLORIDE 125 ML/HR: 9 INJECTION, SOLUTION INTRAVENOUS at 09:39

## 2022-03-03 RX ADMIN — OXYCODONE 5 MG: 5 TABLET ORAL at 10:30

## 2022-03-03 RX ADMIN — OXYCODONE 5 MG: 5 TABLET ORAL at 14:41

## 2022-03-03 RX ADMIN — HYDRALAZINE HYDROCHLORIDE 10 MG: 20 INJECTION INTRAMUSCULAR; INTRAVENOUS at 21:32

## 2022-03-03 RX ADMIN — HYDROMORPHONE HYDROCHLORIDE 1 MG: 1 INJECTION, SOLUTION INTRAMUSCULAR; INTRAVENOUS; SUBCUTANEOUS at 08:00

## 2022-03-03 RX ADMIN — BUDESONIDE 500 MCG: 0.5 INHALANT RESPIRATORY (INHALATION) at 07:11

## 2022-03-03 RX ADMIN — SODIUM CHLORIDE 125 ML/HR: 9 INJECTION, SOLUTION INTRAVENOUS at 01:17

## 2022-03-03 RX ADMIN — SODIUM CHLORIDE, PRESERVATIVE FREE 10 ML: 5 INJECTION INTRAVENOUS at 09:50

## 2022-03-03 RX ADMIN — RISPERIDONE 2 MG: 1 TABLET, FILM COATED ORAL at 17:31

## 2022-03-03 NOTE — PROGRESS NOTES
Progress Note    Patient: Ismael York MRN: [id number]  SSN: [id number]    YOB: 1965  Age: 61 y.o. Sex: male      Admit Date: 2022    1 Day Post-Op    Procedure:  Procedure(s):  LAPAROSCOPIC SIGMOIDECTOMY WITH RIGID PROCTOSCOPY    Subjective:     Patient complains of incisional pain. He had a small amount of soft food last night. He has not yet been out of bed or perform spirometry. Objective:     Visit Vitals  /79 (BP 1 Location: Left upper arm, BP Patient Position: At rest;Lying)   Pulse (!) 107   Temp 97.8 °F (36.6 °C)   Resp 18   Ht 5' 7\" (1.702 m)   Wt 149 lb 14.6 oz (68 kg)   SpO2 92%   BMI 23.48 kg/m²       Temp (24hrs), Av.9 °F (36.6 °C), Min:97.6 °F (36.4 °C), Max:98.5 °F (36.9 °C)      Physical Exam:    LUNG: diminished breath sounds R base, L base, HEART: regular rate and rhythm, S1, S2 normal, no murmur. ABDOMEN: Hypoactive bowel sounds, nondistended, soft. Dressings dry and intact. Serosanguineous drain fluid. Appropriate incisional pain with palpation. Data Review: Vital signs, ins/outs, labs    Lab Review:   Recent Results (from the past 24 hour(s))   METABOLIC PANEL, COMPREHENSIVE    Collection Time: 22  3:44 AM   Result Value Ref Range    Sodium 134 (L) 136 - 145 mmol/L    Potassium 4.4 3.5 - 5.1 mmol/L    Chloride 106 97 - 108 mmol/L    CO2 24 21 - 32 mmol/L    Anion gap 4 (L) 5 - 15 mmol/L    Glucose 113 (H) 65 - 100 mg/dL    BUN 4 (L) 6 - 20 MG/DL    Creatinine 0.67 (L) 0.70 - 1.30 MG/DL    BUN/Creatinine ratio 6 (L) 12 - 20      GFR est AA >60 >60 ml/min/1.73m2    GFR est non-AA >60 >60 ml/min/1.73m2    Calcium 8.6 8.5 - 10.1 MG/DL    Bilirubin, total 0.4 0.2 - 1.0 MG/DL    ALT (SGPT) 31 12 - 78 U/L    AST (SGOT) 26 15 - 37 U/L    Alk.  phosphatase 58 45 - 117 U/L    Protein, total 6.8 6.4 - 8.2 g/dL    Albumin 3.2 (L) 3.5 - 5.0 g/dL    Globulin 3.6 2.0 - 4.0 g/dL    A-G Ratio 0.9 (L) 1.1 - 2.2     CBC WITH AUTOMATED DIFF    Collection Time: 22 3:44 AM   Result Value Ref Range    WBC 16.6 (H) 4.1 - 11.1 K/uL    RBC 4.72 4.10 - 5.70 M/uL    HGB 15.3 12.1 - 17.0 g/dL    HCT 45.4 36.6 - 50.3 %    MCV 96.2 80.0 - 99.0 FL    MCH 32.4 26.0 - 34.0 PG    MCHC 33.7 30.0 - 36.5 g/dL    RDW 12.7 11.5 - 14.5 %    PLATELET 878 239 - 860 K/uL    MPV 9.5 8.9 - 12.9 FL    NRBC 0.0 0  WBC    ABSOLUTE NRBC 0.00 0.00 - 0.01 K/uL    NEUTROPHILS 87 (H) 32 - 75 %    LYMPHOCYTES 5 (L) 12 - 49 %    MONOCYTES 7 5 - 13 %    EOSINOPHILS 0 0 - 7 %    BASOPHILS 0 0 - 1 %    IMMATURE GRANULOCYTES 1 (H) 0.0 - 0.5 %    ABS. NEUTROPHILS 14.4 (H) 1.8 - 8.0 K/UL    ABS. LYMPHOCYTES 0.9 0.8 - 3.5 K/UL    ABS. MONOCYTES 1.2 (H) 0.0 - 1.0 K/UL    ABS. EOSINOPHILS 0.0 0.0 - 0.4 K/UL    ABS. BASOPHILS 0.0 0.0 - 0.1 K/UL    ABS. IMM. GRANS. 0.1 (H) 0.00 - 0.04 K/UL    DF AUTOMATED         Assessment:     Hospital Problems  Date Reviewed: 3/2/2022          Codes Class Noted POA    * (Principal) [facility] ICD-10-CM: K56.2  ICD-9-CM: 560.2  2/26/2022 Yes            Awaiting return of bowel function. Plan/Recommendations/Medical Decision Making:     Remove Garcia. Continue analgesics, out of bed in chair. ERAS protocol for diet advancement. Ambulate in the halls once pain better controlled. Continue chronic medications. Spirometry, DVT prophylaxis.

## 2022-03-03 NOTE — PROGRESS NOTES
Spiritual Care Partner Volunteer visited patient in [address] [facility] on 3/3/22. Documented by:   Chaplain Trejo MDiv, MACE  [phone])

## 2022-03-03 NOTE — OP NOTES
[address]   OPERATIVE REPORT    Name:  Tyrell Del Castillo  MR#:  [id number]  :  1965  ACCOUNT #:  [de-identified]  DATE OF SERVICE:  2022    PREOPERATIVE DIAGNOSIS:  Recurrent sigmoid volvulus. POSTOPERATIVE DIAGNOSIS:  Recurrent sigmoid volvulus. PROCEDURE PERFORMED:  Laparoscopic sigmoidectomy with rigid proctoscopy (CPT 99937). SURGEON:  Kellie Stearns MD    ASSISTANT:  Obie Nolen SA    ANESTHESIA:  General endotracheal.    COMPLICATIONS:  None. SPECIMENS REMOVED:  Sigmoid colon. IMPLANTS:  None. ESTIMATED BLOOD LOSS:  Minimal.    DRAINS:  19-mm Anival drain. COUNTS:  Sponge count correct. Needle count correct. INDICATIONS:  The patient is a 48-year-old white male with multiple medical problems, to include lung cancer, COPD, hypertension, hypertrophic cardiomyopathy, paranoid schizophrenia with a history of recurrent constipation and bloating. He presented to [facility] this past weekend with similar symptoms. Imaging was consistent with sigmoid volvulus. He underwent urgent sigmoidoscopic decompression. He has subsequently undergone bowel prep, and has been cleared by Cardiology. He was taken to the operating room today for laparoscopic sigmoid resection. FINDINGS:  1.  Massive redundancy of sigmoid colon, resected. 2.  End-to-side stapled coloproctostomy (29 EEA). 3.  No insufflation air leak on rigid proctoscopy. PROCEDURE:  The patient was identified as the correct patient in the preoperative holding area and informed consent was confirmed. After answering the patient's remaining questions, he was taken to the operating room and placed on the operating table in the supine position. Sequential compression devices were placed on both lower extremities.   Following the uneventful initiation of general anesthesia, Garcia catheter was placed within his bladder, requiring a coude tip catheter given likely benign prostatic hypertrophy. He was then placed in the low lithotomy position. All potential pressure points were padded with eggcrate. His right arm was tucked to his side. His abdomen was prepped and draped in usual sterile fashion. Final time-out was performed, and it was confirmed he had received intravenous antibiotics. Pneumoperitoneum was achieved using a closed Veress needle technique. Once adequate working sites had been developed, a 5-mm trocar was inserted through a small right upper quadrant skin incision using an Optiview technique. After confirming intraperitoneal location of the trocar tip, insufflation with carbon dioxide gas was switched to the trocar and the Veress needle was removed. A 5-mm, 30-degree laparoscope was inserted. No signs of trocar injury or Veress needle injury were present. The colon was noted to be significantly dilated, with the sigmoid colon displacing itself into the right mid abdomen. A 5-mm trocar was inserted through a small supraumbilical incision using visual guidance with the laparoscope. Two additional right-sided trocars, one 5 mm and the other 12 mm, were inserted using identical technique. The omentum and small bowel were displaced into the upper abdomen with the patient placed in the steep Trendelenburg position. This exposed the sigmoid colon. The rectum was identified, followed by identification of the rectosigmoid junction. The colon was run proximally, through the massively dilated distal sigmoid colon, through the body of the sigmoid colon, to the junction with the descending colon. Lateral attachments at the junction with the descending colon were taken down using the bipolar device and blunt dissection, and this was carried cephalad towards the splenic flexure. Inferiorly, attachments along the white line in the area of the psoas and iliac vessels was performed using identical technique, with medial rotation of the sigmoid colon away from the ureter.   This allowed identification of the left ureter, which was protected from injury. With the sigmoid colon mobilized sufficiently from lateral attachments, the rectosigmoid junction was re-identified, and the peritoneum along the right side was incised using the bipolar device, followed by development of a plane between the posterior wall of the rectum and underlying blood supply. Once this space had been completely developed, it was enlarged using bipolar takedown of posterior blood supply. The upper rectum was then divided with two purple load linear stapler firings. The blood supply of the sigmoid colon was then serially ligated and divided using the bipolar device, proceeding from the stapling site proximally, through the massively redundant sigmoid colon, to the area of the junction with the descending colon. At this point, a 6-cm incision was made in the periumbilical area. The dissection was carried through the subcutaneous fatty tissue, down to the linea alba which was incised using electrocautery, allowing atraumatic entry into the abdominal space. A GelPort was then placed for use as a wound protector. The staple line of the distal sigmoid colon was passed through the wound protector, allowing extracorporealization. After identifying a healthy site of descending colon for planned anvil insertion, the proximal sigmoid colon was incised using electrocautery, and the descending colon was serially dilated using EEA sizers. A 29 EEA stapler anvil attached with spike was passed into the lumen of the  descending colon, passing the spike to the antimesenteric border of the colon. This allowed positioning of the anvil within the distal descending colon. The anvil was secured in position with a pursestring 3-0 Vicryl suture. The spike was then removed. The specimen was then resected beyond the anvil, and it was sent to Pathology for review.   The descending colon was then returned to the abdominal space, and pneumoperitoneum was reestablished after placing the cap for the GelPort. Additional mobilization of the descending colon was necessary, using the bipolar device and blunt dissection, this allowed the descending colon to reach to the sacral promontory. EEA sizers were then used to dilate the rectal stump, up to size 28. With small bowel displaced into the upper abdomen, the 29 EEA powered stapler was passed transanally, through the rectal stump to the staple line. The stapler spike was deployed just anterior to the transverse staple line, and mated with the anvil within the descending colon. The stapler was slowly and carefully closed with care to avoid incorporation of extraneous tissue or twisting of the bowel. After confirming correct stapler closure, the stapler was fired and removed. Sterile saline was instilled into the pelvis, and the colon was manually compressed proximal to the anastomosis. Rigid proctoscopy was then performed. The proctoscope was passed transanally into the mid rectum. With insufflation using the proctoscope, distention of the rectum and the area of the anastomosis was confirmed. No insufflation air leak occurred. The bowel was decompressed and the proctoscope was removed. Sterile saline was evacuated from the pelvis. A 19-mm Anival drain was inserted into the abdominal space. This allowed to lie in the pelvis adjacent to the coloproctostomy and brought out through the right upper quadrant 5-mm trocar site. It was secured to the skin with a 2-0 nylon suture. After confirming satisfactory hemostasis, the right lower quadrant 12-mm fascial defect was closed with a 0 Vicryl suture using a laparoscopic suture passer. Pneumoperitoneum was released, and all trocars and the GelPort were removed. All personnel then changed gowns, gloves, and fresh instruments were opened on the back table. The fascia at the extraction site was closed with a running loop #1 PDS suture.   This wound was then irrigated with sterile saline. All skin edges were reapproximated with skin staples. Sterile dressings were then applied. The patient tolerated the procedure well. He was extubated in the operating room and transported to the recovery area in stable condition. The attending surgeon, Dr. Sharyle Fontana, was scrubbed and present for the entire procedure.         Malinda Pino MD      [id number]  D:  03/02/2022 20:16  T:  03/03/2022 7:22  JOB #:  [id number]

## 2022-03-03 NOTE — PROGRESS NOTES
[facility]  Hospitalist Group                                                                                          Hospitalist Progress Note  Amaurilorna Miller   Answering service: [phone] from in house phone        Date of Service:  3/3/2022  NAME:  Laith Taylor  :  1965  MRN:  [id number]      Admission Summary:   Laith Taylor is a 61 y.o. male who presents complaining of constipation for 6 days. Patient states that it is common for him not to have a bowel movement for 5 days but once it turned into 6 he was concerned. He states that his abdomen does take extended a couple times per month. Never as bad as it did when he came in. He has never had a colonoscopy before. He has a history of schizophrenia and is on multiple psych medications. CT noticed Sigmoid volvulus with small bowel obstruction in ER ,   Patient underwent colonoscopic decompression   midnight and was found to have large colotomy colon. He additionally started vomiting and had NG tube placed  am        Interval history / Subjective:   Patient was seen and examined this morning. Day 1 status post sigmoid resection patient is n.p.o. reports abdominal pain. Denies fever, chills, nausea, and vomiting     Assessment & Plan:        #Sigmoid volvulus POA status post laparoscopic sigmoid resection 22  -He is day 1 postop, doing well with postop incisional pain  -Pain control  -Diet started, advance as tolerated per surgery  -Encourage incentive spirometer use  -Encourage pt to ambulate          #Hypotonic hyponatremia POA resolved    #Paranoid schizophrenia. resume preadmission medication     #COPD.     - DuoNeb as needed. The patient is not in acute exacerbation of COPD at this time. #Hypertension. Monitor BP. #Dyslipidemia.     will resume home medication once the patient is no longer n.p.o. Tobacco abuse. The patient advised to quit smoking.   We will place the patient on Nicoderm patch. Hyperglycemia. We will check hemoglobin A1c level. The patient has no history of diabetes. #Leukocytosis. The patient is afebrile, not toxic. Lactic acid level is within normal limits. UA negative,   chest xray, chest ct lung clear. Hold abx and monitor          #Left lung cancer, status post resection. .        Code status: full   Prophylaxis: scd   Care Plan discussed with: patient   Anticipated Disposition: TBD      Hospital Problems  Date Reviewed: 3/2/2022          Codes Class Noted POA    * (Principal) Sigmoid volvulus ([facility] ICD-10-CM: K56.2  ICD-9-CM: 560.2  2/26/2022 Yes                Review of Systems:   A comprehensive review of systems was negative except for that written in the HPI. Vital Signs:    Last 24hrs VS reviewed since prior progress note. Most recent are:  Visit Vitals  /79 (BP 1 Location: Left upper arm, BP Patient Position: At rest;Lying)   Pulse (!) 107   Temp 97.8 °F (36.6 °C)   Resp 18   Ht 5' 7\" (1.702 m)   Wt 68 kg (149 lb 14.6 oz)   SpO2 92%   BMI 23.48 kg/m²         Intake/Output Summary (Last 24 hours) at 3/3/2022 1118  Last data filed at 3/3/2022 1005  Gross per 24 hour   Intake 1870 ml   Output 6455 ml   Net -4585 ml        Physical Examination:     I had a face to face encounter with this patient and independently examined them on 3/3/2022 as outlined below:          Constitutional:  No acute distress, cooperative, pleasant    ENT:  Oral mucosa moist, oropharynx benign. NGT    Resp:  CTA bilaterally. No wheezing/rhonchi/rales. No accessory muscle use. CV:  Regular rhythm, normal rate, no murmurs, gallops, rubs    GI:  Soft, mild distended, non tender. + bowel sounds, no hepatosplenomegaly     Musculoskeletal:  No edema, warm, 2+ pulses throughout    Neurologic:  Moves all extremities.   AAOx3, CN II-XII reviewed            Data Review:    Review and/or order of clinical lab test      Labs:     Recent Labs     03/03/22  0055 03/02/22  0006   WBC 16.6* 5.8   HGB 15.3 14.8   HCT 45.4 42.6    264     Recent Labs     03/03/22 0344 03/02/22  0006 03/01/22  0315   * 131* 133*   K 4.4 3.4* 3.1*    102 103   CO2 24 24 25   BUN 4* 2* 3*   CREA 0.67* 0.55* 0.55*   * 111* 92   CA 8.6 8.5 8.3*   MG  --  1.8 1.8   PHOS  --  2.8 2.8     Recent Labs     03/03/22 0344 03/02/22  0006   ALT 31 25   AP 58 62   TBILI 0.4 0.4   TP 6.8 6.6   ALB 3.2* 3.1*   GLOB 3.6 3.5     No results for input(s): INR, PTP, APTT, INREXT, INREXT in the last 72 hours. No results for input(s): FE, TIBC, PSAT, FERR in the last 72 hours. No results found for: FOL, RBCF   No results for input(s): PH, PCO2, PO2 in the last 72 hours. No results for input(s): CPK, CKNDX, TROIQ in the last 72 hours.     No lab exists for component: CPKMB  No results found for: CHOL, CHOLX, CHLST, CHOLV, HDL, HDLP, LDL, LDLC, DLDLP, TGLX, TRIGL, TRIGP, CHHD, CHHDX  Lab Results   Component Value Date/Time    Glucose (POC) 163 (H) 12/16/2020 06:13 PM    Glucose (POC) 93 03/07/2017 08:25 AM     Lab Results   Component Value Date/Time    Color YELLOW/STRAW 02/26/2022 11:15 PM    Appearance CLEAR 02/26/2022 11:15 PM    Specific gravity 1.017 02/26/2022 11:15 PM    pH (UA) 6.0 02/26/2022 11:15 PM    Protein Negative 02/26/2022 11:15 PM    Glucose Negative 02/26/2022 11:15 PM    Ketone TRACE (A) 02/26/2022 11:15 PM    Bilirubin Negative 02/26/2022 11:15 PM    Urobilinogen 1.0 02/26/2022 11:15 PM    Nitrites Negative 02/26/2022 11:15 PM    Leukocyte Esterase Negative 02/26/2022 11:15 PM    Epithelial cells FEW 02/26/2022 11:15 PM    Bacteria Negative 02/26/2022 11:15 PM    WBC 0-4 02/26/2022 11:15 PM    RBC 0-5 02/26/2022 11:15 PM         Medications Reviewed:     Current Facility-Administered Medications   Medication Dose Route Frequency    sodium chloride (NS) flush 5-40 mL  5-40 mL IntraVENous Q8H    sodium chloride (NS) flush 5-40 mL  5-40 mL IntraVENous PRN    ondansetron (ZOFRAN ODT) tablet 4 mg  4 mg Oral Q8H PRN    Or    ondansetron (ZOFRAN) injection 4 mg  4 mg IntraVENous Q6H PRN    enoxaparin (LOVENOX) injection 40 mg  40 mg SubCUTAneous DAILY    0.9% sodium chloride infusion  125 mL/hr IntraVENous CONTINUOUS    oxyCODONE IR (ROXICODONE) tablet 5 mg  5 mg Oral Q4H PRN    HYDROmorphone (DILAUDID) injection 1 mg  1 mg IntraVENous Q3H PRN    naloxegoL (MOVANTIK) tablet 25 mg  25 mg Oral DAILY    butalbital-acetaminophen-caffeine (FIORICET, ESGIC) -40 mg per tablet 1 Tablet  1 Tablet Oral Q4H PRN    sodium chloride (NS) flush 5-40 mL  5-40 mL IntraVENous Q8H    sodium chloride (NS) flush 5-40 mL  5-40 mL IntraVENous PRN    albuterol-ipratropium (DUO-NEB) 2.5 MG-0.5 MG/3 ML  3 mL Nebulization Q4H PRN    sodium chloride (NS) flush 5-40 mL  5-40 mL IntraVENous Q8H    sodium chloride (NS) flush 5-40 mL  5-40 mL IntraVENous PRN    acetaminophen (TYLENOL) tablet 650 mg  650 mg Oral Q6H PRN    Or    acetaminophen (TYLENOL) suppository 650 mg  650 mg Rectal Q6H PRN    ondansetron (ZOFRAN ODT) tablet 4 mg  4 mg Oral Q8H PRN    Or    ondansetron (ZOFRAN) injection 4 mg  4 mg IntraVENous Q6H PRN    nicotine (NICODERM CQ) 21 mg/24 hr patch 1 Patch  1 Patch TransDERmal DAILY    hydrALAZINE (APRESOLINE) 20 mg/mL injection 10 mg  10 mg IntraVENous Q6H PRN    pantoprazole (PROTONIX) injection 40 mg  40 mg IntraVENous DAILY    And    sodium chloride (NS) flush 10 mL  10 mL IntraVENous DAILY    benztropine (COGENTIN) tablet 1 mg  1 mg Oral BID    clonazePAM (KlonoPIN) tablet 1 mg  1 mg Oral BID PRN    DULoxetine (CYMBALTA) capsule 30 mg  30 mg Oral 7am    DULoxetine (CYMBALTA) capsule 60 mg  60 mg Oral QHS    gabapentin (NEURONTIN) tablet 600 mg  600 mg Oral TID    risperiDONE (RisperDAL) tablet 2 mg  2 mg Oral BID    albuterol-ipratropium (DUO-NEB) 2.5 MG-0.5 MG/3 ML  3 mL Nebulization Q6H PRN    arformoteroL (BROVANA) neb solution 15 mcg  15 mcg Nebulization BID RT    And    budesonide (PULMICORT) 500 mcg/2 ml nebulizer suspension  500 mcg Nebulization BID RT   No results found.   ______________________________________________________________________  EXPECTED LENGTH OF STAY: 5d 16h  ACTUAL LENGTH OF STAY:          5                 Rosa Santana DO

## 2022-03-03 NOTE — PROGRESS NOTES
Transition of Care: TBD; likely back to his group home; patient normally lives in an adult group home called [address] at [facility] [address]     Transport Plan: TBD; likely roundtrip ride via [facility] or [address]: 13%     DX: Sigmoid volvulus     Main contact is caregiver and owner of group home- Ynes Liu [phone]     Discharge pending:  -patient is POD#1 laparoscopic sigmoid resection   -pending diet advancement  -pending pain control  -pending progress with PT and recs  -pending medical progress and care recs    CM noted from chart and in IDR rounds     CM following  Paige Contreras RN, CRM     NOTE: pleasant patient at bedside; he is alert and oriented x 3; patient states he lives at stated address (which is a group home) for several years; he is normally independent in his ADLs; he does not drive; his caregiver- Ynes Liu-  takes him to appointments;  the group home helps him with medication management; Fransisco Holguin does not own a walker or cane to ambulate; he confirms he sees Dr. Isabella Parker in the same practice as his listed pcp- Kin Delaney; patient states he does not have a history with any home health agency

## 2022-03-03 NOTE — PROGRESS NOTES
This morning, Discussed with patient the importance of Incentive spirometry and ambulation today once pain medication reduces pain level. During the day patient tolerated the oral oxycodone and expressed readiness for ambulation. The PCT ambulated with patient 1/2 lap in hallway. Patient complained that getting up and sitting down were quite painful and insisted on getting back in the bed. The patient took a nap after that. We discussed again that he needs to get up and sit in the chair as well as walk again. Patient eating his Sebas ice and talking about how it hurts when he gets up and down and said he will \"pass on sitting in the chair today\". It is important to note that the patient has been getting up to the bathroom with minimal assistance when he needs to void. Nurse and student nurse also reinforced with patient the necessity of taking deep breaths via incentive spirometer.  Patient can reach up to about 1000ml on the I.S.

## 2022-03-03 NOTE — ANESTHESIA POSTPROCEDURE EVALUATION
Procedure(s):  LAPAROSCOPIC SIGMOIDOSCOPY WITH RIGID PROCTOSCOPY. general    Anesthesia Post Evaluation        Patient location during evaluation: PACU  Patient participation: complete - patient participated  Level of consciousness: awake and alert  Pain management: adequate  Airway patency: patent  Anesthetic complications: no  Cardiovascular status: acceptable  Respiratory status: acceptable  Hydration status: acceptable  Comments: I have seen and evaluated the patient and is ready for discharge. Stefan Brizuela MD    Post anesthesia nausea and vomiting:  none      INITIAL Post-op Vital signs:   Vitals Value Taken Time   /95 03/02/22 1900   Temp 36.4 °C (97.6 °F) 03/02/22 1822   Pulse 95 03/02/22 1910   Resp 14 03/02/22 1910   SpO2 100 % 03/02/22 1910   Vitals shown include unvalidated device data.

## 2022-03-03 NOTE — PERIOP NOTES
TRANSFER - OUT REPORT:    Verbal report given to Rabia (name) on Ines Najera  being transferred to [address] [place] [address] (unit) for routine post - op       Report consisted of patients Situation, Background, Assessment and   Recommendations(SBAR). Time Pre op antibiotic given:1531  Anesthesia Stop time: 2288  Garcia Present on Transfer to floor:yes  Order for Garcia on Chart:yes  Discharge Prescriptions with Chart:no    Information from the following report(s) SBAR, OR Summary, Procedure Summary, Intake/Output, MAR, Recent Results and Cardiac Rhythm Sinus tach was reviewed with the receiving nurse. Opportunity for questions and clarification was provided. Is the patient on 02? YES       L/Min 2       Other nc    Is the patient on a monitor? NO    Is the nurse transporting with the patient? NO    Surgical Waiting Area notified of patient's transfer from PACU? YES; attempted to  pt requested, no answer. The following personal items collected during your admission accompanied patient upon transfer: **inpatient. Dental Appliance: Dental Appliances: Uppers,Partials,At bedside (at bedside)  Vision:    Hearing Aid:    Jewelry: Jewelry: Watch  Clothing: Clothing: Socks,Shirt,Undergarments,Pants,With patient,Jacket/Coat  Other Valuables:  Other Valuables: Eyeglasses,With patient  Valuables sent to safe:

## 2022-03-03 NOTE — ROUTINE PROCESS
Bedside shift change report given to Esme Aguirre RN (oncoming nurse) by [facility], RN (offgoing nurse).  Report included the following information SBAR, Kardex, ED Summary, OR Summary, Procedure Summary, Intake/Output, MAR, Recent Results and Med Rec Status.

## 2022-03-04 ENCOUNTER — APPOINTMENT (OUTPATIENT)
Dept: GENERAL RADIOLOGY | Age: 57
DRG: 329 | End: 2022-03-04
Attending: INTERNAL MEDICINE
Payer: MEDICARE

## 2022-03-04 LAB
ANION GAP SERPL CALC-SCNC: 6 MMOL/L (ref 5–15)
BASOPHILS # BLD: 0 K/UL (ref 0–0.1)
BASOPHILS NFR BLD: 0 % (ref 0–1)
BUN SERPL-MCNC: 3 MG/DL (ref 6–20)
BUN/CREAT SERPL: 5 (ref 12–20)
CALCIUM SERPL-MCNC: 8.8 MG/DL (ref 8.5–10.1)
CHLORIDE SERPL-SCNC: 99 MMOL/L (ref 97–108)
CO2 SERPL-SCNC: 24 MMOL/L (ref 21–32)
CREAT SERPL-MCNC: 0.62 MG/DL (ref 0.7–1.3)
DIFFERENTIAL METHOD BLD: ABNORMAL
EOSINOPHIL # BLD: 0.1 K/UL (ref 0–0.4)
EOSINOPHIL NFR BLD: 1 % (ref 0–7)
ERYTHROCYTE [DISTWIDTH] IN BLOOD BY AUTOMATED COUNT: 12.8 % (ref 11.5–14.5)
GLUCOSE SERPL-MCNC: 123 MG/DL (ref 65–100)
HCT VFR BLD AUTO: 43.8 % (ref 36.6–50.3)
HGB BLD-MCNC: 14.7 G/DL (ref 12.1–17)
IMM GRANULOCYTES # BLD AUTO: 0.1 K/UL (ref 0–0.04)
IMM GRANULOCYTES NFR BLD AUTO: 1 % (ref 0–0.5)
LYMPHOCYTES # BLD: 1.8 K/UL (ref 0.8–3.5)
LYMPHOCYTES NFR BLD: 14 % (ref 12–49)
MCH RBC QN AUTO: 32.1 PG (ref 26–34)
MCHC RBC AUTO-ENTMCNC: 33.6 G/DL (ref 30–36.5)
MCV RBC AUTO: 95.6 FL (ref 80–99)
MONOCYTES # BLD: 1.4 K/UL (ref 0–1)
MONOCYTES NFR BLD: 11 % (ref 5–13)
NEUTS SEG # BLD: 8.8 K/UL (ref 1.8–8)
NEUTS SEG NFR BLD: 73 % (ref 32–75)
NRBC # BLD: 0 K/UL (ref 0–0.01)
NRBC BLD-RTO: 0 PER 100 WBC
PLATELET # BLD AUTO: 263 K/UL (ref 150–400)
PMV BLD AUTO: 9.5 FL (ref 8.9–12.9)
POTASSIUM SERPL-SCNC: 3.4 MMOL/L (ref 3.5–5.1)
RBC # BLD AUTO: 4.58 M/UL (ref 4.1–5.7)
SODIUM SERPL-SCNC: 129 MMOL/L (ref 136–145)
WBC # BLD AUTO: 12.2 K/UL (ref 4.1–11.1)

## 2022-03-04 PROCEDURE — 74011000250 HC RX REV CODE- 250: Performed by: SURGERY

## 2022-03-04 PROCEDURE — 74011250636 HC RX REV CODE- 250/636: Performed by: SURGERY

## 2022-03-04 PROCEDURE — C9113 INJ PANTOPRAZOLE SODIUM, VIA: HCPCS | Performed by: SURGERY

## 2022-03-04 PROCEDURE — 94640 AIRWAY INHALATION TREATMENT: CPT

## 2022-03-04 PROCEDURE — 36415 COLL VENOUS BLD VENIPUNCTURE: CPT

## 2022-03-04 PROCEDURE — 74018 RADEX ABDOMEN 1 VIEW: CPT

## 2022-03-04 PROCEDURE — 74011250637 HC RX REV CODE- 250/637: Performed by: SURGERY

## 2022-03-04 PROCEDURE — 65270000032 HC RM SEMIPRIVATE

## 2022-03-04 PROCEDURE — 94760 N-INVAS EAR/PLS OXIMETRY 1: CPT

## 2022-03-04 PROCEDURE — 85025 COMPLETE CBC W/AUTO DIFF WBC: CPT

## 2022-03-04 PROCEDURE — 74011250637 HC RX REV CODE- 250/637: Performed by: INTERNAL MEDICINE

## 2022-03-04 PROCEDURE — 80048 BASIC METABOLIC PNL TOTAL CA: CPT

## 2022-03-04 RX ORDER — ATORVASTATIN CALCIUM 20 MG/1
20 TABLET, FILM COATED ORAL DAILY
Status: DISCONTINUED | OUTPATIENT
Start: 2022-03-04 | End: 2022-03-17 | Stop reason: HOSPADM

## 2022-03-04 RX ORDER — SODIUM CHLORIDE AND POTASSIUM CHLORIDE .9; .15 G/100ML; G/100ML
SOLUTION INTRAVENOUS CONTINUOUS
Status: DISCONTINUED | OUTPATIENT
Start: 2022-03-04 | End: 2022-03-09

## 2022-03-04 RX ORDER — SIMETHICONE 80 MG
80 TABLET,CHEWABLE ORAL
Status: DISCONTINUED | OUTPATIENT
Start: 2022-03-04 | End: 2022-03-04

## 2022-03-04 RX ORDER — SIMETHICONE 80 MG
80 TABLET,CHEWABLE ORAL
Status: DISCONTINUED | OUTPATIENT
Start: 2022-03-04 | End: 2022-03-17 | Stop reason: HOSPADM

## 2022-03-04 RX ORDER — POTASSIUM CHLORIDE 750 MG/1
20 TABLET, FILM COATED, EXTENDED RELEASE ORAL
Status: COMPLETED | OUTPATIENT
Start: 2022-03-04 | End: 2022-03-04

## 2022-03-04 RX ORDER — KETOROLAC TROMETHAMINE 30 MG/ML
15 INJECTION, SOLUTION INTRAMUSCULAR; INTRAVENOUS EVERY 6 HOURS
Status: COMPLETED | OUTPATIENT
Start: 2022-03-04 | End: 2022-03-05

## 2022-03-04 RX ORDER — NALOXONE HYDROCHLORIDE 0.4 MG/ML
0.4 INJECTION, SOLUTION INTRAMUSCULAR; INTRAVENOUS; SUBCUTANEOUS
Status: DISCONTINUED | OUTPATIENT
Start: 2022-03-04 | End: 2022-03-17 | Stop reason: HOSPADM

## 2022-03-04 RX ADMIN — BUDESONIDE 500 MCG: 0.5 INHALANT RESPIRATORY (INHALATION) at 07:11

## 2022-03-04 RX ADMIN — GABAPENTIN 600 MG: 600 TABLET, FILM COATED ORAL at 21:23

## 2022-03-04 RX ADMIN — POTASSIUM CHLORIDE AND SODIUM CHLORIDE: 900; 150 INJECTION, SOLUTION INTRAVENOUS at 11:37

## 2022-03-04 RX ADMIN — SODIUM CHLORIDE, PRESERVATIVE FREE 10 ML: 5 INJECTION INTRAVENOUS at 21:24

## 2022-03-04 RX ADMIN — BENZTROPINE MESYLATE 1 MG: 1 TABLET ORAL at 17:18

## 2022-03-04 RX ADMIN — GABAPENTIN 600 MG: 600 TABLET, FILM COATED ORAL at 15:04

## 2022-03-04 RX ADMIN — SODIUM CHLORIDE, PRESERVATIVE FREE 10 ML: 5 INJECTION INTRAVENOUS at 06:39

## 2022-03-04 RX ADMIN — HYDROMORPHONE HYDROCHLORIDE 1 MG: 1 INJECTION, SOLUTION INTRAMUSCULAR; INTRAVENOUS; SUBCUTANEOUS at 15:03

## 2022-03-04 RX ADMIN — OXYCODONE 5 MG: 5 TABLET ORAL at 08:31

## 2022-03-04 RX ADMIN — DULOXETINE HYDROCHLORIDE 30 MG: 30 CAPSULE, DELAYED RELEASE ORAL at 06:38

## 2022-03-04 RX ADMIN — POTASSIUM CHLORIDE AND SODIUM CHLORIDE: 900; 150 INJECTION, SOLUTION INTRAVENOUS at 18:03

## 2022-03-04 RX ADMIN — BENZTROPINE MESYLATE 1 MG: 1 TABLET ORAL at 08:31

## 2022-03-04 RX ADMIN — DULOXETINE 60 MG: 30 CAPSULE, DELAYED RELEASE ORAL at 21:23

## 2022-03-04 RX ADMIN — SODIUM CHLORIDE, PRESERVATIVE FREE 10 ML: 5 INJECTION INTRAVENOUS at 08:32

## 2022-03-04 RX ADMIN — KETOROLAC TROMETHAMINE 15 MG: 30 INJECTION, SOLUTION INTRAMUSCULAR; INTRAVENOUS at 11:33

## 2022-03-04 RX ADMIN — GABAPENTIN 600 MG: 600 TABLET, FILM COATED ORAL at 08:31

## 2022-03-04 RX ADMIN — HYDROMORPHONE HYDROCHLORIDE 1 MG: 1 INJECTION, SOLUTION INTRAMUSCULAR; INTRAVENOUS; SUBCUTANEOUS at 21:24

## 2022-03-04 RX ADMIN — OXYCODONE 5 MG: 5 TABLET ORAL at 04:22

## 2022-03-04 RX ADMIN — Medication 10 ML: at 06:39

## 2022-03-04 RX ADMIN — ENOXAPARIN SODIUM 40 MG: 100 INJECTION SUBCUTANEOUS at 08:32

## 2022-03-04 RX ADMIN — ARFORMOTEROL TARTRATE 15 MCG: 15 SOLUTION RESPIRATORY (INHALATION) at 07:11

## 2022-03-04 RX ADMIN — KETOROLAC TROMETHAMINE 15 MG: 30 INJECTION, SOLUTION INTRAMUSCULAR; INTRAVENOUS at 17:18

## 2022-03-04 RX ADMIN — Medication 2 PACKET: at 08:48

## 2022-03-04 RX ADMIN — RISPERIDONE 2 MG: 1 TABLET, FILM COATED ORAL at 08:31

## 2022-03-04 RX ADMIN — HYDROMORPHONE HYDROCHLORIDE 1 MG: 1 INJECTION, SOLUTION INTRAMUSCULAR; INTRAVENOUS; SUBCUTANEOUS at 09:42

## 2022-03-04 RX ADMIN — Medication 80 MG: at 09:36

## 2022-03-04 RX ADMIN — OXYCODONE 5 MG: 5 TABLET ORAL at 17:18

## 2022-03-04 RX ADMIN — SODIUM CHLORIDE 125 ML/HR: 9 INJECTION, SOLUTION INTRAVENOUS at 06:45

## 2022-03-04 RX ADMIN — POTASSIUM CHLORIDE 20 MEQ: 750 TABLET, EXTENDED RELEASE ORAL at 08:31

## 2022-03-04 RX ADMIN — ONDANSETRON 4 MG: 2 INJECTION INTRAMUSCULAR; INTRAVENOUS at 09:36

## 2022-03-04 RX ADMIN — PANTOPRAZOLE SODIUM 40 MG: 40 INJECTION, POWDER, FOR SOLUTION INTRAVENOUS at 08:31

## 2022-03-04 RX ADMIN — RISPERIDONE 2 MG: 1 TABLET, FILM COATED ORAL at 17:18

## 2022-03-04 RX ADMIN — OXYCODONE 5 MG: 5 TABLET ORAL at 00:09

## 2022-03-04 RX ADMIN — OXYCODONE 5 MG: 5 TABLET ORAL at 12:28

## 2022-03-04 RX ADMIN — NALOXEGOL OXALATE 25 MG: 25 TABLET, FILM COATED ORAL at 08:31

## 2022-03-04 RX ADMIN — Medication 80 MG: at 17:18

## 2022-03-04 RX ADMIN — ASCORBIC ACID, VITAMIN A PALMITATE, CHOLECALCIFEROL, THIAMINE HYDROCHLORIDE, RIBOFLAVIN-5 PHOSPHATE SODIUM, PYRIDOXINE HYDROCHLORIDE, NIACINAMIDE, DEXPANTHENOL, ALPHA-TOCOPHEROL ACETATE, VITAMIN K1, FOLIC ACID, BIOTIN, CYANOCOBALAMIN: 200; 3300; 200; 6; 3.6; 6; 40; 15; 10; 150; 600; 60; 5 INJECTION, SOLUTION INTRAVENOUS at 18:01

## 2022-03-04 RX ADMIN — SODIUM CHLORIDE 125 ML/HR: 9 INJECTION, SOLUTION INTRAVENOUS at 00:11

## 2022-03-04 NOTE — ROUTINE PROCESS
Bedside shift change report given to Richard Tavarez RN (oncoming nurse) by [facility], RN (offgoing nurse). Report included the following information SBAR, Kardex, ED Summary, OR Summary, Procedure Summary, Intake/Output, MAR, Recent Results and Med Rec Status.

## 2022-03-04 NOTE — PROGRESS NOTES
Progress Note    Patient: Loren Williamson MRN: [id number]  SSN: [id number]    YOB: 1965  Age: 61 y.o. Sex: male      Admit Date: 2022    2 Days Post-Op    Procedure:  Procedure(s):  LAPAROSCOPIC SIGMOIDECTOMY WITH RIGID PROCTOSCOPY    Subjective:     Patient experienced 2 episodes of emesis. Planes of incisional pain but denies fever or chills. He has not yet passed gas or had bowel movement. Objective:     Visit Vitals  BP (!) 162/95   Pulse (!) 108   Temp 98.4 °F (36.9 °C)   Resp 16   Ht 5' 7\" (1.702 m)   Wt 149 lb 14.6 oz (68 kg)   SpO2 94%   BMI 23.48 kg/m²       Temp (24hrs), Av.1 °F (36.7 °C), Min:97.5 °F (36.4 °C), Max:98.5 °F (36.9 °C)      Physical Exam:    ABDOMEN: Hypoactive bowel sounds, distended, soft. Incisions intact without signs of infection. Serosanguineous drain fluid. Appropriate incisional pain with palpation. Data Review: Vital signs, ins/outs, labs    Lab Review:   Recent Results (from the past 24 hour(s))   CBC WITH AUTOMATED DIFF    Collection Time: 22  4:42 AM   Result Value Ref Range    WBC 12.2 (H) 4.1 - 11.1 K/uL    RBC 4.58 4.10 - 5.70 M/uL    HGB 14.7 12.1 - 17.0 g/dL    HCT 43.8 36.6 - 50.3 %    MCV 95.6 80.0 - 99.0 FL    MCH 32.1 26.0 - 34.0 PG    MCHC 33.6 30.0 - 36.5 g/dL    RDW 12.8 11.5 - 14.5 %    PLATELET 402 877 - 810 K/uL    MPV 9.5 8.9 - 12.9 FL    NRBC 0.0 0  WBC    ABSOLUTE NRBC 0.00 0.00 - 0.01 K/uL    NEUTROPHILS 73 32 - 75 %    LYMPHOCYTES 14 12 - 49 %    MONOCYTES 11 5 - 13 %    EOSINOPHILS 1 0 - 7 %    BASOPHILS 0 0 - 1 %    IMMATURE GRANULOCYTES 1 (H) 0.0 - 0.5 %    ABS. NEUTROPHILS 8.8 (H) 1.8 - 8.0 K/UL    ABS. LYMPHOCYTES 1.8 0.8 - 3.5 K/UL    ABS. MONOCYTES 1.4 (H) 0.0 - 1.0 K/UL    ABS. EOSINOPHILS 0.1 0.0 - 0.4 K/UL    ABS. BASOPHILS 0.0 0.0 - 0.1 K/UL    ABS. IMM.  GRANS. 0.1 (H) 0.00 - 0.04 K/UL    DF AUTOMATED     METABOLIC PANEL, BASIC    Collection Time: 22  4:42 AM   Result Value Ref Range    Sodium 129 (L) 136 - 145 mmol/L    Potassium 3.4 (L) 3.5 - 5.1 mmol/L    Chloride 99 97 - 108 mmol/L    CO2 24 21 - 32 mmol/L    Anion gap 6 5 - 15 mmol/L    Glucose 123 (H) 65 - 100 mg/dL    BUN 3 (L) 6 - 20 MG/DL    Creatinine 0.62 (L) 0.70 - 1.30 MG/DL    BUN/Creatinine ratio 5 (L) 12 - 20      GFR est AA >60 >60 ml/min/1.73m2    GFR est non-AA >60 >60 ml/min/1.73m2    Calcium 8.8 8.5 - 10.1 MG/DL       Assessment:     Hospital Problems  Date Reviewed: 3/2/2022          Codes Class Noted POA    * (Principal) Sigmoid volvulus ([facility] ICD-10-CM: J80.5  ICD-9-CM: 560.2  2/26/2022 Yes            He has clinical signs of ileus. White blood cell count trending down. Plan/Recommendations/Medical Decision Making:     Agree with KUB. Add Toradol, out of bed in chair. Start peripheral TPN. Regressed diet to sips of clear liquids, await return of bowel function. Ambulate in the halls once pain better controlled. Continue chronic medications. Spirometry, DVT prophylaxis.

## 2022-03-04 NOTE — PROGRESS NOTES
Pt vomited 800 ml, green in color. Stated was not related to medication intake. Stated his abdomen is tender and has not had BM but unsure of date, hypoactive sounds, and not passing gas. Tender and slightly firm, after vomiting did state he felt better.  There's order for abd XR    Pt wants to rest today and have pain under control since this AM. Will attempt walking or at least getting out of bed when he is ready/comfortable

## 2022-03-04 NOTE — DISCHARGE INSTRUCTIONS
Patient Education   Call [phone] to schedule Surgery follow-up for 10-14 days after hospital discharge. May shower. Laparoscopic Bowel Resection: What to Expect at [facility]. had part of your small or large intestine taken out. You are likely to have pain that comes and goes for the next few days. You may feel like you have the flu. You also may have a low fever and feel tired and nauseated. This is common. You should feel better after 1 to 2 weeks and will probably be back to normal in 2 to 4 weeks. Your bowel movements may not be regular for several weeks. Also, you may have some blood in your stool. This care sheet gives you a general idea about how long it will take for you to recover. But each person recovers at a different pace. Follow the steps below to get better as quickly as possible. How can you care for yourself at home? Activity    · Rest when you feel tired. Getting enough sleep will help you recover.     · Try to walk each day. Start by walking a little more than you did the day before. Bit by bit, increase the amount you walk. Walking boosts blood flow and helps prevent pneumonia and constipation.     · Avoid strenuous activities, such as biking, jogging, weight lifting, or aerobic exercise, until your doctor says it is okay.     · Avoid lifting anything that would make you strain. This may include heavy grocery bags and milk containers, a heavy briefcase or backpack, cat litter or dog food bags, a vacuum , or a child.     · Ask your doctor when you can drive again.     · You will probably need to take 2 to 4 weeks off from work. It depends on the type of work you do and how you feel.     · You may shower 24 to 48 hours after surgery, if your doctor says it is okay. Pat the cut (incision) dry. Do not take a bath for the first 2 weeks, or until your doctor tells you it is okay.     · Ask your doctor when it is okay for you to have sex.    Diet    · You may not have much appetite after the surgery. But try to eat a healthy diet. Your doctor will tell you about any foods you should not eat.     · Eat a low-fiber diet for several weeks after surgery. Eat many small meals throughout the day. Add high-fiber foods a little at a time.     · Eat yogurt. It puts good bacteria into your colon and helps prevent diarrhea.     · Try to avoid nuts, seeds, and corn for a while. They may be hard to digest.     · You may need to take vitamins that contain sodium and potassium. Your doctor will tell you whether you should take any vitamins or supplements.     · Drink plenty of fluids to prevent dehydration. Choose water and other clear liquids until you feel better. If you have kidney, heart, or liver disease and have to limit fluids, talk with your doctor before you increase the amount of fluids you drink. Medicines    · Your doctor will tell you if and when you can restart your medicines. You will also be given instructions about taking any new medicines.     · If you take aspirin or some other blood thinner, ask your doctor if and when to start taking it again. Make sure that you understand exactly what your doctor wants you to do.     · Take pain medicines exactly as directed. ? If the doctor gave you a prescription medicine for pain, take it as prescribed. ? If you are not taking a prescription pain medicine, ask your doctor if you can take an over-the-counter medicine.     · If your doctor prescribed antibiotics, take them as directed. Do not stop taking them just because you feel better. You need to take the full course of antibiotics.     · You may need to take some medicines in a different form. You will be told whether to crush pills or take a liquid form of the medicine.     · If your doctor gives you a stool softener, take it as directed.    Incision care    · If you have strips of tape on the incisions, leave the tape on for a week or until it falls off.     · Wash the area daily with warm, soapy water and pat it dry. Don't use hydrogen peroxide or alcohol, which can slow healing. You may cover the area with a gauze bandage if it weeps or rubs against clothing. Change the bandage every day. Follow-up care is a key part of your treatment and safety. Be sure to make and go to all appointments, and call your doctor if you are having problems. It's also a good idea to know your test results and keep a list of the medicines you take. When should you call for help? Call 911 anytime you think you may need emergency care. For example, call if:    · You passed out (lost consciousness).     · You are short of breath. Call your doctor now or seek immediate medical care if:    · You have pain that does not get better after you take pain medicine.     · You cannot pass stool or gas.     · You are sick to your stomach and cannot keep fluids down.     · Bright red blood has soaked through your bandage.     · You have loose stitches, or your incision comes open.     · You have signs of a blood clot in your leg (called a deep vein thrombosis), such as:  ? Pain in your calf, back of the knee, thigh, or groin. ? Redness and swelling in your leg or groin.     · You have signs of infection, such as:  ? Increased pain, swelling, warmth, or redness. ? Red streaks leading from the incision. ? Pus draining from the incision. ? A fever. Watch closely for any changes in your health, and be sure to contact your doctor if you have any problems. Where can you learn more? Go to http://www.gray.com/  Enter H191 in the search box to learn more about \"Laparoscopic Bowel Resection: What to Expect at Home. \"  Current as of: February 10, 2021               Content Version: 13.0  © 6334-6241 [employer], Incorporated. Care instructions adapted under license by [employer] (which disclaims liability or warranty for this information).  If you have questions about a medical condition or this instruction, always ask your healthcare professional. [place] [address] any warranty or liability for your use of this information.

## 2022-03-04 NOTE — PROGRESS NOTES
[address] [facility]  Hospitalist Group                                                                                          Hospitalist Progress Note  Pat Mendoza MD  Answering service: [phone] OR [phone] from in house phone        Date of Service:  3/4/2022  NAME:  Ines Najera  :  1965  MRN:  [id number]      Admission Summary:   Galilea Gusman is a 61 y.o. male who presents complaining of constipation for 6 days. Patient states that it is common for him not to have a bowel movement for 5 days but once it turned into 6 he was concerned. He states that his abdomen does take extended a couple times per month. Never as bad as it did when he came in. He has never had a colonoscopy before. He has a history of schizophrenia and is on multiple psych medications. CT noticed Sigmoid volvulus with small bowel obstruction in ER ,   Patient underwent colonoscopic decompression   midnight and was found to have large colotomy colon. He additionally started vomiting and had NG tube placed  am\"       Interval history / Subjective:   Pt states he only had a bite of guardado, denies nausea/vomiting and rather state that it is is uncomfortable abdomen that prevents him from adequate po intake. Assessment & Plan:     Sigmoid volvulus POA status post laparoscopic sigmoid resection 22  -POD#2, incision intact  -Pain control  -Diet started, advance as tolerated per surgery  -3/4 seen by another hospitalist, distended, drain noted with minimal output, no high pitch sounds, ordered abd xray    Hyponatremia in setting of poor solute/po intake  -resume ivf and adjust as needed, trend    Mild hypokalemia  -replete as needed  -check magnesium level intermittently    Hx of Paranoid schizophrenia. -does not voice si/hi/hallucinations  resume preadmission medication (his home med is haldol decanoate which is once every 28 days injection)    Hx of COPD.     -no wheezing/exacerbation  -resume tx    Hx of Hypertension, complicated with pain/anxiety symptoms   -asymptomatic  -treat underlying contributors  -trend/monitor, adjust meds as needed      Hx of Dyslipidemia.    -resume atorvastatin    Tobacco abuse. 3/3/2022 hospitalist note: The patient advised to quit smoking. We will place the patient on Nicoderm patch. Leukocytosis that is improving without abx requirement  -thought to be acute phase reactant  - per 3/3/2022 hospitalist note: The patient is afebrile, not toxic. Lactic acid level is within normal limits. UA negative,  chest xray, chest ct lung clear.   -resume to monitor while inpt    Hx of Left lung cancer, status post resection. See other orders for plan     Code status: full   Prophylaxis: lovenox  Care Plan discussed with: patient   Anticipated Disposition: TBD      Hospital Problems  Date Reviewed: 3/2/2022          Codes Class Noted POA    * (Principal) Sigmoid volvulus ([facility] ICD-10-CM: K56.2  ICD-9-CM: 560.2  2/26/2022 Yes                Review of Systems:   A comprehensive review of systems was negative except for that written in the HPI. Positive for abdominal discomfort. No report of headache/n/v/diarrhea/vision changes/cp/sob/unilateral weakness/change in voice/si/hi/hallucinations/cyanosis/etc. ROS negative other than noted      Vital Signs:    Last 24hrs VS reviewed since prior progress note. Most recent are:  Visit Vitals  BP (!) 162/95   Pulse (!) 108   Temp 98.4 °F (36.9 °C)   Resp 16   Ht 5' 7\" (1.702 m)   Wt 68 kg (149 lb 14.6 oz)   SpO2 94%   BMI 23.48 kg/m²         Intake/Output Summary (Last 24 hours) at 3/4/2022 3976  Last data filed at 3/4/2022 0479  Gross per 24 hour   Intake --   Output 3164 ml   Net -3164 ml        Physical Examination:     I had a face to face encounter with this patient and independently examined them on 3/4/2022 as outlined below:          Constitutional:  nad   ENT:  Oral mucosa moist, supple, no discharge   Resp:  CTA bilaterally.  No wheezing   Chest:  No accessory muscle use, nonlabored breathing    CV:  Regular rhythm, normal rate, no rubs    GI:  Soft, distended, no high pitch sounds, drain with minimal output this am     Musculoskeletal:  No edema, warm, 2+ pulses throughout    Neurologic:  Moves all extremities, awake, alert  Skin: no jaundice or cyanosis  Psych: does not voice si/hi/hallucinations, not agitated            Data Review:       Labs:     Recent Labs     03/04/22 0442 03/03/22 0344   WBC 12.2* 16.6*   HGB 14.7 15.3   HCT 43.8 45.4    258     Recent Labs     03/04/22 0442 03/03/22 0344 03/02/22  0006   * 134* 131*   K 3.4* 4.4 3.4*   CL 99 106 102   CO2 24 24 24   BUN 3* 4* 2*   CREA 0.62* 0.67* 0.55*   * 113* 111*   CA 8.8 8.6 8.5   MG  --   --  1.8   PHOS  --   --  2.8     Recent Labs     03/03/22 0344 03/02/22  0006   ALT 31 25   AP 58 62   TBILI 0.4 0.4   TP 6.8 6.6   ALB 3.2* 3.1*   GLOB 3.6 3.5       Medications Reviewed:     Current Facility-Administered Medications   Medication Dose Route Frequency    naloxone (NARCAN) injection 0.4 mg  0.4 mg IntraVENous EVERY 2 MINUTES AS NEEDED    simethicone (MYLICON) tablet 80 mg  80 mg Oral TIDAC    atorvastatin (LIPITOR) tablet 20 mg  20 mg Oral DAILY    sodium chloride (NS) flush 5-40 mL  5-40 mL IntraVENous Q8H    sodium chloride (NS) flush 5-40 mL  5-40 mL IntraVENous PRN    ondansetron (ZOFRAN ODT) tablet 4 mg  4 mg Oral Q8H PRN    Or    ondansetron (ZOFRAN) injection 4 mg  4 mg IntraVENous Q6H PRN    enoxaparin (LOVENOX) injection 40 mg  40 mg SubCUTAneous DAILY    0.9% sodium chloride infusion  75 mL/hr IntraVENous CONTINUOUS    oxyCODONE IR (ROXICODONE) tablet 5 mg  5 mg Oral Q4H PRN    HYDROmorphone (DILAUDID) injection 1 mg  1 mg IntraVENous Q3H PRN    naloxegoL (MOVANTIK) tablet 25 mg  25 mg Oral DAILY    butalbital-acetaminophen-caffeine (FIORICET, ESGIC) -40 mg per tablet 1 Tablet  1 Tablet Oral Q4H PRN    sodium chloride (NS) flush 5-40 mL  5-40 mL IntraVENous Q8H    sodium chloride (NS) flush 5-40 mL  5-40 mL IntraVENous PRN    albuterol-ipratropium (DUO-NEB) 2.5 MG-0.5 MG/3 ML  3 mL Nebulization Q4H PRN    sodium chloride (NS) flush 5-40 mL  5-40 mL IntraVENous Q8H    sodium chloride (NS) flush 5-40 mL  5-40 mL IntraVENous PRN    acetaminophen (TYLENOL) tablet 650 mg  650 mg Oral Q6H PRN    Or    acetaminophen (TYLENOL) suppository 650 mg  650 mg Rectal Q6H PRN    ondansetron (ZOFRAN ODT) tablet 4 mg  4 mg Oral Q8H PRN    Or    ondansetron (ZOFRAN) injection 4 mg  4 mg IntraVENous Q6H PRN    nicotine (NICODERM CQ) 21 mg/24 hr patch 1 Patch  1 Patch TransDERmal DAILY    hydrALAZINE (APRESOLINE) 20 mg/mL injection 10 mg  10 mg IntraVENous Q6H PRN    pantoprazole (PROTONIX) injection 40 mg  40 mg IntraVENous DAILY    And    sodium chloride (NS) flush 10 mL  10 mL IntraVENous DAILY    benztropine (COGENTIN) tablet 1 mg  1 mg Oral BID    clonazePAM (KlonoPIN) tablet 1 mg  1 mg Oral BID PRN    DULoxetine (CYMBALTA) capsule 30 mg  30 mg Oral 7am    DULoxetine (CYMBALTA) capsule 60 mg  60 mg Oral QHS    gabapentin (NEURONTIN) tablet 600 mg  600 mg Oral TID    risperiDONE (RisperDAL) tablet 2 mg  2 mg Oral BID    albuterol-ipratropium (DUO-NEB) 2.5 MG-0.5 MG/3 ML  3 mL Nebulization Q6H PRN    arformoteroL (BROVANA) neb solution 15 mcg  15 mcg Nebulization BID RT    And    budesonide (PULMICORT) 500 mcg/2 ml nebulizer suspension  500 mcg Nebulization BID RT   No results found.   ______________________________________________________________________  EXPECTED LENGTH OF STAY: 5d 16h  ACTUAL LENGTH OF STAY:          Pj Greenberg MD

## 2022-03-04 NOTE — PROGRESS NOTES
Comprehensive Nutrition Assessment    Type and Reason for Visit: Initial (New PN)    Nutrition Recommendations/Plan:      Goal rate of PPN in order to meet > 75% pt's estimated energy/protein needs:   D10, 4.25%AA at 83 ml/hr + 500 ml 20% lipids at 42 ml/hr x 12h 3x/week     D/c IVF once PN at goal rate. Nutrition Assessment:    62 yo male admitted for sigmoid volvulus. PMhx: lung CA s/p lobectomy, COPD, HTN, dyslipidemia, cardiomyopathy, paranoid schizophrenia. Weight hx in EMR indicates weight loss of 14% over last 15 months - not significant but noted. KUB on admission showed sigmoid volvulus and SBO. S/P decompression with rectal tube in ED. Diet advanced and pt ate but then developed n/v. Diet regressed to sips of clears and MD ordered for PN to begin today. Recommend goal rate of PN as D10, 4.25%AA at 83 ml/hr + 500 ml 20% lipids at 42 ml/hr x 12h 3x/week to provide 1992 kcals, 1447 kcals (85%), 199 gm CHO, 84 gm protein (100%). Malnutrition Assessment:  Malnutrition Status: At risk for malnutrition (specify) (NPO; s/p sigmoidectomy; PPN)      Nutritionally Significant Medications: Zofran; NaCl with 20mEq Kcl at 125 ml/hr    Estimated Daily Nutrient Needs:  Energy (kcal): 7335-1650 (25-30 kcals/kg); Weight Used for Energy Requirements: Current  Protein (g): 81-95 (1.2-1.4 gm/kg); Weight Used for Protein Requirements: Current  Fluid (ml/day): 1700; Method Used for Fluid Requirements: 1 ml/kcal    Nutrition Related Findings:       BM: 3/1  Edema: none  Wounds:  Surgical incision       Current Nutrition Therapies:   Diet: NPO with sips of clears  Supplements: none  Additional Caloric Sources: PPN    Meal intake: Patient Vitals for the past 168 hrs:   % Diet Eaten   03/04/22 1103 0%   02/28/22 1400 76 - 100%   02/28/22 1131 76 - 100%     Supplement Intake: No data found.     Anthropometric Measures:  · Height:  5' 7\" (170.2 cm)  · Current Body Wt:  68 kg (149 lb 14.6 oz)   · Admission Body Wt:       · Usual Body Wt:        · Ideal Body Wt:  148:  101.3 %   · Adjusted Body Weight:   ; Weight Adjustment for: No adjustment   · Adjusted BMI:       · BMI Categories:  Normal weight (BMI 22.0-24.9) age over 72     Wt Readings from Last 10 Encounters:   03/02/22 68 kg (149 lb 14.6 oz)   12/30/21 68 kg (150 lb)   12/30/20 79 kg (174 lb 3.2 oz)   12/18/20 77.6 kg (171 lb)   12/08/20 77.6 kg (171 lb 1.2 oz)   12/01/20 73.9 kg (163 lb)   11/20/20 74.3 kg (163 lb 12.8 oz)   11/06/20 77.1 kg (170 lb)   10/26/20 79.8 kg (176 lb)   10/12/20 79.8 kg (176 lb)       Nutrition Diagnosis:   · Inadequate oral intake related to inadequate protein-energy intake,altered GI function as evidenced by NPO or clear liquid status due to medical condition,GI abnormality      Nutrition Interventions:   Food and/or Nutrient Delivery: Continue current diet,Modify parenteral nutrition  Nutrition Education and Counseling: No recommendations at this time  Coordination of Nutrition Care: Continue to monitor while inpatient    Goals:  Meet > 75% EEN's with PO intakes + PN within next 5-7 days       Nutrition Monitoring and Evaluation:   Behavioral-Environmental Outcomes: None identified  Food/Nutrient Intake Outcomes: Diet advancement/tolerance,Food and nutrient intake,Parenteral nutrition intake/tolerance  Physical Signs/Symptoms Outcomes: Biochemical data,GI status,Weight    Discharge Planning:     Too soon to determine     Keon Bundy RD  Contact via [employer]

## 2022-03-05 LAB
ALBUMIN SERPL-MCNC: 2.3 G/DL (ref 3.5–5)
ANION GAP SERPL CALC-SCNC: 3 MMOL/L (ref 5–15)
BASOPHILS # BLD: 0 K/UL (ref 0–0.1)
BASOPHILS NFR BLD: 0 % (ref 0–1)
BUN SERPL-MCNC: 9 MG/DL (ref 6–20)
BUN/CREAT SERPL: 17 (ref 12–20)
CALCIUM SERPL-MCNC: 8 MG/DL (ref 8.5–10.1)
CHLORIDE SERPL-SCNC: 103 MMOL/L (ref 97–108)
CO2 SERPL-SCNC: 23 MMOL/L (ref 21–32)
COMMENT, HOLDF: NORMAL
CREAT SERPL-MCNC: 0.53 MG/DL (ref 0.7–1.3)
DIFFERENTIAL METHOD BLD: ABNORMAL
EOSINOPHIL # BLD: 0.2 K/UL (ref 0–0.4)
EOSINOPHIL NFR BLD: 2 % (ref 0–7)
ERYTHROCYTE [DISTWIDTH] IN BLOOD BY AUTOMATED COUNT: 12.6 % (ref 11.5–14.5)
GLUCOSE BLD STRIP.AUTO-MCNC: 101 MG/DL (ref 65–117)
GLUCOSE BLD STRIP.AUTO-MCNC: 102 MG/DL (ref 65–117)
GLUCOSE BLD STRIP.AUTO-MCNC: 91 MG/DL (ref 65–117)
GLUCOSE BLD STRIP.AUTO-MCNC: 96 MG/DL (ref 65–117)
GLUCOSE SERPL-MCNC: 113 MG/DL (ref 65–100)
HCT VFR BLD AUTO: 37.7 % (ref 36.6–50.3)
HGB BLD-MCNC: 13 G/DL (ref 12.1–17)
IMM GRANULOCYTES # BLD AUTO: 0.1 K/UL (ref 0–0.04)
IMM GRANULOCYTES NFR BLD AUTO: 1 % (ref 0–0.5)
LYMPHOCYTES # BLD: 1.6 K/UL (ref 0.8–3.5)
LYMPHOCYTES NFR BLD: 14 % (ref 12–49)
MAGNESIUM SERPL-MCNC: 1.5 MG/DL (ref 1.6–2.4)
MCH RBC QN AUTO: 32.7 PG (ref 26–34)
MCHC RBC AUTO-ENTMCNC: 34.5 G/DL (ref 30–36.5)
MCV RBC AUTO: 95 FL (ref 80–99)
MONOCYTES # BLD: 1.1 K/UL (ref 0–1)
MONOCYTES NFR BLD: 9 % (ref 5–13)
NEUTS SEG # BLD: 8.5 K/UL (ref 1.8–8)
NEUTS SEG NFR BLD: 74 % (ref 32–75)
NRBC # BLD: 0 K/UL (ref 0–0.01)
NRBC BLD-RTO: 0 PER 100 WBC
PHOSPHATE SERPL-MCNC: 3.2 MG/DL (ref 2.6–4.7)
PLATELET # BLD AUTO: 222 K/UL (ref 150–400)
PMV BLD AUTO: 9.3 FL (ref 8.9–12.9)
POTASSIUM SERPL-SCNC: 4.2 MMOL/L (ref 3.5–5.1)
RBC # BLD AUTO: 3.97 M/UL (ref 4.1–5.7)
SAMPLES BEING HELD,HOLD: NORMAL
SERVICE CMNT-IMP: NORMAL
SODIUM SERPL-SCNC: 129 MMOL/L (ref 136–145)
WBC # BLD AUTO: 11.5 K/UL (ref 4.1–11.1)

## 2022-03-05 PROCEDURE — 74011250636 HC RX REV CODE- 250/636: Performed by: SURGERY

## 2022-03-05 PROCEDURE — 85025 COMPLETE CBC W/AUTO DIFF WBC: CPT

## 2022-03-05 PROCEDURE — 83735 ASSAY OF MAGNESIUM: CPT

## 2022-03-05 PROCEDURE — 94760 N-INVAS EAR/PLS OXIMETRY 1: CPT

## 2022-03-05 PROCEDURE — 36415 COLL VENOUS BLD VENIPUNCTURE: CPT

## 2022-03-05 PROCEDURE — 74011000250 HC RX REV CODE- 250: Performed by: SURGERY

## 2022-03-05 PROCEDURE — 74011250637 HC RX REV CODE- 250/637: Performed by: SURGERY

## 2022-03-05 PROCEDURE — 80069 RENAL FUNCTION PANEL: CPT

## 2022-03-05 PROCEDURE — 82962 GLUCOSE BLOOD TEST: CPT

## 2022-03-05 PROCEDURE — 94640 AIRWAY INHALATION TREATMENT: CPT

## 2022-03-05 PROCEDURE — 74011250637 HC RX REV CODE- 250/637: Performed by: INTERNAL MEDICINE

## 2022-03-05 PROCEDURE — 65270000032 HC RM SEMIPRIVATE

## 2022-03-05 PROCEDURE — 74011250636 HC RX REV CODE- 250/636: Performed by: HOSPITALIST

## 2022-03-05 PROCEDURE — C9113 INJ PANTOPRAZOLE SODIUM, VIA: HCPCS | Performed by: SURGERY

## 2022-03-05 RX ORDER — MAGNESIUM SULFATE HEPTAHYDRATE 40 MG/ML
2 INJECTION, SOLUTION INTRAVENOUS ONCE
Status: COMPLETED | OUTPATIENT
Start: 2022-03-05 | End: 2022-03-05

## 2022-03-05 RX ORDER — METOCLOPRAMIDE HYDROCHLORIDE 5 MG/ML
10 INJECTION INTRAMUSCULAR; INTRAVENOUS EVERY 6 HOURS
Status: DISCONTINUED | OUTPATIENT
Start: 2022-03-05 | End: 2022-03-17 | Stop reason: HOSPADM

## 2022-03-05 RX ADMIN — ATORVASTATIN CALCIUM 20 MG: 20 TABLET, FILM COATED ORAL at 08:03

## 2022-03-05 RX ADMIN — GABAPENTIN 600 MG: 600 TABLET, FILM COATED ORAL at 15:28

## 2022-03-05 RX ADMIN — ARFORMOTEROL TARTRATE 15 MCG: 15 SOLUTION RESPIRATORY (INHALATION) at 20:03

## 2022-03-05 RX ADMIN — DULOXETINE 60 MG: 30 CAPSULE, DELAYED RELEASE ORAL at 22:00

## 2022-03-05 RX ADMIN — Medication 80 MG: at 11:18

## 2022-03-05 RX ADMIN — RISPERIDONE 2 MG: 1 TABLET, FILM COATED ORAL at 08:03

## 2022-03-05 RX ADMIN — OXYCODONE 5 MG: 5 TABLET ORAL at 08:03

## 2022-03-05 RX ADMIN — POTASSIUM CHLORIDE AND SODIUM CHLORIDE: 900; 150 INJECTION, SOLUTION INTRAVENOUS at 18:01

## 2022-03-05 RX ADMIN — HYDROMORPHONE HYDROCHLORIDE 1 MG: 1 INJECTION, SOLUTION INTRAMUSCULAR; INTRAVENOUS; SUBCUTANEOUS at 18:01

## 2022-03-05 RX ADMIN — HYDROMORPHONE HYDROCHLORIDE 1 MG: 1 INJECTION, SOLUTION INTRAMUSCULAR; INTRAVENOUS; SUBCUTANEOUS at 15:11

## 2022-03-05 RX ADMIN — OXYCODONE 5 MG: 5 TABLET ORAL at 23:31

## 2022-03-05 RX ADMIN — BUDESONIDE 500 MCG: 0.5 INHALANT RESPIRATORY (INHALATION) at 20:03

## 2022-03-05 RX ADMIN — BENZTROPINE MESYLATE 1 MG: 1 TABLET ORAL at 18:06

## 2022-03-05 RX ADMIN — ENOXAPARIN SODIUM 40 MG: 100 INJECTION SUBCUTANEOUS at 08:05

## 2022-03-05 RX ADMIN — METOCLOPRAMIDE 10 MG: 5 INJECTION, SOLUTION INTRAMUSCULAR; INTRAVENOUS at 12:36

## 2022-03-05 RX ADMIN — GABAPENTIN 600 MG: 600 TABLET, FILM COATED ORAL at 08:04

## 2022-03-05 RX ADMIN — OXYCODONE 5 MG: 5 TABLET ORAL at 12:36

## 2022-03-05 RX ADMIN — HYDROMORPHONE HYDROCHLORIDE 1 MG: 1 INJECTION, SOLUTION INTRAMUSCULAR; INTRAVENOUS; SUBCUTANEOUS at 20:51

## 2022-03-05 RX ADMIN — MAGNESIUM SULFATE IN WATER 2 G: 40 INJECTION, SOLUTION INTRAVENOUS at 08:57

## 2022-03-05 RX ADMIN — PANTOPRAZOLE SODIUM 40 MG: 40 INJECTION, POWDER, FOR SOLUTION INTRAVENOUS at 08:06

## 2022-03-05 RX ADMIN — RISPERIDONE 2 MG: 1 TABLET, FILM COATED ORAL at 18:06

## 2022-03-05 RX ADMIN — BUDESONIDE 500 MCG: 0.5 INHALANT RESPIRATORY (INHALATION) at 07:40

## 2022-03-05 RX ADMIN — NALOXEGOL OXALATE 25 MG: 25 TABLET, FILM COATED ORAL at 08:03

## 2022-03-05 RX ADMIN — HYDROMORPHONE HYDROCHLORIDE 1 MG: 1 INJECTION, SOLUTION INTRAMUSCULAR; INTRAVENOUS; SUBCUTANEOUS at 10:34

## 2022-03-05 RX ADMIN — Medication 80 MG: at 07:30

## 2022-03-05 RX ADMIN — POTASSIUM CHLORIDE AND SODIUM CHLORIDE: 900; 150 INJECTION, SOLUTION INTRAVENOUS at 08:14

## 2022-03-05 RX ADMIN — KETOROLAC TROMETHAMINE 15 MG: 30 INJECTION, SOLUTION INTRAMUSCULAR; INTRAVENOUS at 06:10

## 2022-03-05 RX ADMIN — KETOROLAC TROMETHAMINE 15 MG: 30 INJECTION, SOLUTION INTRAMUSCULAR; INTRAVENOUS at 01:07

## 2022-03-05 RX ADMIN — LEUCINE, PHENYLALANINE, LYSINE, METHIONINE, ISOLEUCINE, VALINE, HISTIDINE, THREONINE, TRYPTOPHAN, ALANINE, GLYCINE, ARGININE, PROLINE, SERINE, TYROSINE, SODIUM ACETATE, DIBASIC POTASSIUM PHOSPHATE, MAGNESIUM CHLORIDE, SODIUM CHLORIDE, CALCIUM CHLORIDE, DEXTROSE
311; 238; 247; 170; 255; 247; 204; 179; 77; 880; 438; 489; 289; 213; 17; 297; 261; 51; 77; 33; 10 INJECTION INTRAVENOUS at 18:03

## 2022-03-05 RX ADMIN — HYDROMORPHONE HYDROCHLORIDE 1 MG: 1 INJECTION, SOLUTION INTRAMUSCULAR; INTRAVENOUS; SUBCUTANEOUS at 06:10

## 2022-03-05 RX ADMIN — OXYCODONE 5 MG: 5 TABLET ORAL at 16:16

## 2022-03-05 RX ADMIN — METOCLOPRAMIDE 10 MG: 5 INJECTION, SOLUTION INTRAMUSCULAR; INTRAVENOUS at 23:31

## 2022-03-05 RX ADMIN — OXYCODONE 5 MG: 5 TABLET ORAL at 01:07

## 2022-03-05 RX ADMIN — BENZTROPINE MESYLATE 1 MG: 1 TABLET ORAL at 08:03

## 2022-03-05 RX ADMIN — HYDRALAZINE HYDROCHLORIDE 10 MG: 20 INJECTION INTRAMUSCULAR; INTRAVENOUS at 15:10

## 2022-03-05 RX ADMIN — METOCLOPRAMIDE 10 MG: 5 INJECTION, SOLUTION INTRAMUSCULAR; INTRAVENOUS at 18:07

## 2022-03-05 RX ADMIN — ARFORMOTEROL TARTRATE 15 MCG: 15 SOLUTION RESPIRATORY (INHALATION) at 07:40

## 2022-03-05 RX ADMIN — Medication 80 MG: at 15:30

## 2022-03-05 RX ADMIN — GABAPENTIN 600 MG: 600 TABLET, FILM COATED ORAL at 22:00

## 2022-03-05 RX ADMIN — SODIUM CHLORIDE, PRESERVATIVE FREE 10 ML: 5 INJECTION INTRAVENOUS at 08:06

## 2022-03-05 RX ADMIN — DULOXETINE HYDROCHLORIDE 30 MG: 30 CAPSULE, DELAYED RELEASE ORAL at 06:10

## 2022-03-05 NOTE — PROGRESS NOTES
Ambulated patient this AM to door and back and he sat in chair for short period. Increased weakness, was not able to ambulate guillen. ADL care and bathing performed and new linen applied; pt stated he felt much better and also encouraged spirometer, needs frequent encouragement is needed.

## 2022-03-05 NOTE — PROGRESS NOTES
[address] [facility] Adult  Hospitalist Group                                                                                          Hospitalist Progress Note  Janes Mera MD  Answering service: [phone] OR [phone] from in house phone        Date of Service:  3/5/2022  NAME:  Misti Ann  :  1965  MRN:  [id number]      Admission Summary:   Joesph Chao is a 61 y.o. male who presents complaining of constipation for 6 days. Patient states that it is common for him not to have a bowel movement for 5 days but once it turned into 6 he was concerned. He states that his abdomen does take extended a couple times per month. Never as bad as it did when he came in. He has never had a colonoscopy before. He has a history of schizophrenia and is on multiple psych medications. CT noticed Sigmoid volvulus with small bowel obstruction in ER ,   Patient underwent colonoscopic decompression   midnight and was found to have large colotomy colon. He additionally started vomiting and had NG tube placed  am\"       Interval history / Subjective:   Some mild pain, no n/v, no dyspnea, no bm or flatus     Assessment & Plan:     Sigmoid volvulus POA status post laparoscopic sigmoid resection 22  -Pain control  -awaiting restoration of bowel function  -management per general surgery  -on PPN    Hyponatremia in setting of poor solute/po intake  -monitor on IVF, if worsens will send urine studies    Hypokalemia/hypomagnesemia  -replete PRN    Hx of Paranoid schizophrenia. -does not voice si/hi/hallucinations  resume preadmission medication (his home med is haldol decanoate which is once every 28 days injection)    Hx of COPD. Hypertension, complicated with pain/anxiety symptoms       Dyslipidemia. Tobacco use    Leukocytosis monitor    Hx of Left lung cancer, status post resection.       See other orders for plan     Code status: full   Prophylaxis: lovenox  Care Plan discussed with: patient Anticipated Disposition: TBD      Hospital Problems  Date Reviewed: 3/2/2022          Codes Class Noted POA    * (Principal) Sigmoid volvulus ([facility] [address] ICD-10-CM: K56.2  ICD-9-CM: 560.2  2/26/2022 Yes                Review of Systems:   Pertinent items are noted in HPI. Vital Signs:    Last 24hrs VS reviewed since prior progress note. Most recent are:  Visit Vitals  /80   Pulse (!) 101   Temp 97.7 °F (36.5 °C)   Resp 16   Ht 5' 7\" (1.702 m)   Wt 68 kg (149 lb 14.6 oz)   SpO2 93%   BMI 23.48 kg/m²         Intake/Output Summary (Last 24 hours) at 3/5/2022 1429  Last data filed at 3/5/2022 1238  Gross per 24 hour   Intake 500 ml   Output 200 ml   Net 300 ml        Physical Examination:     I had a face to face encounter with this patient and independently examined them on 3/5/2022 as outlined below:          Constitutional:  nad   ENT:  anicteric sclerae   Resp:  CTA bilaterally.  Normal work of breathing   Chest:  No accessory muscle use   CV:  Regular rhythm, normal rate, no rubs    GI:  Soft, distended, no high pitch sounds, drain with minimal output this am     Musculoskeletal:  No edema, warm, 2+ pulses throughout    Neurologic:  Moves all extremities, awake, alert  Skin: no jaundice or cyanosis  Psych: does not voice si/hi/hallucinations, not agitated            Data Review:       Labs:     Recent Labs     03/05/22 0113 03/04/22 0442   WBC 11.5* 12.2*   HGB 13.0 14.7   HCT 37.7 43.8    263     Recent Labs     03/05/22 0113 03/04/22 0442 03/03/22 0344   * 129* 134*   K 4.2 3.4* 4.4    99 106   CO2 23 24 24   BUN 9 3* 4*   CREA 0.53* 0.62* 0.67*   * 123* 113*   CA 8.0* 8.8 8.6   MG 1.5*  --   --    PHOS 3.2  --   --      Recent Labs     03/05/22 0113 03/03/22  0344   ALT  --  31   AP  --  58   TBILI  --  0.4   TP  --  6.8   ALB 2.3* 3.2*   GLOB  --  3.6       Medications Reviewed:     Current Facility-Administered Medications   Medication Dose Route Frequency    TPN ADULT - PERIPHERAL AA 4.25% D10% W/ ELECTROLYTES AND CA   IntraVENous CONTINUOUS    metoclopramide HCl (REGLAN) injection 10 mg  10 mg IntraVENous Q6H    naloxone (NARCAN) injection 0.4 mg  0.4 mg IntraVENous EVERY 2 MINUTES AS NEEDED    simethicone (MYLICON) tablet 80 mg  80 mg Oral TIDAC    atorvastatin (LIPITOR) tablet 20 mg  20 mg Oral DAILY    0.9% sodium chloride with KCl 20 mEq/L infusion   IntraVENous CONTINUOUS    TPN ADULT - PERIPHERAL AA 4.25% D10% W/ ELECTROLYTES AND CA   IntraVENous CONTINUOUS    sodium chloride (NS) flush 5-40 mL  5-40 mL IntraVENous Q8H    sodium chloride (NS) flush 5-40 mL  5-40 mL IntraVENous PRN    ondansetron (ZOFRAN ODT) tablet 4 mg  4 mg Oral Q8H PRN    Or    ondansetron (ZOFRAN) injection 4 mg  4 mg IntraVENous Q6H PRN    enoxaparin (LOVENOX) injection 40 mg  40 mg SubCUTAneous DAILY    oxyCODONE IR (ROXICODONE) tablet 5 mg  5 mg Oral Q4H PRN    HYDROmorphone (DILAUDID) injection 1 mg  1 mg IntraVENous Q3H PRN    naloxegoL (MOVANTIK) tablet 25 mg  25 mg Oral DAILY    butalbital-acetaminophen-caffeine (FIORICET, ESGIC) -40 mg per tablet 1 Tablet  1 Tablet Oral Q4H PRN    sodium chloride (NS) flush 5-40 mL  5-40 mL IntraVENous Q8H    sodium chloride (NS) flush 5-40 mL  5-40 mL IntraVENous PRN    albuterol-ipratropium (DUO-NEB) 2.5 MG-0.5 MG/3 ML  3 mL Nebulization Q4H PRN    sodium chloride (NS) flush 5-40 mL  5-40 mL IntraVENous Q8H    sodium chloride (NS) flush 5-40 mL  5-40 mL IntraVENous PRN    acetaminophen (TYLENOL) tablet 650 mg  650 mg Oral Q6H PRN    Or    acetaminophen (TYLENOL) suppository 650 mg  650 mg Rectal Q6H PRN    ondansetron (ZOFRAN ODT) tablet 4 mg  4 mg Oral Q8H PRN    Or    ondansetron (ZOFRAN) injection 4 mg  4 mg IntraVENous Q6H PRN    nicotine (NICODERM CQ) 21 mg/24 hr patch 1 Patch  1 Patch TransDERmal DAILY    hydrALAZINE (APRESOLINE) 20 mg/mL injection 10 mg  10 mg IntraVENous Q6H PRN    pantoprazole (PROTONIX) injection 40 mg 40 mg IntraVENous DAILY    And    sodium chloride (NS) flush 10 mL  10 mL IntraVENous DAILY    benztropine (COGENTIN) tablet 1 mg  1 mg Oral BID    clonazePAM (KlonoPIN) tablet 1 mg  1 mg Oral BID PRN    DULoxetine (CYMBALTA) capsule 30 mg  30 mg Oral 7am    DULoxetine (CYMBALTA) capsule 60 mg  60 mg Oral QHS    gabapentin (NEURONTIN) tablet 600 mg  600 mg Oral TID    risperiDONE (RisperDAL) tablet 2 mg  2 mg Oral BID    albuterol-ipratropium (DUO-NEB) 2.5 MG-0.5 MG/3 ML  3 mL Nebulization Q6H PRN    arformoteroL (BROVANA) neb solution 15 mcg  15 mcg Nebulization BID RT    And    budesonide (PULMICORT) 500 mcg/2 ml nebulizer suspension  500 mcg Nebulization BID RT   No results found.   ______________________________________________________________________  EXPECTED LENGTH OF STAY: 5d 16h  ACTUAL LENGTH OF STAY:          7                 Sana Cifuentes MD

## 2022-03-05 NOTE — PROGRESS NOTES
Progress Note    Patient: Ismael York MRN: [id number]  SSN: [id number]    YOB: 1965  Age: 61 y.o. Sex: male      Admit Date: 2022    3 Days Post-Op    Procedure:  Procedure(s):  LAPAROSCOPIC SIGMOIDOSCOPY WITH RIGID PROCTOSCOPY    Subjective:     No acute surgical issues. Pt reported nausea has improved. No flatus or BM yet. Pt reported abdominal pain with movement.      Objective:     Visit Vitals  /80   Pulse (!) 101   Temp 97.7 °F (36.5 °C)   Resp 16   Ht 5' 7\" (1.702 m)   Wt 149 lb 14.6 oz (68 kg)   SpO2 93%   BMI 23.48 kg/m²       Temp (24hrs), Av.4 °F (36.9 °C), Min:97.7 °F (36.5 °C), Max:98.9 °F (37.2 °C)        Physical Exam:    Gen:  NAD  Pulm:  Unlabored  Abd:  S/mildly distended/appropriate TTP  Wound:  C/D/I    Recent Results (from the past 24 hour(s))   RENAL FUNCTION PANEL    Collection Time: 22  1:13 AM   Result Value Ref Range    Sodium 129 (L) 136 - 145 mmol/L    Potassium 4.2 3.5 - 5.1 mmol/L    Chloride 103 97 - 108 mmol/L    CO2 23 21 - 32 mmol/L    Anion gap 3 (L) 5 - 15 mmol/L    Glucose 113 (H) 65 - 100 mg/dL    BUN 9 6 - 20 MG/DL    Creatinine 0.53 (L) 0.70 - 1.30 MG/DL    BUN/Creatinine ratio 17 12 - 20      GFR est AA >60 >60 ml/min/1.73m2    GFR est non-AA >60 >60 ml/min/1.73m2    Calcium 8.0 (L) 8.5 - 10.1 MG/DL    Phosphorus 3.2 2.6 - 4.7 MG/DL    Albumin 2.3 (L) 3.5 - 5.0 g/dL   CBC WITH AUTOMATED DIFF    Collection Time: 22  1:13 AM   Result Value Ref Range    WBC 11.5 (H) 4.1 - 11.1 K/uL    RBC 3.97 (L) 4.10 - 5.70 M/uL    HGB 13.0 12.1 - 17.0 g/dL    HCT 37.7 36.6 - 50.3 %    MCV 95.0 80.0 - 99.0 FL    MCH 32.7 26.0 - 34.0 PG    MCHC 34.5 30.0 - 36.5 g/dL    RDW 12.6 11.5 - 14.5 %    PLATELET 392 999 - 204 K/uL    MPV 9.3 8.9 - 12.9 FL    NRBC 0.0 0  WBC    ABSOLUTE NRBC 0.00 0.00 - 0.01 K/uL    NEUTROPHILS 74 32 - 75 %    LYMPHOCYTES 14 12 - 49 %    MONOCYTES 9 5 - 13 %    EOSINOPHILS 2 0 - 7 %    BASOPHILS 0 0 - 1 %    IMMATURE GRANULOCYTES 1 (H) 0.0 - 0.5 %    ABS. NEUTROPHILS 8.5 (H) 1.8 - 8.0 K/UL    ABS. LYMPHOCYTES 1.6 0.8 - 3.5 K/UL    ABS. MONOCYTES 1.1 (H) 0.0 - 1.0 K/UL    ABS. EOSINOPHILS 0.2 0.0 - 0.4 K/UL    ABS. BASOPHILS 0.0 0.0 - 0.1 K/UL    ABS. IMM. GRANS. 0.1 (H) 0.00 - 0.04 K/UL    DF AUTOMATED     MAGNESIUM    Collection Time: 03/05/22  1:13 AM   Result Value Ref Range    Magnesium 1.5 (L) 1.6 - 2.4 mg/dL   SAMPLES BEING HELD    Collection Time: 03/05/22  1:13 AM   Result Value Ref Range    SAMPLES BEING HELD 1PST     COMMENT        Add-on orders for these samples will be processed based on acceptable specimen integrity and analyte stability, which may vary by analyte.    GLUCOSE, POC    Collection Time: 03/05/22  5:23 AM   Result Value Ref Range    Glucose (POC) 102 65 - 117 mg/dL    Performed by Fátima OLSNO    GLUCOSE, POC    Collection Time: 03/05/22 11:03 AM   Result Value Ref Range    Glucose (POC) 91 65 - 117 mg/dL    Performed by Jackelin muñiz RN          Assessment:     Hospital Problems  Date Reviewed: 3/2/2022          Codes Class Noted POA    * (Principal) Sigmoid volvulus ([facility] [address] ICD-10-CM: K56.2  ICD-9-CM: 560.2  2/26/2022 Yes              Plan/Recommendations/Medical Decision Making:     - Renew PPN  - NPO with sips of clears  - Labs in am  - AXR in am  - Out of bed as tolerated  - Pain control

## 2022-03-06 LAB
ANION GAP SERPL CALC-SCNC: 3 MMOL/L (ref 5–15)
BUN SERPL-MCNC: 3 MG/DL (ref 6–20)
BUN/CREAT SERPL: 8 (ref 12–20)
CALCIUM SERPL-MCNC: 8.1 MG/DL (ref 8.5–10.1)
CHLORIDE SERPL-SCNC: 101 MMOL/L (ref 97–108)
CO2 SERPL-SCNC: 26 MMOL/L (ref 21–32)
CREAT SERPL-MCNC: 0.4 MG/DL (ref 0.7–1.3)
ERYTHROCYTE [DISTWIDTH] IN BLOOD BY AUTOMATED COUNT: 12.2 % (ref 11.5–14.5)
GLUCOSE BLD STRIP.AUTO-MCNC: 105 MG/DL (ref 65–117)
GLUCOSE BLD STRIP.AUTO-MCNC: 109 MG/DL (ref 65–117)
GLUCOSE BLD STRIP.AUTO-MCNC: 91 MG/DL (ref 65–117)
GLUCOSE BLD STRIP.AUTO-MCNC: 92 MG/DL (ref 65–117)
GLUCOSE SERPL-MCNC: 99 MG/DL (ref 65–100)
HCT VFR BLD AUTO: 37.8 % (ref 36.6–50.3)
HGB BLD-MCNC: 12.9 G/DL (ref 12.1–17)
MAGNESIUM SERPL-MCNC: 1.8 MG/DL (ref 1.6–2.4)
MCH RBC QN AUTO: 32.3 PG (ref 26–34)
MCHC RBC AUTO-ENTMCNC: 34.1 G/DL (ref 30–36.5)
MCV RBC AUTO: 94.5 FL (ref 80–99)
NRBC # BLD: 0 K/UL (ref 0–0.01)
NRBC BLD-RTO: 0 PER 100 WBC
PLATELET # BLD AUTO: 237 K/UL (ref 150–400)
PMV BLD AUTO: 9.5 FL (ref 8.9–12.9)
POTASSIUM SERPL-SCNC: 3.8 MMOL/L (ref 3.5–5.1)
RBC # BLD AUTO: 4 M/UL (ref 4.1–5.7)
SERVICE CMNT-IMP: NORMAL
SODIUM SERPL-SCNC: 130 MMOL/L (ref 136–145)
WBC # BLD AUTO: 8.5 K/UL (ref 4.1–11.1)

## 2022-03-06 PROCEDURE — 94760 N-INVAS EAR/PLS OXIMETRY 1: CPT

## 2022-03-06 PROCEDURE — 82962 GLUCOSE BLOOD TEST: CPT

## 2022-03-06 PROCEDURE — 74011250637 HC RX REV CODE- 250/637: Performed by: SURGERY

## 2022-03-06 PROCEDURE — 74011000250 HC RX REV CODE- 250: Performed by: SURGERY

## 2022-03-06 PROCEDURE — 83735 ASSAY OF MAGNESIUM: CPT

## 2022-03-06 PROCEDURE — 74011250636 HC RX REV CODE- 250/636: Performed by: SURGERY

## 2022-03-06 PROCEDURE — 65270000032 HC RM SEMIPRIVATE

## 2022-03-06 PROCEDURE — 80048 BASIC METABOLIC PNL TOTAL CA: CPT

## 2022-03-06 PROCEDURE — C9113 INJ PANTOPRAZOLE SODIUM, VIA: HCPCS | Performed by: SURGERY

## 2022-03-06 PROCEDURE — 36415 COLL VENOUS BLD VENIPUNCTURE: CPT

## 2022-03-06 PROCEDURE — 85027 COMPLETE CBC AUTOMATED: CPT

## 2022-03-06 PROCEDURE — 74011250637 HC RX REV CODE- 250/637: Performed by: INTERNAL MEDICINE

## 2022-03-06 PROCEDURE — 94640 AIRWAY INHALATION TREATMENT: CPT

## 2022-03-06 RX ORDER — KETOROLAC TROMETHAMINE 30 MG/ML
15 INJECTION, SOLUTION INTRAMUSCULAR; INTRAVENOUS EVERY 6 HOURS
Status: COMPLETED | OUTPATIENT
Start: 2022-03-06 | End: 2022-03-07

## 2022-03-06 RX ADMIN — OXYCODONE 5 MG: 5 TABLET ORAL at 08:24

## 2022-03-06 RX ADMIN — Medication 80 MG: at 07:30

## 2022-03-06 RX ADMIN — Medication 80 MG: at 11:27

## 2022-03-06 RX ADMIN — Medication 80 MG: at 16:30

## 2022-03-06 RX ADMIN — BENZTROPINE MESYLATE 1 MG: 1 TABLET ORAL at 18:00

## 2022-03-06 RX ADMIN — SODIUM CHLORIDE, PRESERVATIVE FREE 10 ML: 5 INJECTION INTRAVENOUS at 08:25

## 2022-03-06 RX ADMIN — OXYCODONE 5 MG: 5 TABLET ORAL at 19:59

## 2022-03-06 RX ADMIN — METOCLOPRAMIDE 10 MG: 5 INJECTION, SOLUTION INTRAMUSCULAR; INTRAVENOUS at 23:48

## 2022-03-06 RX ADMIN — METOCLOPRAMIDE 10 MG: 5 INJECTION, SOLUTION INTRAMUSCULAR; INTRAVENOUS at 17:28

## 2022-03-06 RX ADMIN — HYDROMORPHONE HYDROCHLORIDE 1 MG: 1 INJECTION, SOLUTION INTRAMUSCULAR; INTRAVENOUS; SUBCUTANEOUS at 05:37

## 2022-03-06 RX ADMIN — RISPERIDONE 2 MG: 1 TABLET, FILM COATED ORAL at 17:26

## 2022-03-06 RX ADMIN — OXYCODONE 5 MG: 5 TABLET ORAL at 03:42

## 2022-03-06 RX ADMIN — BENZTROPINE MESYLATE 1 MG: 1 TABLET ORAL at 08:24

## 2022-03-06 RX ADMIN — ARFORMOTEROL TARTRATE 15 MCG: 15 SOLUTION RESPIRATORY (INHALATION) at 07:23

## 2022-03-06 RX ADMIN — HYDROMORPHONE HYDROCHLORIDE 1 MG: 1 INJECTION, SOLUTION INTRAMUSCULAR; INTRAVENOUS; SUBCUTANEOUS at 17:27

## 2022-03-06 RX ADMIN — POTASSIUM CHLORIDE AND SODIUM CHLORIDE: 900; 150 INJECTION, SOLUTION INTRAVENOUS at 10:38

## 2022-03-06 RX ADMIN — OXYCODONE 5 MG: 5 TABLET ORAL at 16:20

## 2022-03-06 RX ADMIN — METOCLOPRAMIDE 10 MG: 5 INJECTION, SOLUTION INTRAMUSCULAR; INTRAVENOUS at 11:27

## 2022-03-06 RX ADMIN — BUDESONIDE 500 MCG: 0.5 INHALANT RESPIRATORY (INHALATION) at 07:23

## 2022-03-06 RX ADMIN — GABAPENTIN 600 MG: 600 TABLET, FILM COATED ORAL at 08:24

## 2022-03-06 RX ADMIN — KETOROLAC TROMETHAMINE 15 MG: 30 INJECTION, SOLUTION INTRAMUSCULAR; INTRAVENOUS at 23:47

## 2022-03-06 RX ADMIN — METOCLOPRAMIDE 10 MG: 5 INJECTION, SOLUTION INTRAMUSCULAR; INTRAVENOUS at 05:37

## 2022-03-06 RX ADMIN — ARFORMOTEROL TARTRATE 15 MCG: 15 SOLUTION RESPIRATORY (INHALATION) at 20:06

## 2022-03-06 RX ADMIN — ONDANSETRON 4 MG: 2 INJECTION INTRAMUSCULAR; INTRAVENOUS at 19:59

## 2022-03-06 RX ADMIN — DULOXETINE 60 MG: 30 CAPSULE, DELAYED RELEASE ORAL at 23:48

## 2022-03-06 RX ADMIN — HYDROMORPHONE HYDROCHLORIDE 1 MG: 1 INJECTION, SOLUTION INTRAMUSCULAR; INTRAVENOUS; SUBCUTANEOUS at 22:43

## 2022-03-06 RX ADMIN — GABAPENTIN 600 MG: 600 TABLET, FILM COATED ORAL at 16:19

## 2022-03-06 RX ADMIN — KETOROLAC TROMETHAMINE 15 MG: 30 INJECTION, SOLUTION INTRAMUSCULAR; INTRAVENOUS at 17:28

## 2022-03-06 RX ADMIN — PANTOPRAZOLE SODIUM 40 MG: 40 INJECTION, POWDER, FOR SOLUTION INTRAVENOUS at 08:25

## 2022-03-06 RX ADMIN — ATORVASTATIN CALCIUM 20 MG: 20 TABLET, FILM COATED ORAL at 08:24

## 2022-03-06 RX ADMIN — LEUCINE, PHENYLALANINE, LYSINE, METHIONINE, ISOLEUCINE, VALINE, HISTIDINE, THREONINE, TRYPTOPHAN, ALANINE, GLYCINE, ARGININE, PROLINE, SERINE, TYROSINE, SODIUM ACETATE, DIBASIC POTASSIUM PHOSPHATE, MAGNESIUM CHLORIDE, SODIUM CHLORIDE, CALCIUM CHLORIDE, DEXTROSE
311; 238; 247; 170; 255; 247; 204; 179; 77; 880; 438; 489; 289; 213; 17; 297; 261; 51; 77; 33; 10 INJECTION INTRAVENOUS at 18:09

## 2022-03-06 RX ADMIN — GABAPENTIN 600 MG: 600 TABLET, FILM COATED ORAL at 23:48

## 2022-03-06 RX ADMIN — KETOROLAC TROMETHAMINE 15 MG: 30 INJECTION, SOLUTION INTRAMUSCULAR; INTRAVENOUS at 12:04

## 2022-03-06 RX ADMIN — ENOXAPARIN SODIUM 40 MG: 100 INJECTION SUBCUTANEOUS at 08:22

## 2022-03-06 RX ADMIN — HYDRALAZINE HYDROCHLORIDE 10 MG: 20 INJECTION INTRAMUSCULAR; INTRAVENOUS at 17:37

## 2022-03-06 RX ADMIN — SODIUM CHLORIDE, PRESERVATIVE FREE 10 ML: 5 INJECTION INTRAVENOUS at 22:00

## 2022-03-06 RX ADMIN — DULOXETINE HYDROCHLORIDE 30 MG: 30 CAPSULE, DELAYED RELEASE ORAL at 07:00

## 2022-03-06 RX ADMIN — HYDROMORPHONE HYDROCHLORIDE 1 MG: 1 INJECTION, SOLUTION INTRAMUSCULAR; INTRAVENOUS; SUBCUTANEOUS at 10:36

## 2022-03-06 RX ADMIN — HYDROMORPHONE HYDROCHLORIDE 1 MG: 1 INJECTION, SOLUTION INTRAMUSCULAR; INTRAVENOUS; SUBCUTANEOUS at 14:36

## 2022-03-06 RX ADMIN — RISPERIDONE 2 MG: 1 TABLET, FILM COATED ORAL at 08:23

## 2022-03-06 RX ADMIN — NALOXEGOL OXALATE 25 MG: 25 TABLET, FILM COATED ORAL at 08:24

## 2022-03-06 RX ADMIN — OXYCODONE 5 MG: 5 TABLET ORAL at 12:06

## 2022-03-06 RX ADMIN — BUDESONIDE 500 MCG: 0.5 INHALANT RESPIRATORY (INHALATION) at 20:06

## 2022-03-06 NOTE — PROGRESS NOTES
Progress Note    Patient: Len Renee MRN: [id number]  SSN: [id number]    YOB: 1965  Age: 61 y.o. Sex: male      Admit Date: 2022    4 Days Post-Op    Procedure:  Procedure(s):  LAPAROSCOPIC SIGMOIDOSCOPY WITH RIGID PROCTOSCOPY    Subjective:     No acute surgical issues. Pt reported feeling better today. He is still having lower abdominal pain. No nausea since yesterday. He reported small BM.        Objective:     Visit Vitals  BP (!) 147/81   Pulse (!) 102   Temp 98.1 °F (36.7 °C)   Resp 20   Ht 5' 7\" (1.702 m)   Wt 183 lb 3.2 oz (83.1 kg)   SpO2 94%   BMI 28.69 kg/m²       Temp (24hrs), Av.1 °F (36.7 °C), Min:98 °F (36.7 °C), Max:98.2 °F (36.8 °C)        Physical Exam:    Gen:  NAD  Pulm:  Unlabored  Abd:  S/mildly distended/appropriate TTP  Wound:  C/D/I    Recent Results (from the past 24 hour(s))   GLUCOSE, POC    Collection Time: 22  5:55 PM   Result Value Ref Range    Glucose (POC) 101 65 - 117 mg/dL    Performed by Edgar muñiz RN    GLUCOSE, POC    Collection Time: 22 11:36 PM   Result Value Ref Range    Glucose (POC) 96 65 - 117 mg/dL    Performed by Medina Martines    CBC W/O DIFF    Collection Time: 22  4:00 AM   Result Value Ref Range    WBC 8.5 4.1 - 11.1 K/uL    RBC 4.00 (L) 4.10 - 5.70 M/uL    HGB 12.9 12.1 - 17.0 g/dL    HCT 37.8 36.6 - 50.3 %    MCV 94.5 80.0 - 99.0 FL    MCH 32.3 26.0 - 34.0 PG    MCHC 34.1 30.0 - 36.5 g/dL    RDW 12.2 11.5 - 14.5 %    PLATELET 217 071 - 479 K/uL    MPV 9.5 8.9 - 12.9 FL    NRBC 0.0 0  WBC    ABSOLUTE NRBC 0.00 0.00 - 3.73 K/uL   METABOLIC PANEL, BASIC    Collection Time: 22  4:00 AM   Result Value Ref Range    Sodium 130 (L) 136 - 145 mmol/L    Potassium 3.8 3.5 - 5.1 mmol/L    Chloride 101 97 - 108 mmol/L    CO2 26 21 - 32 mmol/L    Anion gap 3 (L) 5 - 15 mmol/L    Glucose 99 65 - 100 mg/dL    BUN 3 (L) 6 - 20 MG/DL    Creatinine 0.40 (L) 0.70 - 1.30 MG/DL    BUN/Creatinine ratio 8 (L) 12 - 20 GFR est AA >60 >60 ml/min/1.73m2    GFR est non-AA >60 >60 ml/min/1.73m2    Calcium 8.1 (L) 8.5 - 10.1 MG/DL   MAGNESIUM    Collection Time: 03/06/22  4:00 AM   Result Value Ref Range    Magnesium 1.8 1.6 - 2.4 mg/dL   GLUCOSE, POC    Collection Time: 03/06/22  5:52 AM   Result Value Ref Range    Glucose (POC) 105 65 - 117 mg/dL    Performed by Nata Ortega          Assessment:     Hospital Problems  Date Reviewed: 3/2/2022          Codes Class Noted POA    * (Principal) Sigmoid volvulus ([facility] [address] ICD-10-CM: K56.2  ICD-9-CM: 560.2  2/26/2022 Yes              Plan/Recommendations/Medical Decision Making:     - Renew PPN  - Advance to clear  - Labs in am  - AXR in am  - Out of bed as tolerated  - Pain control

## 2022-03-06 NOTE — PROGRESS NOTES
Pt ambulated to bathroom in attempts for a BM this AM. Did appears multiple specks/ brown in color in commode, difficult to visualize d/t amount of toilet paper used. Patient denied gas but stated felt \"kind of better\"     After dinner pt c/o increased bloating, he believes he's taken in \"way too much fluids today. \" Denied nausea, emesis bag in hand in case. Pt encouraged to use IS to help with releasing air and told to not use the straw to drink if able. Stated will walk again later in evening. Continuing to monitor.    -Pt continues to state he is frequently in 10/10 pain, see MAR/ takes PRN meds around the clock. Pt will appear resting comfortable & able to hold convo still stating 10/10 pain and requesting pain meds regardless.   -Second evening PRN hydralazine IV given. Pt stays hypertensive, appears more HTN in evening compared to beginning of the day. No PO BP meds scheduled     1800: perfect served MD about requiring IV hydralazine yesterday & today and If it was appropriate to initiate BP meds    2000: pt vomited ~400 cc stated felt better but still bloated.  Ever since pt had clear liquid/diet his JOANNA had increased output as well

## 2022-03-06 NOTE — PROGRESS NOTES
[address] [facility] Adult  Hospitalist Group                                                                                          Hospitalist Progress Note  Olu Childress MD  Answering service: [phone] OR [phone] from in house phone        Date of Service:  3/6/2022  NAME:  Leny Anderson  :  1965  MRN:  [id number]      Admission Summary:   Con Porter is a 61 y.o. male who presents complaining of constipation for 6 days. Patient states that it is common for him not to have a bowel movement for 5 days but once it turned into 6 he was concerned. He states that his abdomen does take extended a couple times per month. Never as bad as it did when he came in. He has never had a colonoscopy before. He has a history of schizophrenia and is on multiple psych medications. CT noticed Sigmoid volvulus with small bowel obstruction in ER ,   Patient underwent colonoscopic decompression   midnight and was found to have large colotomy colon. He additionally started vomiting and had NG tube placed  am\"       Interval history / Subjective:   Some mild pain, no n/v, no dyspnea, reports a small bm     Assessment & Plan:     Sigmoid volvulus POA status post laparoscopic sigmoid resection 22  -Pain control  -awaiting restoration of bowel function  -management per general surgery  -on PPN    Hyponatremia in setting of poor solute/po intake  -monitor on IVF, reduce rate today    Hypokalemia/hypomagnesemia  -replete PRN    Hx of Paranoid schizophrenia. -does not voice si/hi/hallucinations  resume preadmission medication (his home med is haldol decanoate which is once every 28 days injection)    Hx of COPD. Hypertension      Dyslipidemia. Tobacco use    Leukocytosis monitor    Hx of Left lung cancer, status post resection.       See other orders for plan     Code status: full   Prophylaxis: lovenox  Care Plan discussed with: patient   Anticipated Disposition: TBD      Hospital Problems Date Reviewed: 3/2/2022          Codes Class Noted POA    * (Principal) Sigmoid volvulus ([facility] [address] ICD-10-CM: K56.2  ICD-9-CM: 560.2  2/26/2022 Yes                Review of Systems:   Pertinent items are noted in HPI. Vital Signs:    Last 24hrs VS reviewed since prior progress note. Most recent are:  Visit Vitals  BP (!) 147/81   Pulse (!) 102   Temp 98.1 °F (36.7 °C)   Resp 20   Ht 5' 7\" (1.702 m)   Wt 83.1 kg (183 lb 3.2 oz)   SpO2 94%   BMI 28.69 kg/m²         Intake/Output Summary (Last 24 hours) at 3/6/2022 1200  Last data filed at 3/6/2022 1041  Gross per 24 hour   Intake 1050 ml   Output 4740 ml   Net -3690 ml        Physical Examination:     I had a face to face encounter with this patient and independently examined them on 3/6/2022 as outlined below:          Constitutional:  nad   ENT:  anicteric sclerae   Resp:  CTA bilaterally.  Normal work of breathing   Chest:  No accessory muscle use   CV:  Regular rhythm, normal rate, no rubs    GI:  Soft, minimally tender mildly distended     Musculoskeletal:  No edema, warm, 2+ pulses throughout    Neurologic:  Moves all extremities, awake, alert  Skin: no jaundice or cyanosis  Psych: does not voice si/hi/hallucinations, not agitated            Data Review:       Labs:     Recent Labs     03/06/22  0400 03/05/22  0113   WBC 8.5 11.5*   HGB 12.9 13.0   HCT 37.8 37.7    222     Recent Labs     03/06/22  0400 03/05/22  0113 03/04/22  0442   * 129* 129*   K 3.8 4.2 3.4*    103 99   CO2 26 23 24   BUN 3* 9 3*   CREA 0.40* 0.53* 0.62*   GLU 99 113* 123*   CA 8.1* 8.0* 8.8   MG 1.8 1.5*  --    PHOS  --  3.2  --      Recent Labs     03/05/22  0113   ALB 2.3*       Medications Reviewed:     Current Facility-Administered Medications   Medication Dose Route Frequency    TPN ADULT - PERIPHERAL AA 4.25% D10% W/ ELECTROLYTES AND CA   IntraVENous CONTINUOUS    ketorolac (TORADOL) injection 15 mg  15 mg IntraVENous Q6H    TPN ADULT - PERIPHERAL AA 4.25% D10% W/ ELECTROLYTES AND CA   IntraVENous CONTINUOUS    metoclopramide HCl (REGLAN) injection 10 mg  10 mg IntraVENous Q6H    naloxone (NARCAN) injection 0.4 mg  0.4 mg IntraVENous EVERY 2 MINUTES AS NEEDED    simethicone (MYLICON) tablet 80 mg  80 mg Oral TIDAC    atorvastatin (LIPITOR) tablet 20 mg  20 mg Oral DAILY    0.9% sodium chloride with KCl 20 mEq/L infusion   IntraVENous CONTINUOUS    sodium chloride (NS) flush 5-40 mL  5-40 mL IntraVENous Q8H    sodium chloride (NS) flush 5-40 mL  5-40 mL IntraVENous PRN    ondansetron (ZOFRAN ODT) tablet 4 mg  4 mg Oral Q8H PRN    Or    ondansetron (ZOFRAN) injection 4 mg  4 mg IntraVENous Q6H PRN    enoxaparin (LOVENOX) injection 40 mg  40 mg SubCUTAneous DAILY    oxyCODONE IR (ROXICODONE) tablet 5 mg  5 mg Oral Q4H PRN    HYDROmorphone (DILAUDID) injection 1 mg  1 mg IntraVENous Q3H PRN    naloxegoL (MOVANTIK) tablet 25 mg  25 mg Oral DAILY    butalbital-acetaminophen-caffeine (FIORICET, ESGIC) -40 mg per tablet 1 Tablet  1 Tablet Oral Q4H PRN    sodium chloride (NS) flush 5-40 mL  5-40 mL IntraVENous Q8H    sodium chloride (NS) flush 5-40 mL  5-40 mL IntraVENous PRN    albuterol-ipratropium (DUO-NEB) 2.5 MG-0.5 MG/3 ML  3 mL Nebulization Q4H PRN    sodium chloride (NS) flush 5-40 mL  5-40 mL IntraVENous Q8H    sodium chloride (NS) flush 5-40 mL  5-40 mL IntraVENous PRN    acetaminophen (TYLENOL) tablet 650 mg  650 mg Oral Q6H PRN    Or    acetaminophen (TYLENOL) suppository 650 mg  650 mg Rectal Q6H PRN    ondansetron (ZOFRAN ODT) tablet 4 mg  4 mg Oral Q8H PRN    Or    ondansetron (ZOFRAN) injection 4 mg  4 mg IntraVENous Q6H PRN    nicotine (NICODERM CQ) 21 mg/24 hr patch 1 Patch  1 Patch TransDERmal DAILY    hydrALAZINE (APRESOLINE) 20 mg/mL injection 10 mg  10 mg IntraVENous Q6H PRN    pantoprazole (PROTONIX) injection 40 mg  40 mg IntraVENous DAILY    And    sodium chloride (NS) flush 10 mL  10 mL IntraVENous DAILY    benztropine (COGENTIN) tablet 1 mg  1 mg Oral BID    clonazePAM (KlonoPIN) tablet 1 mg  1 mg Oral BID PRN    DULoxetine (CYMBALTA) capsule 30 mg  30 mg Oral 7am    DULoxetine (CYMBALTA) capsule 60 mg  60 mg Oral QHS    gabapentin (NEURONTIN) tablet 600 mg  600 mg Oral TID    risperiDONE (RisperDAL) tablet 2 mg  2 mg Oral BID    albuterol-ipratropium (DUO-NEB) 2.5 MG-0.5 MG/3 ML  3 mL Nebulization Q6H PRN    arformoteroL (BROVANA) neb solution 15 mcg  15 mcg Nebulization BID RT    And    budesonide (PULMICORT) 500 mcg/2 ml nebulizer suspension  500 mcg Nebulization BID RT   No results found.   ______________________________________________________________________  EXPECTED LENGTH OF STAY: 5d 16h  ACTUAL LENGTH OF STAY:          8                 Hever Brandt MD

## 2022-03-07 ENCOUNTER — APPOINTMENT (OUTPATIENT)
Dept: CT IMAGING | Age: 57
DRG: 329 | End: 2022-03-07
Attending: SURGERY
Payer: MEDICARE

## 2022-03-07 LAB
ANION GAP SERPL CALC-SCNC: 5 MMOL/L (ref 5–15)
BUN SERPL-MCNC: 4 MG/DL (ref 6–20)
BUN/CREAT SERPL: 9 (ref 12–20)
CALCIUM SERPL-MCNC: 8.5 MG/DL (ref 8.5–10.1)
CHLORIDE SERPL-SCNC: 98 MMOL/L (ref 97–108)
CO2 SERPL-SCNC: 23 MMOL/L (ref 21–32)
CREAT SERPL-MCNC: 0.44 MG/DL (ref 0.7–1.3)
ERYTHROCYTE [DISTWIDTH] IN BLOOD BY AUTOMATED COUNT: 12.4 % (ref 11.5–14.5)
GLUCOSE BLD STRIP.AUTO-MCNC: 113 MG/DL (ref 65–117)
GLUCOSE BLD STRIP.AUTO-MCNC: 114 MG/DL (ref 65–117)
GLUCOSE BLD STRIP.AUTO-MCNC: 115 MG/DL (ref 65–117)
GLUCOSE SERPL-MCNC: 122 MG/DL (ref 65–100)
HCT VFR BLD AUTO: 39.9 % (ref 36.6–50.3)
HGB BLD-MCNC: 13.9 G/DL (ref 12.1–17)
LACTATE SERPL-SCNC: 0.6 MMOL/L (ref 0.4–2)
MCH RBC QN AUTO: 32.3 PG (ref 26–34)
MCHC RBC AUTO-ENTMCNC: 34.8 G/DL (ref 30–36.5)
MCV RBC AUTO: 92.6 FL (ref 80–99)
NRBC # BLD: 0 K/UL (ref 0–0.01)
NRBC BLD-RTO: 0 PER 100 WBC
OSMOLALITY SERPL: 266 MOSM/KG H2O
OSMOLALITY UR: 598 MOSM/KG H2O
PLATELET # BLD AUTO: 291 K/UL (ref 150–400)
PMV BLD AUTO: 9.4 FL (ref 8.9–12.9)
POTASSIUM SERPL-SCNC: 4 MMOL/L (ref 3.5–5.1)
RBC # BLD AUTO: 4.31 M/UL (ref 4.1–5.7)
SERVICE CMNT-IMP: NORMAL
SODIUM SERPL-SCNC: 126 MMOL/L (ref 136–145)
SODIUM UR-SCNC: 20 MMOL/L
WBC # BLD AUTO: 12.4 K/UL (ref 4.1–11.1)

## 2022-03-07 PROCEDURE — 74011250637 HC RX REV CODE- 250/637: Performed by: SURGERY

## 2022-03-07 PROCEDURE — 74177 CT ABD & PELVIS W/CONTRAST: CPT

## 2022-03-07 PROCEDURE — 74011000250 HC RX REV CODE- 250: Performed by: SURGERY

## 2022-03-07 PROCEDURE — 94760 N-INVAS EAR/PLS OXIMETRY 1: CPT

## 2022-03-07 PROCEDURE — C9113 INJ PANTOPRAZOLE SODIUM, VIA: HCPCS | Performed by: SURGERY

## 2022-03-07 PROCEDURE — 74011250637 HC RX REV CODE- 250/637: Performed by: INTERNAL MEDICINE

## 2022-03-07 PROCEDURE — 83605 ASSAY OF LACTIC ACID: CPT

## 2022-03-07 PROCEDURE — 83930 ASSAY OF BLOOD OSMOLALITY: CPT

## 2022-03-07 PROCEDURE — 94640 AIRWAY INHALATION TREATMENT: CPT

## 2022-03-07 PROCEDURE — 74011250636 HC RX REV CODE- 250/636: Performed by: SURGERY

## 2022-03-07 PROCEDURE — 97116 GAIT TRAINING THERAPY: CPT

## 2022-03-07 PROCEDURE — 84300 ASSAY OF URINE SODIUM: CPT

## 2022-03-07 PROCEDURE — 36415 COLL VENOUS BLD VENIPUNCTURE: CPT

## 2022-03-07 PROCEDURE — 65270000032 HC RM SEMIPRIVATE

## 2022-03-07 PROCEDURE — 74011000636 HC RX REV CODE- 636: Performed by: HOSPITALIST

## 2022-03-07 PROCEDURE — 85027 COMPLETE CBC AUTOMATED: CPT

## 2022-03-07 PROCEDURE — 97161 PT EVAL LOW COMPLEX 20 MIN: CPT

## 2022-03-07 PROCEDURE — 83935 ASSAY OF URINE OSMOLALITY: CPT

## 2022-03-07 PROCEDURE — 80048 BASIC METABOLIC PNL TOTAL CA: CPT

## 2022-03-07 PROCEDURE — 82962 GLUCOSE BLOOD TEST: CPT

## 2022-03-07 RX ADMIN — Medication 80 MG: at 07:06

## 2022-03-07 RX ADMIN — HYDROMORPHONE HYDROCHLORIDE 1 MG: 1 INJECTION, SOLUTION INTRAMUSCULAR; INTRAVENOUS; SUBCUTANEOUS at 09:50

## 2022-03-07 RX ADMIN — OXYCODONE 5 MG: 5 TABLET ORAL at 20:28

## 2022-03-07 RX ADMIN — OXYCODONE 5 MG: 5 TABLET ORAL at 16:18

## 2022-03-07 RX ADMIN — BUDESONIDE 500 MCG: 0.5 INHALANT RESPIRATORY (INHALATION) at 07:40

## 2022-03-07 RX ADMIN — RISPERIDONE 2 MG: 1 TABLET, FILM COATED ORAL at 17:56

## 2022-03-07 RX ADMIN — ARFORMOTEROL TARTRATE 15 MCG: 15 SOLUTION RESPIRATORY (INHALATION) at 07:40

## 2022-03-07 RX ADMIN — GABAPENTIN 600 MG: 600 TABLET, FILM COATED ORAL at 15:00

## 2022-03-07 RX ADMIN — Medication 5 ML: at 14:00

## 2022-03-07 RX ADMIN — SODIUM CHLORIDE, PRESERVATIVE FREE 10 ML: 5 INJECTION INTRAVENOUS at 22:00

## 2022-03-07 RX ADMIN — Medication 80 MG: at 11:29

## 2022-03-07 RX ADMIN — THIAMINE HYDROCHLORIDE: 100 INJECTION, SOLUTION INTRAMUSCULAR; INTRAVENOUS at 18:01

## 2022-03-07 RX ADMIN — Medication 10 ML: at 06:00

## 2022-03-07 RX ADMIN — PANTOPRAZOLE SODIUM 40 MG: 40 INJECTION, POWDER, FOR SOLUTION INTRAVENOUS at 09:25

## 2022-03-07 RX ADMIN — GABAPENTIN 600 MG: 600 TABLET, FILM COATED ORAL at 23:14

## 2022-03-07 RX ADMIN — SODIUM CHLORIDE, PRESERVATIVE FREE 5 ML: 5 INJECTION INTRAVENOUS at 14:00

## 2022-03-07 RX ADMIN — RISPERIDONE 2 MG: 1 TABLET, FILM COATED ORAL at 09:23

## 2022-03-07 RX ADMIN — SODIUM CHLORIDE, PRESERVATIVE FREE 10 ML: 5 INJECTION INTRAVENOUS at 09:25

## 2022-03-07 RX ADMIN — METOCLOPRAMIDE 10 MG: 5 INJECTION, SOLUTION INTRAMUSCULAR; INTRAVENOUS at 23:13

## 2022-03-07 RX ADMIN — METOCLOPRAMIDE 10 MG: 5 INJECTION, SOLUTION INTRAMUSCULAR; INTRAVENOUS at 11:29

## 2022-03-07 RX ADMIN — METOCLOPRAMIDE 10 MG: 5 INJECTION, SOLUTION INTRAMUSCULAR; INTRAVENOUS at 05:30

## 2022-03-07 RX ADMIN — METOCLOPRAMIDE 10 MG: 5 INJECTION, SOLUTION INTRAMUSCULAR; INTRAVENOUS at 17:56

## 2022-03-07 RX ADMIN — ATORVASTATIN CALCIUM 20 MG: 20 TABLET, FILM COATED ORAL at 09:23

## 2022-03-07 RX ADMIN — ENOXAPARIN SODIUM 40 MG: 100 INJECTION SUBCUTANEOUS at 09:23

## 2022-03-07 RX ADMIN — BENZTROPINE MESYLATE 1 MG: 1 TABLET ORAL at 17:56

## 2022-03-07 RX ADMIN — KETOROLAC TROMETHAMINE 15 MG: 30 INJECTION, SOLUTION INTRAMUSCULAR; INTRAVENOUS at 17:56

## 2022-03-07 RX ADMIN — DULOXETINE HYDROCHLORIDE 30 MG: 30 CAPSULE, DELAYED RELEASE ORAL at 07:06

## 2022-03-07 RX ADMIN — Medication 80 MG: at 16:17

## 2022-03-07 RX ADMIN — IOPAMIDOL 100 ML: 755 INJECTION, SOLUTION INTRAVENOUS at 08:44

## 2022-03-07 RX ADMIN — DULOXETINE 60 MG: 30 CAPSULE, DELAYED RELEASE ORAL at 23:14

## 2022-03-07 RX ADMIN — BENZTROPINE MESYLATE 1 MG: 1 TABLET ORAL at 09:24

## 2022-03-07 RX ADMIN — OXYCODONE 5 MG: 5 TABLET ORAL at 01:27

## 2022-03-07 RX ADMIN — KETOROLAC TROMETHAMINE 15 MG: 30 INJECTION, SOLUTION INTRAMUSCULAR; INTRAVENOUS at 11:28

## 2022-03-07 RX ADMIN — HYDROMORPHONE HYDROCHLORIDE 1 MG: 1 INJECTION, SOLUTION INTRAMUSCULAR; INTRAVENOUS; SUBCUTANEOUS at 12:58

## 2022-03-07 RX ADMIN — KETOROLAC TROMETHAMINE 15 MG: 30 INJECTION, SOLUTION INTRAMUSCULAR; INTRAVENOUS at 05:30

## 2022-03-07 RX ADMIN — GABAPENTIN 600 MG: 600 TABLET, FILM COATED ORAL at 09:23

## 2022-03-07 RX ADMIN — ONDANSETRON 4 MG: 2 INJECTION INTRAMUSCULAR; INTRAVENOUS at 20:25

## 2022-03-07 RX ADMIN — NALOXEGOL OXALATE 25 MG: 25 TABLET, FILM COATED ORAL at 09:23

## 2022-03-07 NOTE — PROGRESS NOTES
Progress Note    Patient: Ines Najera MRN: [id number]  SSN: [id number]    YOB: 1965  Age: 61 y.o. Sex: male      Admit Date: 2022    5 Days Post-Op    Procedure:  Procedure(s):  LAPAROSCOPIC SIGMOIDECTOMY WITH RIGID PROCTOSCOPY    Subjective:     Patient with additional emesis overnight; BM, flatus just occurring (feels better). No fever or chills. Objective:     Visit Vitals  BP (!) 153/89   Pulse (!) 108   Temp 97.9 °F (36.6 °C)   Resp 22   Ht 5' 7\" (1.702 m)   Wt 183 lb 3.2 oz (83.1 kg)   SpO2 93%   BMI 28.69 kg/m²       Temp (24hrs), Av.4 °F (36.9 °C), Min:97.9 °F (36.6 °C), Max:99.1 °F (37.3 °C)      Physical Exam:    ABDOMEN: Less distended, soft. Incisions intact without signs of infection. Serous drain fluid. Appropriate incisional pain with palpation.     Data Review: Vital signs, ins/outs, labs    Lab Review:   Recent Results (from the past 24 hour(s))   GLUCOSE, POC    Collection Time: 22 11:45 AM   Result Value Ref Range    Glucose (POC) 91 65 - 117 mg/dL    Performed by [employer]  PCT    GLUCOSE, POC    Collection Time: 22  4:34 PM   Result Value Ref Range    Glucose (POC) 92 65 - 117 mg/dL    Performed by [employer]  PCT    GLUCOSE, POC    Collection Time: 22 11:11 PM   Result Value Ref Range    Glucose (POC) 109 65 - 117 mg/dL    Performed by Norm Benitez    METABOLIC PANEL, BASIC    Collection Time: 22  5:28 AM   Result Value Ref Range    Sodium 126 (L) 136 - 145 mmol/L    Potassium 4.0 3.5 - 5.1 mmol/L    Chloride 98 97 - 108 mmol/L    CO2 23 21 - 32 mmol/L    Anion gap 5 5 - 15 mmol/L    Glucose 122 (H) 65 - 100 mg/dL    BUN 4 (L) 6 - 20 MG/DL    Creatinine 0.44 (L) 0.70 - 1.30 MG/DL    BUN/Creatinine ratio 9 (L) 12 - 20      GFR est AA >60 >60 ml/min/1.73m2    GFR est non-AA >60 >60 ml/min/1.73m2    Calcium 8.5 8.5 - 10.1 MG/DL   CBC W/O DIFF    Collection Time: 22  5:28 AM   Result Value Ref Range    WBC 12.4 (H) 4.1 - 11.1 K/uL    RBC 4.31 4.10 - 5.70 M/uL    HGB 13.9 12.1 - 17.0 g/dL    HCT 39.9 36.6 - 50.3 %    MCV 92.6 80.0 - 99.0 FL    MCH 32.3 26.0 - 34.0 PG    MCHC 34.8 30.0 - 36.5 g/dL    RDW 12.4 11.5 - 14.5 %    PLATELET 417 243 - 209 K/uL    MPV 9.4 8.9 - 12.9 FL    NRBC 0.0 0  WBC    ABSOLUTE NRBC 0.00 0.00 - 0.01 K/uL   LACTIC ACID    Collection Time: 03/07/22  5:28 AM   Result Value Ref Range    Lactic acid 0.6 0.4 - 2.0 MMOL/L   OSMOLALITY, SERUM/PLASMA    Collection Time: 03/07/22  5:28 AM   Result Value Ref Range    Osmolality, serum/plasma 266 mOsm/kg H2O   GLUCOSE, POC    Collection Time: 03/07/22  6:26 AM   Result Value Ref Range    Glucose (POC) 115 65 - 117 mg/dL    Performed by Jewell Stapleton        Assessment:     Hospital Problems  Date Reviewed: 3/2/2022          Codes Class Noted POA    * (Principal) Sigmoid volvulus ([facility] [address] ICD-10-CM: B26.4  ICD-9-CM: 560.2  2/26/2022 Yes            He has clinical signs of ileus. CT abdomen/pelvis without signs of leak (suggestive of ileus vs partial SBO). Plan/Recommendations/Medical Decision Making:     Agree with KUB. Toradol, out of bed in chair. Continue peripheral TPN. Sips of clear liquids, await return of bowel function. Ambulate in the halls; PT consult. Continue chronic medications. Spirometry, DVT prophylaxis. EOAE (evoked otoacoustic emission)

## 2022-03-07 NOTE — PROGRESS NOTES
0015: Paged Dr. Navid Hernandez about patients tachycardia, increase JOANNA output, and N/V. Dr. Navid Hernandez ordered patient go back to NPO and will repeat Xray later today.

## 2022-03-07 NOTE — PROGRESS NOTES
Problem: Mobility Impaired (Adult and Pediatric)  Goal: *Acute Goals and Plan of Care (Insert Text)  Description: FUNCTIONAL STATUS PRIOR TO ADMISSION: Patient was independent and active without use of DME.    HOME SUPPORT PRIOR TO ADMISSION: The patient lived with group home staff but did not require assist.    Physical Therapy Goals  Initiated 3/7/2022  1. Patient will move from supine to sit and sit to supine , scoot up and down, and roll side to side in bed with independence within 7 day(s). 2.  Patient will transfer from bed to chair and chair to bed with independence using the least restrictive device within 7 day(s). 3.  Patient will perform sit to stand with independence within 7 day(s). 4.  Patient will ambulate with independence for 500 feet with the least restrictive device within 7 day(s). 5.  Patient will ascend/descend 3 stairs with one handrail(s) with independence within 7 day(s). Outcome: Progressing Towards Goal   PHYSICAL THERAPY EVALUATION  Patient: Brock Dimas (89 y.o. male)  Date: 3/7/2022  Primary Diagnosis: Sigmoid volvulus ([address] [K56.2]  Procedure(s) (LRB):  LAPAROSCOPIC SIGMOIDOSCOPY WITH RIGID PROCTOSCOPY (N/A) 5 Days Post-Op   Precautions:   Fall    ASSESSMENT  Based on the objective data described below, the patient presents with slight impairment in functional mobility, activity tolerance and balance s/p hospitalization for laparoscopic sigmoidoscopy with rigid proctoscopy 5 days ago. PLOF: Independent with ADLs and IADLs. Patient lives in a group home with his bedroom on second floor. Patient states that he has spoken with the director and requested a first floor bedroom (I communicated this to Becky Galarza, Case Management). There are 3 step with a rail to enter as well as a wheelchair ramp to enter. Patient is almost at his baseline level of function. Movement was a bit slow and gait with forward flexed trunk due to pain.  Tolerated 300 ft with stand by assistance (PT managing IV pole and lines). Gait was steady. Current Level of Function Impacting Discharge (mobility/balance): Modified independent with bed mobility. Stand by assistance and set up for transfers and gait. Gait 300 ft, no AD, slow and forward flexed but steady. Functional Outcome Measure: The patient scored 80/100 on the Barthel outcome measure which is indicative of minimal impaired ability to care for basic self-needs/dependency on others. Other factors to consider for discharge: Motivated/A & O x 4/Supervised in a group home/Independent PLOF      Patient will benefit from skilled therapy intervention to address the above noted impairments. PLAN :  Recommendations and Planned Interventions: bed mobility training, transfer training, gait training, patient and family training/education, and therapeutic activities      Frequency/Duration: Patient will be followed by physical therapy:  3 times a week to address goals. Recommendation for discharge: (in order for the patient to meet his/her long term goals)  No skilled physical therapy/ follow up rehabilitation needs identified at this time. This discharge recommendation:  Has been made in collaboration with the attending provider and/or case management    IF patient discharges home will need the following DME: none         SUBJECTIVE:   Patient stated I would like to get up and walk.     OBJECTIVE DATA SUMMARY:   HISTORY:    Past Medical History:   Diagnosis Date    Cancer ([facility] 12/2020    LUNG CANCER    Chronic obstructive pulmonary disease ([facility]     Essential hypertension 11/19/2020    High cholesterol 11/19/2020    Hypertension     Hypertrophic cardiomyopathy ([facility]     Paranoid schizophrenia ([facility]     Psychiatric disorder      Past Surgical History:   Procedure Laterality Date    FLEXIBLE SIGMOIDOSCOPY N/A 2/26/2022    SIGMOIDOSCOPY FLEXIBLE performed by Jeni Hale DO at [address] [place] [address] ENDOSCOPY       Personal factors and/or comorbidities impacting plan of care: Motivated/A & O x 4/Supervised in a Group Home/Independent [address]: [place] Name: Nelli Gannon ([address] [address])  # Steps to Enter: 3  Rails to Enter: Yes  Hand Rails : Right  Wheelchair Ramp: Yes  One/Two Story Residence: Two story  # of Interior Steps: 12  Interior Rails: Left  Lift Chair Available: No  Living Alone: No  Support Systems: Adult Group Home  Patient Expects to be Discharged to[de-identified] Group home  Current DME Used/Available at Home: None  Tub or Shower Type: Tub/Shower combination    EXAMINATION/PRESENTATION/DECISION MAKING:   Critical Behavior:  Neurologic State: Alert  Orientation Level: Oriented X4  Cognition: Follows commands       Range Of Motion:  AROM: Within functional limits           PROM: Within functional limits           Strength:    Strength: Within functional limits                    Tone & Sensation:   Tone: Normal              Sensation: Intact               Coordination:  Coordination: Within functional limits  Vision:      Functional Mobility:  Bed Mobility:  Rolling: Modified independent  Supine to Sit: Supervision  Sit to Supine: Supervision  Scooting: Modified independent  Transfers:  Sit to Stand: Stand-by assistance;Setup  Stand to Sit: Stand-by assistance;Setup        Bed to Chair: Stand-by assistance;Setup              Balance:   Sitting: Intact  Standing: Intact; Without support  Ambulation/Gait Training:  Distance (ft): 300 Feet (ft)  Assistive Device: Other (comment) (none)  Ambulation - Level of Assistance: Stand-by assistance (assist with IV pole)        Gait Abnormalities: Decreased step clearance        Base of Support: Widened     Speed/Sarah: Slow           Interventions: Safety awareness training;Verbal cues         Functional Measure:  Barthel Index:    Bathin  Bladder: 10  Bowels: 10  Groomin  Dressin  Feeding: 10  Mobility: 10  Stairs: 5  Toilet Use: 10  Transfer (Bed to Chair and Back): 15  Total: 80/100       The Barthel ADL Index: Guidelines  1. The index should be used as a record of what a patient does, not as a record of what a patient could do. 2. The main aim is to establish degree of independence from any help, physical or verbal, however minor and for whatever reason. 3. The need for supervision renders the patient not independent. 4. A patient's performance should be established using the best available evidence. Asking the patient, friends/relatives and nurses are the usual sources, but direct observation and common sense are also important. However direct testing is not needed. 5. Usually the patient's performance over the preceding 24-48 hours is important, but occasionally longer periods will be relevant. 6. Middle categories imply that the patient supplies over 50 per cent of the effort. 7. Use of aids to be independent is allowed. Score Interpretation (from [address] [address])    Independent   60-79 Minimally independent   40-59 Partially dependent   20-39 Very dependent   <20 Totally dependent     -Mera Singh., Barthel, D.W. (1965). Functional evaluation: the Barthel Index. [facility] ([address] (1997). The Barthel activities of daily living index: self-reporting versus actual performance in the old (> or = 75 years). Journal of [address] [place] [address] 45(7), 14 [facility], J.YOLY, Nitish Shepard., Chano Chan. (1999). Measuring the change in disability after inpatient rehabilitation; comparison of the responsiveness of the Barthel Index and Functional Routt Measure. Journal of Neurology, Neurosurgery, and Psychiatry, 66(4), 215-011. Ramu Baker N.J.A, LI Cano.SAHIL, & Nini Hagan, MDeliaA. (2004) Assessment of post-stroke quality of life in cost-effectiveness studies: The usefulness of the Barthel Index and the EuroQoL-5D.  [facility], 13, 424-94 Physical Therapy Evaluation Charge Determination   History Examination Presentation Decision-Making   MEDIUM  Complexity : 1-2 comorbidities / personal factors will impact the outcome/ POC  MEDIUM Complexity : 3 Standardized tests and measures addressing body structure, function, activity limitation and / or participation in recreation  LOW Complexity : Stable, uncomplicated  LOW Complexity : FOTO score of       Based on the above components, the patient evaluation is determined to be of the following complexity level: LOW     Pain Ratin/10 abdominal with movement and coughing    Activity Tolerance:   Good    After treatment patient left in no apparent distress:   Call bell within reach, Side rails x 3, and sidelying left and nurse notified. COMMUNICATION/EDUCATION:   The patients plan of care was discussed with: Registered nurse and Case management. Fall prevention education was provided and the patient/caregiver indicated understanding., Patient/family have participated as able in goal setting and plan of care. , and Patient/family agree to work toward stated goals and plan of care.     Thank you for this referral.  Michael Elliott   Time Calculation: 20 mins

## 2022-03-07 NOTE — PROGRESS NOTES
[facility] Adult  Hospitalist Group                                                                                          Hospitalist Progress Note  Franny Kendall MD  Answering service: [phone] OR [phone] from in house phone        Date of Service:  3/7/2022  NAME:  Raheem Cormier  :  1965  MRN:  [id number]      Admission Summary:   Merrick Avendano is a 61 y.o. male who presents complaining of constipation for 6 days. Patient states that it is common for him not to have a bowel movement for 5 days but once it turned into 6 he was concerned. He states that his abdomen does take extended a couple times per month. Never as bad as it did when he came in. He has never had a colonoscopy before. He has a history of schizophrenia and is on multiple psych medications. CT noticed Sigmoid volvulus with small bowel obstruction in ER ,   Patient underwent colonoscopic decompression   midnight and was found to have large colotomy colon. He additionally started vomiting and had NG tube placed  am\"       Interval history / Subjective:   N/v overnight, some abdominal pain, some flatus, no bm. Assessment & Plan:     Sigmoid volvulus POA status post laparoscopic sigmoid resection 22  -Pain control  -awaiting restoration of bowel function  -management per general surgery  -on PPN    Hyponatremia  -initially hypovolemic/poor solute-intake  -now, urine lytes with high osms and Na 20. Worsening on NS. Suspect some SIADH. Hold saline for now. PPN with D10 may be contributing. Clinically not c/w adrenal insufficiency    Hypokalemia/hypomagnesemia  -replete PRN    Hx of Paranoid schizophrenia. -does not voice si/hi/hallucinations    Hx of COPD. Hypertension      Dyslipidemia. Tobacco use    Leukocytosis monitor    Hx of Left lung cancer, status post resection.       See other orders for plan     Code status: full   Prophylaxis: lovenox  Care Plan discussed with: patient Anticipated Disposition: TBD      Hospital Problems  Date Reviewed: 3/2/2022          Codes Class Noted POA    * (Principal) Sigmoid volvulus ([facility] ICD-10-CM: K56.2  ICD-9-CM: 560.2  2/26/2022 Yes                Review of Systems:   Pertinent items are noted in HPI. Vital Signs:    Last 24hrs VS reviewed since prior progress note. Most recent are:  Visit Vitals  BP (!) 153/89   Pulse (!) 108   Temp 97.9 °F (36.6 °C)   Resp 22   Ht 5' 7\" (1.702 m)   Wt 73.8 kg (162 lb 12.8 oz)   SpO2 93%   BMI 25.50 kg/m²         Intake/Output Summary (Last 24 hours) at 3/7/2022 1448  Last data filed at 3/7/2022 0900  Gross per 24 hour   Intake 650 ml   Output 1525 ml   Net -875 ml        Physical Examination:     I had a face to face encounter with this patient and independently examined them on 3/7/2022 as outlined below:          Constitutional:  nad   ENT:  anicteric sclerae   Resp:  CTA bilaterally.  Normal work of breathing   Chest:  No accessory muscle use   CV:  Regular rhythm, normal rate, no rubs    GI:  Soft, mild diffuse tenderness, mildly distended, bs+    Musculoskeletal:  No edema, warm, 2+ pulses throughout    Neurologic:  Moves all extremities, awake, alert  Skin: no jaundice or cyanosis  Psych: does not voice si/hi/hallucinations, not agitated            Data Review:       Labs:     Recent Labs     03/07/22  0528 03/06/22  0400   WBC 12.4* 8.5   HGB 13.9 12.9   HCT 39.9 37.8    237     Recent Labs     03/07/22  0528 03/06/22  0400 03/05/22  0113   * 130* 129*   K 4.0 3.8 4.2   CL 98 101 103   CO2 23 26 23   BUN 4* 3* 9   CREA 0.44* 0.40* 0.53*   * 99 113*   CA 8.5 8.1* 8.0*   MG  --  1.8 1.5*   PHOS  --   --  3.2     Recent Labs     03/05/22  0113   ALB 2.3*       Medications Reviewed:     Current Facility-Administered Medications   Medication Dose Route Frequency    TPN ADULT - PERIPHERAL AA 4.25% D10% W/ ELECTROLYTES AND CA   IntraVENous CONTINUOUS    TPN ADULT - PERIPHERAL AA 4.25% D10% W/ ELECTROLYTES AND CA   IntraVENous CONTINUOUS    ketorolac (TORADOL) injection 15 mg  15 mg IntraVENous Q6H    metoclopramide HCl (REGLAN) injection 10 mg  10 mg IntraVENous Q6H    naloxone (NARCAN) injection 0.4 mg  0.4 mg IntraVENous EVERY 2 MINUTES AS NEEDED    simethicone (MYLICON) tablet 80 mg  80 mg Oral TIDAC    atorvastatin (LIPITOR) tablet 20 mg  20 mg Oral DAILY    [Held by provider] 0.9% sodium chloride with KCl 20 mEq/L infusion   IntraVENous CONTINUOUS    sodium chloride (NS) flush 5-40 mL  5-40 mL IntraVENous Q8H    sodium chloride (NS) flush 5-40 mL  5-40 mL IntraVENous PRN    ondansetron (ZOFRAN ODT) tablet 4 mg  4 mg Oral Q8H PRN    Or    ondansetron (ZOFRAN) injection 4 mg  4 mg IntraVENous Q6H PRN    enoxaparin (LOVENOX) injection 40 mg  40 mg SubCUTAneous DAILY    oxyCODONE IR (ROXICODONE) tablet 5 mg  5 mg Oral Q4H PRN    HYDROmorphone (DILAUDID) injection 1 mg  1 mg IntraVENous Q3H PRN    naloxegoL (MOVANTIK) tablet 25 mg  25 mg Oral DAILY    butalbital-acetaminophen-caffeine (FIORICET, ESGIC) -40 mg per tablet 1 Tablet  1 Tablet Oral Q4H PRN    sodium chloride (NS) flush 5-40 mL  5-40 mL IntraVENous Q8H    sodium chloride (NS) flush 5-40 mL  5-40 mL IntraVENous PRN    albuterol-ipratropium (DUO-NEB) 2.5 MG-0.5 MG/3 ML  3 mL Nebulization Q4H PRN    sodium chloride (NS) flush 5-40 mL  5-40 mL IntraVENous Q8H    sodium chloride (NS) flush 5-40 mL  5-40 mL IntraVENous PRN    acetaminophen (TYLENOL) tablet 650 mg  650 mg Oral Q6H PRN    Or    acetaminophen (TYLENOL) suppository 650 mg  650 mg Rectal Q6H PRN    ondansetron (ZOFRAN ODT) tablet 4 mg  4 mg Oral Q8H PRN    Or    ondansetron (ZOFRAN) injection 4 mg  4 mg IntraVENous Q6H PRN    nicotine (NICODERM CQ) 21 mg/24 hr patch 1 Patch  1 Patch TransDERmal DAILY    hydrALAZINE (APRESOLINE) 20 mg/mL injection 10 mg  10 mg IntraVENous Q6H PRN    pantoprazole (PROTONIX) injection 40 mg  40 mg IntraVENous DAILY And    sodium chloride (NS) flush 10 mL  10 mL IntraVENous DAILY    benztropine (COGENTIN) tablet 1 mg  1 mg Oral BID    clonazePAM (KlonoPIN) tablet 1 mg  1 mg Oral BID PRN    DULoxetine (CYMBALTA) capsule 30 mg  30 mg Oral 7am    DULoxetine (CYMBALTA) capsule 60 mg  60 mg Oral QHS    gabapentin (NEURONTIN) tablet 600 mg  600 mg Oral TID    risperiDONE (RisperDAL) tablet 2 mg  2 mg Oral BID    albuterol-ipratropium (DUO-NEB) 2.5 MG-0.5 MG/3 ML  3 mL Nebulization Q6H PRN    arformoteroL (BROVANA) neb solution 15 mcg  15 mcg Nebulization BID RT    And    budesonide (PULMICORT) 500 mcg/2 ml nebulizer suspension  500 mcg Nebulization BID RT   CT ABD PELV W CONT    Result Date: 3/7/2022  1. Status post sigmoid resection with primary anastomosis. Mild perianastomotic stranding may be postsurgical. 2.  Tethered loops of decompressed proximal and distal ileum adjacent to sigmoid anastomosis. Residual internal hernia remains in consideration. 3.  Persistent diffuse fluid-filled distention of stomach and bowel has improved.  Findings may represent a combination of ileus and partial small bowel obstruction near areas of small bowel tethering.     ______________________________________________________________________  EXPECTED LENGTH OF STAY: 5d 16h  ACTUAL LENGTH OF STAY:          9                 Emely Summers MD

## 2022-03-07 NOTE — PROGRESS NOTES
Patient up ambulating in hallways with staff and PT/OT. Patient still requesting IV dilaudid for pain control. This RN encouraged patient to try to control pain with PO pain medication. Patient agreed to try this afternoon.

## 2022-03-07 NOTE — PROGRESS NOTES
Bedside shift change report given to DARCI Jacobs (oncoming nurse) by Trixie Hernandez RN (offgoing nurse). Report included the following information SBAR, Kardex, Intake/Output, MAR and Recent Results.

## 2022-03-07 NOTE — PROGRESS NOTES
Transition of Care: TBD; likely back to his group home; patient normally lives in an adult group home called [address] at [facility] [address]     Transport Plan: TBD; likely roundtrip ride via [facility] or [address]: 14%     DX: Sigmoid volvulus     Main contact is caregiver and owner of group home- Jim Gusman [phone]     Discharge pending:  -patient is POD#5 laparoscopic sigmoid resection   -pending AXray (to be done today 3/7)  -patient continues on PPN  -pending diet advancement  -pending pain control  -pending progress with PT and recs       CM noted from chart and in IDR rounds     CM following  Hanny Staples RN, CRM     NOTE: pleasant patient at bedside; he is alert and oriented x 3; patient states he lives at stated address (which is a group home) for several years; he is normally independent in his ADLs; he does not drive; his caregiver- Eric Argue him to appointments;  the group home helps him with medication management; Abida Keen does not own a walker or cane to ambulate; he confirms he sees Dr. Yosi Snyder in the same practice as his listed pcp- Coreen Hsu; patient states he does not have a history with any home health agency

## 2022-03-08 LAB
ALBUMIN SERPL-MCNC: 2.5 G/DL (ref 3.5–5)
ALBUMIN/GLOB SERPL: 0.7 {RATIO} (ref 1.1–2.2)
ALP SERPL-CCNC: 50 U/L (ref 45–117)
ALT SERPL-CCNC: 31 U/L (ref 12–78)
ANION GAP SERPL CALC-SCNC: 7 MMOL/L (ref 5–15)
AST SERPL-CCNC: 16 U/L (ref 15–37)
BASOPHILS # BLD: 0.1 K/UL (ref 0–0.1)
BASOPHILS NFR BLD: 1 % (ref 0–1)
BILIRUB SERPL-MCNC: 0.5 MG/DL (ref 0.2–1)
BUN SERPL-MCNC: 4 MG/DL (ref 6–20)
BUN/CREAT SERPL: 8 (ref 12–20)
CALCIUM SERPL-MCNC: 8.2 MG/DL (ref 8.5–10.1)
CHLORIDE SERPL-SCNC: 100 MMOL/L (ref 97–108)
CO2 SERPL-SCNC: 22 MMOL/L (ref 21–32)
CREAT SERPL-MCNC: 0.51 MG/DL (ref 0.7–1.3)
DIFFERENTIAL METHOD BLD: NORMAL
EOSINOPHIL # BLD: 0.4 K/UL (ref 0–0.4)
EOSINOPHIL NFR BLD: 5 % (ref 0–7)
ERYTHROCYTE [DISTWIDTH] IN BLOOD BY AUTOMATED COUNT: 12.3 % (ref 11.5–14.5)
GLOBULIN SER CALC-MCNC: 3.8 G/DL (ref 2–4)
GLUCOSE BLD STRIP.AUTO-MCNC: 106 MG/DL (ref 65–117)
GLUCOSE BLD STRIP.AUTO-MCNC: 111 MG/DL (ref 65–117)
GLUCOSE BLD STRIP.AUTO-MCNC: 112 MG/DL (ref 65–117)
GLUCOSE BLD STRIP.AUTO-MCNC: 121 MG/DL (ref 65–117)
GLUCOSE BLD STRIP.AUTO-MCNC: 92 MG/DL (ref 65–117)
GLUCOSE SERPL-MCNC: 115 MG/DL (ref 65–100)
HCT VFR BLD AUTO: 38.8 % (ref 36.6–50.3)
HGB BLD-MCNC: 13.7 G/DL (ref 12.1–17)
IMM GRANULOCYTES # BLD AUTO: 0 K/UL
IMM GRANULOCYTES NFR BLD AUTO: 0 %
LYMPHOCYTES # BLD: 1.5 K/UL (ref 0.8–3.5)
LYMPHOCYTES NFR BLD: 19 % (ref 12–49)
MCH RBC QN AUTO: 32.7 PG (ref 26–34)
MCHC RBC AUTO-ENTMCNC: 35.3 G/DL (ref 30–36.5)
MCV RBC AUTO: 92.6 FL (ref 80–99)
MONOCYTES # BLD: 0.9 K/UL (ref 0–1)
MONOCYTES NFR BLD: 12 % (ref 5–13)
NEUTS SEG # BLD: 4.9 K/UL (ref 1.8–8)
NEUTS SEG NFR BLD: 63 % (ref 32–75)
NRBC # BLD: 0 K/UL (ref 0–0.01)
NRBC BLD-RTO: 0 PER 100 WBC
PLATELET # BLD AUTO: 282 K/UL (ref 150–400)
PMV BLD AUTO: 9.7 FL (ref 8.9–12.9)
POTASSIUM SERPL-SCNC: 3.8 MMOL/L (ref 3.5–5.1)
PROT SERPL-MCNC: 6.3 G/DL (ref 6.4–8.2)
RBC # BLD AUTO: 4.19 M/UL (ref 4.1–5.7)
RBC MORPH BLD: NORMAL
SERVICE CMNT-IMP: ABNORMAL
SERVICE CMNT-IMP: NORMAL
SODIUM SERPL-SCNC: 129 MMOL/L (ref 136–145)
WBC # BLD AUTO: 7.8 K/UL (ref 4.1–11.1)

## 2022-03-08 PROCEDURE — 74011250637 HC RX REV CODE- 250/637: Performed by: INTERNAL MEDICINE

## 2022-03-08 PROCEDURE — 74011000250 HC RX REV CODE- 250: Performed by: SURGERY

## 2022-03-08 PROCEDURE — 80053 COMPREHEN METABOLIC PANEL: CPT

## 2022-03-08 PROCEDURE — 85025 COMPLETE CBC W/AUTO DIFF WBC: CPT

## 2022-03-08 PROCEDURE — 65270000032 HC RM SEMIPRIVATE

## 2022-03-08 PROCEDURE — 74011250636 HC RX REV CODE- 250/636: Performed by: SURGERY

## 2022-03-08 PROCEDURE — 82962 GLUCOSE BLOOD TEST: CPT

## 2022-03-08 PROCEDURE — 74011250637 HC RX REV CODE- 250/637: Performed by: SURGERY

## 2022-03-08 PROCEDURE — 94664 DEMO&/EVAL PT USE INHALER: CPT

## 2022-03-08 PROCEDURE — C9113 INJ PANTOPRAZOLE SODIUM, VIA: HCPCS | Performed by: SURGERY

## 2022-03-08 PROCEDURE — 36415 COLL VENOUS BLD VENIPUNCTURE: CPT

## 2022-03-08 PROCEDURE — 94760 N-INVAS EAR/PLS OXIMETRY 1: CPT

## 2022-03-08 PROCEDURE — 94640 AIRWAY INHALATION TREATMENT: CPT

## 2022-03-08 RX ADMIN — Medication 80 MG: at 15:32

## 2022-03-08 RX ADMIN — OXYCODONE 5 MG: 5 TABLET ORAL at 15:32

## 2022-03-08 RX ADMIN — PANTOPRAZOLE SODIUM 40 MG: 40 INJECTION, POWDER, FOR SOLUTION INTRAVENOUS at 09:46

## 2022-03-08 RX ADMIN — Medication 10 ML: at 06:00

## 2022-03-08 RX ADMIN — DULOXETINE HYDROCHLORIDE 30 MG: 30 CAPSULE, DELAYED RELEASE ORAL at 06:43

## 2022-03-08 RX ADMIN — OXYCODONE 5 MG: 5 TABLET ORAL at 23:42

## 2022-03-08 RX ADMIN — ATORVASTATIN CALCIUM 20 MG: 20 TABLET, FILM COATED ORAL at 09:46

## 2022-03-08 RX ADMIN — OXYCODONE 5 MG: 5 TABLET ORAL at 03:13

## 2022-03-08 RX ADMIN — METOCLOPRAMIDE 10 MG: 5 INJECTION, SOLUTION INTRAMUSCULAR; INTRAVENOUS at 06:43

## 2022-03-08 RX ADMIN — GABAPENTIN 600 MG: 600 TABLET, FILM COATED ORAL at 09:46

## 2022-03-08 RX ADMIN — ARFORMOTEROL TARTRATE 15 MCG: 15 SOLUTION RESPIRATORY (INHALATION) at 20:47

## 2022-03-08 RX ADMIN — Medication 10 ML: at 23:43

## 2022-03-08 RX ADMIN — OXYCODONE 5 MG: 5 TABLET ORAL at 09:45

## 2022-03-08 RX ADMIN — BUDESONIDE 500 MCG: 0.5 INHALANT RESPIRATORY (INHALATION) at 20:47

## 2022-03-08 RX ADMIN — DULOXETINE 60 MG: 30 CAPSULE, DELAYED RELEASE ORAL at 21:11

## 2022-03-08 RX ADMIN — THIAMINE HYDROCHLORIDE: 100 INJECTION, SOLUTION INTRAMUSCULAR; INTRAVENOUS at 19:38

## 2022-03-08 RX ADMIN — RISPERIDONE 2 MG: 1 TABLET, FILM COATED ORAL at 17:15

## 2022-03-08 RX ADMIN — HYDROMORPHONE HYDROCHLORIDE 1 MG: 1 INJECTION, SOLUTION INTRAMUSCULAR; INTRAVENOUS; SUBCUTANEOUS at 06:48

## 2022-03-08 RX ADMIN — SODIUM CHLORIDE, PRESERVATIVE FREE 10 ML: 5 INJECTION INTRAVENOUS at 14:00

## 2022-03-08 RX ADMIN — Medication 80 MG: at 06:43

## 2022-03-08 RX ADMIN — METOCLOPRAMIDE 10 MG: 5 INJECTION, SOLUTION INTRAMUSCULAR; INTRAVENOUS at 23:43

## 2022-03-08 RX ADMIN — RISPERIDONE 2 MG: 1 TABLET, FILM COATED ORAL at 09:45

## 2022-03-08 RX ADMIN — ARFORMOTEROL TARTRATE 15 MCG: 15 SOLUTION RESPIRATORY (INHALATION) at 07:10

## 2022-03-08 RX ADMIN — GABAPENTIN 600 MG: 600 TABLET, FILM COATED ORAL at 21:11

## 2022-03-08 RX ADMIN — SODIUM CHLORIDE, PRESERVATIVE FREE 10 ML: 5 INJECTION INTRAVENOUS at 09:48

## 2022-03-08 RX ADMIN — SODIUM CHLORIDE, PRESERVATIVE FREE 10 ML: 5 INJECTION INTRAVENOUS at 21:12

## 2022-03-08 RX ADMIN — ENOXAPARIN SODIUM 40 MG: 100 INJECTION SUBCUTANEOUS at 09:47

## 2022-03-08 RX ADMIN — NALOXEGOL OXALATE 25 MG: 25 TABLET, FILM COATED ORAL at 09:46

## 2022-03-08 RX ADMIN — BENZTROPINE MESYLATE 1 MG: 1 TABLET ORAL at 09:45

## 2022-03-08 RX ADMIN — METOCLOPRAMIDE 10 MG: 5 INJECTION, SOLUTION INTRAMUSCULAR; INTRAVENOUS at 17:15

## 2022-03-08 RX ADMIN — OXYCODONE 5 MG: 5 TABLET ORAL at 19:37

## 2022-03-08 RX ADMIN — BUDESONIDE 500 MCG: 0.5 INHALANT RESPIRATORY (INHALATION) at 07:10

## 2022-03-08 RX ADMIN — GABAPENTIN 600 MG: 600 TABLET, FILM COATED ORAL at 15:32

## 2022-03-08 RX ADMIN — BENZTROPINE MESYLATE 1 MG: 1 TABLET ORAL at 17:14

## 2022-03-08 NOTE — PROGRESS NOTES
Progress Note    Patient: Ted Baires MRN: [id number]  SSN: [id number]    YOB: 1965  Age: 61 y.o. Sex: male      Admit Date: 2022    6 Days Post-Op    Procedure:  Procedure(s):  LAPAROSCOPIC SIGMOIDECTOMY WITH RIGID PROCTOSCOPY    Subjective:     Patient with additional emesis x 2 over last 24 hours; BM x 2 (feels better). No fever or chills. Walked in [place] yesterday. Objective:     Visit Vitals  /80   Pulse 99   Temp 98.5 °F (36.9 °C)   Resp 19   Ht 5' 7\" (1.702 m)   Wt 162 lb 12.8 oz (73.8 kg)   SpO2 98%   BMI 25.50 kg/m²       Temp (24hrs), Av.5 °F (36.9 °C), Min:98.1 °F (36.7 °C), Max:98.7 °F (37.1 °C)      Physical Exam:    ABDOMEN: Less distended, soft. Incisions intact without signs of infection. Serous drain fluid. Appropriate incisional pain with palpation. Data Review: Vital signs, ins/outs, labs    Lab Review:   Recent Results (from the past 24 hour(s))   SODIUM, UR, RANDOM    Collection Time: 22 11:31 AM   Result Value Ref Range    Sodium,urine random 20 MMOL/L   OSMOLALITY, UR    Collection Time: 22 11:31 AM   Result Value Ref Range    Osmolality,urine 598 MOSM/kg H2O   GLUCOSE, POC    Collection Time: 22 11:33 AM   Result Value Ref Range    Glucose (POC) 113 65 - 117 mg/dL    Performed by Efrain Jacobs    GLUCOSE, POC    Collection Time: 22  5:24 PM   Result Value Ref Range    Glucose (POC) 114 65 - 117 mg/dL    Performed by Efrain Jacobs    GLUCOSE, POC    Collection Time: 22 12:04 AM   Result Value Ref Range    Glucose (POC) 112 65 - 117 mg/dL    Performed by Yevgeniy Schuler    CBC WITH AUTOMATED DIFF    Collection Time: 22  3:15 AM   Result Value Ref Range    WBC 7.8 4.1 - 11.1 K/uL    RBC 4.19 4. 10 - 5.70 M/uL    HGB 13.7 12.1 - 17.0 g/dL    HCT 38.8 36.6 - 50.3 %    MCV 92.6 80.0 - 99.0 FL    MCH 32.7 26.0 - 34.0 PG    MCHC 35.3 30.0 - 36.5 g/dL    RDW 12.3 11.5 - 14.5 %    PLATELET 641 408 - 422 K/uL    MPV 9.7 8.9 - 12.9 FL NRBC 0.0 0  WBC    ABSOLUTE NRBC 0.00 0.00 - 0.01 K/uL    NEUTROPHILS 63 32 - 75 %    LYMPHOCYTES 19 12 - 49 %    MONOCYTES 12 5 - 13 %    EOSINOPHILS 5 0 - 7 %    BASOPHILS 1 0 - 1 %    IMMATURE GRANULOCYTES 0 %    ABS. NEUTROPHILS 4.9 1.8 - 8.0 K/UL    ABS. LYMPHOCYTES 1.5 0.8 - 3.5 K/UL    ABS. MONOCYTES 0.9 0.0 - 1.0 K/UL    ABS. EOSINOPHILS 0.4 0.0 - 0.4 K/UL    ABS. BASOPHILS 0.1 0.0 - 0.1 K/UL    ABS. IMM. GRANS. 0.0 K/UL    DF MANUAL      RBC COMMENTS NORMOCYTIC, NORMOCHROMIC     METABOLIC PANEL, COMPREHENSIVE    Collection Time: 03/08/22  3:15 AM   Result Value Ref Range    Sodium 129 (L) 136 - 145 mmol/L    Potassium 3.8 3.5 - 5.1 mmol/L    Chloride 100 97 - 108 mmol/L    CO2 22 21 - 32 mmol/L    Anion gap 7 5 - 15 mmol/L    Glucose 115 (H) 65 - 100 mg/dL    BUN 4 (L) 6 - 20 MG/DL    Creatinine 0.51 (L) 0.70 - 1.30 MG/DL    BUN/Creatinine ratio 8 (L) 12 - 20      GFR est AA >60 >60 ml/min/1.73m2    GFR est non-AA >60 >60 ml/min/1.73m2    Calcium 8.2 (L) 8.5 - 10.1 MG/DL    Bilirubin, total 0.5 0.2 - 1.0 MG/DL    ALT (SGPT) 31 12 - 78 U/L    AST (SGOT) 16 15 - 37 U/L    Alk. phosphatase 50 45 - 117 U/L    Protein, total 6.3 (L) 6.4 - 8.2 g/dL    Albumin 2.5 (L) 3.5 - 5.0 g/dL    Globulin 3.8 2.0 - 4.0 g/dL    A-G Ratio 0.7 (L) 1.1 - 2.2     GLUCOSE, POC    Collection Time: 03/08/22  6:30 AM   Result Value Ref Range    Glucose (POC) 121 (H) 65 - 117 mg/dL    Performed by Azalea Ying        Assessment:     Hospital Problems  Date Reviewed: 3/2/2022          Codes Class Noted POA    * (Principal) Sigmoid volvulus ([facility] ICD-10-CM: K56.2  ICD-9-CM: 560.2  2/26/2022 Yes            He has some return of bowel function, but still with intermittent emesis. CT scan with question of partial small bowel obstruction with possible transition zone in left lower quadrant, adjacent to descending colon blood supply.   Normalization of white blood cell count, afebrile    Plan/Recommendations/Medical Decision Making: Out of bed in chair. Continue peripheral TPN. Sips of clear liquids, assess tolerance. Ambulate in the halls; PT.  Continue chronic medications. Discussed possibility of diagnostic laparoscopy on Thursday if nausea, vomiting persist.  He acknowledges understanding and agrees with this procedure if necessary. Spirometry, DVT prophylaxis.

## 2022-03-08 NOTE — PROGRESS NOTES
Bedside shift change report given to Alana Hunt RN (oncoming nurse) by Damion Ty RN (offgoing nurse). Report included the following information SBAR, Kardex, Intake/Output, MAR and Recent Results.

## 2022-03-08 NOTE — ROUTINE PROCESS
Bedside and Verbal shift change report given to Lisa Mcmahon (oncoming nurse) by Ana Esquviel RN (offgoing nurse). Report included the following information SBAR, Kardex, Intake/Output, MAR and Recent Results.

## 2022-03-08 NOTE — PROGRESS NOTES
Transition of Care: TBD; likely back to his group home; patient normally lives in an adult group home called [address] at [facility] [address]     Transport Plan: TBD; likely roundtrip ride via [facility] or [address]: 14%     DX: Sigmoid volvulus     Main contact is caregiver and owner of group home- Deirdre Crenshaw [phone]     Discharge pending:  -patient is POD#6 laparoscopic sigmoid resection   -patient continues on TPN  -pending diet advancement  -pending pain control  -pending progress with PT and final recs  -per IDR rounds; patient discharge greater than 48 hours        CM noted from chart and in IDR rounds     CM following  Melody Thakkar RN, CRM     NOTE: pleasant patient at bedside; he is alert and oriented x 3; patient states he lives at stated address (which is a group home) for several years; he is normally independent in his ADLs; he does not drive; his caregiver- Jesus Mace him to appointments;  the group home helps him with medication management; Shilpa Silvestre does not own a walker or cane to ambulate; he confirms he sees Dr. AHUMADA [facility] in the same practice as his listed pcp- Bertha Murry; patient states he does not have a history with any home health agency

## 2022-03-08 NOTE — PROGRESS NOTES
[address] [facility] Adult  Hospitalist Group                                                                                          Hospitalist Progress Note  Naima Storm MD  Answering service: [phone] OR [phone] from in house phone        Date of Service:  3/8/2022  NAME:  Olayinka Andrew  :  1965  MRN:  [id number]      Admission Summary:   Alex Kimble is a 61 y.o. male who presents complaining of constipation for 6 days. Patient states that it is common for him not to have a bowel movement for 5 days but once it turned into 6 he was concerned. He states that his abdomen does take extended a couple times per month. Never as bad as it did when he came in. He has never had a colonoscopy before. He has a history of schizophrenia and is on multiple psych medications. CT noticed Sigmoid volvulus with small bowel obstruction in ER ,   Patient underwent colonoscopic decompression   midnight and was found to have large colotomy colon. He additionally started vomiting and had NG tube placed  am\"       Interval history / Subjective:   More emesis overnight, some abdominal pain, reports 2 bm's. Assessment & Plan:     Sigmoid volvulus POA status post laparoscopic sigmoid resection 22  -Pain control  -awaiting restoration of bowel function  -management per general surgery  -on PPN    Hyponatremia  -initially hypovolemic/poor solute-intake  -now, urine lytes with high osms and Na 20. Worsening on NS. Suspect some SIADH. Some improvement with holding IV NS. PPN with D10 may be contributing. Clinically not c/w adrenal insufficiency    Hypokalemia/hypomagnesemia  -replete PRN    Hx of Paranoid schizophrenia. -does not voice si/hi/hallucinations    Hx of COPD. Hypertension      Dyslipidemia. Tobacco use    Leukocytosis monitor    Hx of Left lung cancer, status post resection.       See other orders for plan     Code status: full   Prophylaxis: lovenox  Care Plan discussed with: patient   Anticipated Disposition: D      Hospital Problems  Date Reviewed: 3/2/2022          Codes Class Noted POA    * (Principal) Sigmoid volvulus ([facility] [address] ICD-10-CM: K56.2  ICD-9-CM: 560.2  2/26/2022 Yes                Review of Systems:   Pertinent items are noted in HPI. Vital Signs:    Last 24hrs VS reviewed since prior progress note. Most recent are:  Visit Vitals  /74   Pulse 98   Temp 98.4 °F (36.9 °C)   Resp 20   Ht 5' 7\" (1.702 m)   Wt 73.8 kg (162 lb 12.8 oz)   SpO2 93%   BMI 25.50 kg/m²         Intake/Output Summary (Last 24 hours) at 3/8/2022 1459  Last data filed at 3/8/2022 0334  Gross per 24 hour   Intake --   Output 450 ml   Net -450 ml        Physical Examination:     I had a face to face encounter with this patient and independently examined them on 3/8/2022 as outlined below:          Constitutional:  nad   ENT:  anicteric sclerae   Resp:  CTA bilaterally.  Normal work of breathing   Chest:  No accessory muscle use   CV:  Regular rhythm, normal rate, no rubs    GI:  Soft, mild diffuse tenderness, mildly distended, bs+    Musculoskeletal:  No edema, warm, 2+ pulses throughout    Neurologic:  Moves all extremities, awake, alert  Skin: no jaundice or cyanosis  Psych: does not voice si/hi/hallucinations, not agitated            Data Review:       Labs:     Recent Labs     03/08/22 0315 03/07/22  0528   WBC 7.8 12.4*   HGB 13.7 13.9   HCT 38.8 39.9    291     Recent Labs     03/08/22 0315 03/07/22  0528 03/06/22  0400   * 126* 130*   K 3.8 4.0 3.8    98 101   CO2 22 23 26   BUN 4* 4* 3*   CREA 0.51* 0.44* 0.40*   * 122* 99   CA 8.2* 8.5 8.1*   MG  --   --  1.8     Recent Labs     03/08/22 0315   ALT 31   AP 50   TBILI 0.5   TP 6.3*   ALB 2.5*   GLOB 3.8       Medications Reviewed:     Current Facility-Administered Medications   Medication Dose Route Frequency    TPN ADULT - PERIPHERAL AA 4.25% D10% W/ ELECTROLYTES AND CA   IntraVENous CONTINUOUS    TPN ADULT - PERIPHERAL AA 4.25% D10% W/ ELECTROLYTES AND CA   IntraVENous CONTINUOUS    metoclopramide HCl (REGLAN) injection 10 mg  10 mg IntraVENous Q6H    naloxone (NARCAN) injection 0.4 mg  0.4 mg IntraVENous EVERY 2 MINUTES AS NEEDED    simethicone (MYLICON) tablet 80 mg  80 mg Oral TIDAC    atorvastatin (LIPITOR) tablet 20 mg  20 mg Oral DAILY    [Held by provider] 0.9% sodium chloride with KCl 20 mEq/L infusion   IntraVENous CONTINUOUS    sodium chloride (NS) flush 5-40 mL  5-40 mL IntraVENous Q8H    sodium chloride (NS) flush 5-40 mL  5-40 mL IntraVENous PRN    ondansetron (ZOFRAN ODT) tablet 4 mg  4 mg Oral Q8H PRN    Or    ondansetron (ZOFRAN) injection 4 mg  4 mg IntraVENous Q6H PRN    enoxaparin (LOVENOX) injection 40 mg  40 mg SubCUTAneous DAILY    oxyCODONE IR (ROXICODONE) tablet 5 mg  5 mg Oral Q4H PRN    HYDROmorphone (DILAUDID) injection 1 mg  1 mg IntraVENous Q3H PRN    naloxegoL (MOVANTIK) tablet 25 mg  25 mg Oral DAILY    butalbital-acetaminophen-caffeine (FIORICET, ESGIC) -40 mg per tablet 1 Tablet  1 Tablet Oral Q4H PRN    sodium chloride (NS) flush 5-40 mL  5-40 mL IntraVENous Q8H    sodium chloride (NS) flush 5-40 mL  5-40 mL IntraVENous PRN    albuterol-ipratropium (DUO-NEB) 2.5 MG-0.5 MG/3 ML  3 mL Nebulization Q4H PRN    sodium chloride (NS) flush 5-40 mL  5-40 mL IntraVENous Q8H    sodium chloride (NS) flush 5-40 mL  5-40 mL IntraVENous PRN    acetaminophen (TYLENOL) tablet 650 mg  650 mg Oral Q6H PRN    Or    acetaminophen (TYLENOL) suppository 650 mg  650 mg Rectal Q6H PRN    ondansetron (ZOFRAN ODT) tablet 4 mg  4 mg Oral Q8H PRN    Or    ondansetron (ZOFRAN) injection 4 mg  4 mg IntraVENous Q6H PRN    nicotine (NICODERM CQ) 21 mg/24 hr patch 1 Patch  1 Patch TransDERmal DAILY    hydrALAZINE (APRESOLINE) 20 mg/mL injection 10 mg  10 mg IntraVENous Q6H PRN    pantoprazole (PROTONIX) injection 40 mg  40 mg IntraVENous DAILY    And    sodium chloride (NS) flush 10 mL  10 mL IntraVENous DAILY    benztropine (COGENTIN) tablet 1 mg  1 mg Oral BID    clonazePAM (KlonoPIN) tablet 1 mg  1 mg Oral BID PRN    DULoxetine (CYMBALTA) capsule 30 mg  30 mg Oral 7am    DULoxetine (CYMBALTA) capsule 60 mg  60 mg Oral QHS    gabapentin (NEURONTIN) tablet 600 mg  600 mg Oral TID    risperiDONE (RisperDAL) tablet 2 mg  2 mg Oral BID    albuterol-ipratropium (DUO-NEB) 2.5 MG-0.5 MG/3 ML  3 mL Nebulization Q6H PRN    arformoteroL (BROVANA) neb solution 15 mcg  15 mcg Nebulization BID RT    And    budesonide (PULMICORT) 500 mcg/2 ml nebulizer suspension  500 mcg Nebulization BID RT   No results found.   ______________________________________________________________________  EXPECTED LENGTH OF STAY: 5d 16h  ACTUAL LENGTH OF STAY:          10                 Giana Barrera MD

## 2022-03-09 ENCOUNTER — APPOINTMENT (OUTPATIENT)
Dept: GENERAL RADIOLOGY | Age: 57
DRG: 329 | End: 2022-03-09
Attending: SURGERY
Payer: MEDICARE

## 2022-03-09 LAB
ANION GAP SERPL CALC-SCNC: 5 MMOL/L (ref 5–15)
BUN SERPL-MCNC: 4 MG/DL (ref 6–20)
BUN/CREAT SERPL: 8 (ref 12–20)
CALCIUM SERPL-MCNC: 8.3 MG/DL (ref 8.5–10.1)
CHLORIDE SERPL-SCNC: 98 MMOL/L (ref 97–108)
CO2 SERPL-SCNC: 23 MMOL/L (ref 21–32)
COMMENT, HOLDF: NORMAL
COVID-19 RAPID TEST, COVR: NOT DETECTED
CREAT SERPL-MCNC: 0.5 MG/DL (ref 0.7–1.3)
GLUCOSE BLD STRIP.AUTO-MCNC: 104 MG/DL (ref 65–117)
GLUCOSE BLD STRIP.AUTO-MCNC: 104 MG/DL (ref 65–117)
GLUCOSE BLD STRIP.AUTO-MCNC: 106 MG/DL (ref 65–117)
GLUCOSE SERPL-MCNC: 108 MG/DL (ref 65–100)
MAGNESIUM SERPL-MCNC: 1.8 MG/DL (ref 1.6–2.4)
PHOSPHATE SERPL-MCNC: 3.2 MG/DL (ref 2.6–4.7)
POTASSIUM SERPL-SCNC: 3.6 MMOL/L (ref 3.5–5.1)
SAMPLES BEING HELD,HOLD: NORMAL
SERVICE CMNT-IMP: NORMAL
SODIUM SERPL-SCNC: 126 MMOL/L (ref 136–145)
SOURCE, COVRS: NORMAL

## 2022-03-09 PROCEDURE — 74011250637 HC RX REV CODE- 250/637: Performed by: SURGERY

## 2022-03-09 PROCEDURE — 74011000250 HC RX REV CODE- 250: Performed by: SURGERY

## 2022-03-09 PROCEDURE — 74011250637 HC RX REV CODE- 250/637: Performed by: INTERNAL MEDICINE

## 2022-03-09 PROCEDURE — 02HV33Z INSERTION OF INFUSION DEVICE INTO SUPERIOR VENA CAVA, PERCUTANEOUS APPROACH: ICD-10-PCS | Performed by: NURSE PRACTITIONER

## 2022-03-09 PROCEDURE — 82962 GLUCOSE BLOOD TEST: CPT

## 2022-03-09 PROCEDURE — 83735 ASSAY OF MAGNESIUM: CPT

## 2022-03-09 PROCEDURE — 94760 N-INVAS EAR/PLS OXIMETRY 1: CPT

## 2022-03-09 PROCEDURE — 87635 SARS-COV-2 COVID-19 AMP PRB: CPT

## 2022-03-09 PROCEDURE — 94640 AIRWAY INHALATION TREATMENT: CPT

## 2022-03-09 PROCEDURE — 76937 US GUIDE VASCULAR ACCESS: CPT

## 2022-03-09 PROCEDURE — 77030020365 HC SOL INJ SOD CL 0.9% 50ML

## 2022-03-09 PROCEDURE — 74011250636 HC RX REV CODE- 250/636: Performed by: SURGERY

## 2022-03-09 PROCEDURE — 74011000250 HC RX REV CODE- 250: Performed by: NURSE PRACTITIONER

## 2022-03-09 PROCEDURE — 3E0436Z INTRODUCTION OF NUTRITIONAL SUBSTANCE INTO CENTRAL VEIN, PERCUTANEOUS APPROACH: ICD-10-PCS | Performed by: SURGERY

## 2022-03-09 PROCEDURE — 74011250636 HC RX REV CODE- 250/636: Performed by: NURSE PRACTITIONER

## 2022-03-09 PROCEDURE — 99024 POSTOP FOLLOW-UP VISIT: CPT | Performed by: NURSE PRACTITIONER

## 2022-03-09 PROCEDURE — 97116 GAIT TRAINING THERAPY: CPT

## 2022-03-09 PROCEDURE — 74011000258 HC RX REV CODE- 258: Performed by: NURSE PRACTITIONER

## 2022-03-09 PROCEDURE — 36415 COLL VENOUS BLD VENIPUNCTURE: CPT

## 2022-03-09 PROCEDURE — 80048 BASIC METABOLIC PNL TOTAL CA: CPT

## 2022-03-09 PROCEDURE — 36573 INSJ PICC RS&I 5 YR+: CPT | Performed by: NURSE PRACTITIONER

## 2022-03-09 PROCEDURE — C1751 CATH, INF, PER/CENT/MIDLINE: HCPCS

## 2022-03-09 PROCEDURE — 74018 RADEX ABDOMEN 1 VIEW: CPT

## 2022-03-09 PROCEDURE — 97530 THERAPEUTIC ACTIVITIES: CPT

## 2022-03-09 PROCEDURE — 84100 ASSAY OF PHOSPHORUS: CPT

## 2022-03-09 PROCEDURE — 99024 POSTOP FOLLOW-UP VISIT: CPT | Performed by: SURGERY

## 2022-03-09 PROCEDURE — 65270000032 HC RM SEMIPRIVATE

## 2022-03-09 PROCEDURE — C9113 INJ PANTOPRAZOLE SODIUM, VIA: HCPCS | Performed by: SURGERY

## 2022-03-09 RX ORDER — SODIUM CHLORIDE AND POTASSIUM CHLORIDE .9; .15 G/100ML; G/100ML
SOLUTION INTRAVENOUS CONTINUOUS
Status: DISCONTINUED | OUTPATIENT
Start: 2022-03-09 | End: 2022-03-09

## 2022-03-09 RX ADMIN — RISPERIDONE 2 MG: 1 TABLET, FILM COATED ORAL at 17:56

## 2022-03-09 RX ADMIN — NALOXEGOL OXALATE 25 MG: 25 TABLET, FILM COATED ORAL at 08:09

## 2022-03-09 RX ADMIN — OXYCODONE 5 MG: 5 TABLET ORAL at 04:40

## 2022-03-09 RX ADMIN — DULOXETINE HYDROCHLORIDE 30 MG: 30 CAPSULE, DELAYED RELEASE ORAL at 06:40

## 2022-03-09 RX ADMIN — OXYCODONE 5 MG: 5 TABLET ORAL at 20:15

## 2022-03-09 RX ADMIN — Medication 80 MG: at 16:27

## 2022-03-09 RX ADMIN — Medication 10 ML: at 06:41

## 2022-03-09 RX ADMIN — BENZTROPINE MESYLATE 1 MG: 1 TABLET ORAL at 08:09

## 2022-03-09 RX ADMIN — METOCLOPRAMIDE 10 MG: 5 INJECTION, SOLUTION INTRAMUSCULAR; INTRAVENOUS at 17:57

## 2022-03-09 RX ADMIN — Medication 10 ML: at 23:23

## 2022-03-09 RX ADMIN — METOCLOPRAMIDE 10 MG: 5 INJECTION, SOLUTION INTRAMUSCULAR; INTRAVENOUS at 23:18

## 2022-03-09 RX ADMIN — SODIUM CHLORIDE, PRESERVATIVE FREE 10 ML: 5 INJECTION INTRAVENOUS at 08:07

## 2022-03-09 RX ADMIN — OXYCODONE 5 MG: 5 TABLET ORAL at 16:28

## 2022-03-09 RX ADMIN — BENZTROPINE MESYLATE 1 MG: 1 TABLET ORAL at 17:56

## 2022-03-09 RX ADMIN — GABAPENTIN 600 MG: 600 TABLET, FILM COATED ORAL at 08:09

## 2022-03-09 RX ADMIN — METOCLOPRAMIDE 10 MG: 5 INJECTION, SOLUTION INTRAMUSCULAR; INTRAVENOUS at 12:54

## 2022-03-09 RX ADMIN — BUDESONIDE 500 MCG: 0.5 INHALANT RESPIRATORY (INHALATION) at 07:58

## 2022-03-09 RX ADMIN — RISPERIDONE 2 MG: 1 TABLET, FILM COATED ORAL at 08:09

## 2022-03-09 RX ADMIN — Medication 80 MG: at 06:40

## 2022-03-09 RX ADMIN — ARFORMOTEROL TARTRATE 15 MCG: 15 SOLUTION RESPIRATORY (INHALATION) at 07:58

## 2022-03-09 RX ADMIN — I.V. FAT EMULSION 500 ML: 20 EMULSION INTRAVENOUS at 20:10

## 2022-03-09 RX ADMIN — METOCLOPRAMIDE 10 MG: 5 INJECTION, SOLUTION INTRAMUSCULAR; INTRAVENOUS at 06:40

## 2022-03-09 RX ADMIN — OXYCODONE 5 MG: 5 TABLET ORAL at 08:09

## 2022-03-09 RX ADMIN — ENOXAPARIN SODIUM 40 MG: 100 INJECTION SUBCUTANEOUS at 08:04

## 2022-03-09 RX ADMIN — OXYCODONE 5 MG: 5 TABLET ORAL at 12:53

## 2022-03-09 RX ADMIN — GABAPENTIN 600 MG: 600 TABLET, FILM COATED ORAL at 16:27

## 2022-03-09 RX ADMIN — Medication 80 MG: at 12:53

## 2022-03-09 RX ADMIN — SODIUM CHLORIDE, PRESERVATIVE FREE 10 ML: 5 INJECTION INTRAVENOUS at 23:23

## 2022-03-09 RX ADMIN — ATORVASTATIN CALCIUM 20 MG: 20 TABLET, FILM COATED ORAL at 08:09

## 2022-03-09 RX ADMIN — GABAPENTIN 600 MG: 600 TABLET, FILM COATED ORAL at 23:18

## 2022-03-09 RX ADMIN — PANTOPRAZOLE SODIUM 40 MG: 40 INJECTION, POWDER, FOR SOLUTION INTRAVENOUS at 08:07

## 2022-03-09 RX ADMIN — CALCIUM GLUCONATE: 94 INJECTION, SOLUTION INTRAVENOUS at 19:55

## 2022-03-09 RX ADMIN — DULOXETINE 60 MG: 30 CAPSULE, DELAYED RELEASE ORAL at 23:17

## 2022-03-09 RX ADMIN — SODIUM CHLORIDE, PRESERVATIVE FREE 10 ML: 5 INJECTION INTRAVENOUS at 06:41

## 2022-03-09 NOTE — ROUTINE PROCESS
Bedside shift change report given to Jasen Bowens RN (oncoming nurse) by [facility], RN (offgoing nurse).  Report included the following information SBAR, Kardex, ED Summary, OR Summary, Procedure Summary, Intake/Output, MAR, Recent Results and Med Rec Status.

## 2022-03-09 NOTE — PROGRESS NOTES
Problem: Mobility Impaired (Adult and Pediatric)  Goal: *Acute Goals and Plan of Care (Insert Text)  Description: FUNCTIONAL STATUS PRIOR TO ADMISSION: Patient was independent and active without use of DME.    HOME SUPPORT PRIOR TO ADMISSION: The patient lived with group home staff but did not require assist.    Physical Therapy Goals  Initiated 3/7/2022  1. Patient will move from supine to sit and sit to supine , scoot up and down, and roll side to side in bed with independence within 7 day(s). 2.  Patient will transfer from bed to chair and chair to bed with independence using the least restrictive device within 7 day(s). 3.  Patient will perform sit to stand with independence within 7 day(s). 4.  Patient will ambulate with independence for 500 feet with the least restrictive device within 7 day(s). 5.  Patient will ascend/descend 3 stairs with one handrail(s) with independence within 7 day(s). Outcome: Progressing Towards Goal    PHYSICAL THERAPY TREATMENT/DISCHARGE  Patient: Scarlett Littlejohn (43 y.o. male)  Date: 3/9/2022  Diagnosis: Sigmoid volvulus ([address] [K56.2] Sigmoid volvulus (HCC)  Procedure(s) (LRB):  LAPAROSCOPIC SIGMOIDOSCOPY WITH RIGID PROCTOSCOPY (N/A) 7 Days Post-Op  Precautions: Fall  Chart, physical therapy assessment, plan of care and goals were reviewed. ASSESSMENT  Patient continues with skilled PT services and has met goals. Pt reported no increase in pain with amb. Pt demonstrated safe and indep ambulation without use of assistive device - or IV pole. Pt also demonstrated safe stair climbing with use of 1 rail. No further skilled PT needs. Recommend continue amb in hallway 3 x day - have pt walk without pushing IV pole - with supervision of nursing or Mobility Team.  Recommend to nursing to continue to encourage OOB to chair - 2 - 3 x day.     Other factors to consider for discharge: pt to return to group home         PLAN :  Patient will be discharged from acute skilled physical therapy at this time. Rationale for discharge:  Goals achieved    Recommendation for discharge: (in order for the patient to meet his/her long term goals)  No skilled physical therapy/ follow up rehabilitation needs identified at this time. This discharge recommendation:  Has been made in collaboration with the attending provider and/or case management    IF patient discharges home will need the following DME: none       SUBJECTIVE:   Patient stated I don't like to sit up in the chair because it hurts my stomach.     OBJECTIVE DATA SUMMARY:   Critical Behavior:  Neurologic State: Alert  Orientation Level: Oriented X4  Cognition: Follows commands     Functional Mobility Training:  Bed Mobility:  Rolling: Independent  Supine to Sit: Independent  Sit to Supine: Independent  Scooting: Independent        Transfers:  Sit to Stand: Supervision  Stand to Sit: Supervision        Bed to Chair: Supervision                    Balance:  Sitting: Intact  Standing: Intact; Without support  Ambulation/Gait Training:  Distance (ft): 300 Feet (ft)  Assistive Device:  (none)  Ambulation - Level of Assistance: Supervision        Gait Abnormalities: Decreased step clearance              Speed/Sarah: Slow              Stairs:  Number of Stairs Trained: 4  Stairs - Level of Assistance: Supervision   Rail Use: Right     Pain Rating:  Pre- and Post - walking - 9/10    Activity Tolerance:   Overall good - despite pain pt reports amb in hallway 2 - 3 x day; pt acknowledges limited time OOB secondary to increased abdominal pain in sitting    After treatment patient left in no apparent distress:   Supine in bed, Call bell within reach, and Side rails x 3    COMMUNICATION/COLLABORATION:   The patients plan of care was discussed with: Registered nurse.      London Patel, PT   Time Calculation: 30 mins

## 2022-03-09 NOTE — PROGRESS NOTES
[address] Adult  Hospitalist Group                                                                                          Hospitalist Progress Note  Dia Gustafson MD  Answering service: [phone] OR [phone] from in house phone        Date of Service:  3/9/2022  NAME:  Brock Dimas  :  1965  MRN:  [id number]      Admission Summary:   Carly Francis is a 61 y.o. male who presents complaining of constipation for 6 days. Patient states that it is common for him not to have a bowel movement for 5 days but once it turned into 6 he was concerned. He states that his abdomen does take extended a couple times per month. Never as bad as it did when he came in. He has never had a colonoscopy before. He has a history of schizophrenia and is on multiple psych medications. CT noticed Sigmoid volvulus with small bowel obstruction in ER ,   Patient underwent colonoscopic decompression   midnight and was found to have large colotomy colon. He additionally started vomiting and had NG tube placed  am\"       Interval history / Subjective:   More emesis overnight, some abdominal pain, some flatus no bm's     Assessment & Plan:     Sigmoid volvulus POA status post laparoscopic sigmoid resection 22  -Pain control  -awaiting restoration of bowel function  -management per general surgery  -on PPN    Hyponatremia  -initially hypovolemic/poor solute-intake  -now, urine lytes with high osms and Na 20. Worsening on NS. Suspect some SIADH. Some improvement with holding IV NS. PPN with D10 may be contributing. Clinically not c/w adrenal insufficiency. Noted nephrology consultation. Hypokalemia/hypomagnesemia  -replete PRN    Hx of Paranoid schizophrenia. -does not voice si/hi/hallucinations    Hx of COPD. Hypertension      Dyslipidemia. Tobacco use    Leukocytosis monitor    Hx of Left lung cancer, status post resection.       See other orders for plan     Code status: full Prophylaxis: lovenox  Care Plan discussed with: patient   Anticipated Disposition: TBD      Hospital Problems  Date Reviewed: 3/2/2022          Codes Class Noted POA    * (Principal) Sigmoid volvulus ([facility] [address] ICD-10-CM: K56.2  ICD-9-CM: 560.2  2/26/2022 Yes                Review of Systems:   Pertinent items are noted in HPI. Vital Signs:    Last 24hrs VS reviewed since prior progress note. Most recent are:  Visit Vitals  BP (!) 157/95   Pulse 95   Temp 98.2 °F (36.8 °C)   Resp 16   Ht 5' 7\" (1.702 m)   Wt 72.3 kg (159 lb 5.2 oz)   SpO2 94%   BMI 24.95 kg/m²         Intake/Output Summary (Last 24 hours) at 3/9/2022 1249  Last data filed at 3/9/2022 1112  Gross per 24 hour   Intake 200 ml   Output 3135 ml   Net -2935 ml        Physical Examination:     I had a face to face encounter with this patient and independently examined them on 3/9/2022 as outlined below:          Constitutional:  nad   ENT:  anicteric sclerae   Resp:  CTA bilaterally.  Normal work of breathing   Chest:  No accessory muscle use   CV:  Regular rhythm, normal rate, no rubs    GI:  Soft, mild diffuse tenderness, mildly distended, bs+    Musculoskeletal:  No edema, warm, 2+ pulses throughout    Neurologic:  Moves all extremities, awake, alert  Skin: no jaundice or cyanosis  Psych: does not voice si/hi/hallucinations, not agitated            Data Review:       Labs:     Recent Labs     03/08/22 0315 03/07/22  0528   WBC 7.8 12.4*   HGB 13.7 13.9   HCT 38.8 39.9    291     Recent Labs     03/09/22  0323 03/08/22 0315 03/07/22  0528   * 129* 126*   K 3.6 3.8 4.0   CL 98 100 98   CO2 23 22 23   BUN 4* 4* 4*   CREA 0.50* 0.51* 0.44*   * 115* 122*   CA 8.3* 8.2* 8.5   MG 1.8  --   --    PHOS 3.2  --   --      Recent Labs     03/08/22 0315   ALT 31   AP 50   TBILI 0.5   TP 6.3*   ALB 2.5*   GLOB 3.8       Medications Reviewed:     Current Facility-Administered Medications   Medication Dose Route Frequency    fat emulsion 20% (LIPOSYN, INTRAlipid) infusion 500 mL  500 mL IntraVENous Q MON, WED & FRI    TPN ADULT - CENTRAL   IntraVENous CONTINUOUS    TPN ADULT - PERIPHERAL AA 4.25% D10% W/ ELECTROLYTES AND CA   IntraVENous CONTINUOUS    metoclopramide HCl (REGLAN) injection 10 mg  10 mg IntraVENous Q6H    naloxone (NARCAN) injection 0.4 mg  0.4 mg IntraVENous EVERY 2 MINUTES AS NEEDED    simethicone (MYLICON) tablet 80 mg  80 mg Oral TIDAC    atorvastatin (LIPITOR) tablet 20 mg  20 mg Oral DAILY    sodium chloride (NS) flush 5-40 mL  5-40 mL IntraVENous Q8H    sodium chloride (NS) flush 5-40 mL  5-40 mL IntraVENous PRN    ondansetron (ZOFRAN ODT) tablet 4 mg  4 mg Oral Q8H PRN    Or    ondansetron (ZOFRAN) injection 4 mg  4 mg IntraVENous Q6H PRN    [Held by provider] enoxaparin (LOVENOX) injection 40 mg  40 mg SubCUTAneous DAILY    oxyCODONE IR (ROXICODONE) tablet 5 mg  5 mg Oral Q4H PRN    HYDROmorphone (DILAUDID) injection 1 mg  1 mg IntraVENous Q3H PRN    butalbital-acetaminophen-caffeine (FIORICET, ESGIC) -40 mg per tablet 1 Tablet  1 Tablet Oral Q4H PRN    sodium chloride (NS) flush 5-40 mL  5-40 mL IntraVENous Q8H    sodium chloride (NS) flush 5-40 mL  5-40 mL IntraVENous PRN    albuterol-ipratropium (DUO-NEB) 2.5 MG-0.5 MG/3 ML  3 mL Nebulization Q4H PRN    sodium chloride (NS) flush 5-40 mL  5-40 mL IntraVENous Q8H    sodium chloride (NS) flush 5-40 mL  5-40 mL IntraVENous PRN    acetaminophen (TYLENOL) tablet 650 mg  650 mg Oral Q6H PRN    Or    acetaminophen (TYLENOL) suppository 650 mg  650 mg Rectal Q6H PRN    ondansetron (ZOFRAN ODT) tablet 4 mg  4 mg Oral Q8H PRN    Or    ondansetron (ZOFRAN) injection 4 mg  4 mg IntraVENous Q6H PRN    nicotine (NICODERM CQ) 21 mg/24 hr patch 1 Patch  1 Patch TransDERmal DAILY    hydrALAZINE (APRESOLINE) 20 mg/mL injection 10 mg  10 mg IntraVENous Q6H PRN    pantoprazole (PROTONIX) injection 40 mg  40 mg IntraVENous DAILY    And    sodium chloride (NS) flush 10 mL  10 mL IntraVENous DAILY    benztropine (COGENTIN) tablet 1 mg  1 mg Oral BID    clonazePAM (KlonoPIN) tablet 1 mg  1 mg Oral BID PRN    DULoxetine (CYMBALTA) capsule 30 mg  30 mg Oral 7am    DULoxetine (CYMBALTA) capsule 60 mg  60 mg Oral QHS    gabapentin (NEURONTIN) tablet 600 mg  600 mg Oral TID    risperiDONE (RisperDAL) tablet 2 mg  2 mg Oral BID    albuterol-ipratropium (DUO-NEB) 2.5 MG-0.5 MG/3 ML  3 mL Nebulization Q6H PRN    arformoteroL (BROVANA) neb solution 15 mcg  15 mcg Nebulization BID RT    And    budesonide (PULMICORT) 500 mcg/2 ml nebulizer suspension  500 mcg Nebulization BID RT   XR ABD PORT  1 V    Result Date: 3/9/2022  No significant change in gaseous distention of small bowel compared to the prior CT.    ______________________________________________________________________  EXPECTED LENGTH OF STAY: 5d 16h  ACTUAL LENGTH OF STAY:          11                 Emely Summers MD

## 2022-03-09 NOTE — CONSULTS
NEPHROLOGY CONSULT NOTE     Patient: Scarlett Littlejohn MRN: [id number]  PCP: Irma Barajas MD   :     1965  Age:   61 y.o. Sex:  male      Referring physician: Noe Gonzalez MD  Reason for consultation: 61 y.o. 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  male who has been admitted to the hospital for constipation for about 6 days back on . He as found to have a sigmoid volvulus on CT scan. He underwent colonic decompression. He is due to go back to the OR tomorrow per surgery. He has a hx of COPD, schizophrenia, HLD, HTN. We were consulted for hypoNa. This appears to be a chronic problem. His Na has been around 130 most of this admission. It has started to fall the past few days and is 126 today. He is on TPN and drinking a lot of water. He also had IV NS + KCL running. The RN claims he is using about 2-3 L per day. He is on cymbalta and a PPI as well. No thiazide use. He denies cp, sob, n/v/d.     Past Medical Hx:   Past Medical History:   Diagnosis Date    Cancer ([facility] 2020    LUNG CANCER    Chronic obstructive pulmonary disease ([facility]     Essential hypertension 2020    High cholesterol 2020    Hypertension     Hypertrophic cardiomyopathy ([facility]     Paranoid schizophrenia [facility])     Psychiatric disorder         Past Surgical Hx:     Past Surgical History:   Procedure Laterality Date    FLEXIBLE SIGMOIDOSCOPY N/A 2/26/2022    SIGMOIDOSCOPY FLEXIBLE performed by Adeline Wells DO at [facility] ENDOSCOPY       Medications:  Prior to Admission medications    Medication Sig Start Date End Date Taking? Authorizing Provider   aspirin 81 mg chewable tablet Take 81 mg by mouth daily. Yes Provider, Historical   clonazePAM (KlonoPIN) 1 mg tablet Take 1 mg by mouth two (2) times daily as needed for Anxiety. Yes Provider, Historical   budesonide-formoteroL (Symbicort) 160-4.5 mcg/actuation HFAA Take 2 Puffs by inhalation two (2) times a day. Yes Provider, Historical   traMADoL (ULTRAM) 50 mg tablet Take 50 mg by mouth three (3) times daily as needed for Pain. Yes Provider, Historical   ibuprofen (MOTRIN) 800 mg tablet Take 800 mg by mouth two (2) times daily as needed for Pain. Yes Provider, Historical   diclofenac (Voltaren) 1 % gel Apply 4 g to affected area three (3) times daily as needed for Pain (Joint pain or rib pain). Yes Provider, Historical   haloperidol decanoate (HALDOL DECANOATE) 100 mg/mL injection every twenty-eight (28) days. 11/30/20  Yes Provider, Historical   gabapentin (NEURONTIN) 600 mg tablet Take 600 mg by mouth three (3) times daily. 10/19/20  Yes Provider, Historical   triamcinolone acetonide (KENALOG) 0.1 % topical cream Apply  to affected area two (2) times daily as needed. 7/22/20  Yes Provider, Historical   atorvastatin (LIPITOR) 20 mg tablet Take 1 Tab by mouth daily. 3/26/20  Yes Cristóbal Owen NP   diphenhydrAMINE (BENADRYL) 50 mg capsule Take 50 mg by mouth nightly as needed. One to two tablets by mouth at bedtime as needed for insomnia. Yes Provider, Historical   DULoxetine (CYMBALTA) 60 mg capsule Take 60 mg by mouth nightly. 3/22/19  Yes Provider, Historical   DULoxetine (CYMBALTA) 30 mg capsule Take 1 Cap by mouth daily.  One daily in the morning, two at [facility]  Patient taking differently: Take 30 mg by mouth every morning. One daily in the morning, two at [facility] 5/14/18  Yes Demond Grodillo MD   loratadine (CLARITIN) 10 mg tablet Take 1 Tab by mouth daily. 5/14/18  Yes Demond Gordillo MD   zolpidem (AMBIEN) 10 mg tablet Take 1 Tab by mouth nightly as needed for Sleep. Max Daily Amount: 10 mg. 5/14/18  Yes Demond Gordillo MD   albuterol (VENTOLIN HFA) 90 mcg/actuation inhaler Take 2 Puffs by inhalation as needed for Wheezing. Patient taking differently: Take 2 Puffs by inhalation. EVERY 4-6 HOURS AS NEEDED 5/2/17  Yes Lisa Jackson PA-C   risperiDONE (RISPERDAL) 2 mg tablet Take 2 mg by mouth two (2) times a day. Yes Provider, Historical   carvediloL (COREG) 12.5 mg tablet Take 1 Tab by mouth two (2) times daily (with meals). Increased 11/6/2020 4/1/21   Rebbecca Reveal, NP   benztropine (COGENTIN) 1 mg tablet Take 1 mg by mouth two (2) times a day. 10/19/20   Provider, Historical       Allergies   Allergen Reactions    Tuberculin Ppd Swelling     NEEDS CXR       Social Hx:  reports that he has been smoking. He has been smoking about 0.10 packs per day. He has never used smokeless tobacco. He reports current alcohol use of about 3.0 standard drinks of alcohol per week. He reports that he does not use drugs. Family History   Problem Relation Age of Onset    Heart Surgery Mother     Depression Father     Emphysema Father     Anesth Problems Neg Hx        Review of Systems:  A twelve point review of system was performed today. Pertinent positives and negatives are mentioned in the HPI. The reminder of the ROS is negative and noncontributory.      Objective:    Vitals:    Vitals:    03/08/22 1932 03/08/22 2049 03/09/22 0015 03/09/22 0802   BP: (!) 171/90  (!) 159/79 (!) 157/95   Pulse: 99  99 95   Resp: 18  18 16   Temp: 98.1 °F (36.7 °C)  98.2 °F (36.8 °C) 98.2 °F (36.8 °C)   SpO2: 96% 96% 97% 94%   Weight:       Height:         I&O's: 03/08 0701 - 03/09 0700  In: -   Out: 0295 [Urine:1700; Drains:35]  Visit Vitals  BP (!) 157/95   Pulse 95   Temp 98.2 °F (36.8 °C)   Resp 16   Ht 5' 7\" (1.702 m)   Wt 72.3 kg (159 lb 5.2 oz)   SpO2 94%   BMI 24.95 kg/m²       Physical Exam:  General:Alert, No distress,   HEENT: Eyes are PERRL. Conjunctiva without pallor ,erythema. The sclerae without icterus. .   Neck:Supple,no mass palpable  Lungs : Clears to auscultation Bilaterally, Normal respiratory effort  CVS: RRR, S1 S2 normal, No rub, no LE edema  Abdomen: Soft, Non tender, No hepatosplenomegaly, bowel sounds present  Extremities: No cyanosis, No clubbing  Skin: No rash or lesions.   Lymph nodes: No palpable nodes  MS: No joint swelling, erythema, warmth  Neurologic: non focal, AAO x 3  Psych: normal affect    Laboratory Results:    Lab Results   Component Value Date    BUN 4 (L) 03/09/2022     (L) 03/09/2022    K 3.6 03/09/2022    CL 98 03/09/2022    CO2 23 03/09/2022       Lab Results   Component Value Date    BUN 4 (L) 03/09/2022    BUN 4 (L) 03/08/2022    BUN 4 (L) 03/07/2022    BUN 3 (L) 03/06/2022    BUN 9 03/05/2022    K 3.6 03/09/2022    K 3.8 03/08/2022    K 4.0 03/07/2022    K 3.8 03/06/2022    K 4.2 03/05/2022       Lab Results   Component Value Date    WBC 7.8 03/08/2022    RBC 4.19 03/08/2022    HGB 13.7 03/08/2022    HCT 38.8 03/08/2022    MCV 92.6 03/08/2022    MCH 32.7 03/08/2022    RDW 12.3 03/08/2022     03/08/2022       Lab Results   Component Value Date    PHOS 3.2 03/09/2022       Urine dipstick:   Lab Results   Component Value Date/Time    Color YELLOW/STRAW 02/26/2022 11:15 PM    Appearance CLEAR 02/26/2022 11:15 PM    Specific gravity 1.017 02/26/2022 11:15 PM    pH (UA) 6.0 02/26/2022 11:15 PM    Protein Negative 02/26/2022 11:15 PM    Glucose Negative 02/26/2022 11:15 PM    Ketone TRACE (A) 02/26/2022 11:15 PM    Bilirubin Negative 02/26/2022 11:15 PM    Urobilinogen 1.0 02/26/2022 11:15 PM    Nitrites Negative 02/26/2022 11:15 PM    Leukocyte Esterase Negative 02/26/2022 11:15 PM    Epithelial cells FEW 02/26/2022 11:15 PM    Bacteria Negative 02/26/2022 11:15 PM    WBC 0-4 02/26/2022 11:15 PM    RBC 0-5 02/26/2022 11:15 PM       I have reviewed the following: All pertinent labs, microbiology data, radiology imaging for my assessment            Thank you for allowing us to participate in the care of this patient. We will follow patient. Please dont hesitate to call with any questions    Thomas Tay MD  3/9/2022    [facility] [address]  Phone - [phone]   Fax - [phone]  [email or web address]

## 2022-03-09 NOTE — PROGRESS NOTES
General Surgery Daily Progress Note    Admit Date: 2022  Post-Operative Day: 7 Days Post-Op from Procedure(s):  LAPAROSCOPIC SIGMOIDOSCOPY WITH RIGID PROCTOSCOPY     Subjective:     Last 24 hrs: pt had a large volume emesis last night; none this am; some nausea; wants ice   Passed gas once last night. KUB - gaseous distention of small bowel    Objective:     Blood pressure (!) 157/95, pulse 95, temperature 98.2 °F (36.8 °C), resp. rate 16, height 5' 7\" (1.702 m), weight 159 lb 5.2 oz (72.3 kg), SpO2 94 %. Temp (24hrs), Av.3 °F (36.8 °C), Min:98.1 °F (36.7 °C), Max:98.5 °F (36.9 °C)      _____________________  Physical Exam:     Alert and Oriented, x3, in no acute distress. Cardiovascular: RRR, no peripheral edema  Abdomen: BS distant throughout; incision is c/d/i  Soft and mildly distended    Assessment:   Principal Problem:    Sigmoid volvulus (HCC) (2022)            Plan:     If cont to vomit would benefit from ngt - doesn't want this now  Possible diagnostic lap tomorrow if cont to vomit  Cont ivf  Can have ice chips  Will hold lovenox and make npo past MN  PICC line  Start lipids and change ppn to tpn  Renal consult for hyponatremia  Daily labs    Data Review:    Recent Labs     22  03122  0528   WBC 7.8 12.4*   HGB 13.7 13.9   HCT 38.8 39.9    291     Recent Labs     22  0323 22  0315 22  0528   * 129* 126*   K 3.6 3.8 4.0   CL 98 100 98   CO2 23 22 23   * 115* 122*   BUN 4* 4* 4*   CREA 0.50* 0.51* 0.44*   CA 8.3* 8.2* 8.5   MG 1.8  --   --    PHOS 3.2  --   --    ALB  --  2.5*  --    ALT  --  31  --      No results for input(s): AML, LPSE in the last 72 hours.         ______________________  Medications:    Current Facility-Administered Medications   Medication Dose Route Frequency    0.9% sodium chloride with KCl 20 mEq/L infusion   IntraVENous CONTINUOUS    TPN ADULT - PERIPHERAL AA 4.25% D10% W/ ELECTROLYTES AND CA   IntraVENous CONTINUOUS    metoclopramide HCl (REGLAN) injection 10 mg  10 mg IntraVENous Q6H    naloxone (NARCAN) injection 0.4 mg  0.4 mg IntraVENous EVERY 2 MINUTES AS NEEDED    simethicone (MYLICON) tablet 80 mg  80 mg Oral TIDAC    atorvastatin (LIPITOR) tablet 20 mg  20 mg Oral DAILY    sodium chloride (NS) flush 5-40 mL  5-40 mL IntraVENous Q8H    sodium chloride (NS) flush 5-40 mL  5-40 mL IntraVENous PRN    ondansetron (ZOFRAN ODT) tablet 4 mg  4 mg Oral Q8H PRN    Or    ondansetron (ZOFRAN) injection 4 mg  4 mg IntraVENous Q6H PRN    enoxaparin (LOVENOX) injection 40 mg  40 mg SubCUTAneous DAILY    oxyCODONE IR (ROXICODONE) tablet 5 mg  5 mg Oral Q4H PRN    HYDROmorphone (DILAUDID) injection 1 mg  1 mg IntraVENous Q3H PRN    butalbital-acetaminophen-caffeine (FIORICET, ESGIC) -40 mg per tablet 1 Tablet  1 Tablet Oral Q4H PRN    sodium chloride (NS) flush 5-40 mL  5-40 mL IntraVENous Q8H    sodium chloride (NS) flush 5-40 mL  5-40 mL IntraVENous PRN    albuterol-ipratropium (DUO-NEB) 2.5 MG-0.5 MG/3 ML  3 mL Nebulization Q4H PRN    sodium chloride (NS) flush 5-40 mL  5-40 mL IntraVENous Q8H    sodium chloride (NS) flush 5-40 mL  5-40 mL IntraVENous PRN    acetaminophen (TYLENOL) tablet 650 mg  650 mg Oral Q6H PRN    Or    acetaminophen (TYLENOL) suppository 650 mg  650 mg Rectal Q6H PRN    ondansetron (ZOFRAN ODT) tablet 4 mg  4 mg Oral Q8H PRN    Or    ondansetron (ZOFRAN) injection 4 mg  4 mg IntraVENous Q6H PRN    nicotine (NICODERM CQ) 21 mg/24 hr patch 1 Patch  1 Patch TransDERmal DAILY    hydrALAZINE (APRESOLINE) 20 mg/mL injection 10 mg  10 mg IntraVENous Q6H PRN    pantoprazole (PROTONIX) injection 40 mg  40 mg IntraVENous DAILY    And    sodium chloride (NS) flush 10 mL  10 mL IntraVENous DAILY    benztropine (COGENTIN) tablet 1 mg  1 mg Oral BID    clonazePAM (KlonoPIN) tablet 1 mg  1 mg Oral BID PRN    DULoxetine (CYMBALTA) capsule 30 mg  30 mg Oral 7am    DULoxetine (CYMBALTA) capsule 60 mg  60 mg Oral QHS    gabapentin (NEURONTIN) tablet 600 mg  600 mg Oral TID    risperiDONE (RisperDAL) tablet 2 mg  2 mg Oral BID    albuterol-ipratropium (DUO-NEB) 2.5 MG-0.5 MG/3 ML  3 mL Nebulization Q6H PRN    arformoteroL (BROVANA) neb solution 15 mcg  15 mcg Nebulization BID RT    And    budesonide (PULMICORT) 500 mcg/2 ml nebulizer suspension  500 mcg Nebulization BID RT       David Llanos NP  3/9/2022

## 2022-03-09 NOTE — PROCEDURES
Order for PICC received and verified. Consent obtained from pt after risks, benefits, procedure explained and questions answered. PICC Placement Note    PRE-PROCEDURE VERIFICATION  Correct Procedure: yes  Correct Site:  yes  Temperature: Temp: 98.2 °F (36.8 °C), Temperature Source: Temp Source: Oral  Recent Labs     03/09/22  0323 03/08/22  0315 03/08/22  0315   BUN 4*   < > 4*   CREA 0.50*   < > 0.51*   PLT  --   --  282   WBC  --   --  7.8    < > = values in this interval not displayed. Allergies: Tuberculin ppd  Education materials, including PICC Booklet, for PICC Care given to patient: yes. See Patient Education activity for further details. PROCEDURE DETAIL  A triple lumen PICC line was started for TPN. The following documentation is in addition to the PICC properties in the lines/airways flowsheet :  Lot #: [id number]  Was xylocaine 1% used intradermally:  yes  Total catheter length: 38 (cm)  External catheter length:  0 (cm)  Vein Selection for PICC:right basilic  Central Line Bundle followed yes  Complication Related to Insertion: none    The placement was verified by ECG/Sapiens technology: The  tip location is in the superior vena cava. See ECG results for PICC tip placement. Report given to nurse Sherwin Diego is okay to use.     Jaquan Tristan RN

## 2022-03-09 NOTE — PROGRESS NOTES
TRANSITION OF CARE  RUR--14%  Disposition--Return to Group Home  Continues on PPN. Group can not accept back with PPN. Transport--TBD    Patient's primary contact: caregiver/owner of group home- Stacie Warren [phone]    CM reviewed case with treatment team during [address]. Patient continues with PPN.   Plan is for surgery 3/10/22: diagnostic laparoscopy.

## 2022-03-09 NOTE — PROGRESS NOTES
Surgery Progress Note    3/9/2022    Admit Date: 2/25/2022    CC: Emesis    S/p sigmoidectomy    Subjective:     Emesis again last night, pain controlled. Passing gas. Constitutional: No fever or chills  Neurologic: No headache  Eyes: No scleral icterus or irritated eyes  Nose: No nasal pain or drainage  Mouth: No oral lesions or sore throat  Cardiac: No palpations or chest pain  Pulmonary: No cough or shortness of breath  Gastrointestinal: Emesis, diarrhea, or constipation  Genitourinary: No dysuria  Musculoskeletal: No muscle or joint tenderness  Skin: No rashes or lesions  Psychiatric: No anxiety or depressed mood    Objective:     Visit Vitals  BP (!) 157/95   Pulse 95   Temp 98.2 °F (36.8 °C)   Resp 16   Ht 5' 7\" (1.702 m)   Wt 159 lb 5.2 oz (72.3 kg)   SpO2 94%   BMI 24.95 kg/m²       General: No acute distress, conversant  Eyes: PERRLA, no scleral icterus  HENT: Normocephalic without oral lesions  Neck: Trachea midline without LAD  Cardiac: Normal pulse rate and rhythm  Pulmonary: Symmetric chest rise with normal effort  GI: Soft, wounds cdi, drain serosang, non distended  Skin: Warm without rash  Extremities: No edema or joint stiffness  Psych: Appropriate mood and affect    Labs, vital signs, and I/O reviewed. Assessment:     58-year-old male with likely partial small bowel obstruction after laparoscopic sigmoid colectomy    Plan:     PRN pain control  IVF. Severe hyponatremia being addressed by medicine  NPO. Nausea and emesis. Suspected post operative adhesions based on CT. Refusing NGT. KUB this AM with diffuse gas. On Reglan. Posted for OR tomorrow for laparoscopic AMRIT.   Lovenox      PLEASE AGGRESSIVELY CORRECT SODIUM IF POSSIBLE TODAY      Maggi Neves MD  Bariatric and General Surgeon  [facility]

## 2022-03-09 NOTE — ROUTINE PROCESS
Since patient vomited 1000cc, offered the antiemetic - Zofran medication. Patient refused at this time and will know this RN if patient wants it.

## 2022-03-10 ENCOUNTER — ANESTHESIA EVENT (OUTPATIENT)
Dept: SURGERY | Age: 57
DRG: 329 | End: 2022-03-10
Payer: MEDICARE

## 2022-03-10 ENCOUNTER — APPOINTMENT (OUTPATIENT)
Dept: GENERAL RADIOLOGY | Age: 57
DRG: 329 | End: 2022-03-10
Attending: SURGERY
Payer: MEDICARE

## 2022-03-10 ENCOUNTER — ANESTHESIA (OUTPATIENT)
Dept: SURGERY | Age: 57
DRG: 329 | End: 2022-03-10
Payer: MEDICARE

## 2022-03-10 LAB
ANION GAP SERPL CALC-SCNC: 5 MMOL/L (ref 5–15)
BASOPHILS # BLD: 0.1 K/UL (ref 0–0.1)
BASOPHILS NFR BLD: 1 % (ref 0–1)
BUN SERPL-MCNC: 4 MG/DL (ref 6–20)
BUN/CREAT SERPL: 8 (ref 12–20)
CALCIUM SERPL-MCNC: 8.3 MG/DL (ref 8.5–10.1)
CHLORIDE SERPL-SCNC: 101 MMOL/L (ref 97–108)
CO2 SERPL-SCNC: 24 MMOL/L (ref 21–32)
CREAT SERPL-MCNC: 0.48 MG/DL (ref 0.7–1.3)
DIFFERENTIAL METHOD BLD: ABNORMAL
EOSINOPHIL # BLD: 0.4 K/UL (ref 0–0.4)
EOSINOPHIL NFR BLD: 4 % (ref 0–7)
ERYTHROCYTE [DISTWIDTH] IN BLOOD BY AUTOMATED COUNT: 12 % (ref 11.5–14.5)
GLUCOSE BLD STRIP.AUTO-MCNC: 117 MG/DL (ref 65–117)
GLUCOSE BLD STRIP.AUTO-MCNC: 122 MG/DL (ref 65–117)
GLUCOSE BLD STRIP.AUTO-MCNC: 125 MG/DL (ref 65–117)
GLUCOSE BLD STRIP.AUTO-MCNC: 129 MG/DL (ref 65–117)
GLUCOSE BLD STRIP.AUTO-MCNC: 141 MG/DL (ref 65–117)
GLUCOSE SERPL-MCNC: 106 MG/DL (ref 65–100)
HCT VFR BLD AUTO: 41 % (ref 36.6–50.3)
HGB BLD-MCNC: 14.1 G/DL (ref 12.1–17)
IMM GRANULOCYTES # BLD AUTO: 0.2 K/UL (ref 0–0.04)
IMM GRANULOCYTES NFR BLD AUTO: 2 % (ref 0–0.5)
LYMPHOCYTES # BLD: 1.7 K/UL (ref 0.8–3.5)
LYMPHOCYTES NFR BLD: 16 % (ref 12–49)
MAGNESIUM SERPL-MCNC: 2.1 MG/DL (ref 1.6–2.4)
MCH RBC QN AUTO: 32 PG (ref 26–34)
MCHC RBC AUTO-ENTMCNC: 34.4 G/DL (ref 30–36.5)
MCV RBC AUTO: 93.2 FL (ref 80–99)
MONOCYTES # BLD: 1.4 K/UL (ref 0–1)
MONOCYTES NFR BLD: 14 % (ref 5–13)
NEUTS SEG # BLD: 6.6 K/UL (ref 1.8–8)
NEUTS SEG NFR BLD: 63 % (ref 32–75)
NRBC # BLD: 0 K/UL (ref 0–0.01)
NRBC BLD-RTO: 0 PER 100 WBC
PHOSPHATE SERPL-MCNC: 3.9 MG/DL (ref 2.6–4.7)
PLATELET # BLD AUTO: 358 K/UL (ref 150–400)
PMV BLD AUTO: 9.3 FL (ref 8.9–12.9)
POTASSIUM SERPL-SCNC: 3.8 MMOL/L (ref 3.5–5.1)
RBC # BLD AUTO: 4.4 M/UL (ref 4.1–5.7)
SERVICE CMNT-IMP: ABNORMAL
SERVICE CMNT-IMP: NORMAL
SODIUM SERPL-SCNC: 130 MMOL/L (ref 136–145)
WBC # BLD AUTO: 10.3 K/UL (ref 4.1–11.1)

## 2022-03-10 PROCEDURE — 74011250637 HC RX REV CODE- 250/637: Performed by: INTERNAL MEDICINE

## 2022-03-10 PROCEDURE — 80048 BASIC METABOLIC PNL TOTAL CA: CPT

## 2022-03-10 PROCEDURE — 77030008756 HC TU IRR SUC STRY -B: Performed by: SURGERY

## 2022-03-10 PROCEDURE — 77030010507 HC ADH SKN DERMBND J&J -B: Performed by: SURGERY

## 2022-03-10 PROCEDURE — 77030020829: Performed by: SURGERY

## 2022-03-10 PROCEDURE — 77030040922 HC BLNKT HYPOTHRM STRY -A

## 2022-03-10 PROCEDURE — 36415 COLL VENOUS BLD VENIPUNCTURE: CPT

## 2022-03-10 PROCEDURE — 77030008608 HC TRCR ENDOSC SMTH AMR -B: Performed by: SURGERY

## 2022-03-10 PROCEDURE — 76060000033 HC ANESTHESIA 1 TO 1.5 HR: Performed by: SURGERY

## 2022-03-10 PROCEDURE — 74011250637 HC RX REV CODE- 250/637: Performed by: SURGERY

## 2022-03-10 PROCEDURE — C9113 INJ PANTOPRAZOLE SODIUM, VIA: HCPCS | Performed by: SURGERY

## 2022-03-10 PROCEDURE — 82962 GLUCOSE BLOOD TEST: CPT

## 2022-03-10 PROCEDURE — 84100 ASSAY OF PHOSPHORUS: CPT

## 2022-03-10 PROCEDURE — C1781 MESH (IMPLANTABLE): HCPCS | Performed by: SURGERY

## 2022-03-10 PROCEDURE — 76010000149 HC OR TIME 1 TO 1.5 HR: Performed by: SURGERY

## 2022-03-10 PROCEDURE — 74011000250 HC RX REV CODE- 250: Performed by: SURGERY

## 2022-03-10 PROCEDURE — 77030025927 HC STPLR FIX SECURSTRP J&J -F: Performed by: SURGERY

## 2022-03-10 PROCEDURE — 85025 COMPLETE CBC W/AUTO DIFF WBC: CPT

## 2022-03-10 PROCEDURE — 74011250636 HC RX REV CODE- 250/636: Performed by: NURSE ANESTHETIST, CERTIFIED REGISTERED

## 2022-03-10 PROCEDURE — 77030037032 HC INSRT SCIS CLICKLLINE DISP STOR -B: Performed by: SURGERY

## 2022-03-10 PROCEDURE — 44180 LAP ENTEROLYSIS: CPT | Performed by: SURGERY

## 2022-03-10 PROCEDURE — 77030039895 HC SYST SMK EVAC LAP COVD -B: Performed by: SURGERY

## 2022-03-10 PROCEDURE — 74011250636 HC RX REV CODE- 250/636: Performed by: ANESTHESIOLOGY

## 2022-03-10 PROCEDURE — 83735 ASSAY OF MAGNESIUM: CPT

## 2022-03-10 PROCEDURE — 77030012770 HC TRCR OPT FX AMR -B: Performed by: SURGERY

## 2022-03-10 PROCEDURE — 74011250636 HC RX REV CODE- 250/636: Performed by: SURGERY

## 2022-03-10 PROCEDURE — 77030040361 HC SLV COMPR DVT MDII -B: Performed by: SURGERY

## 2022-03-10 PROCEDURE — 77030008684 HC TU ET CUF COVD -B: Performed by: ANESTHESIOLOGY

## 2022-03-10 PROCEDURE — 77030031139 HC SUT VCRL2 J&J -A: Performed by: SURGERY

## 2022-03-10 PROCEDURE — 77030008771 HC TU NG SALEM SUMP -A: Performed by: ANESTHESIOLOGY

## 2022-03-10 PROCEDURE — 2709999900 HC NON-CHARGEABLE SUPPLY: Performed by: SURGERY

## 2022-03-10 PROCEDURE — 74018 RADEX ABDOMEN 1 VIEW: CPT

## 2022-03-10 PROCEDURE — 0WUF4JZ SUPPLEMENT ABDOMINAL WALL WITH SYNTHETIC SUBSTITUTE, PERCUTANEOUS ENDOSCOPIC APPROACH: ICD-10-PCS | Performed by: SURGERY

## 2022-03-10 PROCEDURE — 76210000016 HC OR PH I REC 1 TO 1.5 HR: Performed by: SURGERY

## 2022-03-10 PROCEDURE — 65270000032 HC RM SEMIPRIVATE

## 2022-03-10 PROCEDURE — 74011000250 HC RX REV CODE- 250: Performed by: NURSE ANESTHETIST, CERTIFIED REGISTERED

## 2022-03-10 PROCEDURE — 77030002933 HC SUT MCRYL J&J -A: Performed by: SURGERY

## 2022-03-10 PROCEDURE — 77030002895 HC DEV VASC CLOSR COVD -B: Performed by: SURGERY

## 2022-03-10 PROCEDURE — 77030013079 HC BLNKT BAIR HGGR 3M -A: Performed by: ANESTHESIOLOGY

## 2022-03-10 PROCEDURE — 77030026438 HC STYL ET INTUB CARD -A: Performed by: ANESTHESIOLOGY

## 2022-03-10 PROCEDURE — 74011000258 HC RX REV CODE- 258: Performed by: SURGERY

## 2022-03-10 DEVICE — PHASIX ST MESH, 7 CM X 10 CM (3" X 4"), RECTANGLE
Type: IMPLANTABLE DEVICE | Site: ABDOMEN | Status: FUNCTIONAL
Brand: PHASIX

## 2022-03-10 RX ORDER — MIDAZOLAM HYDROCHLORIDE 1 MG/ML
INJECTION, SOLUTION INTRAMUSCULAR; INTRAVENOUS AS NEEDED
Status: DISCONTINUED | OUTPATIENT
Start: 2022-03-10 | End: 2022-03-10 | Stop reason: HOSPADM

## 2022-03-10 RX ORDER — OXYCODONE AND ACETAMINOPHEN 5; 325 MG/1; MG/1
1 TABLET ORAL AS NEEDED
Status: DISCONTINUED | OUTPATIENT
Start: 2022-03-10 | End: 2022-03-14 | Stop reason: HOSPADM

## 2022-03-10 RX ORDER — MIDAZOLAM HYDROCHLORIDE 1 MG/ML
1 INJECTION, SOLUTION INTRAMUSCULAR; INTRAVENOUS AS NEEDED
Status: DISCONTINUED | OUTPATIENT
Start: 2022-03-10 | End: 2022-03-12

## 2022-03-10 RX ORDER — SODIUM CHLORIDE 0.9 % (FLUSH) 0.9 %
5-40 SYRINGE (ML) INJECTION AS NEEDED
Status: DISCONTINUED | OUTPATIENT
Start: 2022-03-10 | End: 2022-03-14 | Stop reason: HOSPADM

## 2022-03-10 RX ORDER — MORPHINE SULFATE 2 MG/ML
2 INJECTION, SOLUTION INTRAMUSCULAR; INTRAVENOUS
Status: DISCONTINUED | OUTPATIENT
Start: 2022-03-10 | End: 2022-03-14 | Stop reason: HOSPADM

## 2022-03-10 RX ORDER — FENTANYL CITRATE 50 UG/ML
25 INJECTION, SOLUTION INTRAMUSCULAR; INTRAVENOUS
Status: COMPLETED | OUTPATIENT
Start: 2022-03-10 | End: 2022-03-10

## 2022-03-10 RX ORDER — SODIUM CHLORIDE, SODIUM LACTATE, POTASSIUM CHLORIDE, CALCIUM CHLORIDE 600; 310; 30; 20 MG/100ML; MG/100ML; MG/100ML; MG/100ML
125 INJECTION, SOLUTION INTRAVENOUS CONTINUOUS
Status: DISCONTINUED | OUTPATIENT
Start: 2022-03-10 | End: 2022-03-10

## 2022-03-10 RX ORDER — FENTANYL CITRATE 50 UG/ML
INJECTION, SOLUTION INTRAMUSCULAR; INTRAVENOUS AS NEEDED
Status: DISCONTINUED | OUTPATIENT
Start: 2022-03-10 | End: 2022-03-10 | Stop reason: HOSPADM

## 2022-03-10 RX ORDER — SODIUM CHLORIDE 0.9 % (FLUSH) 0.9 %
5-40 SYRINGE (ML) INJECTION EVERY 8 HOURS
Status: DISCONTINUED | OUTPATIENT
Start: 2022-03-10 | End: 2022-03-14 | Stop reason: HOSPADM

## 2022-03-10 RX ORDER — DIPHENHYDRAMINE HYDROCHLORIDE 50 MG/ML
12.5 INJECTION, SOLUTION INTRAMUSCULAR; INTRAVENOUS AS NEEDED
Status: ACTIVE | OUTPATIENT
Start: 2022-03-10 | End: 2022-03-10

## 2022-03-10 RX ORDER — LIDOCAINE HYDROCHLORIDE 20 MG/ML
INJECTION, SOLUTION EPIDURAL; INFILTRATION; INTRACAUDAL; PERINEURAL AS NEEDED
Status: DISCONTINUED | OUTPATIENT
Start: 2022-03-10 | End: 2022-03-10 | Stop reason: HOSPADM

## 2022-03-10 RX ORDER — ACETAMINOPHEN 325 MG/1
650 TABLET ORAL ONCE
Status: ACTIVE | OUTPATIENT
Start: 2022-03-10 | End: 2022-03-10

## 2022-03-10 RX ORDER — SODIUM CHLORIDE, SODIUM LACTATE, POTASSIUM CHLORIDE, CALCIUM CHLORIDE 600; 310; 30; 20 MG/100ML; MG/100ML; MG/100ML; MG/100ML
INJECTION, SOLUTION INTRAVENOUS
Status: DISCONTINUED | OUTPATIENT
Start: 2022-03-10 | End: 2022-03-10 | Stop reason: HOSPADM

## 2022-03-10 RX ORDER — SUCCINYLCHOLINE CHLORIDE 20 MG/ML
INJECTION INTRAMUSCULAR; INTRAVENOUS AS NEEDED
Status: DISCONTINUED | OUTPATIENT
Start: 2022-03-10 | End: 2022-03-10 | Stop reason: HOSPADM

## 2022-03-10 RX ORDER — MIDAZOLAM HYDROCHLORIDE 1 MG/ML
0.5 INJECTION, SOLUTION INTRAMUSCULAR; INTRAVENOUS
Status: DISCONTINUED | OUTPATIENT
Start: 2022-03-10 | End: 2022-03-12

## 2022-03-10 RX ORDER — ONDANSETRON 2 MG/ML
4 INJECTION INTRAMUSCULAR; INTRAVENOUS AS NEEDED
Status: DISCONTINUED | OUTPATIENT
Start: 2022-03-10 | End: 2022-03-14 | Stop reason: HOSPADM

## 2022-03-10 RX ORDER — HYDROMORPHONE HYDROCHLORIDE 2 MG/ML
INJECTION, SOLUTION INTRAMUSCULAR; INTRAVENOUS; SUBCUTANEOUS AS NEEDED
Status: DISCONTINUED | OUTPATIENT
Start: 2022-03-10 | End: 2022-03-10 | Stop reason: HOSPADM

## 2022-03-10 RX ORDER — ROCURONIUM BROMIDE 10 MG/ML
INJECTION, SOLUTION INTRAVENOUS AS NEEDED
Status: DISCONTINUED | OUTPATIENT
Start: 2022-03-10 | End: 2022-03-10 | Stop reason: HOSPADM

## 2022-03-10 RX ORDER — BUPIVACAINE HYDROCHLORIDE 5 MG/ML
50 INJECTION, SOLUTION EPIDURAL; INTRACAUDAL ONCE
Status: COMPLETED | OUTPATIENT
Start: 2022-03-10 | End: 2022-03-10

## 2022-03-10 RX ORDER — PROPOFOL 10 MG/ML
INJECTION, EMULSION INTRAVENOUS AS NEEDED
Status: DISCONTINUED | OUTPATIENT
Start: 2022-03-10 | End: 2022-03-10 | Stop reason: HOSPADM

## 2022-03-10 RX ORDER — SODIUM CHLORIDE 9 MG/ML
25 INJECTION, SOLUTION INTRAVENOUS CONTINUOUS
Status: DISCONTINUED | OUTPATIENT
Start: 2022-03-10 | End: 2022-03-10

## 2022-03-10 RX ORDER — HYDROMORPHONE HYDROCHLORIDE 1 MG/ML
0.2 INJECTION, SOLUTION INTRAMUSCULAR; INTRAVENOUS; SUBCUTANEOUS
Status: COMPLETED | OUTPATIENT
Start: 2022-03-10 | End: 2022-03-10

## 2022-03-10 RX ORDER — ONDANSETRON 2 MG/ML
INJECTION INTRAMUSCULAR; INTRAVENOUS AS NEEDED
Status: DISCONTINUED | OUTPATIENT
Start: 2022-03-10 | End: 2022-03-10 | Stop reason: HOSPADM

## 2022-03-10 RX ORDER — PHENYLEPHRINE HCL IN 0.9% NACL 0.4MG/10ML
SYRINGE (ML) INTRAVENOUS AS NEEDED
Status: DISCONTINUED | OUTPATIENT
Start: 2022-03-10 | End: 2022-03-10 | Stop reason: HOSPADM

## 2022-03-10 RX ORDER — SODIUM CHLORIDE, SODIUM LACTATE, POTASSIUM CHLORIDE, CALCIUM CHLORIDE 600; 310; 30; 20 MG/100ML; MG/100ML; MG/100ML; MG/100ML
125 INJECTION, SOLUTION INTRAVENOUS CONTINUOUS
Status: DISCONTINUED | OUTPATIENT
Start: 2022-03-10 | End: 2022-03-11

## 2022-03-10 RX ORDER — FENTANYL CITRATE 50 UG/ML
50 INJECTION, SOLUTION INTRAMUSCULAR; INTRAVENOUS AS NEEDED
Status: DISCONTINUED | OUTPATIENT
Start: 2022-03-10 | End: 2022-03-14 | Stop reason: HOSPADM

## 2022-03-10 RX ORDER — LIDOCAINE HYDROCHLORIDE 10 MG/ML
0.1 INJECTION, SOLUTION EPIDURAL; INFILTRATION; INTRACAUDAL; PERINEURAL AS NEEDED
Status: DISCONTINUED | OUTPATIENT
Start: 2022-03-10 | End: 2022-03-14 | Stop reason: HOSPADM

## 2022-03-10 RX ORDER — ENOXAPARIN SODIUM 100 MG/ML
40 INJECTION SUBCUTANEOUS DAILY
Status: DISCONTINUED | OUTPATIENT
Start: 2022-03-11 | End: 2022-03-17 | Stop reason: HOSPADM

## 2022-03-10 RX ORDER — DEXMEDETOMIDINE HYDROCHLORIDE 100 UG/ML
INJECTION, SOLUTION INTRAVENOUS AS NEEDED
Status: DISCONTINUED | OUTPATIENT
Start: 2022-03-10 | End: 2022-03-10 | Stop reason: HOSPADM

## 2022-03-10 RX ORDER — DEXAMETHASONE SODIUM PHOSPHATE 4 MG/ML
INJECTION, SOLUTION INTRA-ARTICULAR; INTRALESIONAL; INTRAMUSCULAR; INTRAVENOUS; SOFT TISSUE AS NEEDED
Status: DISCONTINUED | OUTPATIENT
Start: 2022-03-10 | End: 2022-03-10 | Stop reason: HOSPADM

## 2022-03-10 RX ADMIN — Medication 10 ML: at 06:57

## 2022-03-10 RX ADMIN — SUCCINYLCHOLINE CHLORIDE 140 MG: 20 INJECTION, SOLUTION INTRAMUSCULAR; INTRAVENOUS at 12:48

## 2022-03-10 RX ADMIN — MIDAZOLAM 2 MG: 1 INJECTION INTRAMUSCULAR; INTRAVENOUS at 12:45

## 2022-03-10 RX ADMIN — CALCIUM GLUCONATE: 94 INJECTION, SOLUTION INTRAVENOUS at 18:08

## 2022-03-10 RX ADMIN — WATER 2 G: 1 INJECTION INTRAMUSCULAR; INTRAVENOUS; SUBCUTANEOUS at 13:02

## 2022-03-10 RX ADMIN — SODIUM CHLORIDE, POTASSIUM CHLORIDE, SODIUM LACTATE AND CALCIUM CHLORIDE 125 ML/HR: 600; 310; 30; 20 INJECTION, SOLUTION INTRAVENOUS at 18:42

## 2022-03-10 RX ADMIN — DULOXETINE 60 MG: 30 CAPSULE, DELAYED RELEASE ORAL at 22:54

## 2022-03-10 RX ADMIN — FENTANYL CITRATE 25 MCG: 50 INJECTION, SOLUTION INTRAMUSCULAR; INTRAVENOUS at 14:21

## 2022-03-10 RX ADMIN — LIDOCAINE HYDROCHLORIDE 80 MG: 20 INJECTION, SOLUTION EPIDURAL; INFILTRATION; INTRACAUDAL; PERINEURAL at 12:48

## 2022-03-10 RX ADMIN — DULOXETINE HYDROCHLORIDE 30 MG: 30 CAPSULE, DELAYED RELEASE ORAL at 06:57

## 2022-03-10 RX ADMIN — BENZTROPINE MESYLATE 1 MG: 1 TABLET ORAL at 09:02

## 2022-03-10 RX ADMIN — RISPERIDONE 2 MG: 1 TABLET, FILM COATED ORAL at 09:02

## 2022-03-10 RX ADMIN — ATORVASTATIN CALCIUM 20 MG: 20 TABLET, FILM COATED ORAL at 09:02

## 2022-03-10 RX ADMIN — LIDOCAINE HYDROCHLORIDE 20 MG: 20 INJECTION, SOLUTION EPIDURAL; INFILTRATION; INTRACAUDAL; PERINEURAL at 13:49

## 2022-03-10 RX ADMIN — Medication 10 ML: at 22:59

## 2022-03-10 RX ADMIN — OXYCODONE 5 MG: 5 TABLET ORAL at 09:02

## 2022-03-10 RX ADMIN — GABAPENTIN 600 MG: 600 TABLET, FILM COATED ORAL at 09:02

## 2022-03-10 RX ADMIN — HYDRALAZINE HYDROCHLORIDE 10 MG: 20 INJECTION INTRAMUSCULAR; INTRAVENOUS at 14:22

## 2022-03-10 RX ADMIN — DEXAMETHASONE SODIUM PHOSPHATE 4 MG: 4 INJECTION, SOLUTION INTRAMUSCULAR; INTRAVENOUS at 12:45

## 2022-03-10 RX ADMIN — OXYCODONE 5 MG: 5 TABLET ORAL at 16:35

## 2022-03-10 RX ADMIN — FENTANYL CITRATE 75 MCG: 50 INJECTION, SOLUTION INTRAMUSCULAR; INTRAVENOUS at 12:48

## 2022-03-10 RX ADMIN — METOCLOPRAMIDE 10 MG: 5 INJECTION, SOLUTION INTRAMUSCULAR; INTRAVENOUS at 16:32

## 2022-03-10 RX ADMIN — DEXMEDETOMIDINE HYDROCHLORIDE 10 MCG: 100 INJECTION, SOLUTION, CONCENTRATE INTRAVENOUS at 13:21

## 2022-03-10 RX ADMIN — HYDROMORPHONE HYDROCHLORIDE 1 MG: 1 INJECTION, SOLUTION INTRAMUSCULAR; INTRAVENOUS; SUBCUTANEOUS at 22:54

## 2022-03-10 RX ADMIN — GABAPENTIN 600 MG: 600 TABLET, FILM COATED ORAL at 22:54

## 2022-03-10 RX ADMIN — DEXMEDETOMIDINE HYDROCHLORIDE 2 MCG: 100 INJECTION, SOLUTION, CONCENTRATE INTRAVENOUS at 13:49

## 2022-03-10 RX ADMIN — OXYCODONE 5 MG: 5 TABLET ORAL at 01:24

## 2022-03-10 RX ADMIN — BENZTROPINE MESYLATE 1 MG: 1 TABLET ORAL at 16:31

## 2022-03-10 RX ADMIN — PANTOPRAZOLE SODIUM 40 MG: 40 INJECTION, POWDER, FOR SOLUTION INTRAVENOUS at 09:03

## 2022-03-10 RX ADMIN — Medication 100 MCG: at 13:07

## 2022-03-10 RX ADMIN — FENTANYL CITRATE 25 MCG: 50 INJECTION, SOLUTION INTRAMUSCULAR; INTRAVENOUS at 13:13

## 2022-03-10 RX ADMIN — SODIUM CHLORIDE, POTASSIUM CHLORIDE, SODIUM LACTATE AND CALCIUM CHLORIDE: 600; 310; 30; 20 INJECTION, SOLUTION INTRAVENOUS at 12:40

## 2022-03-10 RX ADMIN — ONDANSETRON HYDROCHLORIDE 4 MG: 2 INJECTION, SOLUTION INTRAMUSCULAR; INTRAVENOUS at 13:23

## 2022-03-10 RX ADMIN — PROPOFOL 30 MG: 10 INJECTION, EMULSION INTRAVENOUS at 13:49

## 2022-03-10 RX ADMIN — METOCLOPRAMIDE 10 MG: 5 INJECTION, SOLUTION INTRAMUSCULAR; INTRAVENOUS at 06:57

## 2022-03-10 RX ADMIN — SODIUM CHLORIDE, PRESERVATIVE FREE 10 ML: 5 INJECTION INTRAVENOUS at 09:03

## 2022-03-10 RX ADMIN — ROCURONIUM BROMIDE 30 MG: 10 SOLUTION INTRAVENOUS at 13:04

## 2022-03-10 RX ADMIN — Medication 100 MCG: at 12:58

## 2022-03-10 RX ADMIN — HYDROMORPHONE HYDROCHLORIDE 0.2 MG: 1 INJECTION, SOLUTION INTRAMUSCULAR; INTRAVENOUS; SUBCUTANEOUS at 15:04

## 2022-03-10 RX ADMIN — OXYCODONE 5 MG: 5 TABLET ORAL at 20:35

## 2022-03-10 RX ADMIN — RISPERIDONE 2 MG: 1 TABLET, FILM COATED ORAL at 16:31

## 2022-03-10 RX ADMIN — HYDROMORPHONE HYDROCHLORIDE 1 MG: 1 INJECTION, SOLUTION INTRAMUSCULAR; INTRAVENOUS; SUBCUTANEOUS at 15:40

## 2022-03-10 RX ADMIN — GABAPENTIN 600 MG: 600 TABLET, FILM COATED ORAL at 15:39

## 2022-03-10 RX ADMIN — SODIUM CHLORIDE, PRESERVATIVE FREE 10 ML: 5 INJECTION INTRAVENOUS at 06:57

## 2022-03-10 RX ADMIN — SODIUM CHLORIDE 25 ML/HR: 9 INJECTION, SOLUTION INTRAVENOUS at 15:40

## 2022-03-10 RX ADMIN — HYDROMORPHONE HYDROCHLORIDE 0.5 MG: 2 INJECTION, SOLUTION INTRAMUSCULAR; INTRAVENOUS; SUBCUTANEOUS at 14:01

## 2022-03-10 RX ADMIN — Medication 80 MG: at 15:39

## 2022-03-10 RX ADMIN — METOCLOPRAMIDE 10 MG: 5 INJECTION, SOLUTION INTRAMUSCULAR; INTRAVENOUS at 23:45

## 2022-03-10 RX ADMIN — FENTANYL CITRATE 25 MCG: 50 INJECTION, SOLUTION INTRAMUSCULAR; INTRAVENOUS at 14:41

## 2022-03-10 RX ADMIN — HYDROMORPHONE HYDROCHLORIDE 0.2 MG: 1 INJECTION, SOLUTION INTRAMUSCULAR; INTRAVENOUS; SUBCUTANEOUS at 14:51

## 2022-03-10 RX ADMIN — SODIUM CHLORIDE, POTASSIUM CHLORIDE, SODIUM LACTATE AND CALCIUM CHLORIDE 125 ML/HR: 600; 310; 30; 20 INJECTION, SOLUTION INTRAVENOUS at 11:55

## 2022-03-10 RX ADMIN — PROPOFOL 170 MG: 10 INJECTION, EMULSION INTRAVENOUS at 12:48

## 2022-03-10 RX ADMIN — FENTANYL CITRATE 25 MCG: 50 INJECTION, SOLUTION INTRAMUSCULAR; INTRAVENOUS at 14:48

## 2022-03-10 RX ADMIN — FENTANYL CITRATE 25 MCG: 50 INJECTION, SOLUTION INTRAMUSCULAR; INTRAVENOUS at 14:33

## 2022-03-10 RX ADMIN — HYDROMORPHONE HYDROCHLORIDE 0.5 MG: 2 INJECTION, SOLUTION INTRAMUSCULAR; INTRAVENOUS; SUBCUTANEOUS at 13:40

## 2022-03-10 NOTE — PROGRESS NOTES
Nephrology Progress Note  Mandy Valle  Date of Admission : 2/25/2022    CC:  Follow up for hyponatremia       Assessment and Plan     Hyponatremia:  - suspect SAIDH + excess water intake  - urine studies from 3/7 support SIADH  - he is on a SNRI which can cause SIADH  - his nausea can also trigger SIADH  - Na improving with FR  - daily labs for now     Sigmoid volvulus:  - s/p laparoscopic sigmoidoscopy  - to OR today     HTN  COPD  HLD  Tobacco use  Hx of lung cancer s/p resection  Paranoid schizophrenia       Interval History:  Seen and examined. On TPN, remains NPO. Na better. Back to the OR today for diagnostic laparoscopy and AMRIT. No cp, sob reported. Current Medications: all current  Medications have been eviewed in EPIC  Review of Systems: Pertinent items are noted in HPI. Objective:  Vitals:    Vitals:    03/09/22 1952 03/10/22 0015 03/10/22 0424 03/10/22 0826   BP: 138/77 130/75  (!) 140/88   Pulse: (!) 103 (!) 101  (!) 101   Resp: 16 16  20   Temp: 98.2 °F (36.8 °C) 98 °F (36.7 °C)  97.9 °F (36.6 °C)   SpO2: 97% 96%  95%   Weight:   74.6 kg (164 lb 7.4 oz)    Height:         Intake and Output:  No intake/output data recorded. 03/08 1901 - 03/10 0700  In: 452 [P.O.:200; I.V.:252]  Out: 3460 [Urine:2350; Drains:110]    Physical Examination:    General: NAD,Conversant   Neck:  Supple, no mass  Resp:  Lungs CTA B/L, no wheezing , normal respiratory effort  CV:  RRR,  no murmur or rub, no LE edema  GI:  Soft, NT, + Bowel sounds, no hepatosplenomegaly  Neurologic:  Non focal  Psych:             AAO x 3 appropriate affect   Skin:  No Rash  :  No rees    []    High complexity decision making was performed  []    Patient is at high-risk of decompensation with multiple organ involvement    Lab Data Personally Reviewed: I have reviewed all the pertinent labs, microbiology data and radiology studies during assessment.     Recent Labs     03/10/22  0435 03/09/22  0323 03/08/22  0315   * 126* 129*   K 3.8 3.6 3.8    98 100   CO2 24 23 22   * 108* 115*   BUN 4* 4* 4*   CREA 0.48* 0.50* 0.51*   CA 8.3* 8.3* 8.2*   MG 2.1 1.8  --    PHOS 3.9 3.2  --    ALB  --   --  2.5*   ALT  --   --  31     Recent Labs     03/10/22  0435 03/08/22  0315   WBC 10.3 7.8   HGB 14.1 13.7   HCT 41.0 38.8    282     No results found for: SDES  Lab Results   Component Value Date/Time    Culture result: NO GROWTH 5 DAYS 03/07/2017 08:09 AM    Culture result: NORMAL RESPIRATORY ALFRED/NO BETA STREP ISOLATED 03/07/2017 08:09 AM     Recent Results (from the past 24 hour(s))   GLUCOSE, POC    Collection Time: 03/09/22 12:11 PM   Result Value Ref Range    Glucose (POC) 106 65 - 117 mg/dL    Performed by [facility]  PCT    COVID-19 RAPID TEST    Collection Time: 03/09/22 12:58 PM   Result Value Ref Range    Specimen source Nasopharyngeal      COVID-19 rapid test Not detected NOTD     GLUCOSE, POC    Collection Time: 03/09/22  4:11 PM   Result Value Ref Range    Glucose (POC) 104 65 - 117 mg/dL    Performed by [facility]  PCT    GLUCOSE, POC    Collection Time: 03/10/22 12:47 AM   Result Value Ref Range    Glucose (POC) 129 (H) 65 - 117 mg/dL    Performed by [facility]    METABOLIC PANEL, BASIC    Collection Time: 03/10/22  4:35 AM   Result Value Ref Range    Sodium 130 (L) 136 - 145 mmol/L    Potassium 3.8 3.5 - 5.1 mmol/L    Chloride 101 97 - 108 mmol/L    CO2 24 21 - 32 mmol/L    Anion gap 5 5 - 15 mmol/L    Glucose 106 (H) 65 - 100 mg/dL    BUN 4 (L) 6 - 20 MG/DL    Creatinine 0.48 (L) 0.70 - 1.30 MG/DL    BUN/Creatinine ratio 8 (L) 12 - 20      GFR est AA >60 >60 ml/min/1.73m2    GFR est non-AA >60 >60 ml/min/1.73m2    Calcium 8.3 (L) 8.5 - 10.1 MG/DL   PHOSPHORUS    Collection Time: 03/10/22  4:35 AM   Result Value Ref Range    Phosphorus 3.9 2.6 - 4.7 MG/DL   MAGNESIUM    Collection Time: 03/10/22  4:35 AM   Result Value Ref Range    Magnesium 2.1 1.6 - 2.4 mg/dL   CBC WITH AUTOMATED DIFF    Collection Time: 03/10/22 4:35 AM   Result Value Ref Range    WBC 10.3 4.1 - 11.1 K/uL    RBC 4.40 4. 10 - 5.70 M/uL    HGB 14.1 12.1 - 17.0 g/dL    HCT 41.0 36.6 - 50.3 %    MCV 93.2 80.0 - 99.0 FL    MCH 32.0 26.0 - 34.0 PG    MCHC 34.4 30.0 - 36.5 g/dL    RDW 12.0 11.5 - 14.5 %    PLATELET 798 248 - 909 K/uL    MPV 9.3 8.9 - 12.9 FL    NRBC 0.0 0  WBC    ABSOLUTE NRBC 0.00 0.00 - 0.01 K/uL    NEUTROPHILS 63 32 - 75 %    LYMPHOCYTES 16 12 - 49 %    MONOCYTES 14 (H) 5 - 13 %    EOSINOPHILS 4 0 - 7 %    BASOPHILS 1 0 - 1 %    IMMATURE GRANULOCYTES 2 (H) 0.0 - 0.5 %    ABS. NEUTROPHILS 6.6 1.8 - 8.0 K/UL    ABS. LYMPHOCYTES 1.7 0.8 - 3.5 K/UL    ABS. MONOCYTES 1.4 (H) 0.0 - 1.0 K/UL    ABS. EOSINOPHILS 0.4 0.0 - 0.4 K/UL    ABS. BASOPHILS 0.1 0.0 - 0.1 K/UL    ABS. IMM. GRANS. 0.2 (H) 0.00 - 0.04 K/UL    DF AUTOMATED     GLUCOSE, POC    Collection Time: 03/10/22  6:25 AM   Result Value Ref Range    Glucose (POC) 117 65 - 117 mg/dL    Performed by Leeann Lefort, MD  [address]  Phone - [phone]   Fax - [phone]  [email or web address]

## 2022-03-10 NOTE — PROGRESS NOTES
TRANSITONS OF CARE:  RUR:  0FF GMLOS  CRM notes that patient was in surgery earlier today and underwent a Laproscopic lyis of adhesion, incisional hernia repair with mesh. Case management will follow for transitions of care needs. Patient resides at a group home and the plan is for him to return once he is medically stable and off any TPN and IV therapy.

## 2022-03-10 NOTE — PROGRESS NOTES
[address] [facility] Adult  Hospitalist Group                                                                                          Hospitalist Progress Note  Malcolm Aguilera MD  Answering service: [phone] OR [phone] from in house phone        Date of Service:  3/10/2022  NAME:  Keyon Roberson  :  1965  MRN:  [id number]      Admission Summary:   Imtiaz cates a 61 y.o. male  presented to the ED on  with constipation and was admitted for sigmoid volvulus. He underwent colonoscopy with decompression on  midnight  Patient underwent colonoscopic decompression on . He underwent laparoscopic sigmoid resection on 3/3    Interval history / Subjective:      Patient returned from the OR and complains of abdominal pain 9/10, he just had IV Dilaudid a short while ago. No other complaints  Assessment & Plan:     Sigmoid volvulus POA status post laparoscopic sigmoid resection 22  -3/10: Underwent urgent laparoscopic lysis of adhesion and repair of incisional hernia with mesh  --Pain control  --N.p.o.: Continue TPN and IV fluids  --Further management per surgery    Chronic hyponatremia. -Urine NA less than 20  -Sodium stable in the low 130s  --Can change IV fluid, monitor    Hypokalemia/hypomagnesemia  -replete PRN    Hx of Paranoid schizophrenia. -does not voice si/hi/hallucinations    Hx of COPD without bronchospasm: As needed bronchodilators. Hypertension      Dyslipidemia. Tobacco use    Leukocytosis: Resolved. Hx of Left lung cancer, status post resection. See other orders for plan     Code status: full   Prophylaxis: lovenox  Care Plan discussed with: patient   Anticipated Disposition: TBD      Hospital Problems  Date Reviewed: 3/2/2022          Codes Class Noted POA    * (Principal) Sigmoid volvulus ([facility] [address] ICD-10-CM: K56.2  ICD-9-CM: 560.2  2022 Yes                Review of Systems:   Pertinent items are noted in HPI.        Vital Signs:    Last 24hrs VS reviewed since prior progress note. Most recent are:  Visit Vitals  /77   Pulse (!) 115   Temp 97.8 °F (36.6 °C)   Resp 16   Ht 5' 7\" (1.702 m)   Wt 74.6 kg (164 lb 7.4 oz)   SpO2 95%   BMI 25.76 kg/m²         Intake/Output Summary (Last 24 hours) at 3/10/2022 1823  Last data filed at 3/10/2022 1818  Gross per 24 hour   Intake 822 ml   Output 2405 ml   Net -1583 ml        Physical Examination:     I had a face to face encounter with this patient and independently examined them on 3/10/2022 as outlined below:          Constitutional:  And oriented, not in distress   ENT:  NGT in place. Resp:  CTA bilaterally. Normal work of breathing   Chest:  No accessory muscle use   CV:  Regular rhythm, normal rate, no rubs    GI:  Laparoscopic wounds. Abdomen hypoactive, diffusely tender without rebound or guarding.     Musculoskeletal:  No edema, warm, 2+ pulses throughout    Neurologic:  Moves all extremities, awake, alert  Skin: no jaundice or cyanosis  Psych: does not voice si/hi/hallucinations, not agitated            Data Review:       Labs:     Recent Labs     03/10/22  0435 03/08/22 0315   WBC 10.3 7.8   HGB 14.1 13.7   HCT 41.0 38.8    282     Recent Labs     03/10/22  0435 03/09/22  0323 03/08/22 0315   * 126* 129*   K 3.8 3.6 3.8    98 100   CO2 24 23 22   BUN 4* 4* 4*   CREA 0.48* 0.50* 0.51*   * 108* 115*   CA 8.3* 8.3* 8.2*   MG 2.1 1.8  --    PHOS 3.9 3.2  --      Recent Labs     03/08/22 0315   ALT 31   AP 50   TBILI 0.5   TP 6.3*   ALB 2.5*   GLOB 3.8       Medications Reviewed:     Current Facility-Administered Medications   Medication Dose Route Frequency    sodium chloride (NS) flush 5-40 mL  5-40 mL IntraVENous Q8H    sodium chloride (NS) flush 5-40 mL  5-40 mL IntraVENous PRN    lidocaine (PF) (XYLOCAINE) 10 mg/mL (1 %) injection 0.1 mL  0.1 mL SubCUTAneous PRN    fentaNYL citrate (PF) injection 50 mcg  50 mcg IntraVENous PRN    midazolam (VERSED) injection 1 mg  1 mg IntraVENous PRN    acetaminophen (TYLENOL) tablet 650 mg  650 mg Oral ONCE    lactated Ringers infusion  125 mL/hr IntraVENous CONTINUOUS    sodium chloride (NS) flush 5-40 mL  5-40 mL IntraVENous Q8H    sodium chloride (NS) flush 5-40 mL  5-40 mL IntraVENous PRN    oxyCODONE-acetaminophen (PERCOCET) 5-325 mg per tablet 1 Tablet  1 Tablet Oral PRN    morphine injection 2 mg  2 mg IntraVENous Multiple    ondansetron (ZOFRAN) injection 4 mg  4 mg IntraVENous PRN    midazolam (VERSED) injection 0.5 mg  0.5 mg IntraVENous Q5MIN PRN    diphenhydrAMINE (BENADRYL) injection 12.5 mg  12.5 mg IntraVENous PRN    meperidine (DEMEROL) injection 12.5 mg  12.5 mg IntraVENous Q30MIN PRN    TPN ADULT - CENTRAL   IntraVENous CONTINUOUS    [START ON 3/11/2022] enoxaparin (LOVENOX) injection 40 mg  40 mg SubCUTAneous DAILY    fat emulsion 20% (LIPOSYN, INTRAlipid) infusion 500 mL  500 mL IntraVENous Q MON, WED & FRI    TPN ADULT - CENTRAL   IntraVENous CONTINUOUS    metoclopramide HCl (REGLAN) injection 10 mg  10 mg IntraVENous Q6H    naloxone (NARCAN) injection 0.4 mg  0.4 mg IntraVENous EVERY 2 MINUTES AS NEEDED    simethicone (MYLICON) tablet 80 mg  80 mg Oral TIDAC    atorvastatin (LIPITOR) tablet 20 mg  20 mg Oral DAILY    sodium chloride (NS) flush 5-40 mL  5-40 mL IntraVENous Q8H    sodium chloride (NS) flush 5-40 mL  5-40 mL IntraVENous PRN    ondansetron (ZOFRAN ODT) tablet 4 mg  4 mg Oral Q8H PRN    Or    ondansetron (ZOFRAN) injection 4 mg  4 mg IntraVENous Q6H PRN    oxyCODONE IR (ROXICODONE) tablet 5 mg  5 mg Oral Q4H PRN    HYDROmorphone (DILAUDID) injection 1 mg  1 mg IntraVENous Q3H PRN    butalbital-acetaminophen-caffeine (FIORICET, ESGIC) -40 mg per tablet 1 Tablet  1 Tablet Oral Q4H PRN    sodium chloride (NS) flush 5-40 mL  5-40 mL IntraVENous Q8H    sodium chloride (NS) flush 5-40 mL  5-40 mL IntraVENous PRN    albuterol-ipratropium (DUO-NEB) 2.5 MG-0.5 MG/3 ML  3 mL Nebulization Q4H PRN    sodium chloride (NS) flush 5-40 mL  5-40 mL IntraVENous Q8H    sodium chloride (NS) flush 5-40 mL  5-40 mL IntraVENous PRN    acetaminophen (TYLENOL) tablet 650 mg  650 mg Oral Q6H PRN    Or    acetaminophen (TYLENOL) suppository 650 mg  650 mg Rectal Q6H PRN    ondansetron (ZOFRAN ODT) tablet 4 mg  4 mg Oral Q8H PRN    Or    ondansetron (ZOFRAN) injection 4 mg  4 mg IntraVENous Q6H PRN    nicotine (NICODERM CQ) 21 mg/24 hr patch 1 Patch  1 Patch TransDERmal DAILY    hydrALAZINE (APRESOLINE) 20 mg/mL injection 10 mg  10 mg IntraVENous Q6H PRN    pantoprazole (PROTONIX) injection 40 mg  40 mg IntraVENous DAILY    And    sodium chloride (NS) flush 10 mL  10 mL IntraVENous DAILY    benztropine (COGENTIN) tablet 1 mg  1 mg Oral BID    clonazePAM (KlonoPIN) tablet 1 mg  1 mg Oral BID PRN    DULoxetine (CYMBALTA) capsule 30 mg  30 mg Oral 7am    DULoxetine (CYMBALTA) capsule 60 mg  60 mg Oral QHS    gabapentin (NEURONTIN) tablet 600 mg  600 mg Oral TID    risperiDONE (RisperDAL) tablet 2 mg  2 mg Oral BID    albuterol-ipratropium (DUO-NEB) 2.5 MG-0.5 MG/3 ML  3 mL Nebulization Q6H PRN    arformoteroL (BROVANA) neb solution 15 mcg  15 mcg Nebulization BID RT    And    budesonide (PULMICORT) 500 mcg/2 ml nebulizer suspension  500 mcg Nebulization BID RT   XR ABD PORT  1 V    Result Date: 3/10/2022  1. Gastric tube terminates in the gastric body.       ______________________________________________________________________  EXPECTED LENGTH OF STAY: 5d 16h  ACTUAL LENGTH OF STAY:          12                 Victoriano Rosales MD

## 2022-03-10 NOTE — ANESTHESIA POSTPROCEDURE EVALUATION
Procedure(s):  DIAGNOSTIC LAPAROSCOPY , Laproscopic lysis of adhesions, laparoscopic hernia repair with mesh. (URGENT). general    Anesthesia Post Evaluation        Patient participation: complete - patient participated  Level of consciousness: awake  Pain management: adequate  Airway patency: patent  Anesthetic complications: no  Cardiovascular status: hemodynamically stable  Respiratory status: acceptable  Hydration status: acceptable  Comments: The patient is ready for PACU discharge. Mirna JENNIFER Grimes, DO                   Post anesthesia nausea and vomiting:  controlled      INITIAL Post-op Vital signs:   Vitals Value Taken Time   /94 03/10/22 1430   Temp 36.6 °C (97.8 °F) 03/10/22 1404   Pulse 110 03/10/22 1441   Resp 15 03/10/22 1441   SpO2 97 % 03/10/22 1441   Vitals shown include unvalidated device data.

## 2022-03-10 NOTE — ROUTINE PROCESS
TRANSFER - IN REPORT:    Verbal report received from Sukhi on Consuella Oz  being received from [facility](unit) for ordered procedure      Report consisted of patients Situation, Background, Assessment and   Recommendations(SBAR). Information from the following report(s) SBAR was reviewed with the receiving nurse. Opportunity for questions and clarification was provided. Assessment completed upon patients arrival to unit and care assumed.

## 2022-03-10 NOTE — BRIEF OP NOTE
Brief Postoperative Note    Patient: Raheem Cormier  YOB: 1965  MRN: [id number]    Date of Procedure: 3/10/2022     Pre-Op Diagnosis: PARTIAL SMALL BOWEL OBSTRUCTION    Post-Op Diagnosis: Same as preoperative diagnosis. Procedure(s):  DIAGNOSTIC LAPAROSCOPY , Laproscopic lysis of adhesions, laparoscopic incisional hernia repair with mesh. (URGENT)    Surgeon(s):  Aleida Burkett MD    Surgical Assistant: Surg Asst-1: Tamra Bell    Anesthesia: General     Estimated Blood Loss (mL): Minimal    Complications: None    Specimens: * No specimens in log *     Implants:   Implant Name Type Inv. Item Serial No.  Lot No. LRB No. Used Action   MESH SURG [id number] [employer] RECT PHASIX - SNA  MESH SURG [id number] [employer] NA BARD DAVOL_WD [id number] N/A 1 Implanted       Drains:   Drain 19 fr. Round channel full fluted drain 03/02/22 Right; Inferior Abdomen (Active)   Site Assessment Clean, dry, & intact 03/10/22 0435   Dressing Status Clean, dry, & intact 03/10/22 0435   Status Patent; Charged;Draining 03/10/22 0435   Drainage Description Serosanguinous 03/10/22 0435   Output (ml) 30 ml 03/09/22 2000       [REMOVED] Nasogastric Tube 02/26/22 (Removed)   Site Assessment Clean, dry, & intact 02/27/22 2030   Securement Device Adhesive-based novak 02/27/22 2030   G Port Status Clamped 02/27/22 2030   External Insertion Yogi (cms) 77 cms 02/27/22 2030   Action Taken Placement verified (comment) 02/27/22 2030   Drainage Description Green 02/27/22 7460   Intake (ml) 10 ml 02/26/22 1700   Output (ml) 100 ml 02/27/22 1819       [REMOVED] Fecal Management (Removed)       Findings: Small bowel adhesions to edge of mesocolon, pelvic sidewall, lysed. Area of evolving rectus muscle edge separation at extraction site without macho evisceration, reinforced with phasic's 7 cm x 10 cm mesh (covered).     Electronically Signed by Og Zuniga MD on 3/10/2022 at 1:43 PM

## 2022-03-10 NOTE — ROUTINE PROCESS
Bedside and Verbal shift change report given to Fish Jordan (oncoming nurse) by DARCI Morillo (offgoing nurse). Report included the following information SBAR, Kardex, Intake/Output, MAR and Recent Results.

## 2022-03-10 NOTE — PROGRESS NOTES
Assessed and educated patient on not pulling NGT; ~20 minutes and patient was tugging at NGT and confused at what the tube was, again reeducated and will pass on to staff to monitor him closely.  Dr. Judge Levy at bedside, did notify of  consistently and MEWs triggered, he stated it's postop pain tachycardia/ relation pt is stable and baseline tachycardic

## 2022-03-10 NOTE — ANESTHESIA PREPROCEDURE EVALUATION
Relevant Problems   RESPIRATORY SYSTEM   (+) SOB (shortness of breath)      CARDIOVASCULAR   (+) Essential hypertension      PERSONAL HX & FAMILY HX OF CANCER   (+) Lung cancer (HCC)       Anesthetic History   No history of anesthetic complications            Review of Systems / Medical History  Patient summary reviewed, nursing notes reviewed and pertinent labs reviewed    Pulmonary  Within defined limits  COPD: moderate               Neuro/Psych   Within defined limits      Psychiatric history     Cardiovascular  Within defined limits  Hypertension              Exercise tolerance: >4 METS  Comments: Last Echo:  Left Ventricle Left ventricle size is normal. Normal wall thickness. Normal wall motion. Normal left ventricular systolic function with a visually estimated EF of 55 - 60%. Normal diastolic function. Left Atrium:Left atrium size is normal.  Right Ventricle Right ventricle size is normal. Normal wall thickness. Normal systolic function. Right Atrium Right atrium size is normal.  Aortic Valve Valve structure is normal. No transvalvular regurgitation. No stenosis. Mitral Valve:Valve structure is normal. No transvalvular regurgitation. No stenosis noted. Tricuspid Valve:Valve structure is normal. Trace transvalvular regurgitation. No stenosis noted. Pulmonic Valve: The pulmonic valve was not well visualized. Aorta:Mildly dilated annulus. No pericardial effusion.        GI/Hepatic/Renal  Within defined limits              Endo/Other  Within defined limits           Other Findings              Physical Exam    Airway  Mallampati: II  TM Distance: 4 - 6 cm  Neck ROM: normal range of motion   Mouth opening: Normal     Cardiovascular  Regular rate and rhythm,  S1 and S2 normal,  no murmur, click, rub, or gallop  Rhythm: regular  Rate: normal         Dental    Dentition: Poor dentition     Pulmonary  Breath sounds clear to auscultation               Abdominal  GI exam deferred       Other Findings Anesthetic Plan    ASA: 3  Anesthesia type: general          Induction: Intravenous and RSI  Anesthetic plan and risks discussed with: Patient

## 2022-03-10 NOTE — PERIOP NOTES
TRANSFER - OUT REPORT:    Verbal report given to [facility]. RN on [employer]  being transferred to room 506 for routine post - op       Report consisted of patients Situation, Background, Assessment and   Recommendations(SBAR). Time Pre op antibiotic given:2 gram ancef  Anesthesia Stop time: 1332  Garcia Present on Transfer to floor:no  Order for Garcia on Chart:  Discharge Prescriptions with Chart:    Information from the following report(s) SBAR, OR Summary, Intake/Output and MAR was reviewed with the receiving nurse. Opportunity for questions and clarification was provided. Is the patient on 02? YES       L/Min 2       Other     Is the patient on a monitor? NO    Is the nurse transporting with the patient? NO    Surgical Waiting Area notified of patient's transfer from PACU? YES      The following personal items collected during your admission accompanied patient upon transfer:   Dental Appliance: Dental Appliances: Uppers,Partials,At bedside (at bedside)  Vision: Visual Aid: Glasses,With patient  Hearing Aid:    Jewelry: Jewelry: Watch  Clothing: Clothing: Socks,Shirt,Undergarments,Pants,With patient,Jacket/Coat  Other Valuables:  Other Valuables: Eyeglasses,With patient  Valuables sent to safe:

## 2022-03-10 NOTE — PROGRESS NOTES
Denies emesis overnight. Still with incisional pain. Not sure when the last time he passed gas or had bowel movement was. Serum sodium is improving. He has clinical and radiographic signs of partial small bowel obstruction 8 days following laparoscopic sigmoidectomy. Recommended diagnostic laparoscopy with lysis of adhesions as needed. Technical aspects of procedure reviewed along with risks, postoperative diet, expected results. He understands and desires to proceed. All questions answered. Remain n.p.o. except medications.

## 2022-03-11 ENCOUNTER — APPOINTMENT (OUTPATIENT)
Dept: GENERAL RADIOLOGY | Age: 57
DRG: 329 | End: 2022-03-11
Attending: HOSPITALIST
Payer: MEDICARE

## 2022-03-11 LAB
ANION GAP SERPL CALC-SCNC: 3 MMOL/L (ref 5–15)
BASOPHILS # BLD: 0.1 K/UL (ref 0–0.1)
BASOPHILS NFR BLD: 1 % (ref 0–1)
BUN SERPL-MCNC: 5 MG/DL (ref 6–20)
BUN/CREAT SERPL: 11 (ref 12–20)
CALCIUM SERPL-MCNC: 8.6 MG/DL (ref 8.5–10.1)
CHLORIDE SERPL-SCNC: 101 MMOL/L (ref 97–108)
CO2 SERPL-SCNC: 26 MMOL/L (ref 21–32)
CREAT SERPL-MCNC: 0.47 MG/DL (ref 0.7–1.3)
DIFFERENTIAL METHOD BLD: ABNORMAL
EOSINOPHIL # BLD: 0 K/UL (ref 0–0.4)
EOSINOPHIL NFR BLD: 0 % (ref 0–7)
ERYTHROCYTE [DISTWIDTH] IN BLOOD BY AUTOMATED COUNT: 12.1 % (ref 11.5–14.5)
GLUCOSE BLD STRIP.AUTO-MCNC: 100 MG/DL (ref 65–117)
GLUCOSE BLD STRIP.AUTO-MCNC: 101 MG/DL (ref 65–117)
GLUCOSE BLD STRIP.AUTO-MCNC: 107 MG/DL (ref 65–117)
GLUCOSE SERPL-MCNC: 127 MG/DL (ref 65–100)
HCT VFR BLD AUTO: 39.5 % (ref 36.6–50.3)
HGB BLD-MCNC: 13.8 G/DL (ref 12.1–17)
IMM GRANULOCYTES # BLD AUTO: 0.2 K/UL (ref 0–0.04)
IMM GRANULOCYTES NFR BLD AUTO: 2 % (ref 0–0.5)
LYMPHOCYTES # BLD: 1.2 K/UL (ref 0.8–3.5)
LYMPHOCYTES NFR BLD: 10 % (ref 12–49)
MCH RBC QN AUTO: 32.4 PG (ref 26–34)
MCHC RBC AUTO-ENTMCNC: 34.9 G/DL (ref 30–36.5)
MCV RBC AUTO: 92.7 FL (ref 80–99)
MONOCYTES # BLD: 1.1 K/UL (ref 0–1)
MONOCYTES NFR BLD: 8 % (ref 5–13)
NEUTS SEG # BLD: 10.2 K/UL (ref 1.8–8)
NEUTS SEG NFR BLD: 79 % (ref 32–75)
NRBC # BLD: 0 K/UL (ref 0–0.01)
NRBC BLD-RTO: 0 PER 100 WBC
PLATELET # BLD AUTO: 341 K/UL (ref 150–400)
PMV BLD AUTO: 9.2 FL (ref 8.9–12.9)
POTASSIUM SERPL-SCNC: 4.1 MMOL/L (ref 3.5–5.1)
RBC # BLD AUTO: 4.26 M/UL (ref 4.1–5.7)
SERVICE CMNT-IMP: NORMAL
SODIUM SERPL-SCNC: 130 MMOL/L (ref 136–145)
WBC # BLD AUTO: 12.8 K/UL (ref 4.1–11.1)

## 2022-03-11 PROCEDURE — C9113 INJ PANTOPRAZOLE SODIUM, VIA: HCPCS | Performed by: SURGERY

## 2022-03-11 PROCEDURE — 74011000250 HC RX REV CODE- 250: Performed by: SURGERY

## 2022-03-11 PROCEDURE — 74011250636 HC RX REV CODE- 250/636: Performed by: SURGERY

## 2022-03-11 PROCEDURE — 80048 BASIC METABOLIC PNL TOTAL CA: CPT

## 2022-03-11 PROCEDURE — 82962 GLUCOSE BLOOD TEST: CPT

## 2022-03-11 PROCEDURE — 74011250637 HC RX REV CODE- 250/637: Performed by: SURGERY

## 2022-03-11 PROCEDURE — 74011250637 HC RX REV CODE- 250/637: Performed by: HOSPITALIST

## 2022-03-11 PROCEDURE — 71045 X-RAY EXAM CHEST 1 VIEW: CPT

## 2022-03-11 PROCEDURE — 65270000032 HC RM SEMIPRIVATE

## 2022-03-11 PROCEDURE — 85025 COMPLETE CBC W/AUTO DIFF WBC: CPT

## 2022-03-11 PROCEDURE — 36415 COLL VENOUS BLD VENIPUNCTURE: CPT

## 2022-03-11 PROCEDURE — 74011000258 HC RX REV CODE- 258: Performed by: SURGERY

## 2022-03-11 PROCEDURE — 77030027138 HC INCENT SPIROMETER -A

## 2022-03-11 PROCEDURE — 74011000250 HC RX REV CODE- 250: Performed by: ANESTHESIOLOGY

## 2022-03-11 PROCEDURE — 74011250636 HC RX REV CODE- 250/636: Performed by: ANESTHESIOLOGY

## 2022-03-11 PROCEDURE — 94760 N-INVAS EAR/PLS OXIMETRY 1: CPT

## 2022-03-11 PROCEDURE — 94640 AIRWAY INHALATION TREATMENT: CPT

## 2022-03-11 RX ORDER — CARVEDILOL 12.5 MG/1
12.5 TABLET ORAL 2 TIMES DAILY WITH MEALS
Status: DISCONTINUED | OUTPATIENT
Start: 2022-03-11 | End: 2022-03-12

## 2022-03-11 RX ORDER — KETOROLAC TROMETHAMINE 30 MG/ML
15 INJECTION, SOLUTION INTRAMUSCULAR; INTRAVENOUS EVERY 6 HOURS
Status: COMPLETED | OUTPATIENT
Start: 2022-03-11 | End: 2022-03-12

## 2022-03-11 RX ADMIN — HYDROMORPHONE HYDROCHLORIDE 1 MG: 1 INJECTION, SOLUTION INTRAMUSCULAR; INTRAVENOUS; SUBCUTANEOUS at 03:57

## 2022-03-11 RX ADMIN — Medication 15 MG: at 20:00

## 2022-03-11 RX ADMIN — SODIUM CHLORIDE, PRESERVATIVE FREE 10 ML: 5 INJECTION INTRAVENOUS at 15:41

## 2022-03-11 RX ADMIN — Medication 15 MG: at 09:27

## 2022-03-11 RX ADMIN — CALCIUM GLUCONATE: 94 INJECTION, SOLUTION INTRAVENOUS at 18:38

## 2022-03-11 RX ADMIN — RISPERIDONE 2 MG: 1 TABLET, FILM COATED ORAL at 09:27

## 2022-03-11 RX ADMIN — BENZTROPINE MESYLATE 1 MG: 1 TABLET ORAL at 09:27

## 2022-03-11 RX ADMIN — BENZTROPINE MESYLATE 1 MG: 1 TABLET ORAL at 18:39

## 2022-03-11 RX ADMIN — METOCLOPRAMIDE 10 MG: 5 INJECTION, SOLUTION INTRAMUSCULAR; INTRAVENOUS at 06:54

## 2022-03-11 RX ADMIN — Medication 15 MG: at 15:40

## 2022-03-11 RX ADMIN — GABAPENTIN 600 MG: 600 TABLET, FILM COATED ORAL at 09:29

## 2022-03-11 RX ADMIN — GABAPENTIN 600 MG: 600 TABLET, FILM COATED ORAL at 21:11

## 2022-03-11 RX ADMIN — METOCLOPRAMIDE 10 MG: 5 INJECTION, SOLUTION INTRAMUSCULAR; INTRAVENOUS at 23:28

## 2022-03-11 RX ADMIN — Medication 80 MG: at 06:54

## 2022-03-11 RX ADMIN — OXYCODONE 5 MG: 5 TABLET ORAL at 06:54

## 2022-03-11 RX ADMIN — I.V. FAT EMULSION 500 ML: 20 EMULSION INTRAVENOUS at 18:38

## 2022-03-11 RX ADMIN — DULOXETINE HYDROCHLORIDE 30 MG: 30 CAPSULE, DELAYED RELEASE ORAL at 06:55

## 2022-03-11 RX ADMIN — ARFORMOTEROL TARTRATE 15 MCG: 15 SOLUTION RESPIRATORY (INHALATION) at 07:08

## 2022-03-11 RX ADMIN — ENOXAPARIN SODIUM 40 MG: 100 INJECTION SUBCUTANEOUS at 09:27

## 2022-03-11 RX ADMIN — HYDRALAZINE HYDROCHLORIDE 10 MG: 20 INJECTION INTRAMUSCULAR; INTRAVENOUS at 23:28

## 2022-03-11 RX ADMIN — BUDESONIDE 500 MCG: 0.5 INHALANT RESPIRATORY (INHALATION) at 07:08

## 2022-03-11 RX ADMIN — CARVEDILOL 12.5 MG: 12.5 TABLET, FILM COATED ORAL at 15:46

## 2022-03-11 RX ADMIN — Medication 80 MG: at 15:40

## 2022-03-11 RX ADMIN — ATORVASTATIN CALCIUM 20 MG: 20 TABLET, FILM COATED ORAL at 09:27

## 2022-03-11 RX ADMIN — METOCLOPRAMIDE 10 MG: 5 INJECTION, SOLUTION INTRAMUSCULAR; INTRAVENOUS at 12:24

## 2022-03-11 RX ADMIN — DULOXETINE 60 MG: 30 CAPSULE, DELAYED RELEASE ORAL at 21:11

## 2022-03-11 RX ADMIN — Medication 80 MG: at 12:24

## 2022-03-11 RX ADMIN — OXYCODONE 5 MG: 5 TABLET ORAL at 02:43

## 2022-03-11 RX ADMIN — PANTOPRAZOLE SODIUM 40 MG: 40 INJECTION, POWDER, FOR SOLUTION INTRAVENOUS at 09:27

## 2022-03-11 RX ADMIN — RISPERIDONE 2 MG: 1 TABLET, FILM COATED ORAL at 18:39

## 2022-03-11 RX ADMIN — HYDROMORPHONE HYDROCHLORIDE 1 MG: 1 INJECTION, SOLUTION INTRAMUSCULAR; INTRAVENOUS; SUBCUTANEOUS at 23:33

## 2022-03-11 RX ADMIN — SODIUM CHLORIDE, POTASSIUM CHLORIDE, SODIUM LACTATE AND CALCIUM CHLORIDE 125 ML/HR: 600; 310; 30; 20 INJECTION, SOLUTION INTRAVENOUS at 01:31

## 2022-03-11 RX ADMIN — Medication 10 ML: at 07:00

## 2022-03-11 RX ADMIN — METOCLOPRAMIDE 10 MG: 5 INJECTION, SOLUTION INTRAMUSCULAR; INTRAVENOUS at 18:39

## 2022-03-11 RX ADMIN — GABAPENTIN 600 MG: 600 TABLET, FILM COATED ORAL at 15:40

## 2022-03-11 RX ADMIN — SODIUM CHLORIDE, PRESERVATIVE FREE 10 ML: 5 INJECTION INTRAVENOUS at 12:25

## 2022-03-11 NOTE — PROGRESS NOTES
Patient's HR jumped up to 119 and /103 after coming back from the bathroom. His respirations are in the 20s. He appears under no distress and says he feels \"fine but a little out of breath. \" Paged Dr. Mcclendon Shows, who is restarting home Coreg now. Will CTM.

## 2022-03-11 NOTE — PROGRESS NOTES
[address] [facility] Adult  Hospitalist Group                                                                                          Hospitalist Progress Note  Kyle Fraser MD  Answering service: [phone] or [phone] from in house phone        Date of Service:  3/11/2022  NAME:  Sheree Roberson  :  1965  MRN:  [id number]      Admission Summary:   Treva Gomes is a 61 y.o. male  presented to the ED on  with constipation and was admitted for sigmoid volvulus. He underwent colonoscopy with decompression on  midnight  Patient underwent colonoscopic decompression on . He underwent laparoscopic sigmoid resection on 3/3    Interval history / Subjective:      NGT removed in the interval.  Still has some abdominal pain, no nausea or vomiting. No flatus. Started on clear liquid diet. Assessment & Plan:     Sigmoid volvulus POA status post laparoscopic sigmoid resection 22  -3/10: Underwent urgent laparoscopic lysis of adhesion and repair of incisional hernia with mesh  --Pain control  --N.p.o., continue TPN. Started on clears. NGT removed  --Further management per surgery    Chronic hyponatremia. -Urine NA less than 20  -Sodium stable in the low 130s  --Monitor. Hypokalemia/hypomagnesemia  -replete PRN    Hx of Paranoid schizophrenia. -does not voice si/hi/hallucinations    Hx of COPD without bronchospasm: As needed bronchodilators. Hypertension      Dyslipidemia. Tobacco use    Leukocytosis: Resolved. Hx of Left lung cancer, status post resection. Code status: full   Prophylaxis: lovenox  Care Plan discussed with: patient   Anticipated Disposition: The earliest Monday or Tuesday     Hospital Problems  Date Reviewed: 3/2/2022          Codes Class Noted POA    * (Principal) Sigmoid volvulus ([facility] [address] ICD-10-CM: K56.2  ICD-9-CM: 560.2  2022 Yes                Review of Systems:   Pertinent items are noted in HPI.        Vital Signs:    Last 24hrs VS reviewed since prior progress note. Most recent are:  Visit Vitals  BP (!) 168/91   Pulse (!) 102   Temp 98.4 °F (36.9 °C)   Resp 16   Ht 5' 7\" (1.702 m)   Wt 69.8 kg (153 lb 14.4 oz)   SpO2 98%   BMI 24.10 kg/m²         Intake/Output Summary (Last 24 hours) at 3/11/2022 1451  Last data filed at 3/11/2022 1010  Gross per 24 hour   Intake 100 ml   Output 3200 ml   Net -3100 ml        Physical Examination:     I had a face to face encounter with this patient and independently examined them on 3/11/2022 as outlined below:          Constitutional:  And oriented, not in distress   ENT:  No pallor or jaundice. Resp:  CTA bilaterally. Normal work of breathing   Chest:  No accessory muscle use   CV:  Regular rhythm, normal rate, no rubs    GI:  Laparoscopic wounds. Abdomen hypoactive, diffusely tender without rebound or guarding. Musculoskeletal:  No edema, warm, 2+ pulses throughout    Neurologic:  Moves all extremities, awake, alert  Skin: no jaundice or cyanosis  Psych: does not voice si/hi/hallucinations, not agitated            Data Review:       Labs:     Recent Labs     03/11/22  0132 03/10/22  0435   WBC 12.8* 10.3   HGB 13.8 14.1   HCT 39.5 41.0    358     Recent Labs     03/11/22 0132 03/10/22  0435 03/09/22  0323   * 130* 126*   K 4.1 3.8 3.6    101 98   CO2 26 24 23   BUN 5* 4* 4*   CREA 0.47* 0.48* 0.50*   * 106* 108*   CA 8.6 8.3* 8.3*   MG  --  2.1 1.8   PHOS  --  3.9 3.2     No results for input(s): ALT, AP, TBIL, TBILI, TP, ALB, GLOB, GGT, AML, LPSE in the last 72 hours.     No lab exists for component: SGOT, GPT, AMYP, HLPSE    Medications Reviewed:     Current Facility-Administered Medications   Medication Dose Route Frequency    TPN ADULT - CENTRAL   IntraVENous CONTINUOUS    ketorolac (TORADOL) injection 15 mg  15 mg IntraVENous Q6H    sodium chloride (NS) flush 5-40 mL  5-40 mL IntraVENous Q8H    sodium chloride (NS) flush 5-40 mL  5-40 mL IntraVENous PRN    lidocaine (PF) (XYLOCAINE) 10 mg/mL (1 %) injection 0.1 mL  0.1 mL SubCUTAneous PRN    fentaNYL citrate (PF) injection 50 mcg  50 mcg IntraVENous PRN    midazolam (VERSED) injection 1 mg  1 mg IntraVENous PRN    sodium chloride (NS) flush 5-40 mL  5-40 mL IntraVENous Q8H    sodium chloride (NS) flush 5-40 mL  5-40 mL IntraVENous PRN    oxyCODONE-acetaminophen (PERCOCET) 5-325 mg per tablet 1 Tablet  1 Tablet Oral PRN    morphine injection 2 mg  2 mg IntraVENous Multiple    ondansetron (ZOFRAN) injection 4 mg  4 mg IntraVENous PRN    midazolam (VERSED) injection 0.5 mg  0.5 mg IntraVENous Q5MIN PRN    TPN ADULT - CENTRAL   IntraVENous CONTINUOUS    enoxaparin (LOVENOX) injection 40 mg  40 mg SubCUTAneous DAILY    fat emulsion 20% (LIPOSYN, INTRAlipid) infusion 500 mL  500 mL IntraVENous Q MON, WED & FRI    metoclopramide HCl (REGLAN) injection 10 mg  10 mg IntraVENous Q6H    naloxone (NARCAN) injection 0.4 mg  0.4 mg IntraVENous EVERY 2 MINUTES AS NEEDED    simethicone (MYLICON) tablet 80 mg  80 mg Oral TIDAC    atorvastatin (LIPITOR) tablet 20 mg  20 mg Oral DAILY    sodium chloride (NS) flush 5-40 mL  5-40 mL IntraVENous Q8H    sodium chloride (NS) flush 5-40 mL  5-40 mL IntraVENous PRN    ondansetron (ZOFRAN ODT) tablet 4 mg  4 mg Oral Q8H PRN    Or    ondansetron (ZOFRAN) injection 4 mg  4 mg IntraVENous Q6H PRN    oxyCODONE IR (ROXICODONE) tablet 5 mg  5 mg Oral Q4H PRN    HYDROmorphone (DILAUDID) injection 1 mg  1 mg IntraVENous Q3H PRN    butalbital-acetaminophen-caffeine (FIORICET, ESGIC) -40 mg per tablet 1 Tablet  1 Tablet Oral Q4H PRN    sodium chloride (NS) flush 5-40 mL  5-40 mL IntraVENous Q8H    sodium chloride (NS) flush 5-40 mL  5-40 mL IntraVENous PRN    albuterol-ipratropium (DUO-NEB) 2.5 MG-0.5 MG/3 ML  3 mL Nebulization Q4H PRN    sodium chloride (NS) flush 5-40 mL  5-40 mL IntraVENous Q8H    sodium chloride (NS) flush 5-40 mL  5-40 mL IntraVENous PRN    acetaminophen (TYLENOL) tablet 650 mg  650 mg Oral Q6H PRN    Or    acetaminophen (TYLENOL) suppository 650 mg  650 mg Rectal Q6H PRN    ondansetron (ZOFRAN ODT) tablet 4 mg  4 mg Oral Q8H PRN    Or    ondansetron (ZOFRAN) injection 4 mg  4 mg IntraVENous Q6H PRN    nicotine (NICODERM CQ) 21 mg/24 hr patch 1 Patch  1 Patch TransDERmal DAILY    hydrALAZINE (APRESOLINE) 20 mg/mL injection 10 mg  10 mg IntraVENous Q6H PRN    pantoprazole (PROTONIX) injection 40 mg  40 mg IntraVENous DAILY    And    sodium chloride (NS) flush 10 mL  10 mL IntraVENous DAILY    benztropine (COGENTIN) tablet 1 mg  1 mg Oral BID    clonazePAM (KlonoPIN) tablet 1 mg  1 mg Oral BID PRN    DULoxetine (CYMBALTA) capsule 30 mg  30 mg Oral 7am    DULoxetine (CYMBALTA) capsule 60 mg  60 mg Oral QHS    gabapentin (NEURONTIN) tablet 600 mg  600 mg Oral TID    risperiDONE (RisperDAL) tablet 2 mg  2 mg Oral BID    albuterol-ipratropium (DUO-NEB) 2.5 MG-0.5 MG/3 ML  3 mL Nebulization Q6H PRN    arformoteroL (BROVANA) neb solution 15 mcg  15 mcg Nebulization BID RT    And    budesonide (PULMICORT) 500 mcg/2 ml nebulizer suspension  500 mcg Nebulization BID RT   No results found.   ______________________________________________________________________  EXPECTED LENGTH OF STAY: 5d 16h  ACTUAL LENGTH OF STAY:          13                 Markel Faith MD

## 2022-03-11 NOTE — PROGRESS NOTES
Progress Note    Patient: Wolfgang Silva MRN: [id number]  SSN: [id number]    YOB: 1965  Age: 61 y.o. Sex: male      Admit Date: 2022    1 Day Post-Op    Procedure:  Procedure(s):  DIAGNOSTIC LAPAROSCOPY , Laproscopic lysis of adhesions, laparoscopic hernia repair with mesh. Subjective:     Patient complains of incisional pain. No flatus. He has not yet been out of bed since surgery. Objective:     Visit Vitals  BP (!) 168/91   Pulse (!) 102   Temp 98.4 °F (36.9 °C)   Resp 16   Ht 5' 7\" (1.702 m)   Wt 164 lb 7.4 oz (74.6 kg)   SpO2 98%   BMI 25.76 kg/m²       Temp (24hrs), Av.8 °F (36.6 °C), Min:97.3 °F (36.3 °C), Max:98.4 °F (36.9 °C)    Date 03/10/22 07 - 22 0659 22 07 - 22 0659   Shift 9274-4622 5644-6810 24 Hour Total 1743-7731 2504-5577 24 Hour Total   INTAKE   P.O. 50  50        P. O. 50  50      I. V.(mL/kg/hr) 520(0.6)  520(0.3)        I.V. 100  100        Volume (lactated Ringers infusion) 400  400        Volume (ceFAZolin (ANCEF) 2 g in sterile water (preservative free) 20 mL IV syringe) 20  20      Shift Total(mL/kg) 570(7.6)  570(7.6)      OUTPUT   Urine(mL/kg/hr) 1450(1.6) 1200(1.3) 2650(1.5)        Urine Voided 1450 1200 2650      Emesis/NG output         Output (ml) (Nasogastric Tube 03/10/22)       Blood 25  25        Estimated Blood Loss 25  25      Shift Total(mL/kg) 1875(25.1) 1800(24.1) 4815(10.1)      NET -1305 1800 -5225      Weight (kg) 74.6 74.6 74.6 74.6 74.6 74.6       Physical Exam:    ABDOMEN: Nondistended, soft. Wounds dry and intact. Faint cellulitis at umbilical extraction site. Appropriate incisional pain with palpation.     Data Review: Vital signs, ins/outs, labs    Lab Review:   Recent Results (from the past 24 hour(s))   GLUCOSE, POC    Collection Time: 03/10/22  2:11 PM   Result Value Ref Range    Glucose (POC) 141 (H) 65 - 117 mg/dL    Performed by ALYSON Davidson 47, POC    Collection Time: 03/10/22 4:13 PM   Result Value Ref Range    Glucose (POC) 122 (H) 65 - 117 mg/dL    Performed by Malisas Villalobos  PCT    GLUCOSE, POC    Collection Time: 03/10/22  9:28 PM   Result Value Ref Range    Glucose (POC) 125 (H) 65 - 117 mg/dL    Performed by Taty 99, BASIC    Collection Time: 03/11/22  1:32 AM   Result Value Ref Range    Sodium 130 (L) 136 - 145 mmol/L    Potassium 4.1 3.5 - 5.1 mmol/L    Chloride 101 97 - 108 mmol/L    CO2 26 21 - 32 mmol/L    Anion gap 3 (L) 5 - 15 mmol/L    Glucose 127 (H) 65 - 100 mg/dL    BUN 5 (L) 6 - 20 MG/DL    Creatinine 0.47 (L) 0.70 - 1.30 MG/DL    BUN/Creatinine ratio 11 (L) 12 - 20      GFR est AA >60 >60 ml/min/1.73m2    GFR est non-AA >60 >60 ml/min/1.73m2    Calcium 8.6 8.5 - 10.1 MG/DL   CBC WITH AUTOMATED DIFF    Collection Time: 03/11/22  1:32 AM   Result Value Ref Range    WBC 12.8 (H) 4.1 - 11.1 K/uL    RBC 4.26 4.10 - 5.70 M/uL    HGB 13.8 12.1 - 17.0 g/dL    HCT 39.5 36.6 - 50.3 %    MCV 92.7 80.0 - 99.0 FL    MCH 32.4 26.0 - 34.0 PG    MCHC 34.9 30.0 - 36.5 g/dL    RDW 12.1 11.5 - 14.5 %    PLATELET 003 779 - 655 K/uL    MPV 9.2 8.9 - 12.9 FL    NRBC 0.0 0  WBC    ABSOLUTE NRBC 0.00 0.00 - 0.01 K/uL    NEUTROPHILS 79 (H) 32 - 75 %    LYMPHOCYTES 10 (L) 12 - 49 %    MONOCYTES 8 5 - 13 %    EOSINOPHILS 0 0 - 7 %    BASOPHILS 1 0 - 1 %    IMMATURE GRANULOCYTES 2 (H) 0.0 - 0.5 %    ABS. NEUTROPHILS 10.2 (H) 1.8 - 8.0 K/UL    ABS. LYMPHOCYTES 1.2 0.8 - 3.5 K/UL    ABS. MONOCYTES 1.1 (H) 0.0 - 1.0 K/UL    ABS. EOSINOPHILS 0.0 0.0 - 0.4 K/UL    ABS. BASOPHILS 0.1 0.0 - 0.1 K/UL    ABS. IMM.  GRANS. 0.2 (H) 0.00 - 0.04 K/UL    DF AUTOMATED     GLUCOSE, POC    Collection Time: 03/11/22  6:48 AM   Result Value Ref Range    Glucose (POC) 107 65 - 117 mg/dL    Performed by Riky Cross   PCT          Assessment:     Hospital Problems  Date Reviewed: 3/2/2022          Codes Class Noted POA    * (Principal) Sigmoid volvulus ([facility] [address] ICD-10-CM: K56.2  ICD-9-CM: 560.2  2/26/2022 Yes            Awaiting return of bowel function. Plan/Recommendations/Medical Decision Making:     Remove NG tube. Start sips of clear liquids. Continue TPN. Add Toradol for pain management. Out of bed, ambulation. Spirometry, DVT prophylaxis. Resume chronic medications.

## 2022-03-11 NOTE — PROGRESS NOTES
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 On TPN, remains NPO. On IVF post op. S/p AMRIT yesterday. NGT out, plans noted for clears today. Denies cp, sob, n/v/d    Current Medications: all current  Medications have been eviewed in EPIC  Review of Systems: Pertinent items are noted in HPI. Objective:  Vitals:    Vitals:    03/10/22 2348 03/11/22 0410 03/11/22 0709 03/11/22 0733   BP: (!) 169/95 (!) 164/93  (!) 168/91   Pulse: (!) 105 (!) 107  (!) 102   Resp: 18 18  16   Temp: 97.8 °F (36.6 °C) 97.6 °F (36.4 °C)  98.4 °F (36.9 °C)   SpO2: 96% 98% 96% 98%   Weight:       Height:         Intake and Output:  No intake/output data recorded. 03/09 1901 - 03/11 0700  In: 822 [P.O.:50; I.V.:772]  Out: 8142 [Urine:3150; Drains:30]    Physical Examination:    General: NAD,Conversant   Neck:  Supple, no mass  Resp:  Lungs CTA B/L, no wheezing , normal respiratory effort  CV:  RRR,  no murmur or rub, no LE edema  GI:  Soft, NT, + Bowel sounds, no hepatosplenomegaly  Neurologic:  Non focal  Psych:             AAO x 3 appropriate affect   Skin:  No Rash  :  No rees    []    High complexity decision making was performed  []    Patient is at high-risk of decompensation with multiple organ involvement    Lab Data Personally Reviewed: I have reviewed all the pertinent labs, microbiology data and radiology studies during assessment.     Recent Labs 03/11/22  0132 03/10/22  0435 03/09/22  0323   * 130* 126*   K 4.1 3.8 3.6    101 98   CO2 26 24 23   * 106* 108*   BUN 5* 4* 4*   CREA 0.47* 0.48* 0.50*   CA 8.6 8.3* 8.3*   MG  --  2.1 1.8   PHOS  --  3.9 3.2     Recent Labs     03/11/22  0132 03/10/22  0435   WBC 12.8* 10.3   HGB 13.8 14.1   HCT 39.5 41.0    358     No results found for: SDES  Lab Results   Component Value Date/Time    Culture result: NO GROWTH 5 DAYS 03/07/2017 08:09 AM    Culture result: NORMAL RESPIRATORY ALFRED/NO BETA STREP ISOLATED 03/07/2017 08:09 AM     Recent Results (from the past 24 hour(s))   GLUCOSE, POC    Collection Time: 03/10/22  2:11 PM   Result Value Ref Range    Glucose (POC) 141 (H) 65 - 117 mg/dL    Performed by ALYSON Davidson 47, POC    Collection Time: 03/10/22  4:13 PM   Result Value Ref Range    Glucose (POC) 122 (H) 65 - 117 mg/dL    Performed by Connor Garibay  PCT    GLUCOSE, POC    Collection Time: 03/10/22  9:28 PM   Result Value Ref Range    Glucose (POC) 125 (H) 65 - 117 mg/dL    Performed by Taty 99, BASIC    Collection Time: 03/11/22  1:32 AM   Result Value Ref Range    Sodium 130 (L) 136 - 145 mmol/L    Potassium 4.1 3.5 - 5.1 mmol/L    Chloride 101 97 - 108 mmol/L    CO2 26 21 - 32 mmol/L    Anion gap 3 (L) 5 - 15 mmol/L    Glucose 127 (H) 65 - 100 mg/dL    BUN 5 (L) 6 - 20 MG/DL    Creatinine 0.47 (L) 0.70 - 1.30 MG/DL    BUN/Creatinine ratio 11 (L) 12 - 20      GFR est AA >60 >60 ml/min/1.73m2    GFR est non-AA >60 >60 ml/min/1.73m2    Calcium 8.6 8.5 - 10.1 MG/DL   CBC WITH AUTOMATED DIFF    Collection Time: 03/11/22  1:32 AM   Result Value Ref Range    WBC 12.8 (H) 4.1 - 11.1 K/uL    RBC 4.26 4.10 - 5.70 M/uL    HGB 13.8 12.1 - 17.0 g/dL    HCT 39.5 36.6 - 50.3 %    MCV 92.7 80.0 - 99.0 FL    MCH 32.4 26.0 - 34.0 PG    MCHC 34.9 30.0 - 36.5 g/dL    RDW 12.1 11.5 - 14.5 %    PLATELET 307 618 - 486 K/uL    MPV 9.2 8.9 - 12.9 FL    NRBC 0.0 0  WBC ABSOLUTE NRBC 0.00 0.00 - 0.01 K/uL    NEUTROPHILS 79 (H) 32 - 75 %    LYMPHOCYTES 10 (L) 12 - 49 %    MONOCYTES 8 5 - 13 %    EOSINOPHILS 0 0 - 7 %    BASOPHILS 1 0 - 1 %    IMMATURE GRANULOCYTES 2 (H) 0.0 - 0.5 %    ABS. NEUTROPHILS 10.2 (H) 1.8 - 8.0 K/UL    ABS. LYMPHOCYTES 1.2 0.8 - 3.5 K/UL    ABS. MONOCYTES 1.1 (H) 0.0 - 1.0 K/UL    ABS. EOSINOPHILS 0.0 0.0 - 0.4 K/UL    ABS. BASOPHILS 0.1 0.0 - 0.1 K/UL    ABS. IMM. GRANS. 0.2 (H) 0.00 - 0.04 K/UL    DF AUTOMATED     GLUCOSE, POC    Collection Time: 03/11/22  6:48 AM   Result Value Ref Range    Glucose (POC) 107 65 - 117 mg/dL    Performed by Karuna Seo   [facility]                Steven Monge MD  [address]  Phone - [phone]   Fax - [phone]  [email or web address]

## 2022-03-11 NOTE — PROGRESS NOTES
TRANSITIONS OF CARE:  RUR;14%  DISPOSITION;  Patient will return to his group home when medically stable. He is post op day one of abdominal surgery. CRM continues to follow patient for transitions of care needs. Hospitalist is following for medical needs and patient has restarted his home [facility].

## 2022-03-11 NOTE — ROUTINE PROCESS
Bedside and Verbal shift change report given to Emilio Wang RN (oncoming nurse) by DARCI Morillo (offgoing nurse). Report included the following information SBAR, Kardex, Intake/Output, MAR and Recent Results.

## 2022-03-11 NOTE — OP NOTES
[facility]  OPERATIVE REPORT    Name:  Chris Trejo  MR#:  [id number]  :  1965  ACCOUNT #:  [de-identified]  DATE OF SERVICE:  03/10/2022    PREOPERATIVE DIAGNOSIS:  Small-bowel obstruction. POSTOPERATIVE DIAGNOSES:  1.  Small-bowel obstruction. 2.  Evolving fascial dehiscence. PROCEDURES PERFORMED:  1. Laparoscopic lysis of adhesions (CPT 79642). 2.  Laparoscopic repair of incisional hernia with mesh (CPT 48227). SURGEON:  Nhung Wagner MD    ASSISTANTAbdoul Carr SA    ANESTHESIA:  General endotracheal.    COMPLICATIONS:  None. SPECIMENS REMOVED:  None. IMPLANTS:  7 cm x 10 cm covered Phasix mesh. ESTIMATED BLOOD LOSS:  20 mL. DRAINS:  None. COUNTS:  Sponge count correct. Needle count correct. INDICATIONS:  The patient is a 18-year-old white male with multiple psychiatric issues, admitted nearly 2 weeks ago with recurrent sigmoid volvulus. He underwent endoscopic decompression and bowel prep, and on 2022, he underwent laparoscopic sigmoidectomy. The initial postoperative course was uncomplicated, but over the weekend, he developed intermittent nausea and vomiting with bloating. CT scan was concerning for partial small-bowel obstruction. As the symptoms continued, he is taken to the operating room today for diagnostic laparoscopy. FINDINGS:  1. Inflammatory adhesions between small bowel and cut edge of mesocolon proximal to the ileorectal anastomosis, lysed. 2.  Loop of ileum, adherent to the area of evolving fascial dehiscence, freed from the abdominal wall, with reinforcement of prior closure with 7 cm x 10 cm covered Phasix mesh. PROCEDURE:  The patient was identified as the correct patient in the preoperative holding area and informed consent was confirmed. After answering the patient's remaining questions, he was taken to the operating room and placed on the operating room table in the supine position.   Sequential compression devices were placed on both lower extremities. Following the uneventful initiation of general anesthesia, he was carefully secured to the operating room table with safety strap in place. All potential pressure points were padded with eggcrate. His arms were tucked to his sides. His abdomen was prepped and draped in usual sterile fashion. Final time-out was performed, and it was confirmed he had received intravenous antibiotics. A 5-mm trocar was inserted through a small right-sided skin incision using an Optiview technique. After confirming intraperitoneal location of the trocar tip, insufflation with carbon dioxide gas was initiated. Once adequate working sites had been developed, the 5-mm, 30-degree laparoscope was inserted. No signs of trocar injury were present. Dilated loops of bowel were present in the abdominal space. A single loop of bowel was adherent to the area of extraction at the periumbilical area. Two additional trocars were inserted through small right-sided skin incisions, using visual guidance with the laparoscope, the more superior being placed through the drain site, which was removed intraoperatively. The terminal ileum was identified, and the bowel was run in retrograde fashion, freeing loops of bowel from the cut edge of the mesocolon proximal to the coloproctostomy. Additional loops of adhesions between the antimesenteric border of the bowel and root of the mesentery were identified and managed using identical technique. As the bowel was run further proximal, a loop of bowel adherent to the extraction site in the area of the abdominal wall was re-identified. It was carefully freed from the abdominal wall using blunt and sharp dissection. This exposed an evolving fascial dehiscence, with a 1.5-cm gap between the lower aspect of the fascial closure. Given these dimensions, it would be unlikely that a repeat fascial closure would be successful.   Therefore, a 7-cm x 10-cm segment of Phasix covered mesh was opened on the back table. 0 PDS sutures were placed at the superior and inferior aspects of the mesh, and the mesh was introduced into the abdominal space. It was centered over the area of evolving fascial dehiscence, and the sutures were passed through the abdominal wall through small stab incisions using a laparoscopic suture passer. The sutures were tied down, and the mesh was further secured to the abdominal wall with multiple absorbable tacks. After confirming satisfactory mesh closure of the dehiscence and the absence of visceral injury, the 12-mm defect was closed with a 0 Vicryl suture using a laparoscopic suture passer. Pneumoperitoneum was released, and all trocars were removed. All wounds were infiltrated with 0.5% Marcaine without epinephrine. All skin edges were reapproximated with a combination of subcuticular 4-0 Monocryl suture and Dermabond. The patient tolerated the procedure well. He was extubated in the operating room and transported to the recovery area in stable condition. The attending surgeon, Dr. My De La Torre, was scrubbed and present for the entire procedure.       Aman Bryant MD      [email or web address]  D:  03/10/2022 21:11  T:  03/11/2022 1:40  JOB #:  [id number]

## 2022-03-11 NOTE — PROGRESS NOTES
Comprehensive Nutrition Assessment    Type and Reason for Visit: Reassess    Nutrition Recommendations/Plan:      1. Continue with goal rate of TPN:   D15, 5%AA at 63 ml/hr + 500 ml 20% lipids at 42 ml/hr x 12h 3x/week    2. Advance diet to Low Fiber as pt tolerates. Nutrition Assessment:    62 yo male admitted for sigmoid volvulus. PMhx: lung CA s/p lobectomy, COPD, HTN, dyslipidemia, cardiomyopathy, paranoid schizophrenia. Weight hx in EMR indicates weight loss of 14% over last 15 months - not significant but noted. KUB on admission showed sigmoid volvulus and SBO. S/P decompression with rectal tube in ED. Diet advanced and pt ate but then developed n/v. Diet regressed to sips of clears and MD ordered for PN to begin today. PPN was running for several days and then PICC was placed 3/9 and TPN started. Recommend goal rate of TPN as D15, 5%AA at 63 ml/hr + 500 ml 20% lipids at 42 ml/hr x 12h 3x/week to provide 1512 ml, 1505 kcals (88%), 226 gm CHO, 76 gm protein (94%). Pt continued to have intermittent N/V. Repeat CT's of ABD showed partial SBO. S/P lysis of adhesions, laparoscopic hernia repair with mesh on 3/10. Diet advanced to clear liquids today. No further n/v. No flatus or BM since 3/7.         Malnutrition Assessment:  Malnutrition Status: At risk for malnutrition (specify) (NPO; s/p sigmoidectomy; PPN)      Nutritionally Significant Medications: Reglan    Estimated Daily Nutrient Needs:  Energy (kcal): 8455-7576 (25-30 kcals/kg); Weight Used for Energy Requirements: Current  Protein (g): 81-95 (1.2-1.4 gm/kg);  Weight Used for Protein Requirements: Current  Fluid (ml/day): 1700; Method Used for Fluid Requirements: 1 ml/kcal    Nutrition Related Findings:       BM: 3/7  Edema: none  Wounds:  Surgical incision       Current Nutrition Therapies:   Diet: Clear liquids  Supplements: none  Additional Caloric Sources: TPN    Meal intake: Patient Vitals for the past 168 hrs:   % Diet Eaten   03/06/22 1517 76 - 100%   03/06/22 0940 0%   03/04/22 1537 26 - 50%   03/04/22 1103 0%     Supplement Intake: No data found. Anthropometric Measures:  · Height:  5' 7\" (170.2 cm)  · Current Body Wt:  74.6 kg (164 lb 7.4 oz)   · Admission Body Wt:       · Usual Body Wt:        · Ideal Body Wt:  148:  111.1 %   · Adjusted Body Weight:   ; Weight Adjustment for: No adjustment   · Adjusted BMI:       · BMI Categories:  Overweight (BMI 25.0-29. 9)     Wt Readings from Last 10 Encounters:   03/10/22 74.6 kg (164 lb 7.4 oz)   12/30/21 68 kg (150 lb)   12/30/20 79 kg (174 lb 3.2 oz)   12/18/20 77.6 kg (171 lb)   12/08/20 77.6 kg (171 lb 1.2 oz)   12/01/20 73.9 kg (163 lb)   11/20/20 74.3 kg (163 lb 12.8 oz)   11/06/20 77.1 kg (170 lb)   10/26/20 79.8 kg (176 lb)   10/12/20 79.8 kg (176 lb)       Nutrition Diagnosis:   · Inadequate oral intake related to inadequate protein-energy intake,altered GI function as evidenced by NPO or clear liquid status due to medical condition,GI abnormality      Nutrition Interventions:   Food and/or Nutrient Delivery: Continue current diet,Modify parenteral nutrition  Nutrition Education and Counseling: No recommendations at this time  Coordination of Nutrition Care: Continue to monitor while inpatient    Goals:  Meet > 75% EEN's with PO intakes + PN within next 5-7 days       Nutrition Monitoring and Evaluation:   Behavioral-Environmental Outcomes: None identified  Food/Nutrient Intake Outcomes: Diet advancement/tolerance,Food and nutrient intake,Parenteral nutrition intake/tolerance  Physical Signs/Symptoms Outcomes: Biochemical data,GI status,Weight    Discharge Planning:     Too soon to determine     Deena Mackenzie RD  Contact via [employer]

## 2022-03-12 ENCOUNTER — APPOINTMENT (OUTPATIENT)
Dept: CT IMAGING | Age: 57
DRG: 329 | End: 2022-03-12
Attending: SURGERY
Payer: MEDICARE

## 2022-03-12 LAB
ALBUMIN SERPL-MCNC: 2.7 G/DL (ref 3.5–5)
ANION GAP SERPL CALC-SCNC: 6 MMOL/L (ref 5–15)
ANION GAP SERPL CALC-SCNC: 7 MMOL/L (ref 5–15)
BASOPHILS # BLD: 0 K/UL (ref 0–0.1)
BASOPHILS NFR BLD: 0 % (ref 0–1)
BUN SERPL-MCNC: 6 MG/DL (ref 6–20)
BUN SERPL-MCNC: 6 MG/DL (ref 6–20)
BUN/CREAT SERPL: 12 (ref 12–20)
BUN/CREAT SERPL: 13 (ref 12–20)
CALCIUM SERPL-MCNC: 7.8 MG/DL (ref 8.5–10.1)
CALCIUM SERPL-MCNC: 8.5 MG/DL (ref 8.5–10.1)
CHLORIDE SERPL-SCNC: 100 MMOL/L (ref 97–108)
CHLORIDE SERPL-SCNC: 96 MMOL/L (ref 97–108)
CO2 SERPL-SCNC: 23 MMOL/L (ref 21–32)
CO2 SERPL-SCNC: 25 MMOL/L (ref 21–32)
CREAT SERPL-MCNC: 0.48 MG/DL (ref 0.7–1.3)
CREAT SERPL-MCNC: 0.5 MG/DL (ref 0.7–1.3)
DIFFERENTIAL METHOD BLD: ABNORMAL
EOSINOPHIL # BLD: 0.4 K/UL (ref 0–0.4)
EOSINOPHIL NFR BLD: 2 % (ref 0–7)
ERYTHROCYTE [DISTWIDTH] IN BLOOD BY AUTOMATED COUNT: 12 % (ref 11.5–14.5)
GLUCOSE BLD STRIP.AUTO-MCNC: 103 MG/DL (ref 65–117)
GLUCOSE BLD STRIP.AUTO-MCNC: 119 MG/DL (ref 65–117)
GLUCOSE BLD STRIP.AUTO-MCNC: 89 MG/DL (ref 65–117)
GLUCOSE BLD STRIP.AUTO-MCNC: 95 MG/DL (ref 65–117)
GLUCOSE SERPL-MCNC: 103 MG/DL (ref 65–100)
GLUCOSE SERPL-MCNC: 95 MG/DL (ref 65–100)
HCT VFR BLD AUTO: 40.2 % (ref 36.6–50.3)
HGB BLD-MCNC: 14.2 G/DL (ref 12.1–17)
IMM GRANULOCYTES # BLD AUTO: 0 K/UL
IMM GRANULOCYTES NFR BLD AUTO: 0 %
LYMPHOCYTES # BLD: 2 K/UL (ref 0.8–3.5)
LYMPHOCYTES NFR BLD: 11 % (ref 12–49)
MAGNESIUM SERPL-MCNC: 1.8 MG/DL (ref 1.6–2.4)
MCH RBC QN AUTO: 32.4 PG (ref 26–34)
MCHC RBC AUTO-ENTMCNC: 35.3 G/DL (ref 30–36.5)
MCV RBC AUTO: 91.8 FL (ref 80–99)
MONOCYTES # BLD: 1.1 K/UL (ref 0–1)
MONOCYTES NFR BLD: 6 % (ref 5–13)
NEUTS SEG # BLD: 14.6 K/UL (ref 1.8–8)
NEUTS SEG NFR BLD: 81 % (ref 32–75)
NRBC # BLD: 0 K/UL (ref 0–0.01)
NRBC BLD-RTO: 0 PER 100 WBC
PHOSPHATE SERPL-MCNC: 3.4 MG/DL (ref 2.6–4.7)
PHOSPHATE SERPL-MCNC: 3.9 MG/DL (ref 2.6–4.7)
PLATELET # BLD AUTO: 364 K/UL (ref 150–400)
PMV BLD AUTO: 9.2 FL (ref 8.9–12.9)
POTASSIUM SERPL-SCNC: 3.5 MMOL/L (ref 3.5–5.1)
POTASSIUM SERPL-SCNC: 3.6 MMOL/L (ref 3.5–5.1)
RBC # BLD AUTO: 4.38 M/UL (ref 4.1–5.7)
RBC MORPH BLD: ABNORMAL
SERVICE CMNT-IMP: ABNORMAL
SERVICE CMNT-IMP: NORMAL
SODIUM SERPL-SCNC: 128 MMOL/L (ref 136–145)
SODIUM SERPL-SCNC: 129 MMOL/L (ref 136–145)
WBC # BLD AUTO: 18.1 K/UL (ref 4.1–11.1)
WBC MORPH BLD: ABNORMAL

## 2022-03-12 PROCEDURE — 74011250636 HC RX REV CODE- 250/636: Performed by: SURGERY

## 2022-03-12 PROCEDURE — 74011000250 HC RX REV CODE- 250: Performed by: HOSPITALIST

## 2022-03-12 PROCEDURE — 74011250636 HC RX REV CODE- 250/636: Performed by: HOSPITALIST

## 2022-03-12 PROCEDURE — 74011000636 HC RX REV CODE- 636: Performed by: STUDENT IN AN ORGANIZED HEALTH CARE EDUCATION/TRAINING PROGRAM

## 2022-03-12 PROCEDURE — 74011000258 HC RX REV CODE- 258: Performed by: HOSPITALIST

## 2022-03-12 PROCEDURE — 74011000250 HC RX REV CODE- 250: Performed by: SURGERY

## 2022-03-12 PROCEDURE — 65270000032 HC RM SEMIPRIVATE

## 2022-03-12 PROCEDURE — 36415 COLL VENOUS BLD VENIPUNCTURE: CPT

## 2022-03-12 PROCEDURE — 74011250637 HC RX REV CODE- 250/637: Performed by: ANESTHESIOLOGY

## 2022-03-12 PROCEDURE — 74011250637 HC RX REV CODE- 250/637: Performed by: SURGERY

## 2022-03-12 PROCEDURE — 80069 RENAL FUNCTION PANEL: CPT

## 2022-03-12 PROCEDURE — 99024 POSTOP FOLLOW-UP VISIT: CPT | Performed by: SURGERY

## 2022-03-12 PROCEDURE — 85025 COMPLETE CBC W/AUTO DIFF WBC: CPT

## 2022-03-12 PROCEDURE — 74177 CT ABD & PELVIS W/CONTRAST: CPT

## 2022-03-12 PROCEDURE — 74011000258 HC RX REV CODE- 258: Performed by: SURGERY

## 2022-03-12 PROCEDURE — C9113 INJ PANTOPRAZOLE SODIUM, VIA: HCPCS | Performed by: SURGERY

## 2022-03-12 PROCEDURE — 82962 GLUCOSE BLOOD TEST: CPT

## 2022-03-12 PROCEDURE — 74011250637 HC RX REV CODE- 250/637: Performed by: HOSPITALIST

## 2022-03-12 PROCEDURE — 74011000250 HC RX REV CODE- 250: Performed by: ANESTHESIOLOGY

## 2022-03-12 PROCEDURE — 94760 N-INVAS EAR/PLS OXIMETRY 1: CPT

## 2022-03-12 PROCEDURE — 83735 ASSAY OF MAGNESIUM: CPT

## 2022-03-12 PROCEDURE — 84100 ASSAY OF PHOSPHORUS: CPT

## 2022-03-12 PROCEDURE — 80048 BASIC METABOLIC PNL TOTAL CA: CPT

## 2022-03-12 RX ORDER — SODIUM CHLORIDE, SODIUM LACTATE, POTASSIUM CHLORIDE, CALCIUM CHLORIDE 600; 310; 30; 20 MG/100ML; MG/100ML; MG/100ML; MG/100ML
75 INJECTION, SOLUTION INTRAVENOUS CONTINUOUS
Status: DISCONTINUED | OUTPATIENT
Start: 2022-03-12 | End: 2022-03-15

## 2022-03-12 RX ORDER — CLONIDINE HYDROCHLORIDE 0.1 MG/1
0.1 TABLET ORAL
Status: DISCONTINUED | OUTPATIENT
Start: 2022-03-12 | End: 2022-03-17 | Stop reason: HOSPADM

## 2022-03-12 RX ORDER — CARVEDILOL 12.5 MG/1
25 TABLET ORAL 2 TIMES DAILY WITH MEALS
Status: DISCONTINUED | OUTPATIENT
Start: 2022-03-12 | End: 2022-03-17 | Stop reason: HOSPADM

## 2022-03-12 RX ADMIN — PANTOPRAZOLE SODIUM 40 MG: 40 INJECTION, POWDER, FOR SOLUTION INTRAVENOUS at 09:19

## 2022-03-12 RX ADMIN — Medication 80 MG: at 16:31

## 2022-03-12 RX ADMIN — GABAPENTIN 600 MG: 600 TABLET, FILM COATED ORAL at 16:31

## 2022-03-12 RX ADMIN — CARVEDILOL 25 MG: 12.5 TABLET, FILM COATED ORAL at 16:31

## 2022-03-12 RX ADMIN — ATORVASTATIN CALCIUM 20 MG: 20 TABLET, FILM COATED ORAL at 09:19

## 2022-03-12 RX ADMIN — METOCLOPRAMIDE 10 MG: 5 INJECTION, SOLUTION INTRAMUSCULAR; INTRAVENOUS at 18:29

## 2022-03-12 RX ADMIN — RISPERIDONE 2 MG: 1 TABLET, FILM COATED ORAL at 18:30

## 2022-03-12 RX ADMIN — METOCLOPRAMIDE 10 MG: 5 INJECTION, SOLUTION INTRAMUSCULAR; INTRAVENOUS at 11:35

## 2022-03-12 RX ADMIN — METOCLOPRAMIDE 10 MG: 5 INJECTION, SOLUTION INTRAMUSCULAR; INTRAVENOUS at 06:48

## 2022-03-12 RX ADMIN — METOCLOPRAMIDE 10 MG: 5 INJECTION, SOLUTION INTRAMUSCULAR; INTRAVENOUS at 23:04

## 2022-03-12 RX ADMIN — SODIUM CHLORIDE, PRESERVATIVE FREE 10 ML: 5 INJECTION INTRAVENOUS at 22:00

## 2022-03-12 RX ADMIN — DULOXETINE HYDROCHLORIDE 30 MG: 30 CAPSULE, DELAYED RELEASE ORAL at 06:48

## 2022-03-12 RX ADMIN — BENZTROPINE MESYLATE 1 MG: 1 TABLET ORAL at 18:30

## 2022-03-12 RX ADMIN — IOPAMIDOL 100 ML: 755 INJECTION, SOLUTION INTRAVENOUS at 16:04

## 2022-03-12 RX ADMIN — PIPERACILLIN SODIUM AND TAZOBACTAM SODIUM 3.38 G: 3; 375 INJECTION, POWDER, FOR SOLUTION INTRAVENOUS at 15:05

## 2022-03-12 RX ADMIN — PIPERACILLIN SODIUM AND TAZOBACTAM SODIUM 3.38 G: 3; 375 INJECTION, POWDER, FOR SOLUTION INTRAVENOUS at 23:04

## 2022-03-12 RX ADMIN — DULOXETINE 60 MG: 30 CAPSULE, DELAYED RELEASE ORAL at 23:04

## 2022-03-12 RX ADMIN — GABAPENTIN 600 MG: 600 TABLET, FILM COATED ORAL at 23:04

## 2022-03-12 RX ADMIN — Medication 80 MG: at 06:48

## 2022-03-12 RX ADMIN — SODIUM CHLORIDE, PRESERVATIVE FREE 10 ML: 5 INJECTION INTRAVENOUS at 09:21

## 2022-03-12 RX ADMIN — OXYCODONE AND ACETAMINOPHEN 1 TABLET: 5; 325 TABLET ORAL at 16:31

## 2022-03-12 RX ADMIN — BENZTROPINE MESYLATE 1 MG: 1 TABLET ORAL at 09:19

## 2022-03-12 RX ADMIN — CARVEDILOL 25 MG: 12.5 TABLET, FILM COATED ORAL at 07:58

## 2022-03-12 RX ADMIN — RISPERIDONE 2 MG: 1 TABLET, FILM COATED ORAL at 09:19

## 2022-03-12 RX ADMIN — Medication 15 MG: at 07:57

## 2022-03-12 RX ADMIN — OXYCODONE 5 MG: 5 TABLET ORAL at 21:42

## 2022-03-12 RX ADMIN — GABAPENTIN 600 MG: 600 TABLET, FILM COATED ORAL at 09:19

## 2022-03-12 RX ADMIN — SODIUM CHLORIDE, POTASSIUM CHLORIDE, SODIUM LACTATE AND CALCIUM CHLORIDE 75 ML/HR: 600; 310; 30; 20 INJECTION, SOLUTION INTRAVENOUS at 11:35

## 2022-03-12 RX ADMIN — CALCIUM GLUCONATE: 94 INJECTION, SOLUTION INTRAVENOUS at 18:29

## 2022-03-12 RX ADMIN — Medication 80 MG: at 11:35

## 2022-03-12 RX ADMIN — ENOXAPARIN SODIUM 40 MG: 100 INJECTION SUBCUTANEOUS at 09:19

## 2022-03-12 RX ADMIN — Medication 15 MG: at 02:59

## 2022-03-12 NOTE — PROGRESS NOTES
Bedside shift change report given to Landry (oncoming nurse) by Eugene Marie RN (offgoing nurse). Report included the following information SBAR, Kardex, Intake/Output, MAR and Recent Results. no

## 2022-03-12 NOTE — PROGRESS NOTES
[address] [facility] Adult  Hospitalist Group                                                                                          Hospitalist Progress Note  Aruna Wong MD  Answering service: [phone] OR [phone] from in house phone        Date of Service:  3/12/2022  NAME:  Ted Baires  :  1965  MRN:  [id number]      Admission Summary:   Clyde Loyd is a 61 y.o. male  presented to the ED on  with constipation and was admitted for sigmoid volvulus. He underwent colonoscopy with decompression on  midnight  Patient underwent colonoscopic decompression on . He underwent laparoscopic sigmoid resection on 3/3    Interval history / Subjective:      Feeling well, no complaints. He is drinking contrast for CT abdomen ordered by surgery to eval undrained collection due to concerns of rising WBC  Assessment & Plan:     Sigmoid volvulus POA status post laparoscopic sigmoid resection 22  -3/10: Underwent urgent laparoscopic lysis of adhesion and repair of incisional hernia with mesh  --Pain control  --N.p.o., continue TPN. Started on clears. --Further management per surgery    Rising WBC. Afebrile. Patient nontoxic.    --CT A/P with contrast to assess for undrained collection      Chronic hyponatremia. -Urine NA less than 20  -Sodium stable in the low 130s  --Monitor. Hypokalemia/hypomagnesemia  -replete PRN    Hx of Paranoid schizophrenia. -does not voice si/hi/hallucinations    Hx of COPD without bronchospasm: As needed bronchodilators. Hypertension      Dyslipidemia. Tobacco use      Hx of Left lung cancer, status post resection.          Code status: full   Prophylaxis: lovenox  Care Plan discussed with: patient   Anticipated Disposition: The earliest Monday or Tuesday     Hospital Problems  Date Reviewed: 3/2/2022          Codes Class Noted POA    * (Principal) Sigmoid volvulus ([facility] [address] ICD-10-CM: K56.2  ICD-9-CM: 560.2  2022 Yes                Review of Systems:   Pertinent items are noted in HPI. Vital Signs:    Last 24hrs VS reviewed since prior progress note. Most recent are:  Visit Vitals  BP (!) 167/90   Pulse 96   Temp 97.9 °F (36.6 °C)   Resp 16   Ht 5' 7\" (1.702 m)   Wt 69.8 kg (153 lb 14.4 oz)   SpO2 99%   BMI 24.10 kg/m²         Intake/Output Summary (Last 24 hours) at 3/12/2022 1543  Last data filed at 3/12/2022 1140  Gross per 24 hour   Intake --   Output 1775 ml   Net -1775 ml        Physical Examination:     I had a face to face encounter with this patient and independently examined them on 3/12/2022 as outlined below:          Constitutional:  And oriented, not in distress   ENT:  No pallor or jaundice. Resp:  CTA bilaterally. Normal work of breathing   Chest:  No accessory muscle use   CV:  Regular rhythm, normal rate, no rubs    GI:  Laparoscopic wounds. Abdomen normal active, diffusely tender without rebound or guarding. Musculoskeletal:  No edema, warm, 2+ pulses throughout    Neurologic:  Moves all extremities, awake, alert  Skin: no jaundice or cyanosis  Psych: does not voice si/hi/hallucinations, not agitated            Data Review:       Labs:     Recent Labs     03/12/22  0305 03/11/22  0132   WBC 18.1* 12.8*   HGB 14.2 13.8   HCT 40.2 39.5    341     Recent Labs     03/12/22  0305 03/11/22  0132 03/10/22  0435   * 130* 130*   K 3.5 4.1 3.8   CL 96* 101 101   CO2 25 26 24   BUN 6 5* 4*   CREA 0.48* 0.47* 0.48*   * 127* 106*   CA 8.5 8.6 8.3*   MG 1.8  --  2.1   PHOS 3.9  --  3.9     No results for input(s): ALT, AP, TBIL, TBILI, TP, ALB, GLOB, GGT, AML, LPSE in the last 72 hours.     No lab exists for component: SGOT, GPT, AMYP, HLPSE    Medications Reviewed:     Current Facility-Administered Medications   Medication Dose Route Frequency    carvediloL (COREG) tablet 25 mg  25 mg Oral BID WITH MEALS    TPN ADULT - CENTRAL   IntraVENous CONTINUOUS    cloNIDine HCL (CATAPRES) tablet 0.1 mg  0.1 mg Oral Q8H PRN  lactated Ringers infusion  75 mL/hr IntraVENous CONTINUOUS    piperacillin-tazobactam (ZOSYN) 3.375 g in 0.9% sodium chloride (MBP/ADV) 100 mL MBP  3.375 g IntraVENous Q8H    iopamidoL (ISOVUE-370) 76 % injection 100 mL  100 mL IntraVENous RAD ONCE    iohexoL (OMNIPAQUE) 240 mg iodine/mL solution 50 mL  50 mL Oral RAD ONCE    iopamidoL (ISOVUE-370) 76 % injection        TPN ADULT - CENTRAL   IntraVENous CONTINUOUS    sodium chloride (NS) flush 5-40 mL  5-40 mL IntraVENous Q8H    sodium chloride (NS) flush 5-40 mL  5-40 mL IntraVENous PRN    lidocaine (PF) (XYLOCAINE) 10 mg/mL (1 %) injection 0.1 mL  0.1 mL SubCUTAneous PRN    fentaNYL citrate (PF) injection 50 mcg  50 mcg IntraVENous PRN    midazolam (VERSED) injection 1 mg  1 mg IntraVENous PRN    sodium chloride (NS) flush 5-40 mL  5-40 mL IntraVENous Q8H    sodium chloride (NS) flush 5-40 mL  5-40 mL IntraVENous PRN    oxyCODONE-acetaminophen (PERCOCET) 5-325 mg per tablet 1 Tablet  1 Tablet Oral PRN    morphine injection 2 mg  2 mg IntraVENous Multiple    ondansetron (ZOFRAN) injection 4 mg  4 mg IntraVENous PRN    midazolam (VERSED) injection 0.5 mg  0.5 mg IntraVENous Q5MIN PRN    enoxaparin (LOVENOX) injection 40 mg  40 mg SubCUTAneous DAILY    fat emulsion 20% (LIPOSYN, INTRAlipid) infusion 500 mL  500 mL IntraVENous Q MON, WED & FRI    metoclopramide HCl (REGLAN) injection 10 mg  10 mg IntraVENous Q6H    naloxone (NARCAN) injection 0.4 mg  0.4 mg IntraVENous EVERY 2 MINUTES AS NEEDED    simethicone (MYLICON) tablet 80 mg  80 mg Oral TIDAC    atorvastatin (LIPITOR) tablet 20 mg  20 mg Oral DAILY    sodium chloride (NS) flush 5-40 mL  5-40 mL IntraVENous Q8H    sodium chloride (NS) flush 5-40 mL  5-40 mL IntraVENous PRN    ondansetron (ZOFRAN ODT) tablet 4 mg  4 mg Oral Q8H PRN    Or    ondansetron (ZOFRAN) injection 4 mg  4 mg IntraVENous Q6H PRN    oxyCODONE IR (ROXICODONE) tablet 5 mg  5 mg Oral Q4H PRN    HYDROmorphone (DILAUDID) injection 1 mg  1 mg IntraVENous Q3H PRN    butalbital-acetaminophen-caffeine (FIORICET, ESGIC) -40 mg per tablet 1 Tablet  1 Tablet Oral Q4H PRN    sodium chloride (NS) flush 5-40 mL  5-40 mL IntraVENous Q8H    sodium chloride (NS) flush 5-40 mL  5-40 mL IntraVENous PRN    albuterol-ipratropium (DUO-NEB) 2.5 MG-0.5 MG/3 ML  3 mL Nebulization Q4H PRN    sodium chloride (NS) flush 5-40 mL  5-40 mL IntraVENous Q8H    sodium chloride (NS) flush 5-40 mL  5-40 mL IntraVENous PRN    acetaminophen (TYLENOL) tablet 650 mg  650 mg Oral Q6H PRN    Or    acetaminophen (TYLENOL) suppository 650 mg  650 mg Rectal Q6H PRN    ondansetron (ZOFRAN ODT) tablet 4 mg  4 mg Oral Q8H PRN    Or    ondansetron (ZOFRAN) injection 4 mg  4 mg IntraVENous Q6H PRN    nicotine (NICODERM CQ) 21 mg/24 hr patch 1 Patch  1 Patch TransDERmal DAILY    hydrALAZINE (APRESOLINE) 20 mg/mL injection 10 mg  10 mg IntraVENous Q6H PRN    pantoprazole (PROTONIX) injection 40 mg  40 mg IntraVENous DAILY    And    sodium chloride (NS) flush 10 mL  10 mL IntraVENous DAILY    benztropine (COGENTIN) tablet 1 mg  1 mg Oral BID    clonazePAM (KlonoPIN) tablet 1 mg  1 mg Oral BID PRN    DULoxetine (CYMBALTA) capsule 30 mg  30 mg Oral 7am    DULoxetine (CYMBALTA) capsule 60 mg  60 mg Oral QHS    gabapentin (NEURONTIN) tablet 600 mg  600 mg Oral TID    risperiDONE (RisperDAL) tablet 2 mg  2 mg Oral BID    albuterol-ipratropium (DUO-NEB) 2.5 MG-0.5 MG/3 ML  3 mL Nebulization Q6H PRN    arformoteroL (BROVANA) neb solution 15 mcg  15 mcg Nebulization BID RT    And    budesonide (PULMICORT) 500 mcg/2 ml nebulizer suspension  500 mcg Nebulization BID RT   XR CHEST PORT    Result Date: 3/11/2022  No acute process.      ______________________________________________________________________  EXPECTED LENGTH OF STAY: 5d 16h  ACTUAL LENGTH OF STAY:          14                 Victoriano Rosales MD 0 = swallows foods/liquids without difficulty

## 2022-03-12 NOTE — PROGRESS NOTES
Nephrology Progress Note  Adry Cabral  Date of Admission : 2/25/2022    CC:  Follow up for hyponatremia       Assessment and Plan     Hyponatremia:  - suspect SAIDH + excess water intake  - urine studies from 3/7 support SIADH  - he is on a SNRI which can cause SIADH  - his nausea can also trigger SIADH  - Na overall stable  - on TPN  - will follow Na on LR     Sigmoid volvulus:  - s/p laparoscopic sigmoidoscopy  - s/p diagnostic lap and AMRIT 3/10     HTN  COPD  HLD  Tobacco use  Hx of lung cancer s/p resection  Paranoid schizophrenia       Interval History:  Seen and examined. On TPN, on clears. Resting in bed, no cp or sob. Na down slightly at 128. Started back on LR. Current Medications: all current  Medications have been eviewed in EPIC  Review of Systems: Pertinent items are noted in HPI. Objective:  Vitals:    Vitals:    03/11/22 1532 03/11/22 2325 03/12/22 0752 03/12/22 0758   BP: (!) 177/103 (!) 185/100 (!) 167/90 (!) 167/90   Pulse: (!) 119 84 97 96   Resp: 28 28 16    Temp: 97.8 °F (36.6 °C) 98.6 °F (37 °C) 97.9 °F (36.6 °C)    SpO2: 99% 94% 99%    Weight:       Height:         Intake and Output:  03/12 0701 - 03/12 1900  In: -   Out: 475 [Urine:475]  03/10 1901 - 03/12 0700  In: -   Out: 2900 [Urine:2300]    Physical Examination:    General: NAD,Conversant   Neck:  Supple, no mass  Resp:  Lungs CTA B/L, no wheezing , normal respiratory effort  CV:  RRR,  no murmur or rub, no LE edema  GI:  Soft, NT, + Bowel sounds, no hepatosplenomegaly  Neurologic:  Non focal  Psych:             AAO x 3 appropriate affect   Skin:  No Rash  :  No rees    []    High complexity decision making was performed  []    Patient is at high-risk of decompensation with multiple organ involvement    Lab Data Personally Reviewed: I have reviewed all the pertinent labs, microbiology data and radiology studies during assessment.     Recent Labs     03/12/22  0305 03/11/22  0132 03/10/22  0435   * 130* 130*   K 3.5 4.1 3.8   CL 96* 101 101   CO2 25 26 24   * 127* 106*   BUN 6 5* 4*   CREA 0.48* 0.47* 0.48*   CA 8.5 8.6 8.3*   MG 1.8  --  2.1   PHOS 3.9  --  3.9     Recent Labs     03/12/22  0305 03/11/22  0132 03/10/22  0435   WBC 18.1* 12.8* 10.3   HGB 14.2 13.8 14.1   HCT 40.2 39.5 41.0    341 358     No results found for: SDES  Lab Results   Component Value Date/Time    Culture result: NO GROWTH 5 DAYS 03/07/2017 08:09 AM    Culture result: NORMAL RESPIRATORY ALFRED/NO BETA STREP ISOLATED 03/07/2017 08:09 AM     Recent Results (from the past 24 hour(s))   GLUCOSE, POC    Collection Time: 03/11/22 11:21 AM   Result Value Ref Range    Glucose (POC) 100 65 - 117 mg/dL    Performed by Johnita Brownie  PCT    GLUCOSE, POC    Collection Time: 03/11/22  4:19 PM   Result Value Ref Range    Glucose (POC) 101 65 - 117 mg/dL    Performed by Johnita Brownie  PCT    METABOLIC PANEL, BASIC    Collection Time: 03/12/22  3:05 AM   Result Value Ref Range    Sodium 128 (L) 136 - 145 mmol/L    Potassium 3.5 3.5 - 5.1 mmol/L    Chloride 96 (L) 97 - 108 mmol/L    CO2 25 21 - 32 mmol/L    Anion gap 7 5 - 15 mmol/L    Glucose 103 (H) 65 - 100 mg/dL    BUN 6 6 - 20 MG/DL    Creatinine 0.48 (L) 0.70 - 1.30 MG/DL    BUN/Creatinine ratio 13 12 - 20      GFR est AA >60 >60 ml/min/1.73m2    GFR est non-AA >60 >60 ml/min/1.73m2    Calcium 8.5 8.5 - 10.1 MG/DL   PHOSPHORUS    Collection Time: 03/12/22  3:05 AM   Result Value Ref Range    Phosphorus 3.9 2.6 - 4.7 MG/DL   MAGNESIUM    Collection Time: 03/12/22  3:05 AM   Result Value Ref Range    Magnesium 1.8 1.6 - 2.4 mg/dL   CBC WITH AUTOMATED DIFF    Collection Time: 03/12/22  3:05 AM   Result Value Ref Range    WBC 18.1 (H) 4.1 - 11.1 K/uL    RBC 4.38 4.10 - 5.70 M/uL    HGB 14.2 12.1 - 17.0 g/dL    HCT 40.2 36.6 - 50.3 %    MCV 91.8 80.0 - 99.0 FL    MCH 32.4 26.0 - 34.0 PG    MCHC 35.3 30.0 - 36.5 g/dL    RDW 12.0 11.5 - 14.5 %    PLATELET 322 855 - 947 K/uL    MPV 9.2 8.9 - 12.9 FL    NRBC 0.0 0  WBC    ABSOLUTE NRBC 0.00 0.00 - 0.01 K/uL    NEUTROPHILS 81 (H) 32 - 75 %    LYMPHOCYTES 11 (L) 12 - 49 %    MONOCYTES 6 5 - 13 %    EOSINOPHILS 2 0 - 7 %    BASOPHILS 0 0 - 1 %    IMMATURE GRANULOCYTES 0 %    ABS. NEUTROPHILS 14.6 (H) 1.8 - 8.0 K/UL    ABS. LYMPHOCYTES 2.0 0.8 - 3.5 K/UL    ABS. MONOCYTES 1.1 (H) 0.0 - 1.0 K/UL    ABS. EOSINOPHILS 0.4 0.0 - 0.4 K/UL    ABS. BASOPHILS 0.0 0.0 - 0.1 K/UL    ABS. IMM. GRANS. 0.0 K/UL    DF MANUAL      RBC COMMENTS NORMOCYTIC, NORMOCHROMIC      WBC COMMENTS REACTIVE LYMPHS     GLUCOSE, POC    Collection Time: 03/12/22  5:20 AM   Result Value Ref Range    Glucose (POC) 119 (H) 65 - 117 mg/dL    Performed by Verena Gaona MD  [address]  Phone - [phone]   Fax - [phone]  [email or web address]

## 2022-03-12 NOTE — PROGRESS NOTES
Bedside shift change report given to Radha Martinez RN (oncoming nurse) by Darek Chen RN (offgoing nurse). Report included the following information SBAR and Kardex.

## 2022-03-12 NOTE — PROGRESS NOTES
Progress Note    Patient: Clau Owens MRN: [id number]  SSN: [id number]    YOB: 1965  Age: 61 y.o. Sex: male      Admit Date: 2022    2 Days Post-Op    Procedure:  Procedure(s):  DIAGNOSTIC LAPAROSCOPY , Laproscopic lysis of adhesions, laparoscopic hernia repair with mesh. (URGENT)    Subjective:     Patient with rising WBC. States that his pain is feeling a little bit better. Has been passing gas denies any nausea. Objective:     Visit Vitals  BP (!) 167/90   Pulse 96   Temp 97.9 °F (36.6 °C)   Resp 16   Ht 5' 7\" (1.702 m)   Wt 69.8 kg (153 lb 14.4 oz)   SpO2 99%   BMI 24.10 kg/m²       Temp (24hrs), Av.1 °F (36.7 °C), Min:97.8 °F (36.6 °C), Max:98.6 °F (37 °C)      Physical Exam:    General alert in no acute distress  Lungs clear bilaterally  Heart regular rate rhythm  Abdomen soft incisions healing well minimal tenderness    Data Review: images and reports reviewed    Lab Review: All lab results for the last 24 hours reviewed.   Recent Results (from the past 24 hour(s))   GLUCOSE, POC    Collection Time: 22  4:19 PM   Result Value Ref Range    Glucose (POC) 101 65 - 117 mg/dL    Performed by Silas Tenorio  PCT    METABOLIC PANEL, BASIC    Collection Time: 22  3:05 AM   Result Value Ref Range    Sodium 128 (L) 136 - 145 mmol/L    Potassium 3.5 3.5 - 5.1 mmol/L    Chloride 96 (L) 97 - 108 mmol/L    CO2 25 21 - 32 mmol/L    Anion gap 7 5 - 15 mmol/L    Glucose 103 (H) 65 - 100 mg/dL    BUN 6 6 - 20 MG/DL    Creatinine 0.48 (L) 0.70 - 1.30 MG/DL    BUN/Creatinine ratio 13 12 - 20      GFR est AA >60 >60 ml/min/1.73m2    GFR est non-AA >60 >60 ml/min/1.73m2    Calcium 8.5 8.5 - 10.1 MG/DL   PHOSPHORUS    Collection Time: 22  3:05 AM   Result Value Ref Range    Phosphorus 3.9 2.6 - 4.7 MG/DL   MAGNESIUM    Collection Time: 22  3:05 AM   Result Value Ref Range    Magnesium 1.8 1.6 - 2.4 mg/dL   CBC WITH AUTOMATED DIFF    Collection Time: 22  3:05 AM   Result Value Ref Range    WBC 18.1 (H) 4.1 - 11.1 K/uL    RBC 4.38 4.10 - 5.70 M/uL    HGB 14.2 12.1 - 17.0 g/dL    HCT 40.2 36.6 - 50.3 %    MCV 91.8 80.0 - 99.0 FL    MCH 32.4 26.0 - 34.0 PG    MCHC 35.3 30.0 - 36.5 g/dL    RDW 12.0 11.5 - 14.5 %    PLATELET 811 173 - 138 K/uL    MPV 9.2 8.9 - 12.9 FL    NRBC 0.0 0  WBC    ABSOLUTE NRBC 0.00 0.00 - 0.01 K/uL    NEUTROPHILS 81 (H) 32 - 75 %    LYMPHOCYTES 11 (L) 12 - 49 %    MONOCYTES 6 5 - 13 %    EOSINOPHILS 2 0 - 7 %    BASOPHILS 0 0 - 1 %    IMMATURE GRANULOCYTES 0 %    ABS. NEUTROPHILS 14.6 (H) 1.8 - 8.0 K/UL    ABS. LYMPHOCYTES 2.0 0.8 - 3.5 K/UL    ABS. MONOCYTES 1.1 (H) 0.0 - 1.0 K/UL    ABS. EOSINOPHILS 0.4 0.0 - 0.4 K/UL    ABS. BASOPHILS 0.0 0.0 - 0.1 K/UL    ABS. IMM. GRANS. 0.0 K/UL    DF MANUAL      RBC COMMENTS NORMOCYTIC, NORMOCHROMIC      WBC COMMENTS REACTIVE LYMPHS     GLUCOSE, POC    Collection Time: 03/12/22  5:20 AM   Result Value Ref Range    Glucose (POC) 119 (H) 65 - 117 mg/dL    Performed by Doroteo Ram    GLUCOSE, POC    Collection Time: 03/12/22 11:45 AM   Result Value Ref Range    Glucose (POC) 89 65 - 117 mg/dL    Performed by Bria Esteves        Assessment:     Hospital Problems  Date Reviewed: 3/2/2022          Codes Class Noted POA    * (Principal[facility] ICD-10-CM: Y43.3  ICD-9-CM: 560.2  2/26/2022 Yes              Plan/Recommendations/Medical Decision Making:   Clinically patient appears to be doing better after his laparoscopic lysis of adhesions and hernia repairs however WBC is rising. We will get CT scan with p.o. and IV contrast to look for any undrained collection. Will empirically start him on antibiotics due to the rising white count. We will continue TPN for now.   Continue current diet

## 2022-03-13 LAB
ANION GAP SERPL CALC-SCNC: 5 MMOL/L (ref 5–15)
BASOPHILS # BLD: 0.3 K/UL (ref 0–0.1)
BASOPHILS NFR BLD: 2 % (ref 0–1)
BUN SERPL-MCNC: 7 MG/DL (ref 6–20)
BUN/CREAT SERPL: 11 (ref 12–20)
CALCIUM SERPL-MCNC: 8.2 MG/DL (ref 8.5–10.1)
CHLORIDE SERPL-SCNC: 101 MMOL/L (ref 97–108)
CO2 SERPL-SCNC: 25 MMOL/L (ref 21–32)
CREAT SERPL-MCNC: 0.62 MG/DL (ref 0.7–1.3)
DIFFERENTIAL METHOD BLD: ABNORMAL
EOSINOPHIL # BLD: 0.7 K/UL (ref 0–0.4)
EOSINOPHIL NFR BLD: 5 % (ref 0–7)
ERYTHROCYTE [DISTWIDTH] IN BLOOD BY AUTOMATED COUNT: 12.1 % (ref 11.5–14.5)
GLUCOSE BLD STRIP.AUTO-MCNC: 109 MG/DL (ref 65–117)
GLUCOSE BLD STRIP.AUTO-MCNC: 125 MG/DL (ref 65–117)
GLUCOSE SERPL-MCNC: 115 MG/DL (ref 65–100)
HCT VFR BLD AUTO: 39.6 % (ref 36.6–50.3)
HGB BLD-MCNC: 13.5 G/DL (ref 12.1–17)
IMM GRANULOCYTES # BLD AUTO: 0 K/UL
IMM GRANULOCYTES NFR BLD AUTO: 0 %
LYMPHOCYTES # BLD: 2.9 K/UL (ref 0.8–3.5)
LYMPHOCYTES NFR BLD: 21 % (ref 12–49)
MCH RBC QN AUTO: 31.6 PG (ref 26–34)
MCHC RBC AUTO-ENTMCNC: 34.1 G/DL (ref 30–36.5)
MCV RBC AUTO: 92.7 FL (ref 80–99)
MONOCYTES # BLD: 1.4 K/UL (ref 0–1)
MONOCYTES NFR BLD: 10 % (ref 5–13)
MYELOCYTES NFR BLD MANUAL: 1 %
NEUTS BAND NFR BLD MANUAL: 3 % (ref 0–6)
NEUTS SEG # BLD: 8.3 K/UL (ref 1.8–8)
NEUTS SEG NFR BLD: 58 % (ref 32–75)
NRBC # BLD: 0 K/UL (ref 0–0.01)
NRBC BLD-RTO: 0 PER 100 WBC
PLATELET # BLD AUTO: 360 K/UL (ref 150–400)
PMV BLD AUTO: 9.3 FL (ref 8.9–12.9)
POTASSIUM SERPL-SCNC: 3.9 MMOL/L (ref 3.5–5.1)
RBC # BLD AUTO: 4.27 M/UL (ref 4.1–5.7)
RBC MORPH BLD: ABNORMAL
SERVICE CMNT-IMP: ABNORMAL
SERVICE CMNT-IMP: NORMAL
SODIUM SERPL-SCNC: 131 MMOL/L (ref 136–145)
WBC # BLD AUTO: 13.6 K/UL (ref 4.1–11.1)

## 2022-03-13 PROCEDURE — 74011000250 HC RX REV CODE- 250: Performed by: ANESTHESIOLOGY

## 2022-03-13 PROCEDURE — 74011250637 HC RX REV CODE- 250/637: Performed by: SURGERY

## 2022-03-13 PROCEDURE — 65270000032 HC RM SEMIPRIVATE

## 2022-03-13 PROCEDURE — 85025 COMPLETE CBC W/AUTO DIFF WBC: CPT

## 2022-03-13 PROCEDURE — 74011250637 HC RX REV CODE- 250/637: Performed by: HOSPITALIST

## 2022-03-13 PROCEDURE — 82962 GLUCOSE BLOOD TEST: CPT

## 2022-03-13 PROCEDURE — 74011250636 HC RX REV CODE- 250/636: Performed by: SURGERY

## 2022-03-13 PROCEDURE — 99024 POSTOP FOLLOW-UP VISIT: CPT | Performed by: SURGERY

## 2022-03-13 PROCEDURE — 80048 BASIC METABOLIC PNL TOTAL CA: CPT

## 2022-03-13 PROCEDURE — 74011250636 HC RX REV CODE- 250/636: Performed by: HOSPITALIST

## 2022-03-13 PROCEDURE — 94760 N-INVAS EAR/PLS OXIMETRY 1: CPT

## 2022-03-13 PROCEDURE — C9113 INJ PANTOPRAZOLE SODIUM, VIA: HCPCS | Performed by: SURGERY

## 2022-03-13 PROCEDURE — 74011000258 HC RX REV CODE- 258: Performed by: SURGERY

## 2022-03-13 PROCEDURE — 36415 COLL VENOUS BLD VENIPUNCTURE: CPT

## 2022-03-13 PROCEDURE — 74011000250 HC RX REV CODE- 250: Performed by: SURGERY

## 2022-03-13 PROCEDURE — 74011000250 HC RX REV CODE- 250: Performed by: HOSPITALIST

## 2022-03-13 PROCEDURE — 74011000258 HC RX REV CODE- 258: Performed by: HOSPITALIST

## 2022-03-13 RX ADMIN — GABAPENTIN 600 MG: 600 TABLET, FILM COATED ORAL at 08:55

## 2022-03-13 RX ADMIN — CARVEDILOL 25 MG: 12.5 TABLET, FILM COATED ORAL at 17:01

## 2022-03-13 RX ADMIN — ATORVASTATIN CALCIUM 20 MG: 20 TABLET, FILM COATED ORAL at 08:55

## 2022-03-13 RX ADMIN — GABAPENTIN 600 MG: 600 TABLET, FILM COATED ORAL at 17:01

## 2022-03-13 RX ADMIN — Medication 80 MG: at 12:16

## 2022-03-13 RX ADMIN — OXYCODONE 5 MG: 5 TABLET ORAL at 09:03

## 2022-03-13 RX ADMIN — METOCLOPRAMIDE 10 MG: 5 INJECTION, SOLUTION INTRAMUSCULAR; INTRAVENOUS at 17:01

## 2022-03-13 RX ADMIN — PIPERACILLIN SODIUM AND TAZOBACTAM SODIUM 3.38 G: 3; 375 INJECTION, POWDER, FOR SOLUTION INTRAVENOUS at 06:16

## 2022-03-13 RX ADMIN — BENZTROPINE MESYLATE 1 MG: 1 TABLET ORAL at 17:01

## 2022-03-13 RX ADMIN — BENZTROPINE MESYLATE 1 MG: 1 TABLET ORAL at 08:55

## 2022-03-13 RX ADMIN — RISPERIDONE 2 MG: 1 TABLET, FILM COATED ORAL at 17:01

## 2022-03-13 RX ADMIN — Medication 80 MG: at 07:02

## 2022-03-13 RX ADMIN — CALCIUM GLUCONATE: 94 INJECTION, SOLUTION INTRAVENOUS at 18:42

## 2022-03-13 RX ADMIN — SODIUM CHLORIDE, PRESERVATIVE FREE 10 ML: 5 INJECTION INTRAVENOUS at 08:56

## 2022-03-13 RX ADMIN — DULOXETINE 60 MG: 30 CAPSULE, DELAYED RELEASE ORAL at 21:42

## 2022-03-13 RX ADMIN — DULOXETINE HYDROCHLORIDE 30 MG: 30 CAPSULE, DELAYED RELEASE ORAL at 07:02

## 2022-03-13 RX ADMIN — METOCLOPRAMIDE 10 MG: 5 INJECTION, SOLUTION INTRAMUSCULAR; INTRAVENOUS at 06:15

## 2022-03-13 RX ADMIN — PIPERACILLIN SODIUM AND TAZOBACTAM SODIUM 3.38 G: 3; 375 INJECTION, POWDER, FOR SOLUTION INTRAVENOUS at 14:23

## 2022-03-13 RX ADMIN — RISPERIDONE 2 MG: 1 TABLET, FILM COATED ORAL at 08:55

## 2022-03-13 RX ADMIN — GABAPENTIN 600 MG: 600 TABLET, FILM COATED ORAL at 21:42

## 2022-03-13 RX ADMIN — ENOXAPARIN SODIUM 40 MG: 100 INJECTION SUBCUTANEOUS at 08:55

## 2022-03-13 RX ADMIN — OXYCODONE 5 MG: 5 TABLET ORAL at 18:42

## 2022-03-13 RX ADMIN — METOCLOPRAMIDE 10 MG: 5 INJECTION, SOLUTION INTRAMUSCULAR; INTRAVENOUS at 23:12

## 2022-03-13 RX ADMIN — OXYCODONE 5 MG: 5 TABLET ORAL at 23:12

## 2022-03-13 RX ADMIN — METOCLOPRAMIDE 10 MG: 5 INJECTION, SOLUTION INTRAMUSCULAR; INTRAVENOUS at 12:16

## 2022-03-13 RX ADMIN — CARVEDILOL 25 MG: 12.5 TABLET, FILM COATED ORAL at 07:02

## 2022-03-13 RX ADMIN — PANTOPRAZOLE SODIUM 40 MG: 40 INJECTION, POWDER, FOR SOLUTION INTRAVENOUS at 08:55

## 2022-03-13 RX ADMIN — PIPERACILLIN SODIUM AND TAZOBACTAM SODIUM 3.38 G: 3; 375 INJECTION, POWDER, FOR SOLUTION INTRAVENOUS at 21:42

## 2022-03-13 RX ADMIN — SODIUM CHLORIDE, PRESERVATIVE FREE 10 ML: 5 INJECTION INTRAVENOUS at 06:00

## 2022-03-13 RX ADMIN — Medication 80 MG: at 17:01

## 2022-03-13 RX ADMIN — SODIUM CHLORIDE, PRESERVATIVE FREE 10 ML: 5 INJECTION INTRAVENOUS at 22:00

## 2022-03-13 NOTE — PROGRESS NOTES
Progress Note    Patient: Mandy Valle MRN: [id number]  SSN: [id number]    YOB: 1965  Age: 61 y.o. Sex: male      Admit Date: 2022    3 Days Post-Op    Procedure:  Procedure(s):  DIAGNOSTIC LAPAROSCOPY , Laproscopic lysis of adhesions, laparoscopic hernia repair with mesh. (URGENT)    Subjective:     Patient feeling a little worse today. No nausea still passing some gas having a little bit more pain    Objective:     Visit Vitals  /73   Pulse 92   Temp 97.9 °F (36.6 °C)   Resp 16   Ht 5' 7\" (1.702 m)   Wt 69.8 kg (153 lb 14.4 oz)   SpO2 96%   BMI 24.10 kg/m²       Temp (24hrs), Av.2 °F (36.8 °C), Min:97.6 °F (36.4 °C), Max:98.7 °F (37.1 °C)      Physical Exam:    General alert in no acute distress  Lungs clear bilaterally  Heart regular rate and rhythm  Abdomen soft minimal incisional tenderness on the right no rebound or guarding    Data Review: images and reports reviewed  CT- 1. Sigmoidectomy. Worsening high-grade small bowel obstruction to the level of  a possible internal hernia in the left upper quadrant abdomen. 2.  New small volume ascites with possible peritonitis. Persistent fat stranding  adjacent to the anastomosis, likely postsurgical.  Lab Review: All lab results for the last 24 hours reviewed.   Recent Results (from the past 24 hour(s))   GLUCOSE, POC    Collection Time: 22  4:20 PM   Result Value Ref Range    Glucose (POC) 95 65 - 117 mg/dL    Performed by Isaias Willett    RENAL FUNCTION PANEL    Collection Time: 22  5:54 PM   Result Value Ref Range    Sodium 129 (L) 136 - 145 mmol/L    Potassium 3.6 3.5 - 5.1 mmol/L    Chloride 100 97 - 108 mmol/L    CO2 23 21 - 32 mmol/L    Anion gap 6 5 - 15 mmol/L    Glucose 95 65 - 100 mg/dL    BUN 6 6 - 20 MG/DL    Creatinine 0.50 (L) 0.70 - 1.30 MG/DL    BUN/Creatinine ratio 12 12 - 20      GFR est AA >60 >60 ml/min/1.73m2    GFR est non-AA >60 >60 ml/min/1.73m2    Calcium 7.8 (L) 8.5 - 10.1 MG/DL Phosphorus 3.4 2.6 - 4.7 MG/DL    Albumin 2.7 (L) 3.5 - 5.0 g/dL   GLUCOSE, POC    Collection Time: 03/12/22 11:27 PM   Result Value Ref Range    Glucose (POC) 103 65 - 117 mg/dL    Performed by Brandon Elias    METABOLIC PANEL, BASIC    Collection Time: 03/13/22  3:40 AM   Result Value Ref Range    Sodium 131 (L) 136 - 145 mmol/L    Potassium 3.9 3.5 - 5.1 mmol/L    Chloride 101 97 - 108 mmol/L    CO2 25 21 - 32 mmol/L    Anion gap 5 5 - 15 mmol/L    Glucose 115 (H) 65 - 100 mg/dL    BUN 7 6 - 20 MG/DL    Creatinine 0.62 (L) 0.70 - 1.30 MG/DL    BUN/Creatinine ratio 11 (L) 12 - 20      GFR est AA >60 >60 ml/min/1.73m2    GFR est non-AA >60 >60 ml/min/1.73m2    Calcium 8.2 (L) 8.5 - 10.1 MG/DL   CBC WITH AUTOMATED DIFF    Collection Time: 03/13/22  3:40 AM   Result Value Ref Range    WBC 13.6 (H) 4.1 - 11.1 K/uL    RBC 4.27 4. 10 - 5.70 M/uL    HGB 13.5 12.1 - 17.0 g/dL    HCT 39.6 36.6 - 50.3 %    MCV 92.7 80.0 - 99.0 FL    MCH 31.6 26.0 - 34.0 PG    MCHC 34.1 30.0 - 36.5 g/dL    RDW 12.1 11.5 - 14.5 %    PLATELET 910 666 - 365 K/uL    MPV 9.3 8.9 - 12.9 FL    NRBC 0.0 0  WBC    ABSOLUTE NRBC 0.00 0.00 - 0.01 K/uL    NEUTROPHILS 58 32 - 75 %    BAND NEUTROPHILS 3 0 - 6 %    LYMPHOCYTES 21 12 - 49 %    MONOCYTES 10 5 - 13 %    EOSINOPHILS 5 0 - 7 %    BASOPHILS 2 (H) 0 - 1 %    MYELOCYTES 1 (H) 0 %    IMMATURE GRANULOCYTES 0 %    ABS. NEUTROPHILS 8.3 (H) 1.8 - 8.0 K/UL    ABS. LYMPHOCYTES 2.9 0.8 - 3.5 K/UL    ABS. MONOCYTES 1.4 (H) 0.0 - 1.0 K/UL    ABS. EOSINOPHILS 0.7 (H) 0.0 - 0.4 K/UL    ABS. BASOPHILS 0.3 (H) 0.0 - 0.1 K/UL    ABS. IMM.  GRANS. 0.0 K/UL    DF MANUAL      RBC COMMENTS NORMOCYTIC, NORMOCHROMIC     GLUCOSE, POC    Collection Time: 03/13/22  5:47 AM   Result Value Ref Range    Glucose (POC) 109 65 - 117 mg/dL    Performed by Mariama Adams, POC    Collection Time: 03/13/22 11:09 AM   Result Value Ref Range    Glucose (POC) 125 (H) 65 - 117 mg/dL    Performed by Davide Geller Assessment:     Hospital Problems  Date Reviewed: 3/2/2022          Codes Class Noted POA    * (Principal) Sigmoid volvulus ([facility] [address] ICD-10-CM: K56.2  ICD-9-CM: 560.2  2/26/2022 Yes              Plan/Recommendations/Medical Decision Making:   CT scan from yesterday reviewed. Unclear significance of the findings given that the patient is not currently clinically acting as though he is obstructed. Additionally with Zosyn his WBC has actually improved. For now would just keep on clear liquids and on the antibiotics. Needs to be out of bed and ambulating. Continue TPN.

## 2022-03-13 NOTE — PROGRESS NOTES
[facility] Adult  Hospitalist Group                                                                                          Hospitalist Progress Note  Cole Moyer MD  Answering service: [phone] OR [phone] from in house phone        Date of Service:  3/13/2022  NAME:  Meaghan Jj  :  1965  MRN:  [id number]      Admission Summary:   Lola Miles is a 61 y.o. male  presented to the ED on  with constipation and was admitted for sigmoid volvulus. He underwent colonoscopy with decompression on  midnight  Patient underwent colonoscopic decompression on . He underwent laparoscopic sigmoid resection on 3/3    Interval history / Subjective:      He reports abdominal pain when asked. ,  Denies nausea, vomiting, fever or chills. Assessment & Plan:     Sigmoid volvulus POA status post laparoscopic sigmoid resection 22  -3/10: Underwent urgent laparoscopic lysis of adhesion and repair of incisional hernia with mesh  --Pain control  --N.p.o., continue TPN. Started on clears. --Further management per surgery    Rising WBC. Afebrile. Patient nontoxic.    --CT A/P reported high-grade small bowel obstruction and possible peritonitis. He is already on Zosyn and WBC declining. Patient is not clinically toxic or septic      Chronic hyponatremia. -Urine NA less than 20  -Sodium stable in the low 130s  --Monitor. Hypokalemia/hypomagnesemia  -replete PRN    Hx of Paranoid schizophrenia. -does not voice si/hi/hallucinations    Hx of COPD without bronchospasm: As needed bronchodilators. Hypertension      Dyslipidemia. Tobacco use      Hx of Left lung cancer, status post resection. Code status: full   Prophylaxis: lovenox  Care Plan discussed with: patient   Anticipated Disposition: Still on clear liquid diet and on antibiotics for possible peritonitis, he needs ongoing inpatient treatment and monitoring.      Hospital Problems  Date Reviewed: 3/2/2022 Codes Class Noted POA    * (Principal) Sigmoid volvulus ([facility] [address] ICD-10-CM: K56.2  ICD-9-CM: 560.2  2/26/2022 Yes                Review of Systems:   Pertinent items are noted in HPI. Vital Signs:    Last 24hrs VS reviewed since prior progress note. Most recent are:  Visit Vitals  BP (!) 143/94   Pulse 81   Temp 97.7 °F (36.5 °C)   Resp 16   Ht 5' 7\" (1.702 m)   Wt 69.8 kg (153 lb 14.4 oz)   SpO2 99%   BMI 24.10 kg/m²         Intake/Output Summary (Last 24 hours) at 3/13/2022 1657  Last data filed at 3/13/2022 0355  Gross per 24 hour   Intake --   Output 2400 ml   Net -2400 ml        Physical Examination:     I had a face to face encounter with this patient and independently examined them on 3/13/2022 as outlined below:          Constitutional:  And oriented, not in distress   ENT:  No pallor or jaundice. Resp:  CTA bilaterally. Normal work of breathing   Chest:  No accessory muscle use   CV:  Regular rhythm, normal rate, no rubs    GI:  Laparoscopic wounds. Abdomen normal active, diffusely tender without rebound or guarding.     Musculoskeletal:  No edema, warm, 2+ pulses throughout    Neurologic:  Moves all extremities, awake, alert  Skin: no jaundice or cyanosis  Psych: does not voice si/hi/hallucinations, not agitated            Data Review:       Labs:     Recent Labs     03/13/22  0340 03/12/22  0305   WBC 13.6* 18.1*   HGB 13.5 14.2   HCT 39.6 40.2    364     Recent Labs     03/13/22  0340 03/12/22  1754 03/12/22  0305   * 129* 128*   K 3.9 3.6 3.5    100 96*   CO2 25 23 25   BUN 7 6 6   CREA 0.62* 0.50* 0.48*   * 95 103*   CA 8.2* 7.8* 8.5   MG  --   --  1.8   PHOS  --  3.4 3.9     Recent Labs     03/12/22  1754   ALB 2.7*       Medications Reviewed:     Current Facility-Administered Medications   Medication Dose Route Frequency    TPN ADULT - CENTRAL   IntraVENous CONTINUOUS    carvediloL (COREG) tablet 25 mg  25 mg Oral BID WITH MEALS    TPN ADULT - CENTRAL   IntraVENous CONTINUOUS    cloNIDine HCL (CATAPRES) tablet 0.1 mg  0.1 mg Oral Q8H PRN    lactated Ringers infusion  75 mL/hr IntraVENous CONTINUOUS    piperacillin-tazobactam (ZOSYN) 3.375 g in 0.9% sodium chloride (MBP/ADV) 100 mL MBP  3.375 g IntraVENous Q8H    sodium chloride (NS) flush 5-40 mL  5-40 mL IntraVENous Q8H    sodium chloride (NS) flush 5-40 mL  5-40 mL IntraVENous PRN    lidocaine (PF) (XYLOCAINE) 10 mg/mL (1 %) injection 0.1 mL  0.1 mL SubCUTAneous PRN    fentaNYL citrate (PF) injection 50 mcg  50 mcg IntraVENous PRN    sodium chloride (NS) flush 5-40 mL  5-40 mL IntraVENous Q8H    sodium chloride (NS) flush 5-40 mL  5-40 mL IntraVENous PRN    oxyCODONE-acetaminophen (PERCOCET) 5-325 mg per tablet 1 Tablet  1 Tablet Oral PRN    morphine injection 2 mg  2 mg IntraVENous Multiple    ondansetron (ZOFRAN) injection 4 mg  4 mg IntraVENous PRN    enoxaparin (LOVENOX) injection 40 mg  40 mg SubCUTAneous DAILY    fat emulsion 20% (LIPOSYN, INTRAlipid) infusion 500 mL  500 mL IntraVENous Q MON, WED & FRI    metoclopramide HCl (REGLAN) injection 10 mg  10 mg IntraVENous Q6H    naloxone (NARCAN) injection 0.4 mg  0.4 mg IntraVENous EVERY 2 MINUTES AS NEEDED    simethicone (MYLICON) tablet 80 mg  80 mg Oral TIDAC    atorvastatin (LIPITOR) tablet 20 mg  20 mg Oral DAILY    sodium chloride (NS) flush 5-40 mL  5-40 mL IntraVENous Q8H    sodium chloride (NS) flush 5-40 mL  5-40 mL IntraVENous PRN    ondansetron (ZOFRAN ODT) tablet 4 mg  4 mg Oral Q8H PRN    Or    ondansetron (ZOFRAN) injection 4 mg  4 mg IntraVENous Q6H PRN    oxyCODONE IR (ROXICODONE) tablet 5 mg  5 mg Oral Q4H PRN    HYDROmorphone (DILAUDID) injection 1 mg  1 mg IntraVENous Q3H PRN    butalbital-acetaminophen-caffeine (FIORICET, ESGIC) -40 mg per tablet 1 Tablet  1 Tablet Oral Q4H PRN    sodium chloride (NS) flush 5-40 mL  5-40 mL IntraVENous Q8H    sodium chloride (NS) flush 5-40 mL  5-40 mL IntraVENous PRN    albuterol-ipratropium (DUO-NEB) 2.5 MG-0.5 MG/3 ML  3 mL Nebulization Q4H PRN    sodium chloride (NS) flush 5-40 mL  5-40 mL IntraVENous Q8H    sodium chloride (NS) flush 5-40 mL  5-40 mL IntraVENous PRN    acetaminophen (TYLENOL) tablet 650 mg  650 mg Oral Q6H PRN    Or    acetaminophen (TYLENOL) suppository 650 mg  650 mg Rectal Q6H PRN    ondansetron (ZOFRAN ODT) tablet 4 mg  4 mg Oral Q8H PRN    Or    ondansetron (ZOFRAN) injection 4 mg  4 mg IntraVENous Q6H PRN    nicotine (NICODERM CQ) 21 mg/24 hr patch 1 Patch  1 Patch TransDERmal DAILY    hydrALAZINE (APRESOLINE) 20 mg/mL injection 10 mg  10 mg IntraVENous Q6H PRN    pantoprazole (PROTONIX) injection 40 mg  40 mg IntraVENous DAILY    And    sodium chloride (NS) flush 10 mL  10 mL IntraVENous DAILY    benztropine (COGENTIN) tablet 1 mg  1 mg Oral BID    clonazePAM (KlonoPIN) tablet 1 mg  1 mg Oral BID PRN    DULoxetine (CYMBALTA) capsule 30 mg  30 mg Oral 7am    DULoxetine (CYMBALTA) capsule 60 mg  60 mg Oral QHS    gabapentin (NEURONTIN) tablet 600 mg  600 mg Oral TID    risperiDONE (RisperDAL) tablet 2 mg  2 mg Oral BID    albuterol-ipratropium (DUO-NEB) 2.5 MG-0.5 MG/3 ML  3 mL Nebulization Q6H PRN    arformoteroL (BROVANA) neb solution 15 mcg  15 mcg Nebulization BID RT    And    budesonide (PULMICORT) 500 mcg/2 ml nebulizer suspension  500 mcg Nebulization BID RT   No results found.   ______________________________________________________________________  EXPECTED LENGTH OF STAY: 5d 16h  ACTUAL LENGTH OF STAY:          15                 Alyce Orantes MD

## 2022-03-13 NOTE — PROGRESS NOTES
Bedside shift change report given to Zoya Neves RN (oncoming nurse) by Ezekiel Jackson RN (offgoing nurse). Report included the following information SBAR, Kardex, Intake/Output, MAR and Med Rec Status.

## 2022-03-13 NOTE — PROGRESS NOTES
Na stable  Cont present care  Will follow up in AM  Call with any issues      Surekha Johnson MD  [facility]   [address] [address]  Phone - [phone]   Fax - [phone]  [email or web address]

## 2022-03-14 LAB
ANION GAP SERPL CALC-SCNC: 5 MMOL/L (ref 5–15)
BASOPHILS # BLD: 0 K/UL (ref 0–0.1)
BASOPHILS NFR BLD: 0 % (ref 0–1)
BUN SERPL-MCNC: 7 MG/DL (ref 6–20)
BUN/CREAT SERPL: 12 (ref 12–20)
CALCIUM SERPL-MCNC: 8.2 MG/DL (ref 8.5–10.1)
CHLORIDE SERPL-SCNC: 101 MMOL/L (ref 97–108)
CO2 SERPL-SCNC: 25 MMOL/L (ref 21–32)
CREAT SERPL-MCNC: 0.58 MG/DL (ref 0.7–1.3)
DIFFERENTIAL METHOD BLD: ABNORMAL
EOSINOPHIL # BLD: 0.5 K/UL (ref 0–0.4)
EOSINOPHIL NFR BLD: 5 % (ref 0–7)
ERYTHROCYTE [DISTWIDTH] IN BLOOD BY AUTOMATED COUNT: 12.1 % (ref 11.5–14.5)
GLUCOSE BLD STRIP.AUTO-MCNC: 105 MG/DL (ref 65–117)
GLUCOSE BLD STRIP.AUTO-MCNC: 122 MG/DL (ref 65–117)
GLUCOSE BLD STRIP.AUTO-MCNC: 88 MG/DL (ref 65–117)
GLUCOSE SERPL-MCNC: 89 MG/DL (ref 65–100)
HCT VFR BLD AUTO: 41.6 % (ref 36.6–50.3)
HGB BLD-MCNC: 14.2 G/DL (ref 12.1–17)
IMM GRANULOCYTES # BLD AUTO: 0 K/UL
IMM GRANULOCYTES NFR BLD AUTO: 0 %
LYMPHOCYTES # BLD: 2.1 K/UL (ref 0.8–3.5)
LYMPHOCYTES NFR BLD: 20 % (ref 12–49)
MAGNESIUM SERPL-MCNC: 1.9 MG/DL (ref 1.6–2.4)
MCH RBC QN AUTO: 32.1 PG (ref 26–34)
MCHC RBC AUTO-ENTMCNC: 34.1 G/DL (ref 30–36.5)
MCV RBC AUTO: 94.1 FL (ref 80–99)
MONOCYTES # BLD: 1.4 K/UL (ref 0–1)
MONOCYTES NFR BLD: 13 % (ref 5–13)
NEUTS SEG # BLD: 6.6 K/UL (ref 1.8–8)
NEUTS SEG NFR BLD: 62 % (ref 32–75)
NRBC # BLD: 0 K/UL (ref 0–0.01)
NRBC BLD-RTO: 0 PER 100 WBC
PHOSPHATE SERPL-MCNC: 3.4 MG/DL (ref 2.6–4.7)
PLATELET # BLD AUTO: 406 K/UL (ref 150–400)
PMV BLD AUTO: 9.3 FL (ref 8.9–12.9)
POTASSIUM SERPL-SCNC: 4 MMOL/L (ref 3.5–5.1)
RBC # BLD AUTO: 4.42 M/UL (ref 4.1–5.7)
RBC MORPH BLD: ABNORMAL
SERVICE CMNT-IMP: ABNORMAL
SERVICE CMNT-IMP: NORMAL
SERVICE CMNT-IMP: NORMAL
SODIUM SERPL-SCNC: 131 MMOL/L (ref 136–145)
WBC # BLD AUTO: 10.6 K/UL (ref 4.1–11.1)

## 2022-03-14 PROCEDURE — 74011250636 HC RX REV CODE- 250/636: Performed by: SURGERY

## 2022-03-14 PROCEDURE — 74011250637 HC RX REV CODE- 250/637: Performed by: SURGERY

## 2022-03-14 PROCEDURE — 74011000258 HC RX REV CODE- 258: Performed by: SURGERY

## 2022-03-14 PROCEDURE — 74011000250 HC RX REV CODE- 250: Performed by: ANESTHESIOLOGY

## 2022-03-14 PROCEDURE — 74011000250 HC RX REV CODE- 250: Performed by: HOSPITALIST

## 2022-03-14 PROCEDURE — 74011000250 HC RX REV CODE- 250: Performed by: SURGERY

## 2022-03-14 PROCEDURE — 65270000032 HC RM SEMIPRIVATE

## 2022-03-14 PROCEDURE — 74011000258 HC RX REV CODE- 258: Performed by: HOSPITALIST

## 2022-03-14 PROCEDURE — 83735 ASSAY OF MAGNESIUM: CPT

## 2022-03-14 PROCEDURE — 82962 GLUCOSE BLOOD TEST: CPT

## 2022-03-14 PROCEDURE — 80048 BASIC METABOLIC PNL TOTAL CA: CPT

## 2022-03-14 PROCEDURE — 74011250637 HC RX REV CODE- 250/637: Performed by: HOSPITALIST

## 2022-03-14 PROCEDURE — C9113 INJ PANTOPRAZOLE SODIUM, VIA: HCPCS | Performed by: SURGERY

## 2022-03-14 PROCEDURE — 74011250636 HC RX REV CODE- 250/636: Performed by: HOSPITALIST

## 2022-03-14 PROCEDURE — 84100 ASSAY OF PHOSPHORUS: CPT

## 2022-03-14 PROCEDURE — 85025 COMPLETE CBC W/AUTO DIFF WBC: CPT

## 2022-03-14 PROCEDURE — 36415 COLL VENOUS BLD VENIPUNCTURE: CPT

## 2022-03-14 PROCEDURE — 94760 N-INVAS EAR/PLS OXIMETRY 1: CPT

## 2022-03-14 RX ADMIN — PIPERACILLIN SODIUM AND TAZOBACTAM SODIUM 3.38 G: 3; 375 INJECTION, POWDER, FOR SOLUTION INTRAVENOUS at 21:37

## 2022-03-14 RX ADMIN — I.V. FAT EMULSION 500 ML: 20 EMULSION INTRAVENOUS at 18:27

## 2022-03-14 RX ADMIN — METOCLOPRAMIDE 10 MG: 5 INJECTION, SOLUTION INTRAMUSCULAR; INTRAVENOUS at 11:11

## 2022-03-14 RX ADMIN — PIPERACILLIN SODIUM AND TAZOBACTAM SODIUM 3.38 G: 3; 375 INJECTION, POWDER, FOR SOLUTION INTRAVENOUS at 13:24

## 2022-03-14 RX ADMIN — ATORVASTATIN CALCIUM 20 MG: 20 TABLET, FILM COATED ORAL at 08:45

## 2022-03-14 RX ADMIN — PANTOPRAZOLE SODIUM 40 MG: 40 INJECTION, POWDER, FOR SOLUTION INTRAVENOUS at 08:45

## 2022-03-14 RX ADMIN — CARVEDILOL 25 MG: 12.5 TABLET, FILM COATED ORAL at 07:13

## 2022-03-14 RX ADMIN — CARVEDILOL 25 MG: 12.5 TABLET, FILM COATED ORAL at 17:15

## 2022-03-14 RX ADMIN — GABAPENTIN 600 MG: 600 TABLET, FILM COATED ORAL at 08:45

## 2022-03-14 RX ADMIN — GABAPENTIN 600 MG: 600 TABLET, FILM COATED ORAL at 17:15

## 2022-03-14 RX ADMIN — METOCLOPRAMIDE 10 MG: 5 INJECTION, SOLUTION INTRAMUSCULAR; INTRAVENOUS at 17:15

## 2022-03-14 RX ADMIN — DULOXETINE 60 MG: 30 CAPSULE, DELAYED RELEASE ORAL at 21:37

## 2022-03-14 RX ADMIN — SODIUM CHLORIDE, PRESERVATIVE FREE 10 ML: 5 INJECTION INTRAVENOUS at 14:00

## 2022-03-14 RX ADMIN — RISPERIDONE 2 MG: 1 TABLET, FILM COATED ORAL at 08:45

## 2022-03-14 RX ADMIN — OXYCODONE 5 MG: 5 TABLET ORAL at 13:24

## 2022-03-14 RX ADMIN — DULOXETINE HYDROCHLORIDE 30 MG: 30 CAPSULE, DELAYED RELEASE ORAL at 07:13

## 2022-03-14 RX ADMIN — CALCIUM GLUCONATE: 94 INJECTION, SOLUTION INTRAVENOUS at 18:25

## 2022-03-14 RX ADMIN — ENOXAPARIN SODIUM 40 MG: 100 INJECTION SUBCUTANEOUS at 08:45

## 2022-03-14 RX ADMIN — SODIUM CHLORIDE, PRESERVATIVE FREE 10 ML: 5 INJECTION INTRAVENOUS at 22:00

## 2022-03-14 RX ADMIN — PIPERACILLIN SODIUM AND TAZOBACTAM SODIUM 3.38 G: 3; 375 INJECTION, POWDER, FOR SOLUTION INTRAVENOUS at 07:13

## 2022-03-14 RX ADMIN — OXYCODONE 5 MG: 5 TABLET ORAL at 17:16

## 2022-03-14 RX ADMIN — Medication 80 MG: at 07:12

## 2022-03-14 RX ADMIN — SODIUM CHLORIDE, POTASSIUM CHLORIDE, SODIUM LACTATE AND CALCIUM CHLORIDE 75 ML/HR: 600; 310; 30; 20 INJECTION, SOLUTION INTRAVENOUS at 18:27

## 2022-03-14 RX ADMIN — OXYCODONE 5 MG: 5 TABLET ORAL at 05:20

## 2022-03-14 RX ADMIN — BENZTROPINE MESYLATE 1 MG: 1 TABLET ORAL at 08:45

## 2022-03-14 RX ADMIN — Medication 80 MG: at 17:15

## 2022-03-14 RX ADMIN — METOCLOPRAMIDE 10 MG: 5 INJECTION, SOLUTION INTRAMUSCULAR; INTRAVENOUS at 07:13

## 2022-03-14 RX ADMIN — OXYCODONE 5 MG: 5 TABLET ORAL at 09:24

## 2022-03-14 RX ADMIN — RISPERIDONE 2 MG: 1 TABLET, FILM COATED ORAL at 17:16

## 2022-03-14 RX ADMIN — GABAPENTIN 600 MG: 600 TABLET, FILM COATED ORAL at 21:37

## 2022-03-14 RX ADMIN — SODIUM CHLORIDE, PRESERVATIVE FREE 10 ML: 5 INJECTION INTRAVENOUS at 09:00

## 2022-03-14 RX ADMIN — SODIUM CHLORIDE, PRESERVATIVE FREE 10 ML: 5 INJECTION INTRAVENOUS at 06:00

## 2022-03-14 RX ADMIN — BENZTROPINE MESYLATE 1 MG: 1 TABLET ORAL at 17:15

## 2022-03-14 RX ADMIN — Medication 80 MG: at 11:11

## 2022-03-14 NOTE — PROGRESS NOTES
Nephrology Progress Note  Lola Pete  Date of Admission : 2/25/2022    CC:  Follow up for hyponatremia       Assessment and Plan     Hyponatremia:  - suspect SAIDH  - urine studies from 3/7 support SIADH  - he is on a SNRI which can cause SIADH  - his nausea can also trigger SIADH  - Na overall stable  - on TPN  - daily Na levels for now    Sigmoid volvulus:  - s/p laparoscopic sigmoidoscopy  - s/p diagnostic lap and AMRIT 3/10     HTN  COPD  HLD  Tobacco use  Hx of lung cancer s/p resection  Paranoid schizophrenia       Interval History:  Seen and examined. On TPN, on clears. Resting in bed, no cp or sob. Na stable at 131. CT scan over weekened noted. Current Medications: all current  Medications have been eviewed in EPIC  Review of Systems: Pertinent items are noted in HPI. Objective:  Vitals:    Vitals:    03/13/22 0853 03/13/22 1620 03/13/22 2049 03/14/22 0818   BP: 138/73 (!) 143/94 (!) 170/95 (!) 162/89   Pulse: 92 81 85 100   Resp: 16 16 18 18   Temp: 97.9 °F (36.6 °C) 97.7 °F (36.5 °C) 97.6 °F (36.4 °C) 97.5 °F (36.4 °C)   SpO2: 96% 99% 98% 95%   Weight:       Height:         Intake and Output:  03/14 0701 - 03/14 1900  In: 240 [P.O.:240]  Out: 400 [Urine:400]  03/12 1901 - 03/14 0700  In: -   Out: 6500 [Urine:6500]    Physical Examination:    General: NAD,Conversant   Neck:  Supple, no mass  Resp:  Lungs CTA B/L, no wheezing , normal respiratory effort  CV:  RRR,  no murmur or rub, no LE edema  GI:  Soft, NT, + Bowel sounds, no hepatosplenomegaly  Neurologic:  Non focal  Psych:             AAO x 3 appropriate affect   Skin:  No Rash  :  No rees    []    High complexity decision making was performed  []    Patient is at high-risk of decompensation with multiple organ involvement    Lab Data Personally Reviewed: I have reviewed all the pertinent labs, microbiology data and radiology studies during assessment.     Recent Labs     03/14/22  0525 03/13/22  0340 03/12/22  1754 03/12/22  0305 03/12/22  0305   * 131* 129*   < > 128*   K 4.0 3.9 3.6   < > 3.5    101 100   < > 96*   CO2 25 25 23   < > 25   GLU 89 115* 95   < > 103*   BUN 7 7 6   < > 6   CREA 0.58* 0.62* 0.50*   < > 0.48*   CA 8.2* 8.2* 7.8*   < > 8.5   MG 1.9  --   --   --  1.8   PHOS 3.4  --  3.4  --  3.9   ALB  --   --  2.7*  --   --     < > = values in this interval not displayed.      Recent Labs     03/14/22  0525 03/13/22  0340 03/12/22  0305   WBC 10.6 13.6* 18.1*   HGB 14.2 13.5 14.2   HCT 41.6 39.6 40.2   * 360 364     No results found for: [facility]  Lab Results   Component Value Date/Time    Culture result: NO GROWTH 5 DAYS 03/07/2017 08:09 AM    Culture result: NORMAL RESPIRATORY ALFRED/NO BETA STREP ISOLATED 03/07/2017 08:09 AM     Recent Results (from the past 24 hour(s))   GLUCOSE, POC    Collection Time: 03/13/22 11:09 AM   Result Value Ref Range    Glucose (POC) 125 (H) 65 - 117 mg/dL    Performed by Vladimir Gao 25, BASIC    Collection Time: 03/14/22  5:25 AM   Result Value Ref Range    Sodium 131 (L) 136 - 145 mmol/L    Potassium 4.0 3.5 - 5.1 mmol/L    Chloride 101 97 - 108 mmol/L    CO2 25 21 - 32 mmol/L    Anion gap 5 5 - 15 mmol/L    Glucose 89 65 - 100 mg/dL    BUN 7 6 - 20 MG/DL    Creatinine 0.58 (L) 0.70 - 1.30 MG/DL    BUN/Creatinine ratio 12 12 - 20      GFR est AA >60 >60 ml/min/1.73m2    GFR est non-AA >60 >60 ml/min/1.73m2    Calcium 8.2 (L) 8.5 - 10.1 MG/DL   PHOSPHORUS    Collection Time: 03/14/22  5:25 AM   Result Value Ref Range    Phosphorus 3.4 2.6 - 4.7 MG/DL   MAGNESIUM    Collection Time: 03/14/22  5:25 AM   Result Value Ref Range    Magnesium 1.9 1.6 - 2.4 mg/dL   CBC WITH AUTOMATED DIFF    Collection Time: 03/14/22  5:25 AM   Result Value Ref Range    WBC 10.6 4.1 - 11.1 K/uL    RBC 4.42 4.10 - 5.70 M/uL    HGB 14.2 12.1 - 17.0 g/dL    HCT 41.6 36.6 - 50.3 %    MCV 94.1 80.0 - 99.0 FL    MCH 32.1 26.0 - 34.0 PG    MCHC 34.1 30.0 - 36.5 g/dL    RDW 12.1 11.5 - 14.5 %    PLATELET 094 (H) 808 - 400 K/uL    MPV 9.3 8.9 - 12.9 FL    NRBC 0.0 0  WBC    ABSOLUTE NRBC 0.00 0.00 - 0.01 K/uL    NEUTROPHILS 62 32 - 75 %    LYMPHOCYTES 20 12 - 49 %    MONOCYTES 13 5 - 13 %    EOSINOPHILS 5 0 - 7 %    BASOPHILS 0 0 - 1 %    IMMATURE GRANULOCYTES 0 %    ABS. NEUTROPHILS 6.6 1.8 - 8.0 K/UL    ABS. LYMPHOCYTES 2.1 0.8 - 3.5 K/UL    ABS. MONOCYTES 1.4 (H) 0.0 - 1.0 K/UL    ABS. EOSINOPHILS 0.5 (H) 0.0 - 0.4 K/UL    ABS. BASOPHILS 0.0 0.0 - 0.1 K/UL    ABS. IMM. GRANS. 0.0 K/UL    DF MANUAL      RBC COMMENTS NORMOCYTIC, NORMOCHROMIC     GLUCOSE, POC    Collection Time: 03/14/22  5:42 AM   Result Value Ref Range    Glucose (POC) 88 65 - 117 mg/dL    Performed by Gypsy Campbell MD  [address]  Phone - [phone]   Fax - [phone]  [email or web address]

## 2022-03-14 NOTE — PROGRESS NOTES
Progress Note    Patient: Kisha Keys MRN: [id number]  SSN: [id number]    YOB: 1965  Age: 61 y.o. Sex: male      Admit Date: 2022    4 Days Post-Op    Procedure:  Procedure(s):  DIAGNOSTIC LAPAROSCOPY , Laproscopic lysis of adhesions, laparoscopic hernia repair with mesh. Subjective:     Patient is tolerating clear liquids without difficulty. He is passing gas, ambulating. Objective:     Visit Vitals  BP (!) 162/89   Pulse 100   Temp 97.5 °F (36.4 °C)   Resp 18   Ht 5' 7\" (1.702 m)   Wt 153 lb 14.4 oz (69.8 kg)   SpO2 95%   BMI 24.10 kg/m²       Temp (24hrs), Av.6 °F (36.4 °C), Min:97.5 °F (36.4 °C), Max:97.7 °F (36.5 °C)      Physical Exam:    ABDOMEN: Nondistended, soft. Wounds intact without signs of infection. Appropriate incisional pain with palpation.     Data Review: Vital signs, ins/outs, labs    Lab Review:   Recent Results (from the past 24 hour(s))   METABOLIC PANEL, BASIC    Collection Time: 22  5:25 AM   Result Value Ref Range    Sodium 131 (L) 136 - 145 mmol/L    Potassium 4.0 3.5 - 5.1 mmol/L    Chloride 101 97 - 108 mmol/L    CO2 25 21 - 32 mmol/L    Anion gap 5 5 - 15 mmol/L    Glucose 89 65 - 100 mg/dL    BUN 7 6 - 20 MG/DL    Creatinine 0.58 (L) 0.70 - 1.30 MG/DL    BUN/Creatinine ratio 12 12 - 20      GFR est AA >60 >60 ml/min/1.73m2    GFR est non-AA >60 >60 ml/min/1.73m2    Calcium 8.2 (L) 8.5 - 10.1 MG/DL   PHOSPHORUS    Collection Time: 22  5:25 AM   Result Value Ref Range    Phosphorus 3.4 2.6 - 4.7 MG/DL   MAGNESIUM    Collection Time: 22  5:25 AM   Result Value Ref Range    Magnesium 1.9 1.6 - 2.4 mg/dL   CBC WITH AUTOMATED DIFF    Collection Time: 03/14/22  5:25 AM   Result Value Ref Range    WBC 10.6 4.1 - 11.1 K/uL    RBC 4.42 4.10 - 5.70 M/uL    HGB 14.2 12.1 - 17.0 g/dL    HCT 41.6 36.6 - 50.3 %    MCV 94.1 80.0 - 99.0 FL    MCH 32.1 26.0 - 34.0 PG    MCHC 34.1 30.0 - 36.5 g/dL    RDW 12.1 11.5 - 14.5 %    PLATELET 448 (H) 735 - 400 K/uL    MPV 9.3 8.9 - 12.9 FL    NRBC 0.0 0  WBC    ABSOLUTE NRBC 0.00 0.00 - 0.01 K/uL    NEUTROPHILS 62 32 - 75 %    LYMPHOCYTES 20 12 - 49 %    MONOCYTES 13 5 - 13 %    EOSINOPHILS 5 0 - 7 %    BASOPHILS 0 0 - 1 %    IMMATURE GRANULOCYTES 0 %    ABS. NEUTROPHILS 6.6 1.8 - 8.0 K/UL    ABS. LYMPHOCYTES 2.1 0.8 - 3.5 K/UL    ABS. MONOCYTES 1.4 (H) 0.0 - 1.0 K/UL    ABS. EOSINOPHILS 0.5 (H) 0.0 - 0.4 K/UL    ABS. BASOPHILS 0.0 0.0 - 0.1 K/UL    ABS. IMM. GRANS. 0.0 K/UL    DF MANUAL      RBC COMMENTS NORMOCYTIC, NORMOCHROMIC     GLUCOSE, POC    Collection Time: 03/14/22  5:42 AM   Result Value Ref Range    Glucose (POC) 88 65 - 117 mg/dL    Performed by [address] [place] [address]:     Hospital Problems  Date Reviewed: 3/2/2022          Codes Class Noted POA    * (Principal) Sigmoid volvulus ([facility] [address] ICD-10-CM: K56.2  ICD-9-CM: 560.2  2/26/2022 Yes            Bowel function returning. White blood cell count normalized. Tolerating liquids without difficulty. Plan/Recommendations/Medical Decision Making:     Advance to dysphagia diet, assess tolerance. To bed, ambulating. Chronic medications. Spirometry, DVT prophylaxis.

## 2022-03-14 NOTE — PROGRESS NOTES
Transition of Care: TBD; likely back to his group home; patient normally lives in an adult group home called [address] at [facility] [address]     Transport Plan: TBD; likely roundtrip ride via [address] [place] [address] or [address]: 14%     DX: Sigmoid volvulus     Main contact is caregiver and owner of group home- Robby Armstrongmariver [phone]

## 2022-03-14 NOTE — ROUTINE PROCESS
Bedside and Verbal shift change report given to Vladimir Nieto (oncoming nurse) by Corona Montalvo (offgoing nurse). Report included the following information SBAR, Kardex, Intake/Output, MAR and Recent Results.

## 2022-03-14 NOTE — PROGRESS NOTES
[facility] [facility] Adult  Hospitalist Group                                                                                          Hospitalist Progress Note  Nithin Thakkar MD  Answering service: [phone] or [phone] from in house phone        Date of Service:  3/14/2022  NAME:  Vicky Dorantes  :  1965  MRN:  [id number]      Admission Summary:   Gayla Christiansen is a 61 y.o. male  presented to the ED on  with constipation and was admitted for sigmoid volvulus. He underwent colonoscopy with decompression on  midnight  Patient underwent colonoscopic decompression on . He underwent laparoscopic sigmoid resection on 3/3    Interval history / Subjective:      Reports less abdominal pain. Denied nausea,vomiting  Assessment & Plan:     Sigmoid volvulus POA status post laparoscopic sigmoid resection 22  -3/10: Underwent urgent laparoscopic lysis of adhesion and repair of incisional hernia with mesh  --Pain control better   --Tolerating clears, being advanced to dysphagia die t  --Further management per surgery    Rising WBC. Afebrile. Patient nontoxic.    --CT A/P reported high-grade small bowel obstruction and possible peritonitis. He is already on Zosyn and WBC declining. Patient is not clinically toxic or septic      Chronic hyponatremia. -Urine NA less than 20  -Sodium stable in the low 130s  --Monitor. Hypokalemia/hypomagnesemia  -replete PRN    Hx of Paranoid schizophrenia. -does not voice si/hi/hallucinations    Hx of COPD without bronchospasm: As needed bronchodilators. Hypertension      Dyslipidemia. Tobacco use      Hx of Left lung cancer, status post resection.          Code status: full   Prophylaxis: lovenox  Care Plan discussed with: patient   Anticipated Disposition: may need rehab     Hospital Problems  Date Reviewed: 3/2/2022          Codes Class Noted POA    * (Principal) Sigmoid volvulus ([facility] [address] ICD-10-CM: K56.2  ICD-9-CM: 560.2  2022 Yes Review of Systems:   Pertinent items are noted in HPI. Vital Signs:    Last 24hrs VS reviewed since prior progress note. Most recent are:  Visit Vitals  BP (!) 143/78   Pulse 90   Temp 98.3 °F (36.8 °C)   Resp 18   Ht 5' 7\" (1.702 m)   Wt 69.5 kg (153 lb 4.8 oz)   SpO2 91%   BMI 24.01 kg/m²         Intake/Output Summary (Last 24 hours) at 3/14/2022 1822  Last data filed at 3/14/2022 1559  Gross per 24 hour   Intake 740 ml   Output 5050 ml   Net -4310 ml        Physical Examination:     I had a face to face encounter with this patient and independently examined them on 3/14/2022 as outlined below:          Constitutional:  And oriented, not in distress   ENT:  No pallor or jaundice. Resp:  CTA bilaterally. Normal work of breathing   Chest:  No accessory muscle use   CV:  Regular rhythm, normal rate, no rubs    GI:  Laparoscopic wounds. Abdomen normal active, minimally diffusely tender without rebound or guarding. Musculoskeletal:  No edema, warm, 2+ pulses throughout    Neurologic:  Moves all extremities, awake, alert  Skin: no jaundice or cyanosis  Psych: does not voice si/hi/hallucinations, not agitated            Data Review:       Labs:     Recent Labs     03/14/22  0525 03/13/22  0340   WBC 10.6 13.6*   HGB 14.2 13.5   HCT 41.6 39.6   * 360     Recent Labs     03/14/22  0525 03/13/22  0340 03/12/22  1754 03/12/22  0305 03/12/22  0305   * 131* 129*   < > 128*   K 4.0 3.9 3.6   < > 3.5    101 100   < > 96*   CO2 25 25 23   < > 25   BUN 7 7 6   < > 6   CREA 0.58* 0.62* 0.50*   < > 0.48*   GLU 89 115* 95   < > 103*   CA 8.2* 8.2* 7.8*   < > 8.5   MG 1.9  --   --   --  1.8   PHOS 3.4  --  3.4  --  3.9    < > = values in this interval not displayed.      Recent Labs     03/12/22  1754   ALB 2.7*       Medications Reviewed:     Current Facility-Administered Medications   Medication Dose Route Frequency    TPN ADULT - CENTRAL   IntraVENous CONTINUOUS    TPN ADULT - CENTRAL IntraVENous CONTINUOUS    carvediloL (COREG) tablet 25 mg  25 mg Oral BID WITH MEALS    cloNIDine HCL (CATAPRES) tablet 0.1 mg  0.1 mg Oral Q8H PRN    lactated Ringers infusion  75 mL/hr IntraVENous CONTINUOUS    piperacillin-tazobactam (ZOSYN) 3.375 g in 0.9% sodium chloride (MBP/ADV) 100 mL MBP  3.375 g IntraVENous Q8H    enoxaparin (LOVENOX) injection 40 mg  40 mg SubCUTAneous DAILY    fat emulsion 20% (LIPOSYN, INTRAlipid) infusion 500 mL  500 mL IntraVENous Q MON, WED & FRI    metoclopramide HCl (REGLAN) injection 10 mg  10 mg IntraVENous Q6H    naloxone (NARCAN) injection 0.4 mg  0.4 mg IntraVENous EVERY 2 MINUTES AS NEEDED    simethicone (MYLICON) tablet 80 mg  80 mg Oral TIDAC    atorvastatin (LIPITOR) tablet 20 mg  20 mg Oral DAILY    sodium chloride (NS) flush 5-40 mL  5-40 mL IntraVENous Q8H    sodium chloride (NS) flush 5-40 mL  5-40 mL IntraVENous PRN    ondansetron (ZOFRAN ODT) tablet 4 mg  4 mg Oral Q8H PRN    Or    ondansetron (ZOFRAN) injection 4 mg  4 mg IntraVENous Q6H PRN    oxyCODONE IR (ROXICODONE) tablet 5 mg  5 mg Oral Q4H PRN    HYDROmorphone (DILAUDID) injection 1 mg  1 mg IntraVENous Q3H PRN    butalbital-acetaminophen-caffeine (FIORICET, ESGIC) -40 mg per tablet 1 Tablet  1 Tablet Oral Q4H PRN    albuterol-ipratropium (DUO-NEB) 2.5 MG-0.5 MG/3 ML  3 mL Nebulization Q4H PRN    sodium chloride (NS) flush 5-40 mL  5-40 mL IntraVENous Q8H    sodium chloride (NS) flush 5-40 mL  5-40 mL IntraVENous PRN    acetaminophen (TYLENOL) tablet 650 mg  650 mg Oral Q6H PRN    Or    acetaminophen (TYLENOL) suppository 650 mg  650 mg Rectal Q6H PRN    ondansetron (ZOFRAN ODT) tablet 4 mg  4 mg Oral Q8H PRN    Or    ondansetron (ZOFRAN) injection 4 mg  4 mg IntraVENous Q6H PRN    nicotine (NICODERM CQ) 21 mg/24 hr patch 1 Patch  1 Patch TransDERmal DAILY    hydrALAZINE (APRESOLINE) 20 mg/mL injection 10 mg  10 mg IntraVENous Q6H PRN    pantoprazole (PROTONIX) injection 40 mg 40 mg IntraVENous DAILY    And    sodium chloride (NS) flush 10 mL  10 mL IntraVENous DAILY    benztropine (COGENTIN) tablet 1 mg  1 mg Oral BID    clonazePAM (KlonoPIN) tablet 1 mg  1 mg Oral BID PRN    DULoxetine (CYMBALTA) capsule 30 mg  30 mg Oral 7am    DULoxetine (CYMBALTA) capsule 60 mg  60 mg Oral QHS    gabapentin (NEURONTIN) tablet 600 mg  600 mg Oral TID    risperiDONE (RisperDAL) tablet 2 mg  2 mg Oral BID    albuterol-ipratropium (DUO-NEB) 2.5 MG-0.5 MG/3 ML  3 mL Nebulization Q6H PRN    arformoteroL (BROVANA) neb solution 15 mcg  15 mcg Nebulization BID RT    And    budesonide (PULMICORT) 500 mcg/2 ml nebulizer suspension  500 mcg Nebulization BID RT   No results found.   ______________________________________________________________________  EXPECTED LENGTH OF STAY: 5d 16h  ACTUAL LENGTH OF STAY:          16                 Venice Smith MD

## 2022-03-14 NOTE — PROGRESS NOTES
Bedside shift change report given to DARCI Jacobs (oncoming nurse) by Wendy Gore RN (offgoing nurse). Report included the following information SBAR, Kardex, Intake/Output, MAR and Recent Results.

## 2022-03-15 LAB
ANION GAP SERPL CALC-SCNC: 4 MMOL/L (ref 5–15)
BASOPHILS # BLD: 0 K/UL (ref 0–0.1)
BASOPHILS NFR BLD: 0 % (ref 0–1)
BUN SERPL-MCNC: 8 MG/DL (ref 6–20)
BUN/CREAT SERPL: 15 (ref 12–20)
CALCIUM SERPL-MCNC: 8.4 MG/DL (ref 8.5–10.1)
CHLORIDE SERPL-SCNC: 98 MMOL/L (ref 97–108)
CO2 SERPL-SCNC: 26 MMOL/L (ref 21–32)
CREAT SERPL-MCNC: 0.54 MG/DL (ref 0.7–1.3)
DIFFERENTIAL METHOD BLD: ABNORMAL
EOSINOPHIL # BLD: 0.4 K/UL (ref 0–0.4)
EOSINOPHIL NFR BLD: 4 % (ref 0–7)
ERYTHROCYTE [DISTWIDTH] IN BLOOD BY AUTOMATED COUNT: 11.9 % (ref 11.5–14.5)
GLUCOSE BLD STRIP.AUTO-MCNC: 101 MG/DL (ref 65–117)
GLUCOSE BLD STRIP.AUTO-MCNC: 117 MG/DL (ref 65–117)
GLUCOSE BLD STRIP.AUTO-MCNC: 79 MG/DL (ref 65–117)
GLUCOSE BLD STRIP.AUTO-MCNC: 98 MG/DL (ref 65–117)
GLUCOSE SERPL-MCNC: 117 MG/DL (ref 65–100)
HCT VFR BLD AUTO: 41.6 % (ref 36.6–50.3)
HGB BLD-MCNC: 14.3 G/DL (ref 12.1–17)
IMM GRANULOCYTES # BLD AUTO: 0 K/UL
IMM GRANULOCYTES NFR BLD AUTO: 0 %
LYMPHOCYTES # BLD: 1.9 K/UL (ref 0.8–3.5)
LYMPHOCYTES NFR BLD: 18 % (ref 12–49)
MCH RBC QN AUTO: 31.8 PG (ref 26–34)
MCHC RBC AUTO-ENTMCNC: 34.4 G/DL (ref 30–36.5)
MCV RBC AUTO: 92.4 FL (ref 80–99)
METAMYELOCYTES NFR BLD MANUAL: 2 %
MONOCYTES # BLD: 1.1 K/UL (ref 0–1)
MONOCYTES NFR BLD: 10 % (ref 5–13)
NEUTS BAND NFR BLD MANUAL: 4 % (ref 0–6)
NEUTS SEG # BLD: 7 K/UL (ref 1.8–8)
NEUTS SEG NFR BLD: 62 % (ref 32–75)
NRBC # BLD: 0 K/UL (ref 0–0.01)
NRBC BLD-RTO: 0 PER 100 WBC
PLATELET # BLD AUTO: 381 K/UL (ref 150–400)
PLATELET COMMENTS,PCOM: ABNORMAL
PMV BLD AUTO: 9.2 FL (ref 8.9–12.9)
POTASSIUM SERPL-SCNC: 3.6 MMOL/L (ref 3.5–5.1)
RBC # BLD AUTO: 4.5 M/UL (ref 4.1–5.7)
RBC MORPH BLD: ABNORMAL
SERVICE CMNT-IMP: NORMAL
SODIUM SERPL-SCNC: 128 MMOL/L (ref 136–145)
TOTAL CELLS COUNTED SPEC: 50
WBC # BLD AUTO: 10.6 K/UL (ref 4.1–11.1)

## 2022-03-15 PROCEDURE — 85025 COMPLETE CBC W/AUTO DIFF WBC: CPT

## 2022-03-15 PROCEDURE — 65270000032 HC RM SEMIPRIVATE

## 2022-03-15 PROCEDURE — 74011250636 HC RX REV CODE- 250/636: Performed by: SURGERY

## 2022-03-15 PROCEDURE — 82962 GLUCOSE BLOOD TEST: CPT

## 2022-03-15 PROCEDURE — 74011250637 HC RX REV CODE- 250/637: Performed by: SURGERY

## 2022-03-15 PROCEDURE — 80048 BASIC METABOLIC PNL TOTAL CA: CPT

## 2022-03-15 PROCEDURE — 74011000250 HC RX REV CODE- 250: Performed by: SURGERY

## 2022-03-15 PROCEDURE — C9113 INJ PANTOPRAZOLE SODIUM, VIA: HCPCS | Performed by: SURGERY

## 2022-03-15 PROCEDURE — 36415 COLL VENOUS BLD VENIPUNCTURE: CPT

## 2022-03-15 PROCEDURE — 74011000258 HC RX REV CODE- 258: Performed by: SURGERY

## 2022-03-15 PROCEDURE — 74011250637 HC RX REV CODE- 250/637: Performed by: HOSPITALIST

## 2022-03-15 RX ADMIN — SODIUM CHLORIDE, PRESERVATIVE FREE 10 ML: 5 INJECTION INTRAVENOUS at 06:00

## 2022-03-15 RX ADMIN — RISPERIDONE 2 MG: 1 TABLET, FILM COATED ORAL at 09:04

## 2022-03-15 RX ADMIN — Medication 80 MG: at 11:35

## 2022-03-15 RX ADMIN — METOCLOPRAMIDE 10 MG: 5 INJECTION, SOLUTION INTRAMUSCULAR; INTRAVENOUS at 00:46

## 2022-03-15 RX ADMIN — GABAPENTIN 600 MG: 600 TABLET, FILM COATED ORAL at 21:38

## 2022-03-15 RX ADMIN — CARVEDILOL 25 MG: 12.5 TABLET, FILM COATED ORAL at 07:04

## 2022-03-15 RX ADMIN — Medication 80 MG: at 07:03

## 2022-03-15 RX ADMIN — ATORVASTATIN CALCIUM 20 MG: 20 TABLET, FILM COATED ORAL at 09:04

## 2022-03-15 RX ADMIN — DULOXETINE 60 MG: 30 CAPSULE, DELAYED RELEASE ORAL at 21:38

## 2022-03-15 RX ADMIN — OXYCODONE 5 MG: 5 TABLET ORAL at 07:09

## 2022-03-15 RX ADMIN — ENOXAPARIN SODIUM 40 MG: 100 INJECTION SUBCUTANEOUS at 09:04

## 2022-03-15 RX ADMIN — BENZTROPINE MESYLATE 1 MG: 1 TABLET ORAL at 19:12

## 2022-03-15 RX ADMIN — OXYCODONE 5 MG: 5 TABLET ORAL at 19:59

## 2022-03-15 RX ADMIN — RISPERIDONE 2 MG: 1 TABLET, FILM COATED ORAL at 19:11

## 2022-03-15 RX ADMIN — PANTOPRAZOLE SODIUM 40 MG: 40 INJECTION, POWDER, FOR SOLUTION INTRAVENOUS at 09:04

## 2022-03-15 RX ADMIN — OXYCODONE 5 MG: 5 TABLET ORAL at 11:35

## 2022-03-15 RX ADMIN — METOCLOPRAMIDE 10 MG: 5 INJECTION, SOLUTION INTRAMUSCULAR; INTRAVENOUS at 07:04

## 2022-03-15 RX ADMIN — CARVEDILOL 25 MG: 12.5 TABLET, FILM COATED ORAL at 19:11

## 2022-03-15 RX ADMIN — BENZTROPINE MESYLATE 1 MG: 1 TABLET ORAL at 09:04

## 2022-03-15 RX ADMIN — METOCLOPRAMIDE 10 MG: 5 INJECTION, SOLUTION INTRAMUSCULAR; INTRAVENOUS at 11:35

## 2022-03-15 RX ADMIN — Medication 80 MG: at 16:10

## 2022-03-15 RX ADMIN — METOCLOPRAMIDE 10 MG: 5 INJECTION, SOLUTION INTRAMUSCULAR; INTRAVENOUS at 19:12

## 2022-03-15 RX ADMIN — OXYCODONE 5 MG: 5 TABLET ORAL at 16:09

## 2022-03-15 RX ADMIN — PIPERACILLIN SODIUM AND TAZOBACTAM SODIUM 3.38 G: 3; 375 INJECTION, POWDER, FOR SOLUTION INTRAVENOUS at 14:07

## 2022-03-15 RX ADMIN — DULOXETINE HYDROCHLORIDE 30 MG: 30 CAPSULE, DELAYED RELEASE ORAL at 07:03

## 2022-03-15 RX ADMIN — PIPERACILLIN SODIUM AND TAZOBACTAM SODIUM 3.38 G: 3; 375 INJECTION, POWDER, FOR SOLUTION INTRAVENOUS at 07:04

## 2022-03-15 RX ADMIN — GABAPENTIN 600 MG: 600 TABLET, FILM COATED ORAL at 09:04

## 2022-03-15 RX ADMIN — GABAPENTIN 600 MG: 600 TABLET, FILM COATED ORAL at 16:09

## 2022-03-15 RX ADMIN — METOCLOPRAMIDE 10 MG: 5 INJECTION, SOLUTION INTRAMUSCULAR; INTRAVENOUS at 23:26

## 2022-03-15 RX ADMIN — SODIUM CHLORIDE, PRESERVATIVE FREE 10 ML: 5 INJECTION INTRAVENOUS at 22:00

## 2022-03-15 NOTE — PROGRESS NOTES
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 Tolerate po intake, na down some. TPN to stop today. No cp, sob, n/v/d. Current Medications: all current  Medications have been eviewed in EPIC  Review of Systems: Pertinent items are noted in HPI. Objective:  Vitals:    Vitals:    03/14/22 2021 03/15/22 0010 03/15/22 0355 03/15/22 0744   BP: (!) 141/72 139/79  122/80   Pulse: 91 89  90   Resp: 18 16  16   Temp: 98.5 °F (36.9 °C) 98.7 °F (37.1 °C)  97.4 °F (36.3 °C)   SpO2: 95% 97%  99%   Weight:   72.8 kg (160 lb 7.9 oz)    Height:         Intake and Output:  03/15 0701 - 03/15 1900  In: -   Out: 800 [Urine:800]  03/13 1901 - 03/15 0700  In: 740 [P.O.:740]  Out: 6250 [Urine:6250]    Physical Examination:    General: NAD,Conversant   Neck:  Supple, no mass  Resp:  Lungs CTA B/L, no wheezing , normal respiratory effort  CV:  RRR,  no murmur or rub, no LE edema  GI:  Soft, NT, + Bowel sounds, no hepatosplenomegaly  Neurologic:  Non focal  Psych:             AAO x 3 appropriate affect   Skin:  No Rash  :  No rees    []    High complexity decision making was performed  []    Patient is at high-risk of decompensation with multiple organ involvement    Lab Data Personally Reviewed: I have reviewed all the pertinent labs, microbiology data and radiology studies during assessment.     Recent Labs     03/15/22  0047 03/14/22  0525 03/13/22  0340 03/12/22  1754 03/12/22  1754   * 131* 131*   < > 129*   K 3.6 4.0 3.9   < > 3.6   CL 98 101 101   < > 100   CO2 26 25 25   < > 23   * 89 115*   < > 95   BUN 8 7 7   < > 6   CREA 0.54* 0.58* 0.62*   < > 0.50*   CA 8.4* 8.2* 8.2*   < > 7.8*   MG  --  1.9  --   --   --    PHOS  --  3.4  --   --  3.4   ALB  --   --   --   --  2.7*    < > = values in this interval not displayed. Recent Labs     03/15/22  0047 03/14/22  0525 03/13/22  0340   WBC 10.6 10.6 13.6*   HGB 14.3 14.2 13.5   HCT 41.6 41.6 39.6    406* 360     No results found for: SDES  Lab Results   Component Value Date/Time    Culture result: NO GROWTH 5 DAYS 03/07/2017 08:09 AM    Culture result: NORMAL RESPIRATORY ALFRED/NO BETA STREP ISOLATED 03/07/2017 08:09 AM     Recent Results (from the past 24 hour(s))   GLUCOSE, POC    Collection Time: 03/14/22 11:17 AM   Result Value Ref Range    Glucose (POC) 105 65 - 117 mg/dL    Performed by [employer]  PCT    GLUCOSE, POC    Collection Time: 03/14/22  4:28 PM   Result Value Ref Range    Glucose (POC) 122 (H) 65 - 117 mg/dL    Performed by [facility]  PCT    GLUCOSE, POC    Collection Time: 03/15/22 12:07 AM   Result Value Ref Range    Glucose (POC) 117 65 - 117 mg/dL    Performed by Kina Chen    METABOLIC PANEL, BASIC    Collection Time: 03/15/22 12:47 AM   Result Value Ref Range    Sodium 128 (L) 136 - 145 mmol/L    Potassium 3.6 3.5 - 5.1 mmol/L    Chloride 98 97 - 108 mmol/L    CO2 26 21 - 32 mmol/L    Anion gap 4 (L) 5 - 15 mmol/L    Glucose 117 (H) 65 - 100 mg/dL    BUN 8 6 - 20 MG/DL    Creatinine 0.54 (L) 0.70 - 1.30 MG/DL    BUN/Creatinine ratio 15 12 - 20      GFR est AA >60 >60 ml/min/1.73m2    GFR est non-AA >60 >60 ml/min/1.73m2    Calcium 8.4 (L) 8.5 - 10.1 MG/DL   CBC WITH AUTOMATED DIFF    Collection Time: 03/15/22 12:47 AM   Result Value Ref Range    WBC 10.6 4.1 - 11.1 K/uL    RBC 4.50 4. 10 - 5.70 M/uL    HGB 14.3 12.1 - 17.0 g/dL    HCT 41.6 36.6 - 50.3 %    MCV 92.4 80.0 - 99.0 FL    MCH 31.8 26.0 - 34.0 PG MCHC 34.4 30.0 - 36.5 g/dL    RDW 11.9 11.5 - 14.5 %    PLATELET 002 674 - 498 K/uL    MPV 9.2 8.9 - 12.9 FL    NRBC 0.0 0  WBC    ABSOLUTE NRBC 0.00 0.00 - 0.01 K/uL    NEUTROPHILS 62 32 - 75 %    BAND NEUTROPHILS 4 0 - 6 %    LYMPHOCYTES 18 12 - 49 %    MONOCYTES 10 5 - 13 %    EOSINOPHILS 4 0 - 7 %    BASOPHILS 0 0 - 1 %    METAMYELOCYTES 2 (H) 0 %    IMMATURE GRANULOCYTES 0 %    TOTAL CELLS COUNTED 50      ABS. NEUTROPHILS 7.0 1.8 - 8.0 K/UL    ABS. LYMPHOCYTES 1.9 0.8 - 3.5 K/UL    ABS. MONOCYTES 1.1 (H) 0.0 - 1.0 K/UL    ABS. EOSINOPHILS 0.4 0.0 - 0.4 K/UL    ABS. BASOPHILS 0.0 0.0 - 0.1 K/UL    ABS. IMM. GRANS. 0.0 K/UL    DF MANUAL      PLATELET COMMENTS Large Platelets      RBC COMMENTS MACROCYTOSIS  1+       GLUCOSE, POC    Collection Time: 03/15/22  6:08 AM   Result Value Ref Range    Glucose (POC) 98 65 - 117 mg/dL    Performed by Anila Keane MD  [address]  Phone - [phone]   Fax - [phone]  www. [email or web address]

## 2022-03-15 NOTE — PROGRESS NOTES
[facility] Adult  Hospitalist Group                                                                                          Hospitalist Progress Note  Jace Garcia MD  Answering service: [phone] OR [phone] from in house phone        Date of Service:  3/15/2022  NAME:  Denver Walker  :  1965  MRN:  [id number]      Admission Summary:   Randy Barclay is a 61 y.o. male  presented to the ED on  with constipation and was admitted for sigmoid volvulus. He underwent colonoscopy with decompression on  midnight  Patient underwent colonoscopic decompression on . He underwent laparoscopic sigmoid resection on 3/3    Interval history / Subjective:      Progressing well, tolerating regular diet. Passing gas and bowel movements. Assessment & Plan:     Sigmoid volvulus POA status post laparoscopic sigmoid resection 22  -3/10: Underwent urgent laparoscopic lysis of adhesion and repair of incisional hernia with mesh  --Pain control better   --Tolerating regular diet.  --Dr. Aristides Demarco suggesting possible discharge to group home tomorrow. Rising WBC. Afebrile. Patient nontoxic.    --CT A/P reported high-grade small bowel obstruction and possible peritonitis. He is already on Zosyn. WBC normalized. Patient is not clinically toxic or septic      Chronic hyponatremia. -Urine NA less than 20  -Sodium stable in the low 130s  --Monitor. Hypokalemia/hypomagnesemia  -replete PRN    Hx of Paranoid schizophrenia. -does not voice si/hi/hallucinations    Hx of COPD without bronchospasm: As needed bronchodilators. Hypertension      Dyslipidemia. Tobacco use      Hx of Left lung cancer, status post resection.          Code status: full   Prophylaxis: lovenox  Care Plan discussed with: patient   Anticipated Disposition: may need rehab     Hospital Problems  Date Reviewed: 3/2/2022          Codes Class Noted POA    * (Principal) Sigmoid volvulus ([facility] ICD-10-CM: F16.6  ICD-9-CM: 560.2  2/26/2022 Yes                Review of Systems:   Pertinent items are noted in HPI. Vital Signs:    Last 24hrs VS reviewed since prior progress note. Most recent are:  Visit Vitals  BP (!) 152/83   Pulse 80   Temp 97.9 °F (36.6 °C)   Resp 16   Ht 5' 7\" (1.702 m)   Wt 72.8 kg (160 lb 7.9 oz)   SpO2 97%   BMI 25.14 kg/m²         Intake/Output Summary (Last 24 hours) at 3/15/2022 1922  Last data filed at 3/15/2022 1648  Gross per 24 hour   Intake --   Output 2300 ml   Net -2300 ml        Physical Examination:     I had a face to face encounter with this patient and independently examined them on 3/15/2022 as outlined below:          Constitutional:  And oriented, not in distress   ENT:  No pallor or jaundice. Resp:  CTA bilaterally. Normal work of breathing   Chest:  No accessory muscle use   CV:  Regular rhythm, normal rate, no rubs    GI:  Laparoscopic wounds. Abdomen normal active, minimally diffusely tender without rebound or guarding. Musculoskeletal:  No edema, warm, 2+ pulses throughout    Neurologic:  Moves all extremities, awake, alert  Skin: no jaundice or cyanosis  Psych: does not voice si/hi/hallucinations, not agitated            Data Review:       Labs:     Recent Labs     03/15/22  0047 03/14/22  0525   WBC 10.6 10.6   HGB 14.3 14.2   HCT 41.6 41.6    406*     Recent Labs     03/15/22  0047 03/14/22  0525 03/13/22  0340   * 131* 131*   K 3.6 4.0 3.9   CL 98 101 101   CO2 26 25 25   BUN 8 7 7   CREA 0.54* 0.58* 0.62*   * 89 115*   CA 8.4* 8.2* 8.2*   MG  --  1.9  --    PHOS  --  3.4  --      No results for input(s): ALT, AP, TBIL, TBILI, TP, ALB, GLOB, GGT, AML, LPSE in the last 72 hours.     No lab exists for component: SGOT, GPT, AMYP, HLPSE    Medications Reviewed:     Current Facility-Administered Medications   Medication Dose Route Frequency    carvediloL (COREG) tablet 25 mg  25 mg Oral BID WITH MEALS    cloNIDine HCL (CATAPRES) tablet 0.1 mg  0.1 mg Oral Q8H PRN    piperacillin-tazobactam (ZOSYN) 3.375 g in 0.9% sodium chloride (MBP/ADV) 100 mL MBP  3.375 g IntraVENous Q8H    enoxaparin (LOVENOX) injection 40 mg  40 mg SubCUTAneous DAILY    fat emulsion 20% (LIPOSYN, INTRAlipid) infusion 500 mL  500 mL IntraVENous Q MON, WED & FRI    metoclopramide HCl (REGLAN) injection 10 mg  10 mg IntraVENous Q6H    naloxone (NARCAN) injection 0.4 mg  0.4 mg IntraVENous EVERY 2 MINUTES AS NEEDED    simethicone (MYLICON) tablet 80 mg  80 mg Oral TIDAC    atorvastatin (LIPITOR) tablet 20 mg  20 mg Oral DAILY    sodium chloride (NS) flush 5-40 mL  5-40 mL IntraVENous Q8H    sodium chloride (NS) flush 5-40 mL  5-40 mL IntraVENous PRN    ondansetron (ZOFRAN ODT) tablet 4 mg  4 mg Oral Q8H PRN    Or    ondansetron (ZOFRAN) injection 4 mg  4 mg IntraVENous Q6H PRN    oxyCODONE IR (ROXICODONE) tablet 5 mg  5 mg Oral Q4H PRN    butalbital-acetaminophen-caffeine (FIORICET, ESGIC) -40 mg per tablet 1 Tablet  1 Tablet Oral Q4H PRN    albuterol-ipratropium (DUO-NEB) 2.5 MG-0.5 MG/3 ML  3 mL Nebulization Q4H PRN    sodium chloride (NS) flush 5-40 mL  5-40 mL IntraVENous Q8H    sodium chloride (NS) flush 5-40 mL  5-40 mL IntraVENous PRN    acetaminophen (TYLENOL) tablet 650 mg  650 mg Oral Q6H PRN    Or    acetaminophen (TYLENOL) suppository 650 mg  650 mg Rectal Q6H PRN    ondansetron (ZOFRAN ODT) tablet 4 mg  4 mg Oral Q8H PRN    Or    ondansetron (ZOFRAN) injection 4 mg  4 mg IntraVENous Q6H PRN    nicotine (NICODERM CQ) 21 mg/24 hr patch 1 Patch  1 Patch TransDERmal DAILY    hydrALAZINE (APRESOLINE) 20 mg/mL injection 10 mg  10 mg IntraVENous Q6H PRN    pantoprazole (PROTONIX) injection 40 mg  40 mg IntraVENous DAILY    And    sodium chloride (NS) flush 10 mL  10 mL IntraVENous DAILY    benztropine (COGENTIN) tablet 1 mg  1 mg Oral BID    clonazePAM (KlonoPIN) tablet 1 mg  1 mg Oral BID PRN    DULoxetine (CYMBALTA) capsule 30 mg  30 mg Oral 7am    DULoxetine (CYMBALTA) capsule 60 mg  60 mg Oral QHS    gabapentin (NEURONTIN) tablet 600 mg  600 mg Oral TID    risperiDONE (RisperDAL) tablet 2 mg  2 mg Oral BID    albuterol-ipratropium (DUO-NEB) 2.5 MG-0.5 MG/3 ML  3 mL Nebulization Q6H PRN    arformoteroL (BROVANA) neb solution 15 mcg  15 mcg Nebulization BID RT    And    budesonide (PULMICORT) 500 mcg/2 ml nebulizer suspension  500 mcg Nebulization BID RT   No results found.   ______________________________________________________________________  EXPECTED LENGTH OF STAY: 10d 7h  ACTUAL LENGTH OF STAY:          17                 Rip John MD

## 2022-03-15 NOTE — PROGRESS NOTES
Progress Note    Patient: Nenita Barragan MRN: [id number]  SSN: [id number]    YOB: 1965  Age: 61 y.o. Sex: male      Admit Date: 2022    5 Days Post-Op    Procedure:  Procedure(s):  DIAGNOSTIC LAPAROSCOPY , Laproscopic lysis of adhesions, laparoscopic hernia repair with mesh. Subjective:     Patient is tolerating solid diet without difficulty. He is passing gas and bowel movements; ambulating. Objective:     Visit Vitals  /80   Pulse 90   Temp 97.4 °F (36.3 °C)   Resp 16   Ht 5' 7\" (1.702 m)   Wt 160 lb 7.9 oz (72.8 kg)   SpO2 99%   BMI 25.14 kg/m²       Temp (24hrs), Av.1 °F (36.7 °C), Min:97.4 °F (36.3 °C), Max:98.7 °F (37.1 °C)      Physical Exam:    ABDOMEN: Nondistended, soft. Wounds intact without signs of infection. Appropriate incisional pain with palpation.     Data Review: Vital signs, ins/outs, labs    Lab Review:   Recent Results (from the past 24 hour(s))   GLUCOSE, POC    Collection Time: 22 11:17 AM   Result Value Ref Range    Glucose (POC) 105 65 - 117 mg/dL    Performed by [employer] [place]  PCT    GLUCOSE, POC    Collection Time: 22  4:28 PM   Result Value Ref Range    Glucose (POC) 122 (H) 65 - 117 mg/dL    Performed by [employer] [place]  PCT    GLUCOSE, POC    Collection Time: 03/15/22 12:07 AM   Result Value Ref Range    Glucose (POC) 117 65 - 117 mg/dL    Performed by Kina Chen    METABOLIC PANEL, BASIC    Collection Time: 03/15/22 12:47 AM   Result Value Ref Range    Sodium 128 (L) 136 - 145 mmol/L    Potassium 3.6 3.5 - 5.1 mmol/L    Chloride 98 97 - 108 mmol/L    CO2 26 21 - 32 mmol/L    Anion gap 4 (L) 5 - 15 mmol/L    Glucose 117 (H) 65 - 100 mg/dL    BUN 8 6 - 20 MG/DL    Creatinine 0.54 (L) 0.70 - 1.30 MG/DL    BUN/Creatinine ratio 15 12 - 20      GFR est AA >60 >60 ml/min/1.73m2    GFR est non-AA >60 >60 ml/min/1.73m2    Calcium 8.4 (L) 8.5 - 10.1 MG/DL   CBC WITH AUTOMATED DIFF    Collection Time: 03/15/22 12:47 AM Result Value Ref Range    WBC 10.6 4.1 - 11.1 K/uL    RBC 4.50 4. 10 - 5.70 M/uL    HGB 14.3 12.1 - 17.0 g/dL    HCT 41.6 36.6 - 50.3 %    MCV 92.4 80.0 - 99.0 FL    MCH 31.8 26.0 - 34.0 PG    MCHC 34.4 30.0 - 36.5 g/dL    RDW 11.9 11.5 - 14.5 %    PLATELET 712 955 - 742 K/uL    MPV 9.2 8.9 - 12.9 FL    NRBC 0.0 0  WBC    ABSOLUTE NRBC 0.00 0.00 - 0.01 K/uL    NEUTROPHILS 62 32 - 75 %    BAND NEUTROPHILS 4 0 - 6 %    LYMPHOCYTES 18 12 - 49 %    MONOCYTES 10 5 - 13 %    EOSINOPHILS 4 0 - 7 %    BASOPHILS 0 0 - 1 %    METAMYELOCYTES 2 (H) 0 %    IMMATURE GRANULOCYTES 0 %    TOTAL CELLS COUNTED 50      ABS. NEUTROPHILS 7.0 1.8 - 8.0 K/UL    ABS. LYMPHOCYTES 1.9 0.8 - 3.5 K/UL    ABS. MONOCYTES 1.1 (H) 0.0 - 1.0 K/UL    ABS. EOSINOPHILS 0.4 0.0 - 0.4 K/UL    ABS. BASOPHILS 0.0 0.0 - 0.1 K/UL    ABS. IMM. GRANS. 0.0 K/UL    DF MANUAL      PLATELET COMMENTS Large Platelets      RBC COMMENTS MACROCYTOSIS  1+       GLUCOSE, POC    Collection Time: 03/15/22  6:08 AM   Result Value Ref Range    Glucose (POC) 98 65 - 117 mg/dL    Performed by Joe Watson          Assessment:     Hospital Problems  Date Reviewed: 3/2/2022          Codes Class Noted POA    * (Principal) Sigmoid volvulus ([facility] ICD-10-CM: K56.2  ICD-9-CM: 560.2  2/26/2022 Yes            Bowel function returning. Plan/Recommendations/Medical Decision Making:     Continue dysphagia diet. Discontinue TPN. Out of bed, ambulating. Chronic medications. Oral analgesics. Spirometry, DVT prophylaxis. Anticipate ready for discharge to group home tomorrow.

## 2022-03-15 NOTE — PROGRESS NOTES
Transition of Care: back to his group home; patient normally lives in an adult group home called [address] at [facility] [address]     Transport Plan: likely roundtrip ride via [facility] or [address]: 13%     DX: Sigmoid volvulus     Main contact is caregiver and owner of group home- Crow Alert [phone]     Discharge pending:  -patient is POD#14 laparoscopic sigmoid resection   -pending diet advancement  -removal of stitches from procedure        1400: this CM met with patient at bedside; he states he has spoken to his caregiver and group home owner- Ms. gR Bauman twice during this admission about his progress; patient states he is ready to go home when the doctors discharge him;  patient states he will need transport home when discharged; this CM attempted to call Ms. Rg Bauman to inform and update her on patients progress; no answer; left VM for callback     CM following  Rickey Adam, RN, CRM     NOTE: pleasant patient at bedside; he is alert and oriented x 3; patient states he lives at stated address (which is a group home) for several years; he is normally independent in his ADLs; he does not drive; his caregiver- Berny Stella him to appointments;  the group home helps him with medication management; Malorie Tucker does not own a walker or cane to ambulate; he confirms he sees Dr. Gabi Harrison in the same practice as his listed pcp- Lindsey Saucedo; patient states he does not have a history with any home health agency

## 2022-03-15 NOTE — PROGRESS NOTES
Bedside shift change report given to Amauri Leyva RN (oncoming nurse) by Yanick Kiran RN (offgoing nurse). Report included the following information SBAR, Kardex, Intake/Output, MAR and Recent Results.

## 2022-03-16 LAB
ALBUMIN SERPL-MCNC: 3 G/DL (ref 3.5–5)
ANION GAP SERPL CALC-SCNC: 3 MMOL/L (ref 5–15)
ANION GAP SERPL CALC-SCNC: 4 MMOL/L (ref 5–15)
BUN SERPL-MCNC: 7 MG/DL (ref 6–20)
BUN SERPL-MCNC: 7 MG/DL (ref 6–20)
BUN/CREAT SERPL: 10 (ref 12–20)
BUN/CREAT SERPL: 11 (ref 12–20)
CALCIUM SERPL-MCNC: 8.5 MG/DL (ref 8.5–10.1)
CALCIUM SERPL-MCNC: 8.8 MG/DL (ref 8.5–10.1)
CHLORIDE SERPL-SCNC: 100 MMOL/L (ref 97–108)
CHLORIDE SERPL-SCNC: 96 MMOL/L (ref 97–108)
CO2 SERPL-SCNC: 23 MMOL/L (ref 21–32)
CO2 SERPL-SCNC: 26 MMOL/L (ref 21–32)
CREAT SERPL-MCNC: 0.62 MG/DL (ref 0.7–1.3)
CREAT SERPL-MCNC: 0.68 MG/DL (ref 0.7–1.3)
GLUCOSE BLD STRIP.AUTO-MCNC: 92 MG/DL (ref 65–117)
GLUCOSE SERPL-MCNC: 101 MG/DL (ref 65–100)
GLUCOSE SERPL-MCNC: 93 MG/DL (ref 65–100)
OSMOLALITY UR: 212 MOSM/KG H2O
PHOSPHATE SERPL-MCNC: 3.8 MG/DL (ref 2.6–4.7)
POTASSIUM SERPL-SCNC: 4.6 MMOL/L (ref 3.5–5.1)
POTASSIUM SERPL-SCNC: 4.7 MMOL/L (ref 3.5–5.1)
SERVICE CMNT-IMP: NORMAL
SODIUM SERPL-SCNC: 126 MMOL/L (ref 136–145)
SODIUM SERPL-SCNC: 126 MMOL/L (ref 136–145)
SODIUM UR-SCNC: 36 MMOL/L

## 2022-03-16 PROCEDURE — 84300 ASSAY OF URINE SODIUM: CPT

## 2022-03-16 PROCEDURE — 74011250637 HC RX REV CODE- 250/637: Performed by: SURGERY

## 2022-03-16 PROCEDURE — 74011000250 HC RX REV CODE- 250: Performed by: SURGERY

## 2022-03-16 PROCEDURE — 80069 RENAL FUNCTION PANEL: CPT

## 2022-03-16 PROCEDURE — 65270000032 HC RM SEMIPRIVATE

## 2022-03-16 PROCEDURE — 74011250636 HC RX REV CODE- 250/636: Performed by: SURGERY

## 2022-03-16 PROCEDURE — 80048 BASIC METABOLIC PNL TOTAL CA: CPT

## 2022-03-16 PROCEDURE — 74011250637 HC RX REV CODE- 250/637: Performed by: INTERNAL MEDICINE

## 2022-03-16 PROCEDURE — 82962 GLUCOSE BLOOD TEST: CPT

## 2022-03-16 PROCEDURE — 36415 COLL VENOUS BLD VENIPUNCTURE: CPT

## 2022-03-16 PROCEDURE — 74011250637 HC RX REV CODE- 250/637: Performed by: HOSPITALIST

## 2022-03-16 PROCEDURE — C9113 INJ PANTOPRAZOLE SODIUM, VIA: HCPCS | Performed by: SURGERY

## 2022-03-16 PROCEDURE — 83935 ASSAY OF URINE OSMOLALITY: CPT

## 2022-03-16 PROCEDURE — 2709999900 HC NON-CHARGEABLE SUPPLY

## 2022-03-16 RX ADMIN — SODIUM CHLORIDE, PRESERVATIVE FREE 10 ML: 5 INJECTION INTRAVENOUS at 09:41

## 2022-03-16 RX ADMIN — ENOXAPARIN SODIUM 40 MG: 100 INJECTION SUBCUTANEOUS at 09:41

## 2022-03-16 RX ADMIN — ATORVASTATIN CALCIUM 20 MG: 20 TABLET, FILM COATED ORAL at 09:40

## 2022-03-16 RX ADMIN — DULOXETINE 60 MG: 30 CAPSULE, DELAYED RELEASE ORAL at 23:34

## 2022-03-16 RX ADMIN — METOCLOPRAMIDE 10 MG: 5 INJECTION, SOLUTION INTRAMUSCULAR; INTRAVENOUS at 23:34

## 2022-03-16 RX ADMIN — Medication 80 MG: at 11:34

## 2022-03-16 RX ADMIN — SODIUM CHLORIDE, PRESERVATIVE FREE 10 ML: 5 INJECTION INTRAVENOUS at 06:00

## 2022-03-16 RX ADMIN — OXYCODONE 5 MG: 5 TABLET ORAL at 04:17

## 2022-03-16 RX ADMIN — OXYCODONE 5 MG: 5 TABLET ORAL at 23:35

## 2022-03-16 RX ADMIN — CARVEDILOL 25 MG: 12.5 TABLET, FILM COATED ORAL at 17:00

## 2022-03-16 RX ADMIN — METOCLOPRAMIDE 10 MG: 5 INJECTION, SOLUTION INTRAMUSCULAR; INTRAVENOUS at 17:00

## 2022-03-16 RX ADMIN — OXYCODONE 5 MG: 5 TABLET ORAL at 19:43

## 2022-03-16 RX ADMIN — BENZTROPINE MESYLATE 1 MG: 1 TABLET ORAL at 17:00

## 2022-03-16 RX ADMIN — Medication 80 MG: at 17:00

## 2022-03-16 RX ADMIN — GABAPENTIN 600 MG: 600 TABLET, FILM COATED ORAL at 09:40

## 2022-03-16 RX ADMIN — SODIUM CHLORIDE, PRESERVATIVE FREE 30 ML: 5 INJECTION INTRAVENOUS at 22:00

## 2022-03-16 RX ADMIN — OXYCODONE 5 MG: 5 TABLET ORAL at 14:58

## 2022-03-16 RX ADMIN — PANTOPRAZOLE SODIUM 40 MG: 40 INJECTION, POWDER, FOR SOLUTION INTRAVENOUS at 09:40

## 2022-03-16 RX ADMIN — DULOXETINE HYDROCHLORIDE 30 MG: 30 CAPSULE, DELAYED RELEASE ORAL at 07:28

## 2022-03-16 RX ADMIN — CARVEDILOL 25 MG: 12.5 TABLET, FILM COATED ORAL at 07:28

## 2022-03-16 RX ADMIN — GABAPENTIN 600 MG: 600 TABLET, FILM COATED ORAL at 17:00

## 2022-03-16 RX ADMIN — OXYCODONE 5 MG: 5 TABLET ORAL at 09:40

## 2022-03-16 RX ADMIN — RISPERIDONE 2 MG: 1 TABLET, FILM COATED ORAL at 09:40

## 2022-03-16 RX ADMIN — Medication 80 MG: at 07:28

## 2022-03-16 RX ADMIN — RISPERIDONE 2 MG: 1 TABLET, FILM COATED ORAL at 17:00

## 2022-03-16 RX ADMIN — GABAPENTIN 600 MG: 600 TABLET, FILM COATED ORAL at 23:34

## 2022-03-16 RX ADMIN — Medication 15 MG: at 19:44

## 2022-03-16 RX ADMIN — METOCLOPRAMIDE 10 MG: 5 INJECTION, SOLUTION INTRAMUSCULAR; INTRAVENOUS at 11:34

## 2022-03-16 RX ADMIN — OXYCODONE 5 MG: 5 TABLET ORAL at 00:02

## 2022-03-16 RX ADMIN — BENZTROPINE MESYLATE 1 MG: 1 TABLET ORAL at 09:40

## 2022-03-16 RX ADMIN — METOCLOPRAMIDE 10 MG: 5 INJECTION, SOLUTION INTRAMUSCULAR; INTRAVENOUS at 07:28

## 2022-03-16 NOTE — PROGRESS NOTES
Nephrology Progress Note  Misti Ann  Date of Admission : 2/25/2022    CC:  Follow up for hyponatremia       Assessment and Plan     Hyponatremia:  - suspect SAIDH  - urine studies from 3/7 support SIADH  - he is on a SNRI which can cause SIADH  - Na down some  - FR 1 L/d  - may need to be tapered off SNRI under psych guidance  - recheck urine osms and Na today  - repeat labs this PM    Sigmoid volvulus:  - s/p laparoscopic sigmoidoscopy  - s/p diagnostic lap and AMRIT 3/10     HTN  COPD  HLD  Tobacco use  Hx of lung cancer s/p resection  Paranoid schizophrenia       Interval History:  Seen and examined. Tolerating po intake. Na 126 this AM. Pt asymptomatic. Following FR. No cp, sob, n/v/d    Current Medications: all current  Medications have been eviewed in EPIC  Review of Systems: Pertinent items are noted in HPI. Objective:  Vitals:    Vitals:    03/15/22 2010 03/15/22 2340 03/16/22 0417 03/16/22 0752   BP: (!) 146/92 132/87  101/70   Pulse: 94 89  90   Resp: 16 16  16   Temp: 97.4 °F (36.3 °C) 97.5 °F (36.4 °C)  97.5 °F (36.4 °C)   SpO2: 98% 98%  97%   Weight:   72.7 kg (160 lb 4.4 oz)    Height:         Intake and Output:  No intake/output data recorded. 03/14 1901 - 03/16 0700  In: -   Out: 2300 [Urine:2300]    Physical Examination:    General: NAD,Conversant   Neck:  Supple, no mass  Resp:  Lungs CTA B/L, no wheezing , normal respiratory effort  CV:  RRR,  no murmur or rub, no LE edema  GI:  Soft, NT, + Bowel sounds, no hepatosplenomegaly  Neurologic:  Non focal  Psych:             AAO x 3 appropriate affect   Skin:  No Rash  :  No rees    []    High complexity decision making was performed  []    Patient is at high-risk of decompensation with multiple organ involvement    Lab Data Personally Reviewed: I have reviewed all the pertinent labs, microbiology data and radiology studies during assessment.     Recent Labs     03/16/22  0731 03/15/22  0047 03/14/22  0525   * 128* 131*   K 4.7 3.6 4.0  98 101   CO2 23 26 25   GLU 93 117* 89   BUN 7 8 7   CREA 0.62* 0.54* 0.58*   CA 8.8 8.4* 8.2*   MG  --   --  1.9   PHOS  --   --  3.4     Recent Labs     03/15/22  0047 03/14/22  0525   WBC 10.6 10.6   HGB 14.3 14.2   HCT 41.6 41.6    406*     No results found for: SDES  Lab Results   Component Value Date/Time    Culture result: NO GROWTH 5 DAYS 03/07/2017 08:09 AM    Culture result: NORMAL RESPIRATORY ALFRED/NO BETA STREP ISOLATED 03/07/2017 08:09 AM     Recent Results (from the past 24 hour(s))   GLUCOSE, POC    Collection Time: 03/15/22 11:30 AM   Result Value Ref Range    Glucose (POC) 101 65 - 117 mg/dL    Performed by Dmitry Smith, POC    Collection Time: 03/15/22  5:28 PM   Result Value Ref Range    Glucose (POC) 79 65 - 117 mg/dL    Performed by Sharla, POC    Collection Time: 03/16/22 12:21 AM   Result Value Ref Range    Glucose (POC) 92 65 - 117 mg/dL    Performed by [facility]   PCT    METABOLIC PANEL, BASIC    Collection Time: 03/16/22  7:31 AM   Result Value Ref Range    Sodium 126 (L) 136 - 145 mmol/L    Potassium 4.7 3.5 - 5.1 mmol/L    Chloride 100 97 - 108 mmol/L    CO2 23 21 - 32 mmol/L    Anion gap 3 (L) 5 - 15 mmol/L    Glucose 93 65 - 100 mg/dL    BUN 7 6 - 20 MG/DL    Creatinine 0.62 (L) 0.70 - 1.30 MG/DL    BUN/Creatinine ratio 11 (L) 12 - 20      GFR est AA >60 >60 ml/min/1.73m2    GFR est non-AA >60 >60 ml/min/1.73m2    Calcium 8.8 8.5 - 10.1 MG/DL               Coby Sosa MD  [facility]   [address] [address]  Phone - [phone]   Fax - [phone]  [email or web address]

## 2022-03-16 NOTE — PROGRESS NOTES
Repeat labs show Na 126  tolvaptan 15mg x 1 now  Repeat labs in AM        Christa Domínguez MD  [facility]   [address] [facility] [place] [address], [facility]  Phone - [phone]   Fax - [phone]  [email or web address]

## 2022-03-16 NOTE — PROGRESS NOTES
Tolerating diet, passing gas, multiple bowel movements over the week. Discharge planning complete. Will remove staples, and he is okay for discharge to home/group setting. He will follow-up with me in 1-2 weeks. Diet as tolerated.

## 2022-03-16 NOTE — PROGRESS NOTES
[address] [facility] Adult  Hospitalist Group                                                                                          Hospitalist Progress Note  Nithin Thakkar MD  Answering service: [phone] OR [phone] from in house phone        Date of Service:  3/16/2022  NAME:  Vicky Dorantes  :  1965  MRN:  [id number]      Admission Summary:   Gayla Christiansen is a 61 y.o. male  presented to the ED on  with constipation and was admitted for sigmoid volvulus. He underwent colonoscopy with decompression on  midnight  Patient underwent colonoscopic decompression on . He underwent laparoscopic sigmoid resection on 3/3    Interval history / Subjective:      Doing well GI wise. Sodium is downtrending from 131 down to 126 today. Discussed with nephrology, repeat urine chemistry and dose for tolvaptan today. Assessment & Plan:     Sigmoid volvulus POA status post laparoscopic sigmoid resection 22  -3/10: Underwent urgent laparoscopic lysis of adhesion and repair of incisional hernia with mesh  --Pain control better   --Tolerating regular diet.  --Dr. Reina Half suggesting possible discharge to group home tomorrow. Rising WBC. Afebrile. Patient nontoxic.    --CT A/P reported high-grade small bowel obstruction and possible peritonitis. He is already on Zosyn. WBC normalized. Patient is not clinically toxic or septic      Chronic hyponatremia consistent with SIADH. Sodium trending down, 126 today.  -Fluid restriction. Repeat urine chemistry. Discussed with nephrology, to give a dose of tolvaptan. Repeat BMP in a.m. Hypokalemia/hypomagnesemia  -replete PRN    Hx of Paranoid schizophrenia. -does not voice si/hi/hallucinations    Hx of COPD without bronchospasm: As needed bronchodilators. Hypertension      Dyslipidemia. Tobacco use      Hx of Left lung cancer, status post resection.        Discharge planning: Back to group home tomorrow if sodium is stable    Code status: full   Prophylaxis: lovenox  Care Plan discussed with: patient   Anticipated Disposition: may need rehab     Hospital Problems  Date Reviewed: 3/2/2022          Codes Class Noted POA    * (Principal) Sigmoid volvulus ([facility] [address] ICD-10-CM: K56.2  ICD-9-CM: 560.2  2/26/2022 Yes                Review of Systems:   Pertinent items are noted in HPI. Vital Signs:    Last 24hrs VS reviewed since prior progress note. Most recent are:  Visit Vitals  /73   Pulse 92   Temp 98.1 °F (36.7 °C)   Resp 16   Ht 5' 7\" (1.702 m)   Wt 72.7 kg (160 lb 4.4 oz)   SpO2 99%   BMI 25.10 kg/m²         Intake/Output Summary (Last 24 hours) at 3/16/2022 1745  Last data filed at 3/16/2022 1718  Gross per 24 hour   Intake 240 ml   Output --   Net 240 ml        Physical Examination:     I had a face to face encounter with this patient and independently examined them on 3/16/2022 as outlined below:          Constitutional:  And oriented, not in distress   ENT:  No pallor or jaundice. Resp:  CTA bilaterally. Normal work of breathing   Chest:  No accessory muscle use   CV:  Regular rhythm, normal rate, no rubs    GI:  Laparoscopic wounds. Abdomen normal active, minimally diffusely tender without rebound or guarding.     Musculoskeletal:  No edema, warm, 2+ pulses throughout    Neurologic:  Moves all extremities, awake, alert  Skin: no jaundice or cyanosis  Psych: does not voice si/hi/hallucinations, not agitated            Data Review:       Labs:     Recent Labs     03/15/22  0047 03/14/22  0525   WBC 10.6 10.6   HGB 14.3 14.2   HCT 41.6 41.6    406*     Recent Labs     03/16/22  1136 03/16/22  0731 03/15/22  0047 03/14/22  0525 03/14/22  0525   * 126* 128*   < > 131*   K 4.6 4.7 3.6   < > 4.0   CL 96* 100 98   < > 101   CO2 26 23 26   < > 25   BUN 7 7 8   < > 7   CREA 0.68* 0.62* 0.54*   < > 0.58*   * 93 117*   < > 89   CA 8.5 8.8 8.4*   < > 8.2*   MG  --   --   --   --  1.9   PHOS 3.8  --   --   --  3.4    < > = values in this interval not displayed.      Recent Labs     03/16/22  1136   ALB 3.0*       Medications Reviewed:     Current Facility-Administered Medications   Medication Dose Route Frequency    tolvaptan (SAMSCA) tablet (0.5 X 30 MG) 15 mg  15 mg Oral ONCE    carvediloL (COREG) tablet 25 mg  25 mg Oral BID WITH MEALS    cloNIDine HCL (CATAPRES) tablet 0.1 mg  0.1 mg Oral Q8H PRN    piperacillin-tazobactam (ZOSYN) 3.375 g in 0.9% sodium chloride (MBP/ADV) 100 mL MBP  3.375 g IntraVENous Q8H    enoxaparin (LOVENOX) injection 40 mg  40 mg SubCUTAneous DAILY    metoclopramide HCl (REGLAN) injection 10 mg  10 mg IntraVENous Q6H    naloxone (NARCAN) injection 0.4 mg  0.4 mg IntraVENous EVERY 2 MINUTES AS NEEDED    simethicone (MYLICON) tablet 80 mg  80 mg Oral TIDAC    atorvastatin (LIPITOR) tablet 20 mg  20 mg Oral DAILY    sodium chloride (NS) flush 5-40 mL  5-40 mL IntraVENous Q8H    sodium chloride (NS) flush 5-40 mL  5-40 mL IntraVENous PRN    ondansetron (ZOFRAN ODT) tablet 4 mg  4 mg Oral Q8H PRN    Or    ondansetron (ZOFRAN) injection 4 mg  4 mg IntraVENous Q6H PRN    oxyCODONE IR (ROXICODONE) tablet 5 mg  5 mg Oral Q4H PRN    [Held by provider] butalbital-acetaminophen-caffeine (FIORICET, ESGIC) -40 mg per tablet 1 Tablet  1 Tablet Oral Q4H PRN    albuterol-ipratropium (DUO-NEB) 2.5 MG-0.5 MG/3 ML  3 mL Nebulization Q4H PRN    sodium chloride (NS) flush 5-40 mL  5-40 mL IntraVENous Q8H    sodium chloride (NS) flush 5-40 mL  5-40 mL IntraVENous PRN    acetaminophen (TYLENOL) tablet 650 mg  650 mg Oral Q6H PRN    Or    acetaminophen (TYLENOL) suppository 650 mg  650 mg Rectal Q6H PRN    ondansetron (ZOFRAN ODT) tablet 4 mg  4 mg Oral Q8H PRN    Or    ondansetron (ZOFRAN) injection 4 mg  4 mg IntraVENous Q6H PRN    nicotine (NICODERM CQ) 21 mg/24 hr patch 1 Patch  1 Patch TransDERmal DAILY    hydrALAZINE (APRESOLINE) 20 mg/mL injection 10 mg  10 mg IntraVENous Q6H PRN    pantoprazole (PROTONIX) injection 40 mg  40 mg IntraVENous DAILY    And    sodium chloride (NS) flush 10 mL  10 mL IntraVENous DAILY    benztropine (COGENTIN) tablet 1 mg  1 mg Oral BID    clonazePAM (KlonoPIN) tablet 1 mg  1 mg Oral BID PRN    DULoxetine (CYMBALTA) capsule 30 mg  30 mg Oral 7am    DULoxetine (CYMBALTA) capsule 60 mg  60 mg Oral QHS    gabapentin (NEURONTIN) tablet 600 mg  600 mg Oral TID    risperiDONE (RisperDAL) tablet 2 mg  2 mg Oral BID    albuterol-ipratropium (DUO-NEB) 2.5 MG-0.5 MG/3 ML  3 mL Nebulization Q6H PRN    arformoteroL (BROVANA) neb solution 15 mcg  15 mcg Nebulization BID RT    And    budesonide (PULMICORT) 500 mcg/2 ml nebulizer suspension  500 mcg Nebulization BID RT   No results found.   ______________________________________________________________________  EXPECTED LENGTH OF STAY: 10d 7h  ACTUAL LENGTH OF STAY:          18                 Cole Moyer MD

## 2022-03-16 NOTE — PROGRESS NOTES
Transition of Care: back to his group home; patient normally lives in an adult group home called [address] at [facility] [place] [address] Fax- [phone]    Patients pharmacy is [address] [place] [address] pharmacies- [phone]; when patient is discharged; please send any new prescriptions to this pharmacy and Ms. Mirella Pereyra will      Transport Plan: likely roundtrip ride via [employer] or [employer]      RUR: 13%     DX: Sigmoid volvulus     Main contact is caregiver and owner of group home- Jarrod Cage [phone]     Discharge pending:  -patient is POD#15 laparoscopic sigmoid resection   -removal of stitches from procedure  -pending sodium level WNL      2978: this CM met with patient at bedside; he was talking to his caregiver, Ms. Mirella Pereyra on the phone; this CM also spoke to Ms. Mirella Pereyra about updated discharge plans; she verbalized understanding; she would like a copy of the dc instructions faxed to her and any new prescriptions called into [address] [place] [address]    1400: per attending, patient will not discharge home today (not medically ready); more likely discharge on 3/17; this CM called Ms. Mirella Pereyra to update; no answer; left VM; this CM also updated patient at bedside; he verbalized understanding     CM following  Katey Ferrara, RN, CRM     NOTE: pleasant patient at bedside; he is alert and oriented x 3; patient states he lives at stated address (which is a group home) for several years; he is normally independent in his ADLs; he does not drive; his caregiver- Avery Filter him to appointments;  the group home helps him with medication management; Eugene Rodriguez does not own a walker or cane to ambulate; he confirms he sees Dr. Marissa Ernst in the same practice as his listed pcp- Lance Mckinney; patient states he does not have a history

## 2022-03-17 VITALS
TEMPERATURE: 97.7 F | DIASTOLIC BLOOD PRESSURE: 71 MMHG | RESPIRATION RATE: 16 BRPM | WEIGHT: 160.27 LBS | BODY MASS INDEX: 25.16 KG/M2 | SYSTOLIC BLOOD PRESSURE: 104 MMHG | HEIGHT: 67 IN | HEART RATE: 87 BPM | OXYGEN SATURATION: 95 %

## 2022-03-17 PROBLEM — K56.2 SIGMOID VOLVULUS (HCC): Status: RESOLVED | Noted: 2022-02-26 | Resolved: 2022-03-17

## 2022-03-17 LAB
ALBUMIN SERPL-MCNC: 3.2 G/DL (ref 3.5–5)
ANION GAP SERPL CALC-SCNC: 2 MMOL/L (ref 5–15)
BUN SERPL-MCNC: 8 MG/DL (ref 6–20)
BUN/CREAT SERPL: 11 (ref 12–20)
CALCIUM SERPL-MCNC: 9 MG/DL (ref 8.5–10.1)
CHLORIDE SERPL-SCNC: 101 MMOL/L (ref 97–108)
CO2 SERPL-SCNC: 30 MMOL/L (ref 21–32)
COMMENT, HOLDF: NORMAL
CREAT SERPL-MCNC: 0.74 MG/DL (ref 0.7–1.3)
GLUCOSE SERPL-MCNC: 100 MG/DL (ref 65–100)
PHOSPHATE SERPL-MCNC: 4.7 MG/DL (ref 2.6–4.7)
POTASSIUM SERPL-SCNC: 4.1 MMOL/L (ref 3.5–5.1)
SAMPLES BEING HELD,HOLD: NORMAL
SODIUM SERPL-SCNC: 133 MMOL/L (ref 136–145)

## 2022-03-17 PROCEDURE — 74011250637 HC RX REV CODE- 250/637: Performed by: SURGERY

## 2022-03-17 PROCEDURE — 74011250636 HC RX REV CODE- 250/636: Performed by: SURGERY

## 2022-03-17 PROCEDURE — 74011000250 HC RX REV CODE- 250: Performed by: SURGERY

## 2022-03-17 PROCEDURE — C9113 INJ PANTOPRAZOLE SODIUM, VIA: HCPCS | Performed by: SURGERY

## 2022-03-17 PROCEDURE — 74011250637 HC RX REV CODE- 250/637: Performed by: HOSPITALIST

## 2022-03-17 PROCEDURE — 80069 RENAL FUNCTION PANEL: CPT

## 2022-03-17 RX ORDER — POLYETHYLENE GLYCOL 3350 17 G/17G
17 POWDER, FOR SOLUTION ORAL DAILY
Status: DISCONTINUED | OUTPATIENT
Start: 2022-03-17 | End: 2022-03-17 | Stop reason: HOSPADM

## 2022-03-17 RX ORDER — BACITRACIN 500 UNIT/G
PACKET (EA) TOPICAL
Status: DISCONTINUED
Start: 2022-03-17 | End: 2022-03-17 | Stop reason: HOSPADM

## 2022-03-17 RX ORDER — CARVEDILOL 25 MG/1
25 TABLET ORAL 2 TIMES DAILY WITH MEALS
Qty: 60 TABLET | Refills: 0 | Status: SHIPPED
Start: 2022-03-17

## 2022-03-17 RX ADMIN — METOCLOPRAMIDE 10 MG: 5 INJECTION, SOLUTION INTRAMUSCULAR; INTRAVENOUS at 12:08

## 2022-03-17 RX ADMIN — METOCLOPRAMIDE 10 MG: 5 INJECTION, SOLUTION INTRAMUSCULAR; INTRAVENOUS at 06:00

## 2022-03-17 RX ADMIN — BENZTROPINE MESYLATE 1 MG: 1 TABLET ORAL at 09:13

## 2022-03-17 RX ADMIN — RISPERIDONE 2 MG: 1 TABLET, FILM COATED ORAL at 09:13

## 2022-03-17 RX ADMIN — OXYCODONE 5 MG: 5 TABLET ORAL at 04:16

## 2022-03-17 RX ADMIN — SODIUM CHLORIDE, PRESERVATIVE FREE 30 ML: 5 INJECTION INTRAVENOUS at 06:00

## 2022-03-17 RX ADMIN — Medication 80 MG: at 12:08

## 2022-03-17 RX ADMIN — Medication 80 MG: at 09:13

## 2022-03-17 RX ADMIN — CARVEDILOL 25 MG: 12.5 TABLET, FILM COATED ORAL at 09:13

## 2022-03-17 RX ADMIN — ATORVASTATIN CALCIUM 20 MG: 20 TABLET, FILM COATED ORAL at 09:13

## 2022-03-17 RX ADMIN — SODIUM CHLORIDE, PRESERVATIVE FREE 10 ML: 5 INJECTION INTRAVENOUS at 09:14

## 2022-03-17 RX ADMIN — DULOXETINE HYDROCHLORIDE 30 MG: 30 CAPSULE, DELAYED RELEASE ORAL at 09:13

## 2022-03-17 RX ADMIN — OXYCODONE 5 MG: 5 TABLET ORAL at 09:13

## 2022-03-17 RX ADMIN — GABAPENTIN 600 MG: 600 TABLET, FILM COATED ORAL at 09:13

## 2022-03-17 RX ADMIN — PANTOPRAZOLE SODIUM 40 MG: 40 INJECTION, POWDER, FOR SOLUTION INTRAVENOUS at 09:14

## 2022-03-17 RX ADMIN — ENOXAPARIN SODIUM 40 MG: 100 INJECTION SUBCUTANEOUS at 09:13

## 2022-03-17 NOTE — PROGRESS NOTES
Nephrology Progress Note  Isaura Carter  Date of Admission : 2/25/2022    CC:  Follow up for hyponatremia       Assessment and Plan     Hyponatremia:  - suspect SAIDH  - urine studies from 3/7 support SIADH  - he is on a SNRI which can cause SIADH  - Na stable at 133  - ok for d/c from renal standpoint  - will need labs next week and f/u in 2 weeks with us    Sigmoid volvulus:  - s/p laparoscopic sigmoidoscopy  - s/p diagnostic lap and AMRIT 3/10     HTN  COPD  HLD  Tobacco use  Hx of lung cancer s/p resection  Paranoid schizophrenia       Interval History:  Seen and examined. Tolerating po intake. Na 133 after tolvaptan yesterday. C/o some gas pain. No cp, sob, n/v/d reported. Current Medications: all current  Medications have been eviewed in EPIC  Review of Systems: Pertinent items are noted in HPI. Objective:  Vitals:    Vitals:    03/16/22 0752 03/16/22 1515 03/17/22 0020 03/17/22 0723   BP: 101/70 110/73 111/74 104/71   Pulse: 90 92 92 87   Resp: 16 16 16 16   Temp: 97.5 °F (36.4 °C) 98.1 °F (36.7 °C) 97.5 °F (36.4 °C) 97.7 °F (36.5 °C)   SpO2: 97% 99% 97% 95%   Weight:       Height:         Intake and Output:  03/17 0701 - 03/17 1900  In: -   Out: 550 [Urine:550]  03/15 1901 - 03/17 0700  In: 240 [P.O.:240]  Out: 3000 [Urine:3000]    Physical Examination:    General: NAD,Conversant   Neck:  Supple, no mass  Resp:  Lungs CTA B/L, no wheezing , normal respiratory effort  CV:  RRR,  no murmur or rub, no LE edema  GI:  Soft, NT, + Bowel sounds, no hepatosplenomegaly  Neurologic:  Non focal  Psych:             AAO x 3 appropriate affect   Skin:  No Rash  :  No rees    []    High complexity decision making was performed  []    Patient is at high-risk of decompensation with multiple organ involvement    Lab Data Personally Reviewed: I have reviewed all the pertinent labs, microbiology data and radiology studies during assessment.     Recent Labs     03/17/22  0445 03/16/22  1136 03/16/22  0731   * 126* 126*   K 4.1 4.6 4.7    96* 100   CO2 30 26 23    101* 93   BUN 8 7 7   CREA 0.74 0.68* 0.62*   CA 9.0 8.5 8.8   PHOS 4.7 3.8  --    ALB 3.2* 3.0*  --      Recent Labs     03/15/22  0047   WBC 10.6   HGB 14.3   HCT 41.6        No results found for: SDES  Lab Results   Component Value Date/Time    Culture result: NO GROWTH 5 DAYS 03/07/2017 08:09 AM    Culture result: NORMAL RESPIRATORY ALFRED/NO BETA STREP ISOLATED 03/07/2017 08:09 AM     Recent Results (from the past 24 hour(s))   SODIUM, UR, RANDOM    Collection Time: 03/16/22 12:58 PM   Result Value Ref Range    Sodium,urine random 36 MMOL/L   OSMOLALITY, UR    Collection Time: 03/16/22 12:58 PM   Result Value Ref Range    Osmolality,urine 212 MOSM/kg H2O   SAMPLES BEING HELD    Collection Time: 03/17/22  4:45 AM   Result Value Ref Range    SAMPLES BEING HELD 1pst 1lav     COMMENT        Add-on orders for these samples will be processed based on acceptable specimen integrity and analyte stability, which may vary by analyte. RENAL FUNCTION PANEL    Collection Time: 03/17/22  4:45 AM   Result Value Ref Range    Sodium 133 (L) 136 - 145 mmol/L    Potassium 4.1 3.5 - 5.1 mmol/L    Chloride 101 97 - 108 mmol/L    CO2 30 21 - 32 mmol/L    Anion gap 2 (L) 5 - 15 mmol/L    Glucose 100 65 - 100 mg/dL    BUN 8 6 - 20 MG/DL    Creatinine 0.74 0.70 - 1.30 MG/DL    BUN/Creatinine ratio 11 (L) 12 - 20      GFR est AA >60 >60 ml/min/1.73m2    GFR est non-AA >60 >60 ml/min/1.73m2    Calcium 9.0 8.5 - 10.1 MG/DL    Phosphorus 4.7 2.6 - 4.7 MG/DL    Albumin 3.2 (L) 3.5 - 5.0 g/dL               Aleta Fajardo MD  [facility]   [address] [address]  Phone - [phone]   Fax - [phone]  [email or web address]

## 2022-03-17 NOTE — DISCHARGE SUMMARY
Discharge Summary       PATIENT ID: Loren Williamson  MRN: [id number]   YOB: 1965    DATE OF ADMISSION: 2/25/2022 10:35 PM    DATE OF DISCHARGE: 3/17/22   PRIMARY CARE PROVIDER: Ruthie Dickerson MD     ATTENDING PHYSICIAN: Sadia Pérez MD  DISCHARGING PROVIDER: Sadia Pérez MD    To contact this individual call [phone] and ask the  to page. If unavailable ask to be transferred the Adult Hospitalist Department. CONSULTATIONS: IP CONSULT TO GENERAL SURGERY  IP CONSULT TO GENERAL SURGERY  IP CONSULT TO GASTROENTEROLOGY  IP CONSULT TO NEPHROLOGY  IP CONSULT TO NEPHROLOGY  IP CONSULT TO CARDIOLOGY    PROCEDURES/SURGERIES: Procedure(s):  DIAGNOSTIC LAPAROSCOPY , Laproscopic lysis of adhesions, laparoscopic hernia repair with mesh. (URGENT)    [address]:   This is a 79-year-old man with a past medical history significant for left lung cancer, status post lobectomy, COPD, hypertension, dyslipidemia, hypertrophic cardiomyopathy, paranoid schizophrenia, who was in his usual state of health until a few days ago when the patient developed abdominal pain. The pain is diffuse in location, constant, dull ache, 8/10 in severity, no known aggravating or relieving factors, associated with constipation, but no nausea, no vomiting. The patient took over-the-counter laxative for the constipation without any significant relief. The patient was brought to the emergency room from the [facility] for further evaluation. When the patient arrived at the emergency room, CT scan of the abdomen and pelvis as well as KUB suggested sigmoid volvulus with small-bowel obstruction. Emergency room physician consulted gastroenterologist.  The patient was seen by Gastroenterology while he was still in the emergency room and underwent decompression. The patient was subsequently referred to the hospitalist service for evaluation for admission.   No associated fever, rigors, or chills. The patient was last admitted to the hospital from 12/16/2020 to 12/18/2020. The patient was admitted to the Surgical Service and underwent robotic left lower lobe segmentectomy for left lung cancer. 1.  Sigmoid volvulus. This is the most likely cause of the patient's abdominal pain and distention. The patient was seen by gastroenterologist and underwent decompression with rectal tube placement in the emergency room. We will continue conservative treatment which includes pain control, fluid therapy. We will await further recommendation from the gastroenterologist.  2.  Hyponatremia. This may be due to volume depletion. We will carry out fluid therapy and monitor the patient's sodium level closely. We will obtain CT scan of the chest to evaluate the patient for mass lesion. Nephrology consult will be requested to assist in further evaluation and treatment. 3.  Small-bowel obstruction. We will carry out conservative treatment which includes pain control, NG tube insertion connected to low suction. The patient will be n.p.o. and we will await further recommendation from the general surgeon consulted by the emergency room physician. 4.  Paranoid schizophrenia. We will resume preadmission medication once the patient is no longer n.p.o.  5.  COPD. We will place the patient on DuoNeb as needed. The patient is not in acute exacerbation of COPD at this time. 6.  Hypertension. We will resume home medication once the patient is no longer n.p.o. We will place the patient on hydralazine as needed. 7.  Dyslipidemia. We will resume home medication once the patient is no longer n.p.o.  8.  Tobacco abuse. The patient advised to quit smoking. We will place the patient on Nicoderm patch. 9.  Hyperglycemia. We will check hemoglobin A1c level. The patient has no history of diabetes. 10.  Leukocytosis. The patient is afebrile, not toxic. Lactic acid level is within normal limits.   We will check urinalysis, lipase level, and await the results of CT scan of the chest to evaluate the patient for pneumonia as a possible cause of leukocytosis. 11.  Left lung cancer, status post resection. We will continue to monitor. We will await the results of CT scan of the chest.  12.  Other issues:  Code status, the patient is a full code. We will request SCD for DVT prophylaxis. The patient will require prophylaxis with heparin or Lovenox once it was determined that the patient will not require further intervention for the sigmoid volvulus and also for the small-bowel obstruction. Hospital course  Patient was admitted and seen by general surgery. For full hospital course please refer to progress notes this patient was admitted for extended period of time. Patient did have laparoscopic surgery with lysis of adhesions and laparoscopic hernia repair with mesh. Patient recovered at the hospital and his sodium level dropped likely due to SIADH from the procedure. Nephrology was brought on board and manage. Patient sodium improved. General surgery signed off on the patient for outpatient follow-up and nephrology will also follow-up as outpatient. Patient stable on day of discharge    DISCHARGE DIAGNOSES / PLAN:      1. Sigmoid volvulus prior to arrival status post laparoscopic sigmoid resection on 3/3/2022 with urgent laparoscopic lysis of adhesion and repair of incisional hernia on 3/10  2. Rising WBC. Resolved. 3. Chronic hyponatremia consistent with SIADH. Improved. Outpatient follow-up. 4. Hypokalemia/hypomagnesemia. Resolved. 5. Paranoid schizophrenia. Stable. 6. History of COPD. Stable. 7. Pretension. Stable. 8. Dyslipidemia. Stable.   9. Tobacco use disorder       PENDING TEST RESULTS:   At the time of discharge the following test results are still pending: none    FOLLOW UP APPOINTMENTS:    Follow-up Information     Follow up With Specialties Details Why Contact Info    Roxana Rivera, MD Internal Medicine In 1 week Follow up hospital stay and sodium level. [address]      Evita Oconnell MD General Surgery, Bariatrics, Breast Surgery In 1 week Follow up in 1-2 weeks [facility]      Juhi Martins MD Nephrology In 2 weeks Follow up in 2 weeks [facility]  [phone]             ADDITIONAL CARE RECOMMENDATIONS: none    DIET: Regular Diet as tolerated    ACTIVITY: Activity as tolerated    WOUND CARE: none    EQUIPMENT needed: none      DISCHARGE MEDICATIONS:  Current Discharge Medication List      CONTINUE these medications which have CHANGED    Details   carvediloL (COREG) 25 mg tablet Take 1 Tablet by mouth two (2) times daily (with meals). Qty: 60 Tablet, Refills: 0  Start date: 3/17/2022         CONTINUE these medications which have NOT CHANGED    Details   aspirin 81 mg chewable tablet Take 81 mg by mouth daily. clonazePAM (KlonoPIN) 1 mg tablet Take 1 mg by mouth two (2) times daily as needed for Anxiety. budesonide-formoteroL (Symbicort) 160-4.5 mcg/actuation HFAA Take 2 Puffs by inhalation two (2) times a day. traMADoL (ULTRAM) 50 mg tablet Take 50 mg by mouth three (3) times daily as needed for Pain. ibuprofen (MOTRIN) 800 mg tablet Take 800 mg by mouth two (2) times daily as needed for Pain. diclofenac (Voltaren) 1 % gel Apply 4 g to affected area three (3) times daily as needed for Pain (Joint pain or rib pain).      haloperidol decanoate (HALDOL DECANOATE) 100 mg/mL injection every twenty-eight (28) days. gabapentin (NEURONTIN) 600 mg tablet Take 600 mg by mouth three (3) times daily. triamcinolone acetonide (KENALOG) 0.1 % topical cream Apply  to affected area two (2) times daily as needed. atorvastatin (LIPITOR) 20 mg tablet Take 1 Tab by mouth daily.   Qty: 30 Tab, Refills: 5      diphenhydrAMINE (BENADRYL) 50 mg capsule Take 50 mg by mouth nightly as needed. One to two tablets by mouth at bedtime as needed for insomnia. !! DULoxetine (CYMBALTA) 60 mg capsule Take 60 mg by mouth nightly. !! DULoxetine (CYMBALTA) 30 mg capsule Take 1 Cap by mouth daily. One daily in the morning, two at HS  Qty: 90 Cap, Refills: 3    Associated Diagnoses: Primary insomnia; Severe episode of recurrent major depressive disorder, without psychotic features ([facility] [address] Generalized social phobia      loratadine (CLARITIN) 10 mg tablet Take 1 Tab by mouth daily. Qty: 30 Tab, Refills: 3    Associated Diagnoses: Allergic rhinitis due to pollen, unspecified seasonality      zolpidem (AMBIEN) 10 mg tablet Take 1 Tab by mouth nightly as needed for Sleep. Max Daily Amount: 10 mg.  Qty: 30 Tab, Refills: 3    Associated Diagnoses: Primary insomnia      albuterol (VENTOLIN HFA) 90 mcg/actuation inhaler Take 2 Puffs by inhalation as needed for Wheezing. Qty: 1 Inhaler, Refills: 6    Associated Diagnoses: Mixed simple and mucopurulent chronic bronchitis (HCC)      risperiDONE (RISPERDAL) 2 mg tablet Take 2 mg by mouth two (2) times a day. benztropine (COGENTIN) 1 mg tablet Take 1 mg by mouth two (2) times a day. !! - Potential duplicate medications found. Please discuss with provider. NOTIFY YOUR PHYSICIAN FOR ANY OF THE FOLLOWING:   Fever over 101 degrees for 24 hours. Chest pain, shortness of breath, fever, chills, nausea, vomiting, diarrhea, change in mentation, falling, weakness, bleeding. Severe pain or pain not relieved by medications. Or, any other signs or symptoms that you may have questions about.     DISPOSITION:    Home With:   OT  PT  HH  RN       Long term SNF/Inpatient Rehab    Independent/assisted living    Hospice   X Other: Group home       PATIENT CONDITION AT DISCHARGE:     Functional status    Poor     Deconditioned    X Independent      Cognition   X  Lucid     Forgetful     Dementia Catheters/lines (plus indication)    Garcia     PICC     PEG    X None      Code status    X Full code     DNR      PHYSICAL EXAMINATION AT DISCHARGE:  Constitutional:  And oriented, not in distress   ENT:  No pallor or jaundice. Resp:  CTA bilaterally. Normal work of breathing   Chest:  No accessory muscle use   CV:  Regular rhythm, normal rate, no rubs    GI:  Laparoscopic wounds. Abdomen normal active, minimally diffusely tender without rebound or guarding.     Musculoskeletal:  No edema, warm, 2+ pulses throughout    Neurologic:  Moves all extremities, awake, alert  Skin: no jaundice or cyanosis  Psych: does not voice si/hi/hallucinations, not agitated       CHRONIC MEDICAL DIAGNOSES:  Problem List as of 3/17/2022 Date Reviewed: 3/2/2022          Codes Class Noted - Resolved    Lung cancer ([facility] ICD-10-CM: C34.90  ICD-9-CM: 162.9  12/16/2020 - Present        Essential hypertension ICD-10-CM: I10  ICD-9-CM: 401.9  11/19/2020 - Present        High cholesterol ICD-10-CM: E78.00  ICD-9-CM: 272.0  11/19/2020 - Present        Dizziness ICD-10-CM: R42  ICD-9-CM: 780.4  12/18/2017 - Present        Hypertrophic cardiomyopathy ([facility] ICD-10-CM: I42.2  ICD-9-CM: 425.18  12/18/2017 - Present        SOB (shortness of breath) ICD-10-CM: R06.02  ICD-9-CM: 786.05  12/18/2017 - Present        Tachycardia ICD-10-CM: R00.0  ICD-9-CM: 785.0  12/18/2017 - Present        * (Principal) RESOLVED: Sigmoid volvulus ([facility] ICD-10-CM: K56.2  ICD-9-CM: 560.2  2/26/2022 - 3/17/2022              Greater than 35 minutes were spent with the patient on counseling and coordination of care    Signed:   Gordon West MD  3/17/2022  1:55 PM

## 2022-03-17 NOTE — PROGRESS NOTES
Attempted to schedule hospital follow up PCP appointment. Per practice, patient is not a patient of Dr. Veronica Taylor. Lai Johnson, Care Management Assistant.

## 2022-03-17 NOTE — PROGRESS NOTES
Transition of Care: back to his group home; patient normally lives in an adult group home called [address] at [facility] [place] [address] Fax- [phone]     Patients pharmacy is [facility] pharmacies- [phone]; when patient is discharged; please send any new prescriptions to this pharmacy and Ms. Jordan Nguyen will      Transport Plan: roundtrip [employer]- scheduled for 1530 today     RUR: 12%     DX: Sigmoid volvulus     Main contact is caregiver and owner of group home- Franklin August [phone]: this CM noted discharge order; called Ms Jordan Nguyen to inform; she verbalized understanding and faxed her a copy of dc instructions; this CM set up roundtrip ride for patient to get home; ride is scheduled at  today; updated patient at bedside; he verbalized understanding    Medicare pt has received, reviewed, and signed 2nd IM letter informing them of their right to appeal the discharge.   Signed copied has been placed on pt bedside chart.       CM following  Naresh Finch RN, CRM     NOTE: pleasant patient at bedside; he is alert and oriented x 3; patient states he lives at stated address (which is a group home) for several years; he is normally independent in his ADLs; he does not drive; his caregiver- Virgilio Perks him to appointments;  the group home helps him with medication management; Brian Munoz does not own a walker or cane to ambulate; he confirms he sees Dr. Evelyn Wang in the same practice as his listed pcp- Nora Eller; patient states he does not have a history           Revision History

## 2022-03-18 PROBLEM — K56.2 SIGMOID VOLVULUS (HCC): Status: RESOLVED | Noted: 2022-02-26 | Resolved: 2022-03-17

## 2022-03-18 PROBLEM — C34.90 LUNG CANCER (HCC): Status: ACTIVE | Noted: 2020-12-16

## 2022-03-18 PROBLEM — E78.00 HIGH CHOLESTEROL: Status: ACTIVE | Noted: 2020-11-19

## 2022-03-19 PROBLEM — R06.02 SOB (SHORTNESS OF BREATH): Status: ACTIVE | Noted: 2017-12-18

## 2022-03-19 PROBLEM — I42.2 HYPERTROPHIC CARDIOMYOPATHY (HCC): Status: ACTIVE | Noted: 2017-12-18

## 2022-03-19 PROBLEM — R42 DIZZINESS: Status: ACTIVE | Noted: 2017-12-18

## 2022-03-20 PROBLEM — I10 ESSENTIAL HYPERTENSION: Status: ACTIVE | Noted: 2020-11-19

## 2022-03-20 PROBLEM — R00.0 TACHYCARDIA: Status: ACTIVE | Noted: 2017-12-18

## 2023-12-06 ENCOUNTER — TRANSCRIBE ORDERS (OUTPATIENT)
Facility: HOSPITAL | Age: 58
End: 2023-12-06

## 2023-12-06 DIAGNOSIS — F17.210 CIGARETTE SMOKER: Primary | ICD-10-CM

## 2023-12-12 ENCOUNTER — HOSPITAL ENCOUNTER (OUTPATIENT)
Facility: HOSPITAL | Age: 58
Discharge: HOME | End: 2023-12-15
Payer: MEDICARE

## 2023-12-12 DIAGNOSIS — F17.210 CIGARETTE SMOKER: ICD-10-CM

## 2023-12-12 PROCEDURE — 71271 CT THORAX LUNG CANCER SCR C-: CPT

## 2024-03-04 ENCOUNTER — HOSPITAL ENCOUNTER (INPATIENT)
Facility: HOSPITAL | Age: 59
LOS: 17 days | Discharge: HOME | DRG: 885 | End: 2024-03-22
Attending: STUDENT IN AN ORGANIZED HEALTH CARE EDUCATION/TRAINING PROGRAM | Admitting: STUDENT IN AN ORGANIZED HEALTH CARE EDUCATION/TRAINING PROGRAM
Payer: MEDICARE

## 2024-03-04 DIAGNOSIS — R45.851 SUICIDAL IDEATION: Primary | ICD-10-CM

## 2024-03-04 DIAGNOSIS — F20.0 PARANOID SCHIZOPHRENIA (HCC): ICD-10-CM

## 2024-03-04 DIAGNOSIS — R44.3 HALLUCINATIONS: ICD-10-CM

## 2024-03-04 LAB
ALBUMIN SERPL-MCNC: 3.6 G/DL (ref 3.5–5)
ALBUMIN/GLOB SERPL: 0.8 (ref 1.1–2.2)
ALP SERPL-CCNC: 99 U/L (ref 45–117)
ALT SERPL-CCNC: 40 U/L (ref 12–78)
ANION GAP SERPL CALC-SCNC: 10 MMOL/L (ref 5–15)
AST SERPL-CCNC: 30 U/L (ref 15–37)
BASOPHILS # BLD: 0.1 K/UL (ref 0–0.1)
BASOPHILS NFR BLD: 1 % (ref 0–1)
BILIRUB SERPL-MCNC: 0.6 MG/DL (ref 0.2–1)
BUN SERPL-MCNC: 6 MG/DL (ref 6–20)
BUN/CREAT SERPL: 7 (ref 12–20)
CALCIUM SERPL-MCNC: 8.9 MG/DL (ref 8.5–10.1)
CHLORIDE SERPL-SCNC: 96 MMOL/L (ref 97–108)
CO2 SERPL-SCNC: 29 MMOL/L (ref 21–32)
CREAT SERPL-MCNC: 0.86 MG/DL (ref 0.7–1.3)
DIFFERENTIAL METHOD BLD: ABNORMAL
EOSINOPHIL # BLD: 0.2 K/UL (ref 0–0.4)
EOSINOPHIL NFR BLD: 2 % (ref 0–7)
ERYTHROCYTE [DISTWIDTH] IN BLOOD BY AUTOMATED COUNT: 13.5 % (ref 11.5–14.5)
ETHANOL SERPL-MCNC: <10 MG/DL (ref 0–0.08)
FLUAV RNA SPEC QL NAA+PROBE: NOT DETECTED
FLUBV RNA SPEC QL NAA+PROBE: NOT DETECTED
GLOBULIN SER CALC-MCNC: 4.4 G/DL (ref 2–4)
GLUCOSE SERPL-MCNC: 103 MG/DL (ref 65–100)
HCT VFR BLD AUTO: 46.8 % (ref 36.6–50.3)
HGB BLD-MCNC: 16.4 G/DL (ref 12.1–17)
IMM GRANULOCYTES # BLD AUTO: 0.1 K/UL (ref 0–0.04)
IMM GRANULOCYTES NFR BLD AUTO: 1 % (ref 0–0.5)
LYMPHOCYTES # BLD: 1.7 K/UL (ref 0.8–3.5)
LYMPHOCYTES NFR BLD: 15 % (ref 12–49)
MCH RBC QN AUTO: 32.4 PG (ref 26–34)
MCHC RBC AUTO-ENTMCNC: 35 G/DL (ref 30–36.5)
MCV RBC AUTO: 92.5 FL (ref 80–99)
MONOCYTES # BLD: 1.1 K/UL (ref 0–1)
MONOCYTES NFR BLD: 10 % (ref 5–13)
NEUTS SEG # BLD: 7.8 K/UL (ref 1.8–8)
NEUTS SEG NFR BLD: 71 % (ref 32–75)
NRBC # BLD: 0 K/UL (ref 0–0.01)
NRBC BLD-RTO: 0 PER 100 WBC
PLATELET # BLD AUTO: 269 K/UL (ref 150–400)
PMV BLD AUTO: 10.2 FL (ref 8.9–12.9)
POTASSIUM SERPL-SCNC: 2.8 MMOL/L (ref 3.5–5.1)
PROT SERPL-MCNC: 8 G/DL (ref 6.4–8.2)
RBC # BLD AUTO: 5.06 M/UL (ref 4.1–5.7)
SALICYLATES SERPL-MCNC: <1.7 MG/DL (ref 2.8–20)
SARS-COV-2 RNA RESP QL NAA+PROBE: NOT DETECTED
SODIUM SERPL-SCNC: 135 MMOL/L (ref 136–145)
WBC # BLD AUTO: 10.9 K/UL (ref 4.1–11.1)

## 2024-03-04 PROCEDURE — 80179 DRUG ASSAY SALICYLATE: CPT

## 2024-03-04 PROCEDURE — 99285 EMERGENCY DEPT VISIT HI MDM: CPT

## 2024-03-04 PROCEDURE — 80053 COMPREHEN METABOLIC PANEL: CPT

## 2024-03-04 PROCEDURE — 80307 DRUG TEST PRSMV CHEM ANLYZR: CPT

## 2024-03-04 PROCEDURE — 87636 SARSCOV2 & INF A&B AMP PRB: CPT

## 2024-03-04 PROCEDURE — 2580000003 HC RX 258

## 2024-03-04 PROCEDURE — 96361 HYDRATE IV INFUSION ADD-ON: CPT

## 2024-03-04 PROCEDURE — 93005 ELECTROCARDIOGRAM TRACING: CPT

## 2024-03-04 PROCEDURE — 36415 COLL VENOUS BLD VENIPUNCTURE: CPT

## 2024-03-04 PROCEDURE — 90791 PSYCH DIAGNOSTIC EVALUATION: CPT

## 2024-03-04 PROCEDURE — 81001 URINALYSIS AUTO W/SCOPE: CPT

## 2024-03-04 PROCEDURE — 85025 COMPLETE CBC W/AUTO DIFF WBC: CPT

## 2024-03-04 PROCEDURE — 96365 THER/PROPH/DIAG IV INF INIT: CPT

## 2024-03-04 PROCEDURE — 82077 ASSAY SPEC XCP UR&BREATH IA: CPT

## 2024-03-04 PROCEDURE — 6360000002 HC RX W HCPCS

## 2024-03-04 PROCEDURE — 6370000000 HC RX 637 (ALT 250 FOR IP)

## 2024-03-04 PROCEDURE — 96375 TX/PRO/DX INJ NEW DRUG ADDON: CPT

## 2024-03-04 RX ORDER — LORAZEPAM 2 MG/ML
2 INJECTION INTRAMUSCULAR
Status: DISCONTINUED | OUTPATIENT
Start: 2024-03-04 | End: 2024-03-05 | Stop reason: ALTCHOICE

## 2024-03-04 RX ORDER — LORAZEPAM 1 MG/1
2 TABLET ORAL
Status: DISCONTINUED | OUTPATIENT
Start: 2024-03-04 | End: 2024-03-05 | Stop reason: ALTCHOICE

## 2024-03-04 RX ORDER — LORAZEPAM 1 MG/1
1 TABLET ORAL
Status: DISCONTINUED | OUTPATIENT
Start: 2024-03-04 | End: 2024-03-05 | Stop reason: ALTCHOICE

## 2024-03-04 RX ORDER — LORAZEPAM 2 MG/ML
3 INJECTION INTRAMUSCULAR
Status: DISCONTINUED | OUTPATIENT
Start: 2024-03-04 | End: 2024-03-05 | Stop reason: ALTCHOICE

## 2024-03-04 RX ORDER — 0.9 % SODIUM CHLORIDE 0.9 %
1000 INTRAVENOUS SOLUTION INTRAVENOUS ONCE
Status: COMPLETED | OUTPATIENT
Start: 2024-03-04 | End: 2024-03-04

## 2024-03-04 RX ORDER — POTASSIUM CHLORIDE 7.45 MG/ML
10 INJECTION INTRAVENOUS
Status: COMPLETED | OUTPATIENT
Start: 2024-03-05 | End: 2024-03-05

## 2024-03-04 RX ORDER — POTASSIUM CHLORIDE 750 MG/1
40 TABLET, FILM COATED, EXTENDED RELEASE ORAL ONCE
Status: COMPLETED | OUTPATIENT
Start: 2024-03-04 | End: 2024-03-04

## 2024-03-04 RX ORDER — RISPERIDONE 1 MG/1
2 TABLET ORAL ONCE
Status: COMPLETED | OUTPATIENT
Start: 2024-03-04 | End: 2024-03-04

## 2024-03-04 RX ORDER — LORAZEPAM 1 MG/1
4 TABLET ORAL
Status: DISCONTINUED | OUTPATIENT
Start: 2024-03-04 | End: 2024-03-05 | Stop reason: ALTCHOICE

## 2024-03-04 RX ORDER — LORAZEPAM 1 MG/1
3 TABLET ORAL
Status: DISCONTINUED | OUTPATIENT
Start: 2024-03-04 | End: 2024-03-05 | Stop reason: ALTCHOICE

## 2024-03-04 RX ORDER — TRAZODONE HYDROCHLORIDE 50 MG/1
50 TABLET ORAL ONCE
Status: COMPLETED | OUTPATIENT
Start: 2024-03-04 | End: 2024-03-04

## 2024-03-04 RX ORDER — LORAZEPAM 2 MG/ML
4 INJECTION INTRAMUSCULAR
Status: DISCONTINUED | OUTPATIENT
Start: 2024-03-04 | End: 2024-03-05 | Stop reason: ALTCHOICE

## 2024-03-04 RX ORDER — LORAZEPAM 2 MG/ML
1 INJECTION INTRAMUSCULAR
Status: DISCONTINUED | OUTPATIENT
Start: 2024-03-04 | End: 2024-03-05 | Stop reason: ALTCHOICE

## 2024-03-04 RX ORDER — ALBUTEROL SULFATE 90 UG/1
2 AEROSOL, METERED RESPIRATORY (INHALATION) EVERY 6 HOURS PRN
Status: DISCONTINUED | OUTPATIENT
Start: 2024-03-04 | End: 2024-03-05

## 2024-03-04 RX ORDER — ONDANSETRON 2 MG/ML
4 INJECTION INTRAMUSCULAR; INTRAVENOUS ONCE
Status: COMPLETED | OUTPATIENT
Start: 2024-03-04 | End: 2024-03-04

## 2024-03-04 RX ADMIN — POTASSIUM CHLORIDE 10 MEQ: 7.46 INJECTION, SOLUTION INTRAVENOUS at 23:52

## 2024-03-04 RX ADMIN — TRAZODONE HYDROCHLORIDE 50 MG: 50 TABLET ORAL at 23:43

## 2024-03-04 RX ADMIN — SODIUM CHLORIDE 1000 ML: 9 INJECTION, SOLUTION INTRAVENOUS at 22:30

## 2024-03-04 RX ADMIN — RISPERIDONE 2 MG: 1 TABLET ORAL at 23:43

## 2024-03-04 RX ADMIN — POTASSIUM CHLORIDE 40 MEQ: 750 TABLET, EXTENDED RELEASE ORAL at 23:43

## 2024-03-04 RX ADMIN — ONDANSETRON 4 MG: 2 INJECTION INTRAMUSCULAR; INTRAVENOUS at 22:29

## 2024-03-04 RX ADMIN — LORAZEPAM 2 MG: 2 INJECTION INTRAMUSCULAR; INTRAVENOUS at 22:13

## 2024-03-04 ASSESSMENT — LIFESTYLE VARIABLES
HOW OFTEN DO YOU HAVE A DRINK CONTAINING ALCOHOL: 4 OR MORE TIMES A WEEK
HOW MANY STANDARD DRINKS CONTAINING ALCOHOL DO YOU HAVE ON A TYPICAL DAY: 3 OR 4

## 2024-03-04 ASSESSMENT — PAIN - FUNCTIONAL ASSESSMENT: PAIN_FUNCTIONAL_ASSESSMENT: 0-10

## 2024-03-04 ASSESSMENT — PAIN DESCRIPTION - ORIENTATION: ORIENTATION: ANTERIOR

## 2024-03-04 ASSESSMENT — PAIN DESCRIPTION - DESCRIPTORS: DESCRIPTORS: ACHING

## 2024-03-04 ASSESSMENT — PAIN DESCRIPTION - LOCATION: LOCATION: HEAD

## 2024-03-04 ASSESSMENT — PAIN SCALES - GENERAL: PAINLEVEL_OUTOF10: 10

## 2024-03-05 PROBLEM — F20.0 PARANOID SCHIZOPHRENIA (HCC): Status: ACTIVE | Noted: 2024-03-05

## 2024-03-05 PROBLEM — F20.9 SCHIZOPHRENIA (HCC): Status: ACTIVE | Noted: 2024-03-05

## 2024-03-05 LAB
AMPHET UR QL SCN: NEGATIVE
ANION GAP SERPL CALC-SCNC: 7 MMOL/L (ref 5–15)
APPEARANCE UR: CLEAR
BACTERIA URNS QL MICRO: NEGATIVE /HPF
BARBITURATES UR QL SCN: NEGATIVE
BENZODIAZ UR QL: NEGATIVE
BILIRUB UR QL: NEGATIVE
BUN SERPL-MCNC: 5 MG/DL (ref 6–20)
BUN/CREAT SERPL: 5 (ref 12–20)
CALCIUM SERPL-MCNC: 7.9 MG/DL (ref 8.5–10.1)
CANNABINOIDS UR QL SCN: POSITIVE
CHLORIDE SERPL-SCNC: 101 MMOL/L (ref 97–108)
CO2 SERPL-SCNC: 28 MMOL/L (ref 21–32)
COCAINE UR QL SCN: NEGATIVE
COLOR UR: ABNORMAL
CREAT SERPL-MCNC: 0.95 MG/DL (ref 0.7–1.3)
EKG ATRIAL RATE: 126 BPM
EKG DIAGNOSIS: NORMAL
EKG P AXIS: 77 DEGREES
EKG P-R INTERVAL: 128 MS
EKG Q-T INTERVAL: 342 MS
EKG QRS DURATION: 80 MS
EKG QTC CALCULATION (BAZETT): 495 MS
EKG R AXIS: 90 DEGREES
EKG T AXIS: 75 DEGREES
EKG VENTRICULAR RATE: 126 BPM
EPITH CASTS URNS QL MICRO: ABNORMAL /LPF
GLUCOSE SERPL-MCNC: 93 MG/DL (ref 65–100)
GLUCOSE UR STRIP.AUTO-MCNC: NEGATIVE MG/DL
HGB UR QL STRIP: NEGATIVE
KETONES UR QL STRIP.AUTO: ABNORMAL MG/DL
LEUKOCYTE ESTERASE UR QL STRIP.AUTO: NEGATIVE
Lab: ABNORMAL
METHADONE UR QL: NEGATIVE
NITRITE UR QL STRIP.AUTO: NEGATIVE
OPIATES UR QL: NEGATIVE
PCP UR QL: NEGATIVE
PH UR STRIP: 7 (ref 5–8)
POTASSIUM SERPL-SCNC: 3.5 MMOL/L (ref 3.5–5.1)
PROT UR STRIP-MCNC: NEGATIVE MG/DL
RBC #/AREA URNS HPF: ABNORMAL /HPF (ref 0–5)
SODIUM SERPL-SCNC: 136 MMOL/L (ref 136–145)
SP GR UR REFRACTOMETRY: 1.01
URINE CULTURE IF INDICATED: ABNORMAL
UROBILINOGEN UR QL STRIP.AUTO: 1 EU/DL (ref 0.2–1)
WBC URNS QL MICRO: ABNORMAL /HPF (ref 0–4)

## 2024-03-05 PROCEDURE — 96366 THER/PROPH/DIAG IV INF ADDON: CPT

## 2024-03-05 PROCEDURE — 6360000002 HC RX W HCPCS: Performed by: STUDENT IN AN ORGANIZED HEALTH CARE EDUCATION/TRAINING PROGRAM

## 2024-03-05 PROCEDURE — 6370000000 HC RX 637 (ALT 250 FOR IP): Performed by: PSYCHIATRY & NEUROLOGY

## 2024-03-05 PROCEDURE — 80048 BASIC METABOLIC PNL TOTAL CA: CPT

## 2024-03-05 PROCEDURE — 36415 COLL VENOUS BLD VENIPUNCTURE: CPT

## 2024-03-05 PROCEDURE — 6370000000 HC RX 637 (ALT 250 FOR IP): Performed by: STUDENT IN AN ORGANIZED HEALTH CARE EDUCATION/TRAINING PROGRAM

## 2024-03-05 PROCEDURE — 6370000000 HC RX 637 (ALT 250 FOR IP)

## 2024-03-05 PROCEDURE — 1240000000 HC EMOTIONAL WELLNESS R&B

## 2024-03-05 PROCEDURE — 93010 ELECTROCARDIOGRAM REPORT: CPT | Performed by: SPECIALIST

## 2024-03-05 PROCEDURE — 6360000002 HC RX W HCPCS

## 2024-03-05 PROCEDURE — 96376 TX/PRO/DX INJ SAME DRUG ADON: CPT

## 2024-03-05 RX ORDER — PHENOBARBITAL 32.4 MG/1
16.2 TABLET ORAL 2 TIMES DAILY
Status: COMPLETED | OUTPATIENT
Start: 2024-03-07 | End: 2024-03-08

## 2024-03-05 RX ORDER — HALOPERIDOL DECANOATE 100 MG/ML
200 INJECTION INTRAMUSCULAR
Status: ON HOLD | COMMUNITY
End: 2024-03-22 | Stop reason: HOSPADM

## 2024-03-05 RX ORDER — ONDANSETRON 2 MG/ML
4 INJECTION INTRAMUSCULAR; INTRAVENOUS EVERY 6 HOURS PRN
Status: DISCONTINUED | OUTPATIENT
Start: 2024-03-05 | End: 2024-03-11 | Stop reason: HOSPADM

## 2024-03-05 RX ORDER — TRAZODONE HYDROCHLORIDE 50 MG/1
50 TABLET ORAL
COMMUNITY

## 2024-03-05 RX ORDER — ALBUTEROL SULFATE 90 UG/1
2 AEROSOL, METERED RESPIRATORY (INHALATION) EVERY 6 HOURS PRN
Status: DISCONTINUED | OUTPATIENT
Start: 2024-03-05 | End: 2024-03-06

## 2024-03-05 RX ORDER — POLYETHYLENE GLYCOL 3350 17 G/17G
17 POWDER, FOR SOLUTION ORAL DAILY PRN
Status: DISCONTINUED | OUTPATIENT
Start: 2024-03-05 | End: 2024-03-16

## 2024-03-05 RX ORDER — PHENOBARBITAL 32.4 MG/1
16.2 TABLET ORAL EVERY 6 HOURS PRN
Status: ACTIVE | OUTPATIENT
Start: 2024-03-07 | End: 2024-03-08

## 2024-03-05 RX ORDER — HALOPERIDOL 5 MG/1
5 TABLET ORAL EVERY 4 HOURS PRN
Status: DISCONTINUED | OUTPATIENT
Start: 2024-03-05 | End: 2024-03-22 | Stop reason: HOSPADM

## 2024-03-05 RX ORDER — MAGNESIUM HYDROXIDE/ALUMINUM HYDROXICE/SIMETHICONE 120; 1200; 1200 MG/30ML; MG/30ML; MG/30ML
30 SUSPENSION ORAL EVERY 6 HOURS PRN
Status: DISCONTINUED | OUTPATIENT
Start: 2024-03-05 | End: 2024-03-22 | Stop reason: HOSPADM

## 2024-03-05 RX ORDER — MULTIVITAMIN WITH IRON
1 TABLET ORAL DAILY
Status: DISCONTINUED | OUTPATIENT
Start: 2024-03-05 | End: 2024-03-22 | Stop reason: HOSPADM

## 2024-03-05 RX ORDER — CHLORDIAZEPOXIDE HYDROCHLORIDE 25 MG/1
50 CAPSULE, GELATIN COATED ORAL ONCE
Status: COMPLETED | OUTPATIENT
Start: 2024-03-05 | End: 2024-03-05

## 2024-03-05 RX ORDER — FOLIC ACID 1 MG/1
1 TABLET ORAL DAILY
Status: DISCONTINUED | OUTPATIENT
Start: 2024-03-05 | End: 2024-03-22 | Stop reason: HOSPADM

## 2024-03-05 RX ORDER — METOPROLOL SUCCINATE 100 MG/1
100 TABLET, EXTENDED RELEASE ORAL DAILY
COMMUNITY

## 2024-03-05 RX ORDER — FLUTICASONE PROPIONATE 50 MCG
2 SPRAY, SUSPENSION (ML) NASAL
COMMUNITY

## 2024-03-05 RX ORDER — TRAZODONE HYDROCHLORIDE 50 MG/1
50 TABLET ORAL NIGHTLY PRN
Status: DISCONTINUED | OUTPATIENT
Start: 2024-03-05 | End: 2024-03-22 | Stop reason: HOSPADM

## 2024-03-05 RX ORDER — PHENOBARBITAL 32.4 MG/1
32.4 TABLET ORAL 4 TIMES DAILY
Status: COMPLETED | OUTPATIENT
Start: 2024-03-05 | End: 2024-03-06

## 2024-03-05 RX ORDER — HYDROXYZINE HYDROCHLORIDE 25 MG/1
50 TABLET, FILM COATED ORAL 3 TIMES DAILY PRN
Status: DISCONTINUED | OUTPATIENT
Start: 2024-03-05 | End: 2024-03-22 | Stop reason: HOSPADM

## 2024-03-05 RX ORDER — PHENOBARBITAL 32.4 MG/1
32.4 TABLET ORAL 2 TIMES DAILY
Status: COMPLETED | OUTPATIENT
Start: 2024-03-06 | End: 2024-03-07

## 2024-03-05 RX ORDER — ACETAMINOPHEN 325 MG/1
650 TABLET ORAL EVERY 4 HOURS PRN
Status: DISCONTINUED | OUTPATIENT
Start: 2024-03-05 | End: 2024-03-22 | Stop reason: HOSPADM

## 2024-03-05 RX ORDER — HALOPERIDOL 5 MG/ML
5 INJECTION INTRAMUSCULAR EVERY 4 HOURS PRN
Status: DISCONTINUED | OUTPATIENT
Start: 2024-03-05 | End: 2024-03-22 | Stop reason: HOSPADM

## 2024-03-05 RX ORDER — LANOLIN ALCOHOL/MO/W.PET/CERES
100 CREAM (GRAM) TOPICAL DAILY
Status: DISCONTINUED | OUTPATIENT
Start: 2024-03-05 | End: 2024-03-22 | Stop reason: HOSPADM

## 2024-03-05 RX ORDER — CARVEDILOL 12.5 MG/1
12.5 TABLET ORAL 2 TIMES DAILY
Status: DISCONTINUED | OUTPATIENT
Start: 2024-03-05 | End: 2024-03-06

## 2024-03-05 RX ORDER — PHENOBARBITAL 32.4 MG/1
32.4 TABLET ORAL EVERY 6 HOURS PRN
Status: ACTIVE | OUTPATIENT
Start: 2024-03-05 | End: 2024-03-07

## 2024-03-05 RX ORDER — DIPHENHYDRAMINE HYDROCHLORIDE 50 MG/ML
50 INJECTION INTRAMUSCULAR; INTRAVENOUS EVERY 4 HOURS PRN
Status: DISCONTINUED | OUTPATIENT
Start: 2024-03-05 | End: 2024-03-22 | Stop reason: HOSPADM

## 2024-03-05 RX ORDER — TIOTROPIUM BROMIDE 18 UG/1
18 CAPSULE ORAL; RESPIRATORY (INHALATION) DAILY
COMMUNITY

## 2024-03-05 RX ADMIN — CARVEDILOL 12.5 MG: 12.5 TABLET, FILM COATED ORAL at 04:34

## 2024-03-05 RX ADMIN — Medication 100 MG: at 14:07

## 2024-03-05 RX ADMIN — CHLORDIAZEPOXIDE HYDROCHLORIDE 50 MG: 25 CAPSULE ORAL at 00:33

## 2024-03-05 RX ADMIN — LORAZEPAM 2 MG: 1 TABLET ORAL at 09:41

## 2024-03-05 RX ADMIN — PHENOBARBITAL 32.4 MG: 32.4 TABLET ORAL at 14:07

## 2024-03-05 RX ADMIN — FOLIC ACID 1 MG: 1 TABLET ORAL at 14:07

## 2024-03-05 RX ADMIN — LORAZEPAM 1 MG: 2 INJECTION INTRAMUSCULAR; INTRAVENOUS at 03:25

## 2024-03-05 RX ADMIN — ACETAMINOPHEN 650 MG: 325 TABLET ORAL at 14:07

## 2024-03-05 RX ADMIN — THERA TABS 1 TABLET: TAB at 14:07

## 2024-03-05 RX ADMIN — LORAZEPAM 1 MG: 1 TABLET ORAL at 07:29

## 2024-03-05 RX ADMIN — PHENOBARBITAL 32.4 MG: 32.4 TABLET ORAL at 21:26

## 2024-03-05 RX ADMIN — ALBUTEROL SULFATE 2 PUFF: 90 AEROSOL, METERED RESPIRATORY (INHALATION) at 22:35

## 2024-03-05 RX ADMIN — ONDANSETRON 4 MG: 2 INJECTION INTRAMUSCULAR; INTRAVENOUS at 09:41

## 2024-03-05 RX ADMIN — ALBUTEROL SULFATE 2 PUFF: 90 AEROSOL, METERED RESPIRATORY (INHALATION) at 13:56

## 2024-03-05 RX ADMIN — CARVEDILOL 12.5 MG: 12.5 TABLET, FILM COATED ORAL at 21:26

## 2024-03-05 RX ADMIN — TRAZODONE HYDROCHLORIDE 50 MG: 50 TABLET ORAL at 21:26

## 2024-03-05 RX ADMIN — PHENOBARBITAL 32.4 MG: 32.4 TABLET ORAL at 17:45

## 2024-03-05 RX ADMIN — POTASSIUM CHLORIDE 10 MEQ: 7.46 INJECTION, SOLUTION INTRAVENOUS at 01:02

## 2024-03-05 ASSESSMENT — SLEEP AND FATIGUE QUESTIONNAIRES
DO YOU USE A SLEEP AID: NO
AVERAGE NUMBER OF SLEEP HOURS: 6
DO YOU HAVE DIFFICULTY SLEEPING: NO

## 2024-03-05 ASSESSMENT — PAIN DESCRIPTION - LOCATION: LOCATION: GENERALIZED

## 2024-03-05 ASSESSMENT — PAIN - FUNCTIONAL ASSESSMENT: PAIN_FUNCTIONAL_ASSESSMENT: 0-10

## 2024-03-05 ASSESSMENT — PAIN SCALES - GENERAL
PAINLEVEL_OUTOF10: 0
PAINLEVEL_OUTOF10: 6
PAINLEVEL_OUTOF10: 0

## 2024-03-05 ASSESSMENT — PAIN DESCRIPTION - DESCRIPTORS: DESCRIPTORS: ACHING

## 2024-03-05 NOTE — ED PROVIDER NOTES
unanticipated grammatical, syntax, homophones, and other interpretive errors are inadvertently transcribed by the computer software. Please disregards these errors. Please excuse any errors that have escaped final proofreading.)        Maribel Busch, DONNELL - CNP  03/05/24 9582

## 2024-03-05 NOTE — BSMART NOTE
Patient bed placement will be given in the am due to staffing. Access is aware , patient has already been accepted.

## 2024-03-05 NOTE — BSMART NOTE
Comprehensive Assessment Form Part 1      Section I - Disposition    Primary Diagnosis: Paranoid Schizophrenia   Secondary Diagnosis:     The Medical Doctor to Psychiatrist conference was notcompleted.  The Medical Doctor is in agreement with Psychiatrist disposition because of (reason) ED provider in agreement.  The plan is Bhu if medically clear.  The on-call Psychiatrist consulted was  .  The admitting Psychiatrist will be  .  The admitting Diagnosis is Paranoid Schizophrenia .  The Payor source is .  The name of the representative was .  This was     This writer reviewed the [place]ick Suicide Severity Rating Scale in nursing flowsheet and the risk level assigned is high risk_.  Based on this assessment, the risk of suicide is low to moderate and the plan is TBA.    Section II - Integrated Summary  Summary:  Triage: [facility] with several complaints.   SI with a plan to slit wrists and anxiety since 8 am. Reports smoking weed this morning and drinking liquor all day;unsure the amount. Daily drinker. Denies HI.   Reports visual and auditory hallucinations telling him to harm himself.  Reports only taking his medications once today instead of twice. Pt anxious and paranoid    At bedside, patient is cooperative, slow to respond, depressed mood. Patient is oriented to place and current situation. Patient informed this writer that is was 1965, could not give month or president when asked. Patient reported coming to hospital to get away from the bed bugs and he reported suicidal thoughts with plan to cut his wrists. Patient reported previous attempt in past to cut his wrist. Patient showed this writer both arms and it appears he has scratched himself several times. Patient denied homicidal thoughts. Patient reported hallucinations when asked him to explain what he has been hearing and seeing he looked away from this writer briefly and once asked again he stated he wasnot sure. Patient

## 2024-03-05 NOTE — ED NOTES
TRANSFER - OUT REPORT:    Verbal report given to JOSE Bahena on Dajuan Schultz  being transferred to [facility] for routine progression of patient care       Report consisted of patient's Situation, Background, Assessment and   Recommendations(SBAR).     Information from the following report(s) ED SBAR, MAR, and Recent Results was reviewed with the receiving nurse.    [place] Fall Assessment:    Presents to emergency department  because of falls (Syncope, seizure, or loss of consciousness): No  Age > 70: No  Altered Mental Status, Intoxication with alcohol or substance confusion (Disorientation, impaired judgment, poor safety awaremess, or inability to follow instructions): Yes  Impaired Mobility: Ambulates or transfers with assistive devices or assistance; Unable to ambulate or transer.: No  Nursing Judgement: No            Opportunity for questions and clarification was provided.      Patient transported with:  Security

## 2024-03-05 NOTE — GROUP NOTE
Group Therapy Note    Date: 3/5/2024    Group Start Time: 1400  Group End Time: 1500  Group Topic: Recreational    [facility]    Rosario Hauser        Group Therapy Note    Attendees: 4       Patient's Goal:  To concentrate on selected task    Notes:  Pt did not attend session    Discipline Responsible: Recreational Therapist      Signature:  ROSARIO HAUSER

## 2024-03-05 NOTE — BSMART NOTE
BSMART assessment completed, and suicide risk level noted to be moderate to high risk. Primary Nurse Ayaka and Charge Nurse Anna and Physician Jo-Ann notified. Concerns not observed.

## 2024-03-05 NOTE — ED TRIAGE NOTES
[facility] with several complaints.     SI with a plan to slit wrists and anxiety since 8 am. Reports smoking weed this morning and drinking liquor all day;unsure the amount. Daily drinker.     Denies HI.   Reports visual and auditory hallucinations telling him to harm himself.  Reports only taking his medications once today instead of twice. Pt anxious and paranoid.     Pt also urine is dark and odorous x unsure amount of time.     Group homes name is [facility]. EMS stated it is a two story house.

## 2024-03-05 NOTE — GROUP NOTE
Group Therapy Note    Date: 3/5/2024    Group Start Time: 1000  Group End Time: 1100  Group Topic: Topic Group    [facility] 3 ACUTE [facility]HAV [facility]    Rosario Hauser        Group Therapy Note    Attendees: 5       Patient's Goal:  To participate in mental health  [email or web address] game     Notes:  Pt did not attend session    Discipline Responsible: Recreational Therapist      Signature:  ROSARIO HAUSER

## 2024-03-06 PROCEDURE — 6370000000 HC RX 637 (ALT 250 FOR IP): Performed by: PSYCHIATRY & NEUROLOGY

## 2024-03-06 PROCEDURE — 6370000000 HC RX 637 (ALT 250 FOR IP): Performed by: STUDENT IN AN ORGANIZED HEALTH CARE EDUCATION/TRAINING PROGRAM

## 2024-03-06 PROCEDURE — 1240000000 HC EMOTIONAL WELLNESS R&B

## 2024-03-06 PROCEDURE — 97161 PT EVAL LOW COMPLEX 20 MIN: CPT | Performed by: PHYSICAL THERAPIST

## 2024-03-06 PROCEDURE — 6370000000 HC RX 637 (ALT 250 FOR IP): Performed by: INTERNAL MEDICINE

## 2024-03-06 RX ORDER — BUDESONIDE AND FORMOTEROL FUMARATE DIHYDRATE 160; 4.5 UG/1; UG/1
2 AEROSOL RESPIRATORY (INHALATION) 2 TIMES DAILY
Status: DISCONTINUED | OUTPATIENT
Start: 2024-03-06 | End: 2024-03-22 | Stop reason: HOSPADM

## 2024-03-06 RX ORDER — DULOXETIN HYDROCHLORIDE 30 MG/1
30 CAPSULE, DELAYED RELEASE ORAL EVERY MORNING
Status: DISCONTINUED | OUTPATIENT
Start: 2024-03-06 | End: 2024-03-22 | Stop reason: HOSPADM

## 2024-03-06 RX ORDER — NICOTINE 21 MG/24HR
1 PATCH, TRANSDERMAL 24 HOURS TRANSDERMAL DAILY
Status: DISCONTINUED | OUTPATIENT
Start: 2024-03-06 | End: 2024-03-22 | Stop reason: HOSPADM

## 2024-03-06 RX ORDER — POLYETHYLENE GLYCOL 3350 17 G
2 POWDER IN PACKET (EA) ORAL
Status: DISCONTINUED | OUTPATIENT
Start: 2024-03-06 | End: 2024-03-22 | Stop reason: HOSPADM

## 2024-03-06 RX ORDER — METOPROLOL SUCCINATE 50 MG/1
100 TABLET, EXTENDED RELEASE ORAL DAILY
Status: DISCONTINUED | OUTPATIENT
Start: 2024-03-07 | End: 2024-03-22 | Stop reason: HOSPADM

## 2024-03-06 RX ORDER — DULOXETIN HYDROCHLORIDE 60 MG/1
60 CAPSULE, DELAYED RELEASE ORAL NIGHTLY
Status: DISCONTINUED | OUTPATIENT
Start: 2024-03-06 | End: 2024-03-22 | Stop reason: HOSPADM

## 2024-03-06 RX ORDER — ALBUTEROL SULFATE 90 UG/1
2 AEROSOL, METERED RESPIRATORY (INHALATION) EVERY 4 HOURS PRN
Status: DISCONTINUED | OUTPATIENT
Start: 2024-03-06 | End: 2024-03-22 | Stop reason: HOSPADM

## 2024-03-06 RX ORDER — RISPERIDONE 1 MG/1
2 TABLET ORAL 2 TIMES DAILY
Status: DISCONTINUED | OUTPATIENT
Start: 2024-03-06 | End: 2024-03-14

## 2024-03-06 RX ORDER — ATORVASTATIN CALCIUM 10 MG/1
20 TABLET, FILM COATED ORAL DAILY
Status: DISCONTINUED | OUTPATIENT
Start: 2024-03-06 | End: 2024-03-22 | Stop reason: HOSPADM

## 2024-03-06 RX ORDER — AMLODIPINE BESYLATE 5 MG/1
10 TABLET ORAL DAILY
Status: DISCONTINUED | OUTPATIENT
Start: 2024-03-06 | End: 2024-03-22 | Stop reason: HOSPADM

## 2024-03-06 RX ORDER — CAPTOPRIL 25 MG/1
25 TABLET ORAL 2 TIMES DAILY
Status: DISCONTINUED | OUTPATIENT
Start: 2024-03-06 | End: 2024-03-07

## 2024-03-06 RX ORDER — TRAMADOL HYDROCHLORIDE 50 MG/1
50 TABLET ORAL 3 TIMES DAILY PRN
Status: DISCONTINUED | OUTPATIENT
Start: 2024-03-06 | End: 2024-03-22 | Stop reason: HOSPADM

## 2024-03-06 RX ORDER — CAPTOPRIL 25 MG/1
25 TABLET ORAL 2 TIMES DAILY
Status: DISCONTINUED | OUTPATIENT
Start: 2024-03-06 | End: 2024-03-06 | Stop reason: SDUPTHER

## 2024-03-06 RX ORDER — HALOPERIDOL DECANOATE 100 MG/ML
200 INJECTION INTRAMUSCULAR
Status: DISCONTINUED | OUTPATIENT
Start: 2024-03-22 | End: 2024-03-14

## 2024-03-06 RX ORDER — BENZTROPINE MESYLATE 1 MG/1
1 TABLET ORAL 2 TIMES DAILY
Status: DISCONTINUED | OUTPATIENT
Start: 2024-03-06 | End: 2024-03-22 | Stop reason: HOSPADM

## 2024-03-06 RX ORDER — FLUTICASONE PROPIONATE 50 MCG
2 SPRAY, SUSPENSION (ML) NASAL DAILY
Status: DISCONTINUED | OUTPATIENT
Start: 2024-03-06 | End: 2024-03-22 | Stop reason: HOSPADM

## 2024-03-06 RX ORDER — ASPIRIN 81 MG/1
81 TABLET, CHEWABLE ORAL DAILY
Status: DISCONTINUED | OUTPATIENT
Start: 2024-03-06 | End: 2024-03-22 | Stop reason: HOSPADM

## 2024-03-06 RX ORDER — IBUPROFEN 400 MG/1
800 TABLET ORAL 3 TIMES DAILY PRN
Status: DISCONTINUED | OUTPATIENT
Start: 2024-03-06 | End: 2024-03-22 | Stop reason: HOSPADM

## 2024-03-06 RX ADMIN — RISPERIDONE 2 MG: 1 TABLET, FILM COATED ORAL at 12:27

## 2024-03-06 RX ADMIN — THERA TABS 1 TABLET: TAB at 08:09

## 2024-03-06 RX ADMIN — NICOTINE POLACRILEX 2 MG: 2 LOZENGE ORAL at 15:43

## 2024-03-06 RX ADMIN — ALBUTEROL SULFATE 2 PUFF: 90 AEROSOL, METERED RESPIRATORY (INHALATION) at 07:44

## 2024-03-06 RX ADMIN — ACETAMINOPHEN 650 MG: 325 TABLET ORAL at 08:08

## 2024-03-06 RX ADMIN — BENZTROPINE MESYLATE 1 MG: 1 TABLET ORAL at 21:17

## 2024-03-06 RX ADMIN — HYDROXYZINE HYDROCHLORIDE 50 MG: 25 TABLET, FILM COATED ORAL at 08:08

## 2024-03-06 RX ADMIN — ATORVASTATIN CALCIUM 20 MG: 40 TABLET, FILM COATED ORAL at 12:27

## 2024-03-06 RX ADMIN — CARVEDILOL 12.5 MG: 12.5 TABLET, FILM COATED ORAL at 11:03

## 2024-03-06 RX ADMIN — FOLIC ACID 1 MG: 1 TABLET ORAL at 08:13

## 2024-03-06 RX ADMIN — BENZTROPINE MESYLATE 1 MG: 1 TABLET ORAL at 12:28

## 2024-03-06 RX ADMIN — BUDESONIDE AND FORMOTEROL FUMARATE DIHYDRATE 2 PUFF: 160; 4.5 AEROSOL RESPIRATORY (INHALATION) at 21:22

## 2024-03-06 RX ADMIN — CAPTOPRIL 25 MG: 25 TABLET ORAL at 21:16

## 2024-03-06 RX ADMIN — PHENOBARBITAL 32.4 MG: 32.4 TABLET ORAL at 21:17

## 2024-03-06 RX ADMIN — BUDESONIDE AND FORMOTEROL FUMARATE DIHYDRATE 2 PUFF: 160; 4.5 AEROSOL RESPIRATORY (INHALATION) at 13:50

## 2024-03-06 RX ADMIN — DULOXETINE HYDROCHLORIDE 30 MG: 30 CAPSULE, DELAYED RELEASE ORAL at 12:27

## 2024-03-06 RX ADMIN — ASPIRIN 81 MG CHEWABLE TABLET 81 MG: 81 TABLET CHEWABLE at 12:27

## 2024-03-06 RX ADMIN — FLUTICASONE PROPIONATE 2 SPRAY: 50 SPRAY, METERED NASAL at 13:51

## 2024-03-06 RX ADMIN — TIOTROPIUM BROMIDE INHALATION SPRAY 2 PUFF: 3.12 SPRAY, METERED RESPIRATORY (INHALATION) at 13:51

## 2024-03-06 RX ADMIN — Medication 100 MG: at 08:09

## 2024-03-06 RX ADMIN — RISPERIDONE 2 MG: 1 TABLET, FILM COATED ORAL at 21:18

## 2024-03-06 RX ADMIN — PHENOBARBITAL 32.4 MG: 32.4 TABLET ORAL at 08:09

## 2024-03-06 RX ADMIN — AMLODIPINE BESYLATE 10 MG: 5 TABLET ORAL at 21:20

## 2024-03-06 RX ADMIN — TRAZODONE HYDROCHLORIDE 50 MG: 50 TABLET ORAL at 21:17

## 2024-03-06 RX ADMIN — POLYETHYLENE GLYCOL 3350 17 G: 17 POWDER, FOR SOLUTION ORAL at 14:20

## 2024-03-06 RX ADMIN — DULOXETINE HYDROCHLORIDE 60 MG: 30 CAPSULE, DELAYED RELEASE ORAL at 21:17

## 2024-03-06 ASSESSMENT — PAIN DESCRIPTION - DESCRIPTORS: DESCRIPTORS: ACHING

## 2024-03-06 ASSESSMENT — PAIN SCALES - GENERAL
PAINLEVEL_OUTOF10: 5
PAINLEVEL_OUTOF10: 0

## 2024-03-06 ASSESSMENT — PAIN DESCRIPTION - LOCATION: LOCATION: GENERALIZED

## 2024-03-06 NOTE — CARE COORDINATION
03/06/24 0833   ITP   Date of Plan 03/06/24   Date of Next Review 03/13/24   Primary Diagnosis Code Paranoid Schizophrenia   Barriers to Treatment Need for psychoeducation   Strengths [employer] in Plan Acknowledging need for assistance;Community supports;Natural supports;Verbal;Support network   Plan of Care   Long Term Goal (LTG) Stated in patient/guardian terms To maintain mental health in the community   Short Term Goal 1   Short Term Goal 1 Client will learn and demonstrate effective coping skills   Baseline Functioning Client reports SI   Target Client will use healthy coping skills   Objectives Client will participate in group therapy   Intervention 1 Group therapy;Milieu therapy and support   Frequency Daily   Measured by Staff observation;Self report   Staff Responsible Clinical staff;[facility] staff   STG Goal 1 Status: Patient Appears to be  Partially meeting treatment plan goal   Short Term Goal 2   Short Term Goal 2 Client will maintain compliance with medication regime   Baseline Functioning Client reports VH   Target Client will comply with and feel supported by medication regimen   Objectives Other (comment)  (Client will take medication as prescribed in hospital)   Intervention 1 Monitor medications   Frequency Daily   Measured by Staff observation;Self report   Staff Responsible Clinical staff;[facility] staff   STG Goal 2 Status: Patient Appears to be  Progressing toward treatment plan goal   Crisis/Safety/Discharge Plan   Crisis/Safety Plan Standard program interventions and protocol   Comprehensive Assessment Completion Date 03/06/24   Discharge Plan Group home

## 2024-03-06 NOTE — GROUP NOTE
Group Therapy Note    Date: 3/6/2024    Group Start Time: 1000  Group End Time: 1100  Group Topic: Topic Group    [facility] 3 ACUTE BE[facility]AV [facility]    Rosario Hauser        Group Therapy Note    Attendees: 4       Patient's Goal:  To participate in relaxation activity    Notes:  Pt did not attend session    Discipline Responsible: Recreational Therapist      Signature:  ROSARIO HAUSER

## 2024-03-06 NOTE — CARE COORDINATION
03/06/24 0800   Suicidal Ideation   Wish to be Dead Lifetime - Yes;Past 1 month - Yes   Non-Specific Active Suicidal Thoughts Past 1 month - No;Lifetime - No   Suicidal Behavior Trigger Wish to be Dead   Intensity of Ideation   Lifetime - Most Severe Ideation 4   Lifetime - Most Recent Ideation 3   Frequency Once a week   Duration 4-8 hours/most of day   Controllability Can control thoughts with some difficulty   Deterrents Does not apply   Reasons for Ideation Completely to end or stop the pain   Suicidal Behavior   Actual Attempt Lifetime - No;Past 3 months - No   Has subject engaged in Non-Suicidal Self-Injurious Behavior? Lifetime - No;Past 3 months - No   Interrupted Attempt Lifetime - No;Past 3 months - No   Aborted or Self-Interrupted Attempt Lifetime - No;Past 3 months - No   Preparatory Acts or Behavior Lifetime - No;Past 3 months - No   Actual Lethality/Medical Damage 0   Potential Lethality 1

## 2024-03-06 NOTE — GROUP NOTE
Group Therapy Note    Date: 3/6/2024    Group Start Time: 1400  Group End Time: 1500  Group Topic: Recreational    [facility]    Rosario Hauser        Group Therapy Note    Attendees: 3       Patient's Goal:  T concentrate on selected task    Notes:  Pt did not attend session    Discipline Responsible: Recreational Therapist      Signature:  ROSARIO HAUSER

## 2024-03-07 LAB
CHOLEST SERPL-MCNC: 137 MG/DL
EST. AVERAGE GLUCOSE BLD GHB EST-MCNC: 111 MG/DL
HBA1C MFR BLD: 5.5 % (ref 4–5.6)
HDLC SERPL-MCNC: 57 MG/DL
HDLC SERPL: 2.4 (ref 0–5)
LDLC SERPL CALC-MCNC: 62 MG/DL (ref 0–100)
TRIGL SERPL-MCNC: 90 MG/DL
VLDLC SERPL CALC-MCNC: 18 MG/DL

## 2024-03-07 PROCEDURE — 6370000000 HC RX 637 (ALT 250 FOR IP): Performed by: INTERNAL MEDICINE

## 2024-03-07 PROCEDURE — 83036 HEMOGLOBIN GLYCOSYLATED A1C: CPT

## 2024-03-07 PROCEDURE — 36415 COLL VENOUS BLD VENIPUNCTURE: CPT

## 2024-03-07 PROCEDURE — 80061 LIPID PANEL: CPT

## 2024-03-07 PROCEDURE — 6370000000 HC RX 637 (ALT 250 FOR IP): Performed by: PSYCHIATRY & NEUROLOGY

## 2024-03-07 PROCEDURE — 1240000000 HC EMOTIONAL WELLNESS R&B

## 2024-03-07 RX ORDER — SENNA AND DOCUSATE SODIUM 50; 8.6 MG/1; MG/1
2 TABLET, FILM COATED ORAL DAILY PRN
Status: DISCONTINUED | OUTPATIENT
Start: 2024-03-07 | End: 2024-03-11

## 2024-03-07 RX ADMIN — BENZTROPINE MESYLATE 1 MG: 1 TABLET ORAL at 08:19

## 2024-03-07 RX ADMIN — AMLODIPINE BESYLATE 10 MG: 5 TABLET ORAL at 08:20

## 2024-03-07 RX ADMIN — THERA TABS 1 TABLET: TAB at 08:21

## 2024-03-07 RX ADMIN — METOPROLOL SUCCINATE 100 MG: 50 TABLET, EXTENDED RELEASE ORAL at 08:20

## 2024-03-07 RX ADMIN — BUDESONIDE AND FORMOTEROL FUMARATE DIHYDRATE 2 PUFF: 160; 4.5 AEROSOL RESPIRATORY (INHALATION) at 22:07

## 2024-03-07 RX ADMIN — RISPERIDONE 2 MG: 1 TABLET, FILM COATED ORAL at 08:21

## 2024-03-07 RX ADMIN — FLUTICASONE PROPIONATE 2 SPRAY: 50 SPRAY, METERED NASAL at 08:18

## 2024-03-07 RX ADMIN — HYDROXYZINE HYDROCHLORIDE 50 MG: 25 TABLET, FILM COATED ORAL at 11:04

## 2024-03-07 RX ADMIN — DULOXETINE HYDROCHLORIDE 30 MG: 30 CAPSULE, DELAYED RELEASE ORAL at 08:20

## 2024-03-07 RX ADMIN — FOLIC ACID 1 MG: 1 TABLET ORAL at 08:20

## 2024-03-07 RX ADMIN — PHENOBARBITAL 32.4 MG: 32.4 TABLET ORAL at 08:21

## 2024-03-07 RX ADMIN — BUDESONIDE AND FORMOTEROL FUMARATE DIHYDRATE 2 PUFF: 160; 4.5 AEROSOL RESPIRATORY (INHALATION) at 08:18

## 2024-03-07 RX ADMIN — ATORVASTATIN CALCIUM 20 MG: 40 TABLET, FILM COATED ORAL at 08:21

## 2024-03-07 RX ADMIN — DULOXETINE HYDROCHLORIDE 60 MG: 30 CAPSULE, DELAYED RELEASE ORAL at 22:08

## 2024-03-07 RX ADMIN — PHENOBARBITAL 16.2 MG: 32.4 TABLET ORAL at 22:08

## 2024-03-07 RX ADMIN — Medication 100 MG: at 08:20

## 2024-03-07 RX ADMIN — ALBUTEROL SULFATE 2 PUFF: 90 AEROSOL, METERED RESPIRATORY (INHALATION) at 11:27

## 2024-03-07 RX ADMIN — ASPIRIN 81 MG CHEWABLE TABLET 81 MG: 81 TABLET CHEWABLE at 08:21

## 2024-03-07 RX ADMIN — SENNOSIDES AND DOCUSATE SODIUM 2 TABLET: 50; 8.6 TABLET ORAL at 15:53

## 2024-03-07 RX ADMIN — RISPERIDONE 2 MG: 1 TABLET, FILM COATED ORAL at 22:08

## 2024-03-07 RX ADMIN — ACETAMINOPHEN 650 MG: 325 TABLET ORAL at 22:18

## 2024-03-07 RX ADMIN — BENZTROPINE MESYLATE 1 MG: 1 TABLET ORAL at 22:08

## 2024-03-07 RX ADMIN — TIOTROPIUM BROMIDE INHALATION SPRAY 2 PUFF: 3.12 SPRAY, METERED RESPIRATORY (INHALATION) at 08:18

## 2024-03-07 ASSESSMENT — PAIN SCALES - GENERAL
PAINLEVEL_OUTOF10: 10
PAINLEVEL_OUTOF10: 0

## 2024-03-07 ASSESSMENT — PAIN DESCRIPTION - LOCATION: LOCATION: HEAD

## 2024-03-07 ASSESSMENT — PAIN DESCRIPTION - ORIENTATION: ORIENTATION: ANTERIOR

## 2024-03-07 ASSESSMENT — PAIN - FUNCTIONAL ASSESSMENT: PAIN_FUNCTIONAL_ASSESSMENT: ACTIVITIES ARE NOT PREVENTED

## 2024-03-07 ASSESSMENT — PAIN DESCRIPTION - DESCRIPTORS: DESCRIPTORS: THROBBING

## 2024-03-07 NOTE — GROUP NOTE
Group Therapy Note    Date: 3/7/2024    Group Start Time: 1400  Group End Time: 1500  Group Topic: Recreational    [facility]    Rosario Hauser        Group Therapy Note    Attendees: 4       Patient's Goal:  To concentrate on selected task    Notes:  Pt did not attend session  Discipline Responsible: Recreational Therapist      Signature:  ROSARIO HAUSER

## 2024-03-07 NOTE — GROUP NOTE
Group Therapy Note    Date: 3/7/2024    Group Start Time: 1000  Group End Time: 1100  Group Topic: Topic Group    [facility] 3 ACUTE BEHAV [facility]    Rosario Hauser        Group Therapy Note    Attendees: 3       Patient's Goal:  To participate in relaxation activity    Notes:  Pt did not attend session      Discipline Responsible: Recreational Therapist      Signature:  ROSARIO HAUSER

## 2024-03-07 NOTE — H&P
[facility]               [address] [place]  [address]                                PSYCH H&P      PATIENT NAME: BREANN LAKE                   : 1965  MED REC NO: [id number]                       ROOM: 321  ACCOUNT NO: [id number]                       ADMIT DATE: 2024  PROVIDER: Jaron Yoon MD    DATE:  2024    CHIEF COMPLAINT:  Depression and anxiety with thoughts of suicide.    HISTORY OF PRESENT ILLNESS:  This is a 58-year-old male, history of COPD, hypertension, paranoid schizophrenia, comes to the hospital complaining of suicidal ideation due to the voices that he is hearing and telling him to use a knife to cut his wrists per the ED report.  He smokes marijuana and occasionally drinks alcohol took his medical conditions when came in for his myriad health condition, unsteady on his feet, known to have essential tremors.  Came from [facility] where they support him in taking medications.  He was intoxicated with ETOH from drinking all day when he came to the hospital for management of his condition.    PAST PSYCHIATRIC HISTORY:  Paranoid schizophrenia, prior hospitalizations, poor historian.    PAST MEDICAL HISTORY:  Lung cancer, chronic obstructive pulmonary disease, essential hypertension, hypercholesterolemia, hypertrophic cardiomyopathy.    ALLERGIES:  TUBERCULIN PPD.  IT CAUSES SWELLING.      SOCIAL HISTORY:  Lives in [facility] due to memory problems.    MENTAL STATUS EXAM:  Adult male calm, cooperative, clear, coherent.  Speech of average rate, volume, tone.  Mood is all right.  Poor historian.  Alert and oriented x2 only to self and maybe location, not to date or time.  Here for management of his condition.    DIAGNOSIS:  Paranoid schizophrenia, acute exacerbation.    PLAN:  Admit for safety and stabilization.  PT consult for the unsteady gait, group therapy, individual therapy, and collateral

## 2024-03-08 ENCOUNTER — APPOINTMENT (OUTPATIENT)
Facility: HOSPITAL | Age: 59
DRG: 885 | End: 2024-03-08
Payer: MEDICARE

## 2024-03-08 PROCEDURE — 6370000000 HC RX 637 (ALT 250 FOR IP): Performed by: PSYCHIATRY & NEUROLOGY

## 2024-03-08 PROCEDURE — 97116 GAIT TRAINING THERAPY: CPT | Performed by: PHYSICAL THERAPIST

## 2024-03-08 PROCEDURE — 74018 RADEX ABDOMEN 1 VIEW: CPT

## 2024-03-08 PROCEDURE — 1240000000 HC EMOTIONAL WELLNESS R&B

## 2024-03-08 PROCEDURE — 6370000000 HC RX 637 (ALT 250 FOR IP): Performed by: INTERNAL MEDICINE

## 2024-03-08 RX ADMIN — THERA TABS 1 TABLET: TAB at 10:40

## 2024-03-08 RX ADMIN — BENZTROPINE MESYLATE 1 MG: 1 TABLET ORAL at 21:24

## 2024-03-08 RX ADMIN — FOLIC ACID 1 MG: 1 TABLET ORAL at 10:40

## 2024-03-08 RX ADMIN — BUDESONIDE AND FORMOTEROL FUMARATE DIHYDRATE 2 PUFF: 160; 4.5 AEROSOL RESPIRATORY (INHALATION) at 10:41

## 2024-03-08 RX ADMIN — DULOXETINE HYDROCHLORIDE 30 MG: 30 CAPSULE, DELAYED RELEASE ORAL at 10:39

## 2024-03-08 RX ADMIN — BUDESONIDE AND FORMOTEROL FUMARATE DIHYDRATE 2 PUFF: 160; 4.5 AEROSOL RESPIRATORY (INHALATION) at 21:29

## 2024-03-08 RX ADMIN — FLUTICASONE PROPIONATE 2 SPRAY: 50 SPRAY, METERED NASAL at 10:41

## 2024-03-08 RX ADMIN — ATORVASTATIN CALCIUM 20 MG: 40 TABLET, FILM COATED ORAL at 10:40

## 2024-03-08 RX ADMIN — POLYETHYLENE GLYCOL 3350 17 G: 17 POWDER, FOR SOLUTION ORAL at 21:29

## 2024-03-08 RX ADMIN — HYDROXYZINE HYDROCHLORIDE 50 MG: 25 TABLET, FILM COATED ORAL at 10:40

## 2024-03-08 RX ADMIN — Medication 100 MG: at 10:40

## 2024-03-08 RX ADMIN — AMLODIPINE BESYLATE 10 MG: 5 TABLET ORAL at 10:39

## 2024-03-08 RX ADMIN — ASPIRIN 81 MG CHEWABLE TABLET 81 MG: 81 TABLET CHEWABLE at 10:39

## 2024-03-08 RX ADMIN — ACETAMINOPHEN 650 MG: 325 TABLET ORAL at 10:39

## 2024-03-08 RX ADMIN — TIOTROPIUM BROMIDE INHALATION SPRAY 2 PUFF: 3.12 SPRAY, METERED RESPIRATORY (INHALATION) at 10:41

## 2024-03-08 RX ADMIN — RISPERIDONE 2 MG: 1 TABLET, FILM COATED ORAL at 21:24

## 2024-03-08 RX ADMIN — SENNOSIDES AND DOCUSATE SODIUM 2 TABLET: 50; 8.6 TABLET ORAL at 10:39

## 2024-03-08 RX ADMIN — DULOXETINE HYDROCHLORIDE 60 MG: 30 CAPSULE, DELAYED RELEASE ORAL at 21:24

## 2024-03-08 RX ADMIN — METOPROLOL SUCCINATE 100 MG: 50 TABLET, EXTENDED RELEASE ORAL at 10:40

## 2024-03-08 RX ADMIN — PHENOBARBITAL 16.2 MG: 32.4 TABLET ORAL at 10:39

## 2024-03-08 RX ADMIN — RISPERIDONE 2 MG: 1 TABLET, FILM COATED ORAL at 10:40

## 2024-03-08 RX ADMIN — BENZTROPINE MESYLATE 1 MG: 1 TABLET ORAL at 10:40

## 2024-03-08 ASSESSMENT — PAIN SCALES - GENERAL: PAINLEVEL_OUTOF10: 8

## 2024-03-08 ASSESSMENT — PAIN DESCRIPTION - ORIENTATION: ORIENTATION: ANTERIOR

## 2024-03-08 ASSESSMENT — PAIN DESCRIPTION - DESCRIPTORS: DESCRIPTORS: THROBBING

## 2024-03-08 ASSESSMENT — PAIN DESCRIPTION - LOCATION: LOCATION: HEAD

## 2024-03-08 NOTE — GROUP NOTE
Group Therapy Note    Date: 3/8/2024    Group Start Time: 1000  Group End Time: 1100  Group Topic: Topic Group    [facility] 3 ACUTE BEHAV [facility]    Rosario Hauser        Group Therapy Note    Attendees: 5       Patient's Goal:  To participate in relaxation activity    Notes:  Pt did not attend session    Discipline Responsible: Recreational Therapist      Signature:  ROSARIO HAUSER

## 2024-03-08 NOTE — GROUP NOTE
Group Therapy Note    Date: 3/8/2024    Group Start Time: 1400  Group End Time: 1500  Group Topic: Recreational    [facility]    Rosario Hauser        Group Therapy Note    Attendees: 4       Patient's Goal:  To concentrate on selected task    Notes:  Pt did not attend session                                  Discipline Responsible: Recreational Therapist      Signature:  ROSARIO HAUSER

## 2024-03-09 PROCEDURE — 6370000000 HC RX 637 (ALT 250 FOR IP): Performed by: PSYCHIATRY & NEUROLOGY

## 2024-03-09 PROCEDURE — 1240000000 HC EMOTIONAL WELLNESS R&B

## 2024-03-09 PROCEDURE — 6370000000 HC RX 637 (ALT 250 FOR IP): Performed by: INTERNAL MEDICINE

## 2024-03-09 RX ADMIN — BENZTROPINE MESYLATE 1 MG: 1 TABLET ORAL at 21:27

## 2024-03-09 RX ADMIN — BUDESONIDE AND FORMOTEROL FUMARATE DIHYDRATE 2 PUFF: 160; 4.5 AEROSOL RESPIRATORY (INHALATION) at 09:21

## 2024-03-09 RX ADMIN — METOPROLOL SUCCINATE 100 MG: 50 TABLET, EXTENDED RELEASE ORAL at 09:20

## 2024-03-09 RX ADMIN — FOLIC ACID 1 MG: 1 TABLET ORAL at 09:21

## 2024-03-09 RX ADMIN — THERA TABS 1 TABLET: TAB at 09:21

## 2024-03-09 RX ADMIN — BENZTROPINE MESYLATE 1 MG: 1 TABLET ORAL at 09:20

## 2024-03-09 RX ADMIN — DULOXETINE HYDROCHLORIDE 60 MG: 30 CAPSULE, DELAYED RELEASE ORAL at 21:27

## 2024-03-09 RX ADMIN — TIOTROPIUM BROMIDE INHALATION SPRAY 2 PUFF: 3.12 SPRAY, METERED RESPIRATORY (INHALATION) at 09:21

## 2024-03-09 RX ADMIN — BUDESONIDE AND FORMOTEROL FUMARATE DIHYDRATE 2 PUFF: 160; 4.5 AEROSOL RESPIRATORY (INHALATION) at 21:28

## 2024-03-09 RX ADMIN — HYDROXYZINE HYDROCHLORIDE 50 MG: 25 TABLET, FILM COATED ORAL at 21:31

## 2024-03-09 RX ADMIN — FLUTICASONE PROPIONATE 2 SPRAY: 50 SPRAY, METERED NASAL at 09:21

## 2024-03-09 RX ADMIN — DULOXETINE HYDROCHLORIDE 30 MG: 30 CAPSULE, DELAYED RELEASE ORAL at 09:20

## 2024-03-09 RX ADMIN — HYDROXYZINE HYDROCHLORIDE 50 MG: 25 TABLET, FILM COATED ORAL at 09:20

## 2024-03-09 RX ADMIN — AMLODIPINE BESYLATE 10 MG: 5 TABLET ORAL at 09:20

## 2024-03-09 RX ADMIN — RISPERIDONE 2 MG: 1 TABLET, FILM COATED ORAL at 21:28

## 2024-03-09 RX ADMIN — Medication 100 MG: at 09:20

## 2024-03-09 RX ADMIN — ATORVASTATIN CALCIUM 20 MG: 40 TABLET, FILM COATED ORAL at 09:20

## 2024-03-09 RX ADMIN — RISPERIDONE 2 MG: 1 TABLET, FILM COATED ORAL at 09:20

## 2024-03-09 RX ADMIN — ASPIRIN 81 MG CHEWABLE TABLET 81 MG: 81 TABLET CHEWABLE at 09:20

## 2024-03-09 RX ADMIN — ACETAMINOPHEN 650 MG: 325 TABLET ORAL at 09:20

## 2024-03-09 ASSESSMENT — PAIN SCALES - GENERAL
PAINLEVEL_OUTOF10: 6
PAINLEVEL_OUTOF10: 0

## 2024-03-10 PROCEDURE — 6370000000 HC RX 637 (ALT 250 FOR IP): Performed by: PSYCHIATRY & NEUROLOGY

## 2024-03-10 PROCEDURE — 1240000000 HC EMOTIONAL WELLNESS R&B

## 2024-03-10 PROCEDURE — 6370000000 HC RX 637 (ALT 250 FOR IP): Performed by: INTERNAL MEDICINE

## 2024-03-10 RX ADMIN — ATORVASTATIN CALCIUM 20 MG: 40 TABLET, FILM COATED ORAL at 09:04

## 2024-03-10 RX ADMIN — BUDESONIDE AND FORMOTEROL FUMARATE DIHYDRATE 2 PUFF: 160; 4.5 AEROSOL RESPIRATORY (INHALATION) at 09:05

## 2024-03-10 RX ADMIN — HYDROXYZINE HYDROCHLORIDE 50 MG: 25 TABLET, FILM COATED ORAL at 20:51

## 2024-03-10 RX ADMIN — DULOXETINE HYDROCHLORIDE 60 MG: 30 CAPSULE, DELAYED RELEASE ORAL at 20:58

## 2024-03-10 RX ADMIN — THERA TABS 1 TABLET: TAB at 09:05

## 2024-03-10 RX ADMIN — FOLIC ACID 1 MG: 1 TABLET ORAL at 09:05

## 2024-03-10 RX ADMIN — HYDROXYZINE HYDROCHLORIDE 50 MG: 25 TABLET, FILM COATED ORAL at 09:04

## 2024-03-10 RX ADMIN — ASPIRIN 81 MG CHEWABLE TABLET 81 MG: 81 TABLET CHEWABLE at 09:05

## 2024-03-10 RX ADMIN — BENZTROPINE MESYLATE 1 MG: 1 TABLET ORAL at 09:04

## 2024-03-10 RX ADMIN — AMLODIPINE BESYLATE 10 MG: 5 TABLET ORAL at 09:05

## 2024-03-10 RX ADMIN — BENZTROPINE MESYLATE 1 MG: 1 TABLET ORAL at 20:51

## 2024-03-10 RX ADMIN — DULOXETINE HYDROCHLORIDE 30 MG: 30 CAPSULE, DELAYED RELEASE ORAL at 09:02

## 2024-03-10 RX ADMIN — RISPERIDONE 2 MG: 1 TABLET, FILM COATED ORAL at 09:03

## 2024-03-10 RX ADMIN — FLUTICASONE PROPIONATE 2 SPRAY: 50 SPRAY, METERED NASAL at 09:05

## 2024-03-10 RX ADMIN — RISPERIDONE 2 MG: 1 TABLET, FILM COATED ORAL at 20:51

## 2024-03-10 RX ADMIN — METOPROLOL SUCCINATE 100 MG: 50 TABLET, EXTENDED RELEASE ORAL at 09:04

## 2024-03-10 RX ADMIN — BUDESONIDE AND FORMOTEROL FUMARATE DIHYDRATE 2 PUFF: 160; 4.5 AEROSOL RESPIRATORY (INHALATION) at 20:59

## 2024-03-10 RX ADMIN — TRAZODONE HYDROCHLORIDE 50 MG: 50 TABLET ORAL at 20:51

## 2024-03-10 RX ADMIN — Medication 100 MG: at 09:03

## 2024-03-10 RX ADMIN — ACETAMINOPHEN 650 MG: 325 TABLET ORAL at 09:04

## 2024-03-10 RX ADMIN — TIOTROPIUM BROMIDE INHALATION SPRAY 2 PUFF: 3.12 SPRAY, METERED RESPIRATORY (INHALATION) at 09:05

## 2024-03-10 ASSESSMENT — PAIN DESCRIPTION - LOCATION
LOCATION: ABDOMEN;NECK
LOCATION: ABDOMEN

## 2024-03-10 ASSESSMENT — PAIN SCALES - GENERAL
PAINLEVEL_OUTOF10: 10
PAINLEVEL_OUTOF10: 0
PAINLEVEL_OUTOF10: 10

## 2024-03-10 ASSESSMENT — PAIN DESCRIPTION - DESCRIPTORS: DESCRIPTORS: ACHING;JABBING

## 2024-03-11 PROCEDURE — 6370000000 HC RX 637 (ALT 250 FOR IP): Performed by: PSYCHIATRY & NEUROLOGY

## 2024-03-11 PROCEDURE — 1240000000 HC EMOTIONAL WELLNESS R&B

## 2024-03-11 PROCEDURE — 6370000000 HC RX 637 (ALT 250 FOR IP): Performed by: INTERNAL MEDICINE

## 2024-03-11 RX ORDER — SENNA AND DOCUSATE SODIUM 50; 8.6 MG/1; MG/1
2 TABLET, FILM COATED ORAL DAILY
Status: DISCONTINUED | OUTPATIENT
Start: 2024-03-11 | End: 2024-03-22 | Stop reason: HOSPADM

## 2024-03-11 RX ORDER — NALOXONE HYDROCHLORIDE 0.4 MG/ML
0.4 INJECTION, SOLUTION INTRAMUSCULAR; INTRAVENOUS; SUBCUTANEOUS PRN
Status: DISCONTINUED | OUTPATIENT
Start: 2024-03-11 | End: 2024-03-22 | Stop reason: HOSPADM

## 2024-03-11 RX ADMIN — THERA TABS 1 TABLET: TAB at 09:31

## 2024-03-11 RX ADMIN — FLUTICASONE PROPIONATE 2 SPRAY: 50 SPRAY, METERED NASAL at 09:34

## 2024-03-11 RX ADMIN — METOPROLOL SUCCINATE 100 MG: 50 TABLET, EXTENDED RELEASE ORAL at 09:31

## 2024-03-11 RX ADMIN — DULOXETINE HYDROCHLORIDE 60 MG: 30 CAPSULE, DELAYED RELEASE ORAL at 20:29

## 2024-03-11 RX ADMIN — ATORVASTATIN CALCIUM 20 MG: 40 TABLET, FILM COATED ORAL at 09:31

## 2024-03-11 RX ADMIN — BENZTROPINE MESYLATE 1 MG: 1 TABLET ORAL at 20:29

## 2024-03-11 RX ADMIN — BUDESONIDE AND FORMOTEROL FUMARATE DIHYDRATE 2 PUFF: 160; 4.5 AEROSOL RESPIRATORY (INHALATION) at 09:33

## 2024-03-11 RX ADMIN — ASPIRIN 81 MG CHEWABLE TABLET 81 MG: 81 TABLET CHEWABLE at 09:31

## 2024-03-11 RX ADMIN — Medication 100 MG: at 09:30

## 2024-03-11 RX ADMIN — DULOXETINE HYDROCHLORIDE 30 MG: 30 CAPSULE, DELAYED RELEASE ORAL at 09:31

## 2024-03-11 RX ADMIN — FOLIC ACID 1 MG: 1 TABLET ORAL at 09:30

## 2024-03-11 RX ADMIN — RISPERIDONE 2 MG: 1 TABLET, FILM COATED ORAL at 09:31

## 2024-03-11 RX ADMIN — AMLODIPINE BESYLATE 10 MG: 5 TABLET ORAL at 09:31

## 2024-03-11 RX ADMIN — RISPERIDONE 2 MG: 1 TABLET, FILM COATED ORAL at 20:29

## 2024-03-11 RX ADMIN — TIOTROPIUM BROMIDE INHALATION SPRAY 2 PUFF: 3.12 SPRAY, METERED RESPIRATORY (INHALATION) at 09:33

## 2024-03-11 RX ADMIN — HYDROXYZINE HYDROCHLORIDE 50 MG: 25 TABLET, FILM COATED ORAL at 20:29

## 2024-03-11 RX ADMIN — BENZTROPINE MESYLATE 1 MG: 1 TABLET ORAL at 09:31

## 2024-03-11 RX ADMIN — SENNOSIDES AND DOCUSATE SODIUM 2 TABLET: 50; 8.6 TABLET ORAL at 13:04

## 2024-03-11 RX ADMIN — BUDESONIDE AND FORMOTEROL FUMARATE DIHYDRATE 2 PUFF: 160; 4.5 AEROSOL RESPIRATORY (INHALATION) at 20:29

## 2024-03-11 ASSESSMENT — PAIN SCALES - GENERAL
PAINLEVEL_OUTOF10: 0
PAINLEVEL_OUTOF10: 0

## 2024-03-11 NOTE — GROUP NOTE
Group Therapy Note    Date: 3/11/2024    Group Start Time: 1500  Group End Time: 1600  Group Topic: Recreational    [facility]    Rosario Hauser        Group Therapy Note    Attendees: 3       Patient's Goal:  To concentrate on selected task    Notes:  Pt did not attend session    Discipline Responsible: Recreational Therapist      Signature:  ROSARIO HAUSER

## 2024-03-11 NOTE — GROUP NOTE
Group Therapy Note    Date: 3/11/2024    Group Start Time: 1000  Group End Time: 1100  Group Topic: Topic Group    [facility] 3 ACUTE BEHAV [facility]    Rosario Hauser        Group Therapy Note    Attendees: 5       Patient's Goal:  To participate in relaxation activity    Notes:  Pt declined active participation-left session early      Signature:  ROSARIO HAUSER

## 2024-03-12 PROCEDURE — 6370000000 HC RX 637 (ALT 250 FOR IP): Performed by: PSYCHIATRY & NEUROLOGY

## 2024-03-12 PROCEDURE — 6370000000 HC RX 637 (ALT 250 FOR IP): Performed by: INTERNAL MEDICINE

## 2024-03-12 PROCEDURE — 1240000000 HC EMOTIONAL WELLNESS R&B

## 2024-03-12 RX ORDER — SENNA AND DOCUSATE SODIUM 50; 8.6 MG/1; MG/1
2 TABLET, FILM COATED ORAL ONCE
Status: COMPLETED | OUTPATIENT
Start: 2024-03-12 | End: 2024-03-12

## 2024-03-12 RX ADMIN — SENNOSIDES AND DOCUSATE SODIUM 2 TABLET: 50; 8.6 TABLET ORAL at 08:17

## 2024-03-12 RX ADMIN — RISPERIDONE 2 MG: 1 TABLET, FILM COATED ORAL at 21:38

## 2024-03-12 RX ADMIN — ASPIRIN 81 MG CHEWABLE TABLET 81 MG: 81 TABLET CHEWABLE at 08:19

## 2024-03-12 RX ADMIN — RISPERIDONE 2 MG: 1 TABLET, FILM COATED ORAL at 08:19

## 2024-03-12 RX ADMIN — AMLODIPINE BESYLATE 10 MG: 5 TABLET ORAL at 08:18

## 2024-03-12 RX ADMIN — FOLIC ACID 1 MG: 1 TABLET ORAL at 08:18

## 2024-03-12 RX ADMIN — DULOXETINE HYDROCHLORIDE 60 MG: 30 CAPSULE, DELAYED RELEASE ORAL at 21:39

## 2024-03-12 RX ADMIN — METOPROLOL SUCCINATE 100 MG: 50 TABLET, EXTENDED RELEASE ORAL at 08:19

## 2024-03-12 RX ADMIN — THERA TABS 1 TABLET: TAB at 08:19

## 2024-03-12 RX ADMIN — ACETAMINOPHEN 650 MG: 325 TABLET ORAL at 08:45

## 2024-03-12 RX ADMIN — TIOTROPIUM BROMIDE INHALATION SPRAY 2 PUFF: 3.12 SPRAY, METERED RESPIRATORY (INHALATION) at 17:16

## 2024-03-12 RX ADMIN — DOCUSATE SODIUM 50 MG AND SENNOSIDES 8.6 MG 2 TABLET: 8.6; 5 TABLET, FILM COATED ORAL at 21:39

## 2024-03-12 RX ADMIN — TRAZODONE HYDROCHLORIDE 50 MG: 50 TABLET ORAL at 21:39

## 2024-03-12 RX ADMIN — ATORVASTATIN CALCIUM 20 MG: 40 TABLET, FILM COATED ORAL at 08:19

## 2024-03-12 RX ADMIN — Medication 100 MG: at 08:18

## 2024-03-12 RX ADMIN — BUDESONIDE AND FORMOTEROL FUMARATE DIHYDRATE 2 PUFF: 160; 4.5 AEROSOL RESPIRATORY (INHALATION) at 21:51

## 2024-03-12 RX ADMIN — POLYETHYLENE GLYCOL 3350 17 G: 17 POWDER, FOR SOLUTION ORAL at 08:24

## 2024-03-12 RX ADMIN — DULOXETINE HYDROCHLORIDE 30 MG: 30 CAPSULE, DELAYED RELEASE ORAL at 08:18

## 2024-03-12 RX ADMIN — HYDROXYZINE HYDROCHLORIDE 50 MG: 25 TABLET, FILM COATED ORAL at 21:39

## 2024-03-12 RX ADMIN — BENZTROPINE MESYLATE 1 MG: 1 TABLET ORAL at 21:39

## 2024-03-12 RX ADMIN — BENZTROPINE MESYLATE 1 MG: 1 TABLET ORAL at 08:18

## 2024-03-12 RX ADMIN — FLUTICASONE PROPIONATE 2 SPRAY: 50 SPRAY, METERED NASAL at 11:56

## 2024-03-12 RX ADMIN — BUDESONIDE AND FORMOTEROL FUMARATE DIHYDRATE 2 PUFF: 160; 4.5 AEROSOL RESPIRATORY (INHALATION) at 11:56

## 2024-03-12 ASSESSMENT — PAIN SCALES - GENERAL
PAINLEVEL_OUTOF10: 10
PAINLEVEL_OUTOF10: 10
PAINLEVEL_OUTOF10: 9
PAINLEVEL_OUTOF10: 0

## 2024-03-12 ASSESSMENT — PAIN DESCRIPTION - ORIENTATION
ORIENTATION: MID
ORIENTATION: MID

## 2024-03-12 ASSESSMENT — PAIN DESCRIPTION - LOCATION
LOCATION: ABDOMEN
LOCATION: ABDOMEN

## 2024-03-12 ASSESSMENT — PAIN DESCRIPTION - FREQUENCY: FREQUENCY: CONTINUOUS

## 2024-03-12 ASSESSMENT — PAIN DESCRIPTION - DESCRIPTORS
DESCRIPTORS: SHARP
DESCRIPTORS: SHARP

## 2024-03-12 NOTE — GROUP NOTE
Group Therapy Note    Date: 3/12/2024    Group Start Time: 1500  Group End Time: 1600  Group Topic: Recreational    [facility]    Rosario Hauser        Group Therapy Note    Attendees: 5       Patient's Goal:  To concentrate on selected task    Notes:  Pt did not attend session    Discipline Responsible: Recreational Therapist      Signature:  ROSARIO HAUSER

## 2024-03-12 NOTE — GROUP NOTE
Group Therapy Note    Date: 3/12/2024    Group Start Time: 1000  Group End Time: 1130  Group Topic: Topic Group    [facility] 3 ACUTE BEHAV [facility]    Rosario Hauser        Group Therapy Note    Attendees: 6       Patient's Goal:  To participate in relaxation activity    Notes:  Pt did not attend session    Discipline Responsible: Recreational Therapist      Signature:  ROSARIO HAUSER

## 2024-03-13 PROCEDURE — 6370000000 HC RX 637 (ALT 250 FOR IP): Performed by: INTERNAL MEDICINE

## 2024-03-13 PROCEDURE — 6370000000 HC RX 637 (ALT 250 FOR IP): Performed by: PSYCHIATRY & NEUROLOGY

## 2024-03-13 PROCEDURE — 1240000000 HC EMOTIONAL WELLNESS R&B

## 2024-03-13 RX ADMIN — RISPERIDONE 2 MG: 1 TABLET, FILM COATED ORAL at 10:10

## 2024-03-13 RX ADMIN — BUDESONIDE AND FORMOTEROL FUMARATE DIHYDRATE 2 PUFF: 160; 4.5 AEROSOL RESPIRATORY (INHALATION) at 20:11

## 2024-03-13 RX ADMIN — TIOTROPIUM BROMIDE INHALATION SPRAY 2 PUFF: 3.12 SPRAY, METERED RESPIRATORY (INHALATION) at 10:13

## 2024-03-13 RX ADMIN — BENZTROPINE MESYLATE 1 MG: 1 TABLET ORAL at 20:13

## 2024-03-13 RX ADMIN — HYDROXYZINE HYDROCHLORIDE 50 MG: 25 TABLET, FILM COATED ORAL at 20:13

## 2024-03-13 RX ADMIN — RISPERIDONE 2 MG: 1 TABLET, FILM COATED ORAL at 20:12

## 2024-03-13 RX ADMIN — ATORVASTATIN CALCIUM 20 MG: 40 TABLET, FILM COATED ORAL at 10:09

## 2024-03-13 RX ADMIN — THERA TABS 1 TABLET: TAB at 10:10

## 2024-03-13 RX ADMIN — TRAZODONE HYDROCHLORIDE 50 MG: 50 TABLET ORAL at 20:13

## 2024-03-13 RX ADMIN — AMLODIPINE BESYLATE 10 MG: 5 TABLET ORAL at 10:10

## 2024-03-13 RX ADMIN — DULOXETINE HYDROCHLORIDE 60 MG: 30 CAPSULE, DELAYED RELEASE ORAL at 20:12

## 2024-03-13 RX ADMIN — Medication 100 MG: at 10:10

## 2024-03-13 RX ADMIN — DULOXETINE HYDROCHLORIDE 30 MG: 30 CAPSULE, DELAYED RELEASE ORAL at 10:10

## 2024-03-13 RX ADMIN — FLUTICASONE PROPIONATE 2 SPRAY: 50 SPRAY, METERED NASAL at 10:12

## 2024-03-13 RX ADMIN — SENNOSIDES AND DOCUSATE SODIUM 2 TABLET: 50; 8.6 TABLET ORAL at 10:10

## 2024-03-13 RX ADMIN — BENZTROPINE MESYLATE 1 MG: 1 TABLET ORAL at 10:10

## 2024-03-13 RX ADMIN — FOLIC ACID 1 MG: 1 TABLET ORAL at 10:10

## 2024-03-13 RX ADMIN — ASPIRIN 81 MG CHEWABLE TABLET 81 MG: 81 TABLET CHEWABLE at 10:10

## 2024-03-13 RX ADMIN — BUDESONIDE AND FORMOTEROL FUMARATE DIHYDRATE 2 PUFF: 160; 4.5 AEROSOL RESPIRATORY (INHALATION) at 10:11

## 2024-03-13 RX ADMIN — METOPROLOL SUCCINATE 100 MG: 50 TABLET, EXTENDED RELEASE ORAL at 10:09

## 2024-03-13 NOTE — GROUP NOTE
Group Therapy Note    Date: 3/13/2024    Group Start Time: 1500  Group End Time: 1600  Group Topic: Recreational    [facility]    Rosario Hauser        Group Therapy Note    Attendees: 6       Patient's Goal:  To concentrate  on selected task    Notes:  Pt did not attend session    Discipline Responsible: Recreational Therapist      Signature:  ROSARIO HAUSER

## 2024-03-13 NOTE — GROUP NOTE
Group Therapy Note    Date: 3/13/2024    Group Start Time: 1000  Group End Time: 1115  Group Topic: Topic Group    [facility] 3 ACUTE BEHAV [facility]    Rosario Hauser        Group Therapy Note    Attendees: 5       Patient's Goal:  To participate in mental health [employer] game    Notes:  Pt did not attend session  Discipline Responsible: Recreational Therapist      Signature:  ROSARIO HAUSER

## 2024-03-14 PROCEDURE — 6370000000 HC RX 637 (ALT 250 FOR IP): Performed by: PSYCHIATRY & NEUROLOGY

## 2024-03-14 PROCEDURE — 1240000000 HC EMOTIONAL WELLNESS R&B

## 2024-03-14 PROCEDURE — 6370000000 HC RX 637 (ALT 250 FOR IP): Performed by: INTERNAL MEDICINE

## 2024-03-14 RX ORDER — RISPERIDONE 3 MG/1
3 TABLET ORAL 2 TIMES DAILY
Status: DISCONTINUED | OUTPATIENT
Start: 2024-03-14 | End: 2024-03-22 | Stop reason: HOSPADM

## 2024-03-14 RX ORDER — HALOPERIDOL DECANOATE 100 MG/ML
300 INJECTION INTRAMUSCULAR
Status: DISCONTINUED | OUTPATIENT
Start: 2024-03-22 | End: 2024-03-22 | Stop reason: HOSPADM

## 2024-03-14 RX ADMIN — ATORVASTATIN CALCIUM 20 MG: 40 TABLET, FILM COATED ORAL at 10:21

## 2024-03-14 RX ADMIN — FOLIC ACID 1 MG: 1 TABLET ORAL at 10:21

## 2024-03-14 RX ADMIN — POLYETHYLENE GLYCOL 3350 17 G: 17 POWDER, FOR SOLUTION ORAL at 21:35

## 2024-03-14 RX ADMIN — METOPROLOL SUCCINATE 100 MG: 50 TABLET, EXTENDED RELEASE ORAL at 10:20

## 2024-03-14 RX ADMIN — Medication 100 MG: at 10:21

## 2024-03-14 RX ADMIN — BENZTROPINE MESYLATE 1 MG: 1 TABLET ORAL at 10:21

## 2024-03-14 RX ADMIN — SENNOSIDES AND DOCUSATE SODIUM 2 TABLET: 50; 8.6 TABLET ORAL at 10:20

## 2024-03-14 RX ADMIN — FLUTICASONE PROPIONATE 2 SPRAY: 50 SPRAY, METERED NASAL at 10:21

## 2024-03-14 RX ADMIN — DULOXETINE HYDROCHLORIDE 60 MG: 30 CAPSULE, DELAYED RELEASE ORAL at 21:35

## 2024-03-14 RX ADMIN — DULOXETINE HYDROCHLORIDE 30 MG: 30 CAPSULE, DELAYED RELEASE ORAL at 10:21

## 2024-03-14 RX ADMIN — BUDESONIDE AND FORMOTEROL FUMARATE DIHYDRATE 2 PUFF: 160; 4.5 AEROSOL RESPIRATORY (INHALATION) at 21:42

## 2024-03-14 RX ADMIN — BUDESONIDE AND FORMOTEROL FUMARATE DIHYDRATE 2 PUFF: 160; 4.5 AEROSOL RESPIRATORY (INHALATION) at 10:21

## 2024-03-14 RX ADMIN — AMLODIPINE BESYLATE 10 MG: 5 TABLET ORAL at 10:21

## 2024-03-14 RX ADMIN — RISPERIDONE 2 MG: 1 TABLET, FILM COATED ORAL at 10:20

## 2024-03-14 RX ADMIN — ASPIRIN 81 MG CHEWABLE TABLET 81 MG: 81 TABLET CHEWABLE at 10:21

## 2024-03-14 RX ADMIN — BENZTROPINE MESYLATE 1 MG: 1 TABLET ORAL at 21:35

## 2024-03-14 RX ADMIN — RISPERIDONE 3 MG: 3 TABLET ORAL at 21:35

## 2024-03-14 RX ADMIN — TIOTROPIUM BROMIDE INHALATION SPRAY 2 PUFF: 3.12 SPRAY, METERED RESPIRATORY (INHALATION) at 10:29

## 2024-03-14 RX ADMIN — THERA TABS 1 TABLET: TAB at 10:21

## 2024-03-14 RX ADMIN — TRAZODONE HYDROCHLORIDE 50 MG: 50 TABLET ORAL at 21:35

## 2024-03-14 ASSESSMENT — PAIN SCALES - GENERAL
PAINLEVEL_OUTOF10: 0
PAINLEVEL_OUTOF10: 0

## 2024-03-14 NOTE — GROUP NOTE
Group Therapy Note    Date: 3/14/2024    Group Start Time: 1000  Group End Time: 1130  Group Topic: Topic Group    [facility] 3 ACUTE BEHAV [facility]    Rosario Hauser        Group Therapy Note    Attendees: 8       Patient's Goal:  To participate in relaxation activity    Notes:  Pt did not attend session    Discipline Responsible: Recreational Therapist      Signature:  ROSARIO HAUSER

## 2024-03-14 NOTE — GROUP NOTE
Group Therapy Note    Date: 3/14/2024    Group Start Time: 1500  Group End Time: 1600  Group Topic: Recreational    [facility]    Rosario Hauser        Group Therapy Note    Attendees: 6       Patient's Goal:  To concentrate on selected task    Notes:  Pt did not attend session    Discipline Responsible: Recreational Therapist      Signature:  ROSARIO HAUSER

## 2024-03-15 PROCEDURE — 1240000000 HC EMOTIONAL WELLNESS R&B

## 2024-03-15 PROCEDURE — 6370000000 HC RX 637 (ALT 250 FOR IP): Performed by: PSYCHIATRY & NEUROLOGY

## 2024-03-15 PROCEDURE — 6370000000 HC RX 637 (ALT 250 FOR IP): Performed by: INTERNAL MEDICINE

## 2024-03-15 RX ADMIN — BENZTROPINE MESYLATE 1 MG: 1 TABLET ORAL at 21:17

## 2024-03-15 RX ADMIN — BENZTROPINE MESYLATE 1 MG: 1 TABLET ORAL at 09:01

## 2024-03-15 RX ADMIN — METOPROLOL SUCCINATE 100 MG: 50 TABLET, EXTENDED RELEASE ORAL at 09:01

## 2024-03-15 RX ADMIN — Medication 100 MG: at 09:01

## 2024-03-15 RX ADMIN — DULOXETINE HYDROCHLORIDE 30 MG: 30 CAPSULE, DELAYED RELEASE ORAL at 09:01

## 2024-03-15 RX ADMIN — SENNOSIDES AND DOCUSATE SODIUM 2 TABLET: 50; 8.6 TABLET ORAL at 09:00

## 2024-03-15 RX ADMIN — RISPERIDONE 3 MG: 3 TABLET ORAL at 09:01

## 2024-03-15 RX ADMIN — BUDESONIDE AND FORMOTEROL FUMARATE DIHYDRATE 2 PUFF: 160; 4.5 AEROSOL RESPIRATORY (INHALATION) at 09:01

## 2024-03-15 RX ADMIN — RISPERIDONE 3 MG: 3 TABLET ORAL at 21:17

## 2024-03-15 RX ADMIN — BUDESONIDE AND FORMOTEROL FUMARATE DIHYDRATE 2 PUFF: 160; 4.5 AEROSOL RESPIRATORY (INHALATION) at 21:23

## 2024-03-15 RX ADMIN — ASPIRIN 81 MG CHEWABLE TABLET 81 MG: 81 TABLET CHEWABLE at 09:01

## 2024-03-15 RX ADMIN — FLUTICASONE PROPIONATE 2 SPRAY: 50 SPRAY, METERED NASAL at 09:01

## 2024-03-15 RX ADMIN — AMLODIPINE BESYLATE 10 MG: 5 TABLET ORAL at 09:01

## 2024-03-15 RX ADMIN — THERA TABS 1 TABLET: TAB at 09:01

## 2024-03-15 RX ADMIN — DULOXETINE HYDROCHLORIDE 60 MG: 30 CAPSULE, DELAYED RELEASE ORAL at 21:17

## 2024-03-15 RX ADMIN — TIOTROPIUM BROMIDE INHALATION SPRAY 2 PUFF: 3.12 SPRAY, METERED RESPIRATORY (INHALATION) at 09:01

## 2024-03-15 RX ADMIN — FOLIC ACID 1 MG: 1 TABLET ORAL at 09:01

## 2024-03-15 RX ADMIN — HYDROXYZINE HYDROCHLORIDE 50 MG: 25 TABLET, FILM COATED ORAL at 21:18

## 2024-03-15 RX ADMIN — ATORVASTATIN CALCIUM 20 MG: 40 TABLET, FILM COATED ORAL at 09:01

## 2024-03-15 ASSESSMENT — PAIN SCALES - GENERAL
PAINLEVEL_OUTOF10: 0
PAINLEVEL_OUTOF10: 5

## 2024-03-15 ASSESSMENT — PAIN DESCRIPTION - LOCATION: LOCATION: ABDOMEN

## 2024-03-15 ASSESSMENT — PAIN DESCRIPTION - DESCRIPTORS: DESCRIPTORS: ACHING;SHARP

## 2024-03-15 ASSESSMENT — PAIN DESCRIPTION - ORIENTATION: ORIENTATION: MID

## 2024-03-15 NOTE — GROUP NOTE
Group Therapy Note    Date: 3/15/2024    Group Start Time: 1000  Group End Time: 1100  Group Topic: Topic Group    [facility] 3 ACUTE BEHAV [facility]    Rosario Hauser        Group Therapy Note    Attendees: 6       Patient's Goal:  To participate in mental health [employer] game    Notes:  Pt did not attend session    Signature:  ROSARIO HAUSER

## 2024-03-15 NOTE — GROUP NOTE
Group Therapy Note    Date: 3/15/2024    Group Start Time: 1500  Group End Time: 1600  Group Topic: Recreational    [facility]    Rosario Hauser        Group Therapy Note    Attendees: 5       Patient's Goal:  To concentrate on selected task    Notes:  Pt did not attend session  Discipline Responsible: Recreational Therapist      Signature:  ROSARIO HAUSER

## 2024-03-16 PROCEDURE — 6370000000 HC RX 637 (ALT 250 FOR IP): Performed by: INTERNAL MEDICINE

## 2024-03-16 PROCEDURE — 1240000000 HC EMOTIONAL WELLNESS R&B

## 2024-03-16 PROCEDURE — 6360000002 HC RX W HCPCS: Performed by: PSYCHIATRY & NEUROLOGY

## 2024-03-16 PROCEDURE — 6370000000 HC RX 637 (ALT 250 FOR IP): Performed by: PSYCHIATRY & NEUROLOGY

## 2024-03-16 RX ORDER — POLYETHYLENE GLYCOL 3350 17 G/17G
17 POWDER, FOR SOLUTION ORAL DAILY
Status: DISCONTINUED | OUTPATIENT
Start: 2024-03-17 | End: 2024-03-22 | Stop reason: HOSPADM

## 2024-03-16 RX ADMIN — THERA TABS 1 TABLET: TAB at 09:50

## 2024-03-16 RX ADMIN — BENZTROPINE MESYLATE 1 MG: 1 TABLET ORAL at 20:44

## 2024-03-16 RX ADMIN — DIPHENHYDRAMINE HYDROCHLORIDE 50 MG: 50 INJECTION INTRAMUSCULAR; INTRAVENOUS at 23:25

## 2024-03-16 RX ADMIN — RISPERIDONE 3 MG: 3 TABLET ORAL at 09:51

## 2024-03-16 RX ADMIN — HALOPERIDOL LACTATE 5 MG: 5 INJECTION, SOLUTION INTRAMUSCULAR at 23:25

## 2024-03-16 RX ADMIN — FOLIC ACID 1 MG: 1 TABLET ORAL at 09:51

## 2024-03-16 RX ADMIN — DULOXETINE HYDROCHLORIDE 60 MG: 30 CAPSULE, DELAYED RELEASE ORAL at 20:43

## 2024-03-16 RX ADMIN — METOPROLOL SUCCINATE 100 MG: 50 TABLET, EXTENDED RELEASE ORAL at 09:57

## 2024-03-16 RX ADMIN — ATORVASTATIN CALCIUM 20 MG: 40 TABLET, FILM COATED ORAL at 09:50

## 2024-03-16 RX ADMIN — AMLODIPINE BESYLATE 10 MG: 5 TABLET ORAL at 09:50

## 2024-03-16 RX ADMIN — ASPIRIN 81 MG CHEWABLE TABLET 81 MG: 81 TABLET CHEWABLE at 09:54

## 2024-03-16 RX ADMIN — HYDROXYZINE HYDROCHLORIDE 50 MG: 25 TABLET, FILM COATED ORAL at 19:49

## 2024-03-16 RX ADMIN — TRAZODONE HYDROCHLORIDE 50 MG: 50 TABLET ORAL at 20:44

## 2024-03-16 RX ADMIN — BENZTROPINE MESYLATE 1 MG: 1 TABLET ORAL at 09:51

## 2024-03-16 RX ADMIN — DULOXETINE HYDROCHLORIDE 30 MG: 30 CAPSULE, DELAYED RELEASE ORAL at 09:51

## 2024-03-16 RX ADMIN — Medication 100 MG: at 09:51

## 2024-03-16 RX ADMIN — TIOTROPIUM BROMIDE INHALATION SPRAY 2 PUFF: 3.12 SPRAY, METERED RESPIRATORY (INHALATION) at 09:53

## 2024-03-16 RX ADMIN — SENNOSIDES AND DOCUSATE SODIUM 2 TABLET: 50; 8.6 TABLET ORAL at 09:50

## 2024-03-16 RX ADMIN — TRAZODONE HYDROCHLORIDE 50 MG: 50 TABLET ORAL at 02:20

## 2024-03-16 RX ADMIN — FLUTICASONE PROPIONATE 2 SPRAY: 50 SPRAY, METERED NASAL at 09:53

## 2024-03-16 RX ADMIN — RISPERIDONE 3 MG: 3 TABLET ORAL at 20:44

## 2024-03-16 RX ADMIN — HALOPERIDOL 5 MG: 5 TABLET ORAL at 19:49

## 2024-03-16 RX ADMIN — BUDESONIDE AND FORMOTEROL FUMARATE DIHYDRATE 2 PUFF: 160; 4.5 AEROSOL RESPIRATORY (INHALATION) at 20:47

## 2024-03-16 RX ADMIN — BUDESONIDE AND FORMOTEROL FUMARATE DIHYDRATE 2 PUFF: 160; 4.5 AEROSOL RESPIRATORY (INHALATION) at 09:53

## 2024-03-16 ASSESSMENT — PAIN SCALES - GENERAL: PAINLEVEL_OUTOF10: 0

## 2024-03-17 ENCOUNTER — APPOINTMENT (OUTPATIENT)
Facility: HOSPITAL | Age: 59
DRG: 885 | End: 2024-03-17
Payer: MEDICARE

## 2024-03-17 PROBLEM — I50.22 CHRONIC SYSTOLIC CONGESTIVE HEART FAILURE (HCC): Status: ACTIVE | Noted: 2024-03-17

## 2024-03-17 PROBLEM — M41.9 SCOLIOSIS: Status: ACTIVE | Noted: 2024-03-17

## 2024-03-17 PROBLEM — J44.9 CHRONIC OBSTRUCTIVE PULMONARY DISEASE (HCC): Status: ACTIVE | Noted: 2024-03-17

## 2024-03-17 PROBLEM — R76.8 HEPATITIS C ANTIBODY TEST POSITIVE: Status: ACTIVE | Noted: 2024-03-17

## 2024-03-17 LAB
ALBUMIN SERPL-MCNC: 3.8 G/DL (ref 3.5–5)
ALBUMIN/GLOB SERPL: 1 (ref 1.1–2.2)
ALP SERPL-CCNC: 95 U/L (ref 45–117)
ALT SERPL-CCNC: 50 U/L (ref 12–78)
AMMONIA PLAS-SCNC: 19 UMOL/L
ANION GAP SERPL CALC-SCNC: 14 MMOL/L (ref 5–15)
AST SERPL-CCNC: 45 U/L (ref 15–37)
BASOPHILS # BLD: 0.1 K/UL (ref 0–0.1)
BASOPHILS NFR BLD: 0 % (ref 0–1)
BILIRUB SERPL-MCNC: 1.1 MG/DL (ref 0.2–1)
BUN SERPL-MCNC: 12 MG/DL (ref 6–20)
BUN/CREAT SERPL: 12 (ref 12–20)
CALCIUM SERPL-MCNC: 9.1 MG/DL (ref 8.5–10.1)
CHLORIDE SERPL-SCNC: 91 MMOL/L (ref 97–108)
CO2 SERPL-SCNC: 23 MMOL/L (ref 21–32)
CREAT SERPL-MCNC: 0.97 MG/DL (ref 0.7–1.3)
DIFFERENTIAL METHOD BLD: ABNORMAL
EOSINOPHIL # BLD: 0.2 K/UL (ref 0–0.4)
EOSINOPHIL NFR BLD: 1 % (ref 0–7)
ERYTHROCYTE [DISTWIDTH] IN BLOOD BY AUTOMATED COUNT: 12.8 % (ref 11.5–14.5)
GLOBULIN SER CALC-MCNC: 3.7 G/DL (ref 2–4)
GLUCOSE SERPL-MCNC: 96 MG/DL (ref 65–100)
HCT VFR BLD AUTO: 42.5 % (ref 36.6–50.3)
HGB BLD-MCNC: 14.6 G/DL (ref 12.1–17)
IMM GRANULOCYTES # BLD AUTO: 0.1 K/UL (ref 0–0.04)
IMM GRANULOCYTES NFR BLD AUTO: 1 % (ref 0–0.5)
LYMPHOCYTES # BLD: 2.6 K/UL (ref 0.8–3.5)
LYMPHOCYTES NFR BLD: 19 % (ref 12–49)
MCH RBC QN AUTO: 32.2 PG (ref 26–34)
MCHC RBC AUTO-ENTMCNC: 34.4 G/DL (ref 30–36.5)
MCV RBC AUTO: 93.6 FL (ref 80–99)
MONOCYTES # BLD: 1.6 K/UL (ref 0–1)
MONOCYTES NFR BLD: 12 % (ref 5–13)
NEUTS SEG # BLD: 8.9 K/UL (ref 1.8–8)
NEUTS SEG NFR BLD: 67 % (ref 32–75)
NRBC # BLD: 0 K/UL (ref 0–0.01)
NRBC BLD-RTO: 0 PER 100 WBC
PLATELET # BLD AUTO: 276 K/UL (ref 150–400)
PMV BLD AUTO: 9.9 FL (ref 8.9–12.9)
POTASSIUM SERPL-SCNC: 4.1 MMOL/L (ref 3.5–5.1)
PROT SERPL-MCNC: 7.5 G/DL (ref 6.4–8.2)
RBC # BLD AUTO: 4.54 M/UL (ref 4.1–5.7)
SODIUM SERPL-SCNC: 128 MMOL/L (ref 136–145)
TSH SERPL DL<=0.05 MIU/L-ACNC: 3.8 UIU/ML (ref 0.36–3.74)
WBC # BLD AUTO: 13.4 K/UL (ref 4.1–11.1)

## 2024-03-17 PROCEDURE — 6370000000 HC RX 637 (ALT 250 FOR IP): Performed by: PSYCHIATRY & NEUROLOGY

## 2024-03-17 PROCEDURE — 36415 COLL VENOUS BLD VENIPUNCTURE: CPT

## 2024-03-17 PROCEDURE — 1240000000 HC EMOTIONAL WELLNESS R&B

## 2024-03-17 PROCEDURE — 70450 CT HEAD/BRAIN W/O DYE: CPT

## 2024-03-17 PROCEDURE — 80053 COMPREHEN METABOLIC PANEL: CPT

## 2024-03-17 PROCEDURE — 6370000000 HC RX 637 (ALT 250 FOR IP): Performed by: INTERNAL MEDICINE

## 2024-03-17 PROCEDURE — 82140 ASSAY OF AMMONIA: CPT

## 2024-03-17 PROCEDURE — 71045 X-RAY EXAM CHEST 1 VIEW: CPT

## 2024-03-17 PROCEDURE — 84443 ASSAY THYROID STIM HORMONE: CPT

## 2024-03-17 PROCEDURE — 85025 COMPLETE CBC W/AUTO DIFF WBC: CPT

## 2024-03-17 RX ADMIN — ASPIRIN 81 MG CHEWABLE TABLET 81 MG: 81 TABLET CHEWABLE at 09:10

## 2024-03-17 RX ADMIN — TIOTROPIUM BROMIDE INHALATION SPRAY 2 PUFF: 3.12 SPRAY, METERED RESPIRATORY (INHALATION) at 09:04

## 2024-03-17 RX ADMIN — BUDESONIDE AND FORMOTEROL FUMARATE DIHYDRATE 2 PUFF: 160; 4.5 AEROSOL RESPIRATORY (INHALATION) at 09:04

## 2024-03-17 RX ADMIN — ALBUTEROL SULFATE 2 PUFF: 90 AEROSOL, METERED RESPIRATORY (INHALATION) at 12:16

## 2024-03-17 RX ADMIN — RISPERIDONE 3 MG: 3 TABLET ORAL at 09:07

## 2024-03-17 RX ADMIN — DULOXETINE HYDROCHLORIDE 30 MG: 30 CAPSULE, DELAYED RELEASE ORAL at 09:06

## 2024-03-17 RX ADMIN — BUDESONIDE AND FORMOTEROL FUMARATE DIHYDRATE 2 PUFF: 160; 4.5 AEROSOL RESPIRATORY (INHALATION) at 20:39

## 2024-03-17 RX ADMIN — HALOPERIDOL 5 MG: 5 TABLET ORAL at 12:16

## 2024-03-17 RX ADMIN — METOPROLOL SUCCINATE 100 MG: 50 TABLET, EXTENDED RELEASE ORAL at 09:07

## 2024-03-17 RX ADMIN — POLYETHYLENE GLYCOL 3350 17 G: 17 POWDER, FOR SOLUTION ORAL at 09:11

## 2024-03-17 RX ADMIN — FOLIC ACID 1 MG: 1 TABLET ORAL at 09:12

## 2024-03-17 RX ADMIN — TRAZODONE HYDROCHLORIDE 50 MG: 50 TABLET ORAL at 20:37

## 2024-03-17 RX ADMIN — BENZTROPINE MESYLATE 1 MG: 1 TABLET ORAL at 20:37

## 2024-03-17 RX ADMIN — HYDROXYZINE HYDROCHLORIDE 50 MG: 25 TABLET, FILM COATED ORAL at 12:16

## 2024-03-17 RX ADMIN — SENNOSIDES AND DOCUSATE SODIUM 2 TABLET: 50; 8.6 TABLET ORAL at 09:11

## 2024-03-17 RX ADMIN — AMLODIPINE BESYLATE 10 MG: 5 TABLET ORAL at 09:09

## 2024-03-17 RX ADMIN — RISPERIDONE 3 MG: 3 TABLET ORAL at 20:37

## 2024-03-17 RX ADMIN — DULOXETINE HYDROCHLORIDE 60 MG: 30 CAPSULE, DELAYED RELEASE ORAL at 20:37

## 2024-03-17 RX ADMIN — THERA TABS 1 TABLET: TAB at 09:07

## 2024-03-17 RX ADMIN — NICOTINE POLACRILEX 2 MG: 2 LOZENGE ORAL at 02:01

## 2024-03-17 RX ADMIN — BENZTROPINE MESYLATE 1 MG: 1 TABLET ORAL at 09:07

## 2024-03-17 RX ADMIN — ATORVASTATIN CALCIUM 20 MG: 40 TABLET, FILM COATED ORAL at 09:10

## 2024-03-17 RX ADMIN — FLUTICASONE PROPIONATE 2 SPRAY: 50 SPRAY, METERED NASAL at 09:03

## 2024-03-17 RX ADMIN — Medication 100 MG: at 09:08

## 2024-03-17 ASSESSMENT — PAIN SCALES - GENERAL
PAINLEVEL_OUTOF10: 0

## 2024-03-17 NOTE — CONSULTS
________________________________________________________________________  Signed: Sherry Spain MD    Procedures: see electronic medical records for all procedures/Xrays and details which were not copied into this note but were reviewed prior to creation of Plan.    LAB DATA REVIEWED:    No results found for this or any previous visit (from the past 24 hour(s)).

## 2024-03-18 LAB
AMPHET UR QL SCN: NEGATIVE
APPEARANCE UR: CLEAR
BACTERIA URNS QL MICRO: NEGATIVE /HPF
BARBITURATES UR QL SCN: POSITIVE
BASOPHILS # BLD: 0 K/UL (ref 0–0.1)
BASOPHILS NFR BLD: 1 % (ref 0–1)
BENZODIAZ UR QL: NEGATIVE
BILIRUB UR QL: NEGATIVE
CANNABINOIDS UR QL SCN: POSITIVE
COCAINE UR QL SCN: NEGATIVE
COLOR UR: ABNORMAL
DIFFERENTIAL METHOD BLD: ABNORMAL
EOSINOPHIL # BLD: 0.2 K/UL (ref 0–0.4)
EOSINOPHIL NFR BLD: 3 % (ref 0–7)
EPITH CASTS URNS QL MICRO: ABNORMAL /LPF
ERYTHROCYTE [DISTWIDTH] IN BLOOD BY AUTOMATED COUNT: 13 % (ref 11.5–14.5)
GLUCOSE UR STRIP.AUTO-MCNC: NEGATIVE MG/DL
HCT VFR BLD AUTO: 43.8 % (ref 36.6–50.3)
HGB BLD-MCNC: 14.7 G/DL (ref 12.1–17)
HGB UR QL STRIP: NEGATIVE
IMM GRANULOCYTES # BLD AUTO: 0.1 K/UL (ref 0–0.04)
IMM GRANULOCYTES NFR BLD AUTO: 1 % (ref 0–0.5)
KETONES UR QL STRIP.AUTO: ABNORMAL MG/DL
LEUKOCYTE ESTERASE UR QL STRIP.AUTO: NEGATIVE
LYMPHOCYTES # BLD: 1.5 K/UL (ref 0.8–3.5)
LYMPHOCYTES NFR BLD: 20 % (ref 12–49)
Lab: ABNORMAL
MCH RBC QN AUTO: 32.1 PG (ref 26–34)
MCHC RBC AUTO-ENTMCNC: 33.6 G/DL (ref 30–36.5)
MCV RBC AUTO: 95.6 FL (ref 80–99)
METHADONE UR QL: NEGATIVE
MONOCYTES # BLD: 1.1 K/UL (ref 0–1)
MONOCYTES NFR BLD: 14 % (ref 5–13)
NEUTS SEG # BLD: 4.6 K/UL (ref 1.8–8)
NEUTS SEG NFR BLD: 61 % (ref 32–75)
NITRITE UR QL STRIP.AUTO: NEGATIVE
NRBC # BLD: 0 K/UL (ref 0–0.01)
NRBC BLD-RTO: 0 PER 100 WBC
OPIATES UR QL: NEGATIVE
PCP UR QL: NEGATIVE
PH UR STRIP: 6.5 (ref 5–8)
PLATELET # BLD AUTO: 235 K/UL (ref 150–400)
PMV BLD AUTO: 9.8 FL (ref 8.9–12.9)
PROT UR STRIP-MCNC: NEGATIVE MG/DL
RBC # BLD AUTO: 4.58 M/UL (ref 4.1–5.7)
RBC #/AREA URNS HPF: ABNORMAL /HPF (ref 0–5)
SODIUM SERPL-SCNC: 138 MMOL/L (ref 136–145)
SP GR UR REFRACTOMETRY: 1.01
T4 FREE SERPL-MCNC: 1.2 NG/DL (ref 0.8–1.5)
URINE CULTURE IF INDICATED: ABNORMAL
UROBILINOGEN UR QL STRIP.AUTO: 0.2 EU/DL (ref 0.2–1)
WBC # BLD AUTO: 7.4 K/UL (ref 4.1–11.1)
WBC URNS QL MICRO: ABNORMAL /HPF (ref 0–4)

## 2024-03-18 PROCEDURE — 6370000000 HC RX 637 (ALT 250 FOR IP): Performed by: PSYCHIATRY & NEUROLOGY

## 2024-03-18 PROCEDURE — 84439 ASSAY OF FREE THYROXINE: CPT

## 2024-03-18 PROCEDURE — 6370000000 HC RX 637 (ALT 250 FOR IP): Performed by: INTERNAL MEDICINE

## 2024-03-18 PROCEDURE — 84295 ASSAY OF SERUM SODIUM: CPT

## 2024-03-18 PROCEDURE — 1240000000 HC EMOTIONAL WELLNESS R&B

## 2024-03-18 PROCEDURE — 80307 DRUG TEST PRSMV CHEM ANLYZR: CPT

## 2024-03-18 PROCEDURE — 36415 COLL VENOUS BLD VENIPUNCTURE: CPT

## 2024-03-18 PROCEDURE — 85025 COMPLETE CBC W/AUTO DIFF WBC: CPT

## 2024-03-18 PROCEDURE — 81001 URINALYSIS AUTO W/SCOPE: CPT

## 2024-03-18 RX ADMIN — BENZTROPINE MESYLATE 1 MG: 1 TABLET ORAL at 20:58

## 2024-03-18 RX ADMIN — BUDESONIDE AND FORMOTEROL FUMARATE DIHYDRATE 2 PUFF: 160; 4.5 AEROSOL RESPIRATORY (INHALATION) at 09:08

## 2024-03-18 RX ADMIN — FOLIC ACID 1 MG: 1 TABLET ORAL at 08:45

## 2024-03-18 RX ADMIN — TIOTROPIUM BROMIDE INHALATION SPRAY 2 PUFF: 3.12 SPRAY, METERED RESPIRATORY (INHALATION) at 09:08

## 2024-03-18 RX ADMIN — DULOXETINE HYDROCHLORIDE 60 MG: 30 CAPSULE, DELAYED RELEASE ORAL at 20:58

## 2024-03-18 RX ADMIN — BENZTROPINE MESYLATE 1 MG: 1 TABLET ORAL at 08:45

## 2024-03-18 RX ADMIN — RISPERIDONE 3 MG: 3 TABLET ORAL at 08:45

## 2024-03-18 RX ADMIN — ATORVASTATIN CALCIUM 20 MG: 40 TABLET, FILM COATED ORAL at 08:45

## 2024-03-18 RX ADMIN — MAGNESIUM CITRATE 296 ML: 1.75 LIQUID ORAL at 15:14

## 2024-03-18 RX ADMIN — Medication 100 MG: at 08:45

## 2024-03-18 RX ADMIN — POLYETHYLENE GLYCOL 3350 17 G: 17 POWDER, FOR SOLUTION ORAL at 08:44

## 2024-03-18 RX ADMIN — BUDESONIDE AND FORMOTEROL FUMARATE DIHYDRATE 2 PUFF: 160; 4.5 AEROSOL RESPIRATORY (INHALATION) at 21:17

## 2024-03-18 RX ADMIN — TRAZODONE HYDROCHLORIDE 50 MG: 50 TABLET ORAL at 20:59

## 2024-03-18 RX ADMIN — SENNOSIDES AND DOCUSATE SODIUM 2 TABLET: 50; 8.6 TABLET ORAL at 08:46

## 2024-03-18 RX ADMIN — METOPROLOL SUCCINATE 100 MG: 50 TABLET, EXTENDED RELEASE ORAL at 08:46

## 2024-03-18 RX ADMIN — FLUTICASONE PROPIONATE 2 SPRAY: 50 SPRAY, METERED NASAL at 09:08

## 2024-03-18 RX ADMIN — THERA TABS 1 TABLET: TAB at 08:46

## 2024-03-18 RX ADMIN — DULOXETINE HYDROCHLORIDE 30 MG: 30 CAPSULE, DELAYED RELEASE ORAL at 08:44

## 2024-03-18 RX ADMIN — AMLODIPINE BESYLATE 10 MG: 5 TABLET ORAL at 08:46

## 2024-03-18 RX ADMIN — RISPERIDONE 3 MG: 3 TABLET ORAL at 20:58

## 2024-03-18 RX ADMIN — ASPIRIN 81 MG CHEWABLE TABLET 81 MG: 81 TABLET CHEWABLE at 08:46

## 2024-03-18 ASSESSMENT — PAIN SCALES - GENERAL
PAINLEVEL_OUTOF10: 0
PAINLEVEL_OUTOF10: 0

## 2024-03-18 NOTE — GROUP NOTE
Group Therapy Note    Date: 3/18/2024    Group Start Time: 1400  Group End Time: 1500  Group Topic: Recreational    [facility]    Rosario Hauser        Group Therapy Note    Attendees: 5       Patient's Goal:  To concentrate on selected task    Notes:  Pt did not attend session    Recreational Therapist      Signature:  ROSARIO HAUSER

## 2024-03-18 NOTE — GROUP NOTE
Group Therapy Note    Date: 3/18/2024    Group Start Time: 1100  Group End Time: 1200  Group Topic: Topic Group    [facility] 3 ACUTE BEHAV [facility]    Rosario Hauser        Group Therapy Note    Attendees: 8       Patient's Goal:  To participate in relaxation activity    Notes:  Pt did not attend session    Discipline Responsible: Recreational Therapist      Signature:  ROSARIO HAUSER

## 2024-03-19 PROCEDURE — 6370000000 HC RX 637 (ALT 250 FOR IP): Performed by: PSYCHIATRY & NEUROLOGY

## 2024-03-19 PROCEDURE — 1240000000 HC EMOTIONAL WELLNESS R&B

## 2024-03-19 PROCEDURE — 6370000000 HC RX 637 (ALT 250 FOR IP): Performed by: INTERNAL MEDICINE

## 2024-03-19 RX ADMIN — AMLODIPINE BESYLATE 10 MG: 5 TABLET ORAL at 09:07

## 2024-03-19 RX ADMIN — THERA TABS 1 TABLET: TAB at 09:09

## 2024-03-19 RX ADMIN — BENZTROPINE MESYLATE 1 MG: 1 TABLET ORAL at 09:08

## 2024-03-19 RX ADMIN — ATORVASTATIN CALCIUM 20 MG: 40 TABLET, FILM COATED ORAL at 09:06

## 2024-03-19 RX ADMIN — FLUTICASONE PROPIONATE 2 SPRAY: 50 SPRAY, METERED NASAL at 09:45

## 2024-03-19 RX ADMIN — METOPROLOL SUCCINATE 100 MG: 50 TABLET, EXTENDED RELEASE ORAL at 09:09

## 2024-03-19 RX ADMIN — TIOTROPIUM BROMIDE INHALATION SPRAY 2 PUFF: 3.12 SPRAY, METERED RESPIRATORY (INHALATION) at 09:45

## 2024-03-19 RX ADMIN — SENNOSIDES AND DOCUSATE SODIUM 2 TABLET: 50; 8.6 TABLET ORAL at 09:09

## 2024-03-19 RX ADMIN — POLYETHYLENE GLYCOL 3350 17 G: 17 POWDER, FOR SOLUTION ORAL at 09:06

## 2024-03-19 RX ADMIN — BUDESONIDE AND FORMOTEROL FUMARATE DIHYDRATE 2 PUFF: 160; 4.5 AEROSOL RESPIRATORY (INHALATION) at 09:45

## 2024-03-19 RX ADMIN — HYDROXYZINE HYDROCHLORIDE 50 MG: 25 TABLET, FILM COATED ORAL at 21:20

## 2024-03-19 RX ADMIN — DULOXETINE HYDROCHLORIDE 30 MG: 30 CAPSULE, DELAYED RELEASE ORAL at 09:08

## 2024-03-19 RX ADMIN — DULOXETINE HYDROCHLORIDE 60 MG: 30 CAPSULE, DELAYED RELEASE ORAL at 21:16

## 2024-03-19 RX ADMIN — ALBUTEROL SULFATE 2 PUFF: 90 AEROSOL, METERED RESPIRATORY (INHALATION) at 21:25

## 2024-03-19 RX ADMIN — Medication 100 MG: at 09:09

## 2024-03-19 RX ADMIN — BENZTROPINE MESYLATE 1 MG: 1 TABLET ORAL at 21:17

## 2024-03-19 RX ADMIN — FOLIC ACID 1 MG: 1 TABLET ORAL at 09:09

## 2024-03-19 RX ADMIN — ASPIRIN 81 MG CHEWABLE TABLET 81 MG: 81 TABLET CHEWABLE at 09:07

## 2024-03-19 RX ADMIN — TRAZODONE HYDROCHLORIDE 50 MG: 50 TABLET ORAL at 21:20

## 2024-03-19 RX ADMIN — RISPERIDONE 3 MG: 3 TABLET ORAL at 21:17

## 2024-03-19 RX ADMIN — RISPERIDONE 3 MG: 3 TABLET ORAL at 09:08

## 2024-03-19 ASSESSMENT — PAIN SCALES - GENERAL: PAINLEVEL_OUTOF10: 0

## 2024-03-19 NOTE — GROUP NOTE
Group Therapy Note    Date: 3/19/2024    Group Start Time: 1100  Group End Time: 1200  Group Topic: Topic Group    [facility] 3 ACUTE BEHAV [facility]    Rosario Hauser        Group Therapy Note    Attendees: 8       Patient's Goal:  To participate in relaxation activity    Notes:  Pt did not attend session    Discipline Responsible: Recreational Therapist      Signature:  ROSARIO HAUSER

## 2024-03-19 NOTE — GROUP NOTE
Group Therapy Note    Date: 3/19/2024    Group Start Time: 1500  Group End Time: 1600  Group Topic: Recreational    [facility]    Rosario Hauser        Group Therapy Note    Attendees: 6       Patient's Goal:  To concentrate on selected task    Notes:  Pt did not attend session    Discipline Responsible: Recreational Therapist      Signature:  ROSARIO HAUSER

## 2024-03-20 PROCEDURE — 6370000000 HC RX 637 (ALT 250 FOR IP): Performed by: INTERNAL MEDICINE

## 2024-03-20 PROCEDURE — 6370000000 HC RX 637 (ALT 250 FOR IP): Performed by: PSYCHIATRY & NEUROLOGY

## 2024-03-20 PROCEDURE — 1240000000 HC EMOTIONAL WELLNESS R&B

## 2024-03-20 RX ADMIN — BENZTROPINE MESYLATE 1 MG: 1 TABLET ORAL at 08:47

## 2024-03-20 RX ADMIN — DULOXETINE HYDROCHLORIDE 60 MG: 30 CAPSULE, DELAYED RELEASE ORAL at 22:32

## 2024-03-20 RX ADMIN — ASPIRIN 81 MG CHEWABLE TABLET 81 MG: 81 TABLET CHEWABLE at 08:47

## 2024-03-20 RX ADMIN — Medication 100 MG: at 08:47

## 2024-03-20 RX ADMIN — TIOTROPIUM BROMIDE INHALATION SPRAY 2 PUFF: 3.12 SPRAY, METERED RESPIRATORY (INHALATION) at 09:03

## 2024-03-20 RX ADMIN — BUDESONIDE AND FORMOTEROL FUMARATE DIHYDRATE 2 PUFF: 160; 4.5 AEROSOL RESPIRATORY (INHALATION) at 22:32

## 2024-03-20 RX ADMIN — FOLIC ACID 1 MG: 1 TABLET ORAL at 08:45

## 2024-03-20 RX ADMIN — RISPERIDONE 3 MG: 3 TABLET ORAL at 08:47

## 2024-03-20 RX ADMIN — METOPROLOL SUCCINATE 100 MG: 50 TABLET, EXTENDED RELEASE ORAL at 08:46

## 2024-03-20 RX ADMIN — THERA TABS 1 TABLET: TAB at 08:47

## 2024-03-20 RX ADMIN — POLYETHYLENE GLYCOL 3350 17 G: 17 POWDER, FOR SOLUTION ORAL at 08:45

## 2024-03-20 RX ADMIN — FLUTICASONE PROPIONATE 2 SPRAY: 50 SPRAY, METERED NASAL at 09:03

## 2024-03-20 RX ADMIN — RISPERIDONE 3 MG: 3 TABLET ORAL at 22:32

## 2024-03-20 RX ADMIN — BENZTROPINE MESYLATE 1 MG: 1 TABLET ORAL at 22:32

## 2024-03-20 RX ADMIN — DULOXETINE HYDROCHLORIDE 30 MG: 30 CAPSULE, DELAYED RELEASE ORAL at 08:46

## 2024-03-20 RX ADMIN — BUDESONIDE AND FORMOTEROL FUMARATE DIHYDRATE 2 PUFF: 160; 4.5 AEROSOL RESPIRATORY (INHALATION) at 09:03

## 2024-03-20 RX ADMIN — SENNOSIDES AND DOCUSATE SODIUM 2 TABLET: 50; 8.6 TABLET ORAL at 08:47

## 2024-03-20 RX ADMIN — AMLODIPINE BESYLATE 10 MG: 5 TABLET ORAL at 08:45

## 2024-03-20 RX ADMIN — ATORVASTATIN CALCIUM 20 MG: 40 TABLET, FILM COATED ORAL at 08:47

## 2024-03-20 ASSESSMENT — PAIN SCALES - GENERAL: PAINLEVEL_OUTOF10: 0

## 2024-03-20 NOTE — GROUP NOTE
Group Therapy Note    Date: 3/20/2024    Group Start Time: 1100  Group End Time: 1200  Group Topic: Topic Group    [facility] 3 ACUTE BEHAV [facility]    Rosario Hauser        Group Therapy Note    Attendees: 6       Patient's Goal:  To participate in relaxation activity    Notes:  Pt did not attend session    Discipline Responsible: Recreational Therapist      Signature:  ROSARIO HAUSER

## 2024-03-21 PROCEDURE — 6370000000 HC RX 637 (ALT 250 FOR IP): Performed by: INTERNAL MEDICINE

## 2024-03-21 PROCEDURE — 1240000000 HC EMOTIONAL WELLNESS R&B

## 2024-03-21 PROCEDURE — 6370000000 HC RX 637 (ALT 250 FOR IP): Performed by: PSYCHIATRY & NEUROLOGY

## 2024-03-21 RX ADMIN — Medication 100 MG: at 09:16

## 2024-03-21 RX ADMIN — BUDESONIDE AND FORMOTEROL FUMARATE DIHYDRATE 2 PUFF: 160; 4.5 AEROSOL RESPIRATORY (INHALATION) at 09:15

## 2024-03-21 RX ADMIN — RISPERIDONE 3 MG: 3 TABLET ORAL at 22:18

## 2024-03-21 RX ADMIN — RISPERIDONE 3 MG: 3 TABLET ORAL at 09:15

## 2024-03-21 RX ADMIN — TIOTROPIUM BROMIDE INHALATION SPRAY 2 PUFF: 3.12 SPRAY, METERED RESPIRATORY (INHALATION) at 09:15

## 2024-03-21 RX ADMIN — ASPIRIN 81 MG CHEWABLE TABLET 81 MG: 81 TABLET CHEWABLE at 09:16

## 2024-03-21 RX ADMIN — AMLODIPINE BESYLATE 10 MG: 5 TABLET ORAL at 09:16

## 2024-03-21 RX ADMIN — SENNOSIDES AND DOCUSATE SODIUM 2 TABLET: 50; 8.6 TABLET ORAL at 09:16

## 2024-03-21 RX ADMIN — POLYETHYLENE GLYCOL 3350 17 G: 17 POWDER, FOR SOLUTION ORAL at 09:14

## 2024-03-21 RX ADMIN — THERA TABS 1 TABLET: TAB at 09:16

## 2024-03-21 RX ADMIN — BENZTROPINE MESYLATE 1 MG: 1 TABLET ORAL at 22:18

## 2024-03-21 RX ADMIN — ALBUTEROL SULFATE 2 PUFF: 90 AEROSOL, METERED RESPIRATORY (INHALATION) at 21:30

## 2024-03-21 RX ADMIN — METOPROLOL SUCCINATE 100 MG: 50 TABLET, EXTENDED RELEASE ORAL at 09:16

## 2024-03-21 RX ADMIN — ATORVASTATIN CALCIUM 20 MG: 40 TABLET, FILM COATED ORAL at 09:15

## 2024-03-21 RX ADMIN — FOLIC ACID 1 MG: 1 TABLET ORAL at 09:18

## 2024-03-21 RX ADMIN — DULOXETINE HYDROCHLORIDE 30 MG: 30 CAPSULE, DELAYED RELEASE ORAL at 09:15

## 2024-03-21 RX ADMIN — TRAZODONE HYDROCHLORIDE 50 MG: 50 TABLET ORAL at 23:06

## 2024-03-21 RX ADMIN — DULOXETINE HYDROCHLORIDE 60 MG: 30 CAPSULE, DELAYED RELEASE ORAL at 22:17

## 2024-03-21 RX ADMIN — ACETAMINOPHEN 650 MG: 325 TABLET ORAL at 09:24

## 2024-03-21 RX ADMIN — BENZTROPINE MESYLATE 1 MG: 1 TABLET ORAL at 09:16

## 2024-03-21 RX ADMIN — FLUTICASONE PROPIONATE 2 SPRAY: 50 SPRAY, METERED NASAL at 09:15

## 2024-03-21 ASSESSMENT — PAIN SCALES - GENERAL
PAINLEVEL_OUTOF10: 0
PAINLEVEL_OUTOF10: 7
PAINLEVEL_OUTOF10: 0

## 2024-03-21 ASSESSMENT — PAIN DESCRIPTION - DESCRIPTORS: DESCRIPTORS: DISCOMFORT

## 2024-03-21 ASSESSMENT — PAIN DESCRIPTION - ORIENTATION: ORIENTATION: ANTERIOR

## 2024-03-21 ASSESSMENT — PAIN DESCRIPTION - LOCATION: LOCATION: HEAD

## 2024-03-21 ASSESSMENT — PAIN SCALES - WONG BAKER: WONGBAKER_NUMERICALRESPONSE: NO HURT

## 2024-03-21 NOTE — GROUP NOTE
Group Therapy Note    Date: 3/21/2024    Group Start Time: 1400  Group End Time: 1500  Group Topic: Recreational    [facility]    Rosario Hauser        Group Therapy Note    Attendees: 6       Patient's Goal:  To concentrate on selected task    Notes:  Pt did not attend session    Discipline Responsible: Recreational Therapist      Signature:  ROSARIO HAUSER

## 2024-03-21 NOTE — GROUP NOTE
Group Therapy Note    Date: 3/21/2024    Group Start Time: 1000  Group End Time: 1100  Group Topic: Topic Group    [facility] 3 ACUTE BEHAV [facility]    Rosario Hauser        Group Therapy Note    Attendees: 6       Patient's Goal:  To participate in relaxation activity    Notes:  Pt did not attend session    Discipline Responsible:       Signature:  ROSARIO HAUSER

## 2024-03-22 VITALS
RESPIRATION RATE: 18 BRPM | HEIGHT: 68 IN | HEART RATE: 88 BPM | BODY MASS INDEX: 24.25 KG/M2 | TEMPERATURE: 97.6 F | SYSTOLIC BLOOD PRESSURE: 114 MMHG | DIASTOLIC BLOOD PRESSURE: 65 MMHG | WEIGHT: 160 LBS | OXYGEN SATURATION: 98 %

## 2024-03-22 PROBLEM — F20.9 SCHIZOPHRENIA (HCC): Status: RESOLVED | Noted: 2024-03-05 | Resolved: 2024-03-22

## 2024-03-22 PROCEDURE — 6370000000 HC RX 637 (ALT 250 FOR IP): Performed by: PSYCHIATRY & NEUROLOGY

## 2024-03-22 PROCEDURE — 6360000002 HC RX W HCPCS: Performed by: PSYCHIATRY & NEUROLOGY

## 2024-03-22 PROCEDURE — 6370000000 HC RX 637 (ALT 250 FOR IP): Performed by: INTERNAL MEDICINE

## 2024-03-22 RX ORDER — RISPERIDONE 3 MG/1
3 TABLET ORAL 2 TIMES DAILY
Qty: 60 TABLET | Refills: 3 | Status: SHIPPED | OUTPATIENT
Start: 2024-03-22

## 2024-03-22 RX ORDER — HALOPERIDOL DECANOATE 100 MG/ML
300 INJECTION INTRAMUSCULAR
Qty: 3 ML | Refills: 0 | Status: SHIPPED | OUTPATIENT
Start: 2024-04-21

## 2024-03-22 RX ORDER — SENNA AND DOCUSATE SODIUM 50; 8.6 MG/1; MG/1
2 TABLET, FILM COATED ORAL DAILY
Qty: 60 TABLET | Refills: 0 | Status: SHIPPED | OUTPATIENT
Start: 2024-03-23

## 2024-03-22 RX ORDER — AMLODIPINE BESYLATE 10 MG/1
10 TABLET ORAL DAILY
Qty: 30 TABLET | Refills: 3 | Status: SHIPPED | OUTPATIENT
Start: 2024-03-23

## 2024-03-22 RX ORDER — NICOTINE 21 MG/24HR
1 PATCH, TRANSDERMAL 24 HOURS TRANSDERMAL DAILY
Qty: 30 PATCH | Refills: 0 | Status: SHIPPED | OUTPATIENT
Start: 2024-03-23

## 2024-03-22 RX ADMIN — DULOXETINE HYDROCHLORIDE 30 MG: 30 CAPSULE, DELAYED RELEASE ORAL at 08:53

## 2024-03-22 RX ADMIN — BENZTROPINE MESYLATE 1 MG: 1 TABLET ORAL at 08:54

## 2024-03-22 RX ADMIN — POLYETHYLENE GLYCOL 3350 17 G: 17 POWDER, FOR SOLUTION ORAL at 08:54

## 2024-03-22 RX ADMIN — TIOTROPIUM BROMIDE INHALATION SPRAY 2 PUFF: 3.12 SPRAY, METERED RESPIRATORY (INHALATION) at 08:54

## 2024-03-22 RX ADMIN — FLUTICASONE PROPIONATE 2 SPRAY: 50 SPRAY, METERED NASAL at 08:54

## 2024-03-22 RX ADMIN — METOPROLOL SUCCINATE 100 MG: 50 TABLET, EXTENDED RELEASE ORAL at 08:54

## 2024-03-22 RX ADMIN — Medication 100 MG: at 08:53

## 2024-03-22 RX ADMIN — AMLODIPINE BESYLATE 10 MG: 5 TABLET ORAL at 08:53

## 2024-03-22 RX ADMIN — HALOPERIDOL DECANOATE 300 MG: 100 INJECTION INTRAMUSCULAR at 09:03

## 2024-03-22 RX ADMIN — ASPIRIN 81 MG CHEWABLE TABLET 81 MG: 81 TABLET CHEWABLE at 08:54

## 2024-03-22 RX ADMIN — RISPERIDONE 3 MG: 3 TABLET ORAL at 08:53

## 2024-03-22 RX ADMIN — BUDESONIDE AND FORMOTEROL FUMARATE DIHYDRATE 2 PUFF: 160; 4.5 AEROSOL RESPIRATORY (INHALATION) at 08:55

## 2024-03-22 RX ADMIN — FOLIC ACID 1 MG: 1 TABLET ORAL at 08:54

## 2024-03-22 RX ADMIN — THERA TABS 1 TABLET: TAB at 08:54

## 2024-03-22 RX ADMIN — ATORVASTATIN CALCIUM 20 MG: 40 TABLET, FILM COATED ORAL at 08:53

## 2024-03-22 RX ADMIN — SENNOSIDES AND DOCUSATE SODIUM 2 TABLET: 50; 8.6 TABLET ORAL at 08:54

## 2024-03-22 ASSESSMENT — PAIN SCALES - GENERAL: PAINLEVEL_OUTOF10: 0

## 2024-03-22 NOTE — BH NOTE
Psychiatric Progress Note    Patient: Dajuan Schultz MRN: [id number]  SSN: [id number]    YOB: 1965  Age: 58 y.o.  Sex: male      Admit Date: 3/4/2024    LOS: 15 days     Subjective:   3/20 -  Dajuan Schultz reports feeling sleepy and moods are depressed.  Hearing voices but not able to make them out.  Slightly confused but improved since having a big stool. Appropriately dressed and groomed.  Denies SI/HI/AH/VH.  No aggression or violence.  Appropriately interactive and aware. Tolerating medications well (No prn).  Appetite is good.  Eating and sleeping fairly (7.5 hrs).      3/19 -  Dajuan Schultz reports feeling anxious 9/10 and moods are depressed 8/10.  Appropriately dressed and groomed.  (+) AH as background noise.  Stooled well.  Mild perceptual difficulties.  Denies SI/HI/AH/VH.  No aggression or violence.  Appropriately interactive and aware. Would like his castañeda trimmed Tolerating medications well (Trazodone prn).  Appetite is fair.  Eating and sleeping fairly (8 hrs).      Dajuan Schultz reports feeling constipated still although acknowledges many small stools.  Per staff he has periods of confusion with anxiety and moods are depressed still.  He is on line of sight due to fall risk.  Appropriately dressed and groomed.  Denies SI/HI/AH/VH.  No aggression or violence.  Appropriately interactive and aware. Tolerating medications well (Trazodone prn).  Appetite is fair.  Eating and sleeping fairly (7 hrs).      Objective:     Vitals:    03/19/24 0830 03/19/24 2015 03/19/24 2300 03/20/24 0845   BP: 132/81 (!) 148/78 (!) 148/78 (!) 136/91   Pulse: 84 75 75 85   Resp: 18 16  18   Temp: 98.1 °F (36.7 °C) 97.8 °F (36.6 °C)  97.3 °F (36.3 °C)   TempSrc: Oral Oral  Oral   SpO2: 99% 99%  98%   Weight:       Height:            Mental Status Exam:   Sensorium  Alert and Oriented x 2   Relations passive   Eye Contact appropriate   Appearance:  older than stated age   Speech:  non-pressured and soft   Thought 
       Psychiatric Progress Note    Patient: Dajuan Schultz MRN: [id number]  SSN: [id number]    YOB: 1965  Age: 58 y.o.  Sex: male      Admit Date: 3/4/2024    LOS: 3 days     Subjective:   3/8 -  Dajuan Schultz reports feeling abdominal pain remains with a headache.  Tylenol helps some.  Notes note having a stool for 3 months and moods are depressed.  Appropriately dressed and groomed.  Denies SI/HI/AH/VH.  No aggression or violence.  Appropriately interactive and aware. Tolerating medications well (Tylenol, PeriColace and Atarax prn).  Appetite is fair.  Eating and sleeping poorly (4.5 hrs).   Reports having bowel surgery years ago (laparoscopically done)    Dajuan Schultz  reports feeling constipated and moods are depressed and anxious.  Passive SI.  Denies HI/AH/VH.  No aggression or violence.  Appropriately interactive and aware. Tolerating medications well.  Eating fairly and sleeping fairly.    Objective:     Vitals:    03/07/24 0745 03/07/24 0820 03/07/24 1115 03/07/24 2045   BP: (!) 159/91 (!) 159/91 138/88 (!) 143/87   Pulse: 96 96 85 77   Resp: 18   18   Temp: 97.4 °F (36.3 °C)   97.4 °F (36.3 °C)   TempSrc: Oral   Oral   SpO2: 97%   98%   Weight:       Height:            Mental Status Exam:   Sensorium  Alert and Oriented x 2   Relations passive   Eye Contact appropriate   Appearance:  older than stated age   Speech:  non-pressured and soft   Thought Process: goal directed   Thought Content no hallucinations and no delusions   Suicidal ideations no intention   Mood:  depressed   Affect:  blunted and constricted   Memory   adequate   Concentration:  impaired   Insight:  limited   Judgment:  limited       MEDICATIONS:  Current Facility-Administered Medications   Medication Dose Route Frequency    sennosides-docusate sodium (SENOKOT-S) 8.6-50 MG tablet 2 tablet  2 tablet Oral Daily PRN    albuterol sulfate HFA (PROVENTIL;VENTOLIN;PROAIR) 108 (90 Base) MCG/ACT inhaler 2 puff  2 puff Inhalation Q4H PRN    
       Psychiatric Progress Note    Patient: Dajuan Schultz MRN: [id number]  SSN: [id number]    YOB: 1965  Age: 58 y.o.  Sex: male      Admit Date: 3/4/2024    LOS: 2 days     Subjective:     Dajuan Schultz  reports feeling constipated and moods are depressed and anxious.  Passive SI.  Denies HI/AH/VH.  No aggression or violence.  Appropriately interactive and aware. Tolerating medications well.  Eating fairly and sleeping fairly.    Objective:     Vitals:    03/06/24 2116 03/07/24 0745 03/07/24 0820 03/07/24 1115   BP: (!) 181/91 (!) 159/91 (!) 159/91 138/88   Pulse:  96 96 85   Resp:  18     Temp:  97.4 °F (36.3 °C)     TempSrc:  Oral     SpO2:  97%     Weight:       Height:            Mental Status Exam:   Sensorium  Alert and Oriented x 2   Relations passive   Eye Contact appropriate   Appearance:  older than stated age   Speech:  non-pressured and soft   Thought Process: goal directed   Thought Content no hallucinations and no delusions   Suicidal ideations no intention   Mood:  depressed   Affect:  blunted and constricted   Memory   adequate   Concentration:  impaired   Insight:  limited   Judgment:  limited       MEDICATIONS:  Current Facility-Administered Medications   Medication Dose Route Frequency    sennosides-docusate sodium (SENOKOT-S) 8.6-50 MG tablet 2 tablet  2 tablet Oral Daily PRN    albuterol sulfate HFA (PROVENTIL;VENTOLIN;PROAIR) 108 (90 Base) MCG/ACT inhaler 2 puff  2 puff Inhalation Q4H PRN    aspirin chewable tablet 81 mg  81 mg Oral Daily    atorvastatin (LIPITOR) tablet 20 mg  20 mg Oral Daily    budesonide-formoterol (SYMBICORT) 160-4.5 MCG/ACT inhaler 2 puff  2 puff Inhalation BID    DULoxetine (CYMBALTA) extended release capsule 30 mg  30 mg Oral QAM    DULoxetine (CYMBALTA) extended release capsule 60 mg  60 mg Oral Nightly    fluticasone (FLONASE) 50 MCG/ACT nasal spray 2 spray  2 spray Each Nostril Daily    metoprolol succinate (TOPROL XL) extended release tablet 100 mg  100 
       Psychiatric Progress Note    Patient: Dajuan Schultz MRN: [id number]  SSN: [id number]    YOB: 1965  Age: 58 y.o.  Sex: male      Admit Date: 3/4/2024    LOS: 6 days     Subjective:   3/11 -  Dajuan Schultz reports feeling a little but his stomach is full and has not had a full stool in days.  Moods are depressed.  Appropriately dressed and groomed.  Denies SI/HI/AH/VH.  No aggression or violence.  Appropriately interactive and aware.  Going to groups.  Tolerating medications well (Trazodone and Atarax prn).  Appetite is limited.  Eating and sleeping fairly (8 hrs).       3/10 -  Dajuan Schultz reports feeling anxious and moods are depressed 10/10.  However is more visible on the unit.  Continued flatus with small bits of stool her reports.  Appropriately dressed and groomed.  Denies SI/HI/AH/VH.  No aggression or violence.  Appropriately interactive and aware. Tolerating medications well (Tylenol and Atarax prn).  Appetite is good Eating and sleeping fairly (7.5 hrs).      3/9 -  Dajuan Schultz reports feeling constipated still though a staff member recalls patient stooling 3 days ago.  Moods are depressed.  Thoughts are racing.  Appropriately dressed and groomed.  Intermittent auditory hallucination.  Denies SI/HI/VH.  No aggression or violence.  Appropriately interactive and aware. Tolerating medications well (No prn). CIWA = 1.  KUB - excess fecal matter no stricture or   Appetite is fair.  Eating and sleeping fairly (7.5 hrs).      3/8 -  Dajuan Schultz reports feeling abdominal pain remains with a headache.  Tylenol helps some.  Notes note having a stool for 3 months and moods are depressed.  Appropriately dressed and groomed.  Denies SI/HI/AH/VH.  No aggression or violence.  Appropriately interactive and aware. Tolerating medications well (Tylenol, PeriColace and Atarax prn).  Appetite is fair.  Eating and sleeping poorly (4.5 hrs).   Reports having bowel surgery years ago (laparoscopically done)    Dajuan 
       Psychiatric Progress Note    Patient: Dajuan Schultz MRN: [id number]  SSN: [id number]    YOB: 1965  Age: 58 y.o.  Sex: male      Admit Date: 3/4/2024    LOS: 5 days     Subjective:   3/10 -  Dajuan Schultz reports feeling anxious and moods are depressed 10/10.  However is more visible on the unit.  Continued flatus with small bits of stool her reports.  Appropriately dressed and groomed.  Denies SI/HI/AH/VH.  No aggression or violence.  Appropriately interactive and aware. Tolerating medications well (Tylenol and Atarax prn).  Appetite is good Eating and sleeping fairly (7.5 hrs).      3/9 -  Dajuan Schultz reports feeling constipated still though a staff member recalls patient stooling 3 days ago.  Moods are depressed.  Thoughts are racing.  Appropriately dressed and groomed.  Intermittent auditory hallucination.  Denies SI/HI/VH.  No aggression or violence.  Appropriately interactive and aware. Tolerating medications well (No prn). CIWA = 1.  KUB - excess fecal matter no stricture or   Appetite is fair.  Eating and sleeping fairly (7.5 hrs).      3/8 -  Dajuan Schultz reports feeling abdominal pain remains with a headache.  Tylenol helps some.  Notes note having a stool for 3 months and moods are depressed.  Appropriately dressed and groomed.  Denies SI/HI/AH/VH.  No aggression or violence.  Appropriately interactive and aware. Tolerating medications well (Tylenol, PeriColace and Atarax prn).  Appetite is fair.  Eating and sleeping poorly (4.5 hrs).   Reports having bowel surgery years ago (laparoscopically done)    Dajuan Schultz  reports feeling constipated and moods are depressed and anxious.  Passive SI.  Denies HI/AH/VH.  No aggression or violence.  Appropriately interactive and aware. Tolerating medications well.  Eating fairly and sleeping fairly.    Objective:     Vitals:    03/09/24 1020 03/09/24 2105 03/10/24 0815 03/10/24 1008   BP: 119/79 131/66 (!) 155/80 (!) 155/80   Pulse: 79 76 76 76   Resp:  16 
       Psychiatric Progress Note    Patient: Dajuan Schultz MRN: [id number]  SSN: [id number]    YOB: 1965  Age: 58 y.o.  Sex: male      Admit Date: 3/4/2024    LOS: 14 days     Subjective:   3/19 -  Dajuan Schultz reports feeling anxious 9/10 and moods are depressed 8/10.  Appropriately dressed and groomed.  (+) AH as background noise.  Stooled well.  Mild perceptual difficulties.  Denies SI/HI/AH/VH.  No aggression or violence.  Appropriately interactive and aware. Would like his castañeda trimmed Tolerating medications well (Trazodone prn).  Appetite is fair.  Eating and sleeping fairly (8 hrs).      Dajuan Schultz reports feeling constipated still although acknowledges many small stools.  Per staff he has periods of confusion with anxiety and moods are depressed still.  He is on line of sight due to fall risk.  Appropriately dressed and groomed.  Denies SI/HI/AH/VH.  No aggression or violence.  Appropriately interactive and aware. Tolerating medications well (Trazodone prn).  Appetite is fair.  Eating and sleeping fairly (7 hrs).      Objective:     Vitals:    03/17/24 2230 03/18/24 0846 03/18/24 2026 03/19/24 0830   BP: 125/76 135/78 113/73 132/81   Pulse: 96 84 84 84   Resp: 18 18 18 18   Temp: 98.1 °F (36.7 °C) 97.8 °F (36.6 °C) 97.9 °F (36.6 °C) 98.1 °F (36.7 °C)   TempSrc: Oral Oral Oral Oral   SpO2: 96% 97% 99% 99%   Weight:       Height:            Mental Status Exam:   Sensorium  Alert and Oriented x 2   Relations passive   Eye Contact appropriate   Appearance:  older than stated age   Speech:  non-pressured and soft   Thought Process: goal directed   Thought Content no hallucinations and no delusions   Suicidal ideations none   Mood:  depressed   Affect:  blunted and constricted   Memory   adequate   Concentration:  impaired   Insight:  limited   Judgment:  limited       MEDICATIONS:  Current Facility-Administered Medications   Medication Dose Route Frequency    polyethylene glycol (GLYCOLAX) packet 17 g  
       Psychiatric Progress Note    Patient: Dajuan cShultz MRN: [id number]  SSN: [id number]    YOB: 1965  Age: 58 y.o.  Sex: male      Admit Date: 3/4/2024    LOS: 7 days     Subjective:   3/12 -  Dajuan Schultz reports feeling better with moods however his abdominal pain and lack of a full sized stool continues to bother him.  Appropriately dressed and groomed.  Denies SI/HI/AH/VH.  No aggression or violence.  Appropriately interactive and aware. Tolerating medications well (Miralax and Tylenol prn).  Appetite is fair.  Eating and sleeping fairly (8 hrs).  Grieving the family losses over the years.    3/11 -  Dajuan Schultz reports feeling a little but his stomach is full and has not had a full stool in days.  Moods are depressed.  Appropriately dressed and groomed.  Denies SI/HI/AH/VH.  No aggression or violence.  Appropriately interactive and aware.  Going to groups.  Tolerating medications well (Trazodone and Atarax prn).  Appetite is limited.  Eating and sleeping fairly (8 hrs).       3/10 -  Dajuan Schultz reports feeling anxious and moods are depressed 10/10.  However is more visible on the unit.  Continued flatus with small bits of stool her reports.  Appropriately dressed and groomed.  Denies SI/HI/AH/VH.  No aggression or violence.  Appropriately interactive and aware. Tolerating medications well (Tylenol and Atarax prn).  Appetite is good Eating and sleeping fairly (7.5 hrs).      3/9 -  Dajuan Schultz reports feeling constipated still though a staff member recalls patient stooling 3 days ago.  Moods are depressed.  Thoughts are racing.  Appropriately dressed and groomed.  Intermittent auditory hallucination.  Denies SI/HI/VH.  No aggression or violence.  Appropriately interactive and aware. Tolerating medications well (No prn). CIWA = 1.  KUB - excess fecal matter no stricture or   Appetite is fair.  Eating and sleeping fairly (7.5 hrs).      3/8 -  Dajuan Schultz reports feeling abdominal pain remains with a 
    Night shift assessment completed.    Patient is isolative to the room, alert and oriented x 3 with episodes of confusion, entering into other patient's room.   Patient is redirectable, calm and cooperative.  Affect is flat, mood is depressed.  C-SSRS  level rated as no risk.  Patient endorsed anxiety at 10/10, depression at 8/10 and AH, voices telling him to get the solange that killed his brother.   Patient  denied SI, HI and VH.  PRN atarax given. Med and meals compliant.   No side effect noted. Emotional support and encouragement provided.   No aggressive behaviors noted and no complaint made. Q 15 minutes and hourly rounds in progress.   Pt slept for the total of 2.5 hours.      
1500 At this time magnisium citrate 296 ml was given PO for constipation. Patient claims that hasn't had a BM since he came to the unit. Abdomen is soft and non tender. Will continue to monitor.  
1720 At this time patient had a large bowel movement.  
1950 At this time haldol 5 mg was given PO for psychosis and atarax 50 mg was given for anxiety, patient was pacing in the hallway and trying to find the exit to leave.  
Admission assessment complete.   Patient arrived on unit as a voluntary patient.   Patient A&Ox3, not oriented to place. Reoriented to the hospital.   Patient is calm and cooperative with admission assessment.   Eye contact is noted to be fair.   Mood is noted to be depressed. Affect is constricted.    Patient currently reports anxiety and depression.   Patient also endorses SI with plan to cut wrists. Patient states that his suicidal thoughts started since bed bugs started infesting his group home. Patient says that the bed bugs have been there for years. Patient reports that he does not plan to harm self while hospitalized. States he will be safe on unit. MD notified and patient discontinued from constant observer and suicide precautions.   C-SSRS level is moderate risk.  Patient given PRN albuterol given at 1407.  Patient c/o generalized pain, PRN Tylenol given at 1407. Patient reports effectiveness.   Patient on fall precautions due to unsteady gait. Physical therapy consult ordered.  Patient has been in room isolative for most of shift. With constant observer.   Patient is both med and meal compliant. There are no untoward side effects from medications to note.     Hourly rounds are being completed to assure patient safety and attend to care needs. Monitoring will continue for changes in patient condition     
Assumed care of patient after receiving shift report from outgoing nurse.  Pt sleeping in bed at change of shift.  No apparent distress.  Pt current negative for SI/HI/AVH, positive for Depression 10/10, and /Anxiety 8/10.  Medication and meal compliant.  Mood is calm and cooperative with flat affect.  No apparent delusions perceived. Pt alert and oriented.  Pt ambulates with walker and provided with assistance as necessary.  No current behavioral issues observed.  Emotional support and encouragement provided. Last BM today but small amount, so pt is till constipated, pt given mirolax.  Continue to monitor progress with Bms.  Q15 minute safety continued for safety by techs and separate hourly checks done by myself.   
[facility]  Admission Note     Admission Type:   Admission Type: Voluntary    Reason for admission:  Reason for Admission: SI with plan to slit wrists. Patient endorsed AH saying the voices were telling him to harm self. Patient said he came to hospital to get away from bed bugs.     Patient reports drinking 6 beers daily. BAL negative. UDS positive for THC.   Patient lives at a group home called [facility] [employer] [facility]. Patient reports he is dyslexic.     Medical Problems:   Past Medical History:   Diagnosis Date    Cancer (HCC) 12/2020    LUNG CANCER    Chronic obstructive pulmonary disease (HCC)     Essential hypertension 11/19/2020    High cholesterol 11/19/2020    Hypertension     Hypertrophic cardiomyopathy (HCC)     Paranoid schizophrenia (HCC)     Psychiatric disorder        Status EXAM:  Mental Status and Behavioral Exam  Normal: No  Level of Assistance: Independent/Self  Facial Expression: Sad  Affect: Constricted  Level of Consciousness: Alert  Frequency of Checks: 4 times per hour, close  Mood:Normal: No  Mood: Sad, Depressed  Motor Activity:Normal: No  Motor Activity: Unusual posture/gait  Eye Contact: Fair  Observed Behavior: Guarded  Sexual Misconduct History: Current - no  Preception: Rosalia to person, Rosalia to time, Rosalia to situation  Attention:Normal: Yes  Thought Processes: Unremarkable  Thought Content:Normal: Yes  Depression Symptoms: Feelings of helplessness  Anxiety Symptoms: Generalized  Tonya Symptoms: No problems reported or observed.  Hallucinations: None  Delusions: No  Memory:Normal: Yes  Insight and Judgment: No  Insight and Judgment: Poor judgment               Skin check performed with JOSE Ellington. Small bruise noted on left forearm. Old scars noted on bilateral forearms.     Shruti Mcgill RN                     
Behavioral Health Treatment Team Note     Patient goal(s) for today: to eat lunch  Treatment team focus/goals: continue monitor medication, adjust as needed    Admission type: Voluntary    Progress note: Pt presents ao x 4. Pt presents with brighter affect today. ADLs appear fair. Pt speech presents WNL. Pt denies SI/HI, reports continued AH. Denies VH. He endorses anxiety and depression 8/10.    Pt reports he did have a bowel movement yesterday. He reports continued auditory hallucinations of voices.     LOS:  8  Expected LOS: 13-15    Insurance info/prescription coverage:  [employer] MEDICARE + [facility] MEDICAID  Date of last family contact:  3/7 MS. Mcfarlane from group home  Family requesting physician contact today:  No  Discharge plan:  Group home  Guns in the home:  No  Outpatient provider(s):  [facility]     Participating treatment team members: Dajuan Schultz, MEGHA Cuellar  
Behavioral Health Treatment Team Note     Patient goal(s) for today: to get my toll taken care of  Treatment team focus/goals: continue monitor medication, adjust as needed    Admission type: Voluntary    Progress note: Pt presents ao x 3. Pt presents depressed. ADLs appear fair, pt has been showering. Pt speech presents WNL. Pt endorses passive SI, denies HI, and reports AVH but does not identify what he is seeing. Pt presents disoriented at times and reports he has not had a bowel movement in 3 months.    10:42: Writer called Ms. Mcfarlane, manager. She reports pt was hallucinating mouse and snakes and that pt would point where he thought the vermin were and there would be nothing there, and this was verified by police. She reports pt is normally of sound mind and that she also noticed pt has presented disoriented recently, another example she provided is that pt reported to her that no one was letting him use the restroom and that someone was stopping him from using the restroom when no one was there. She reports that a similar situation 2 years ago when pt used THC that may have been laced. She reports she is concerned this this is what happened this time as well. She reports pt had told her he was not drinking and that if he was, he may have been hiding it from her. She reports that pt is able to return to the group home and that when this happened 2 years ago it took the pt about 3 weeks to clear up. She has no other concerns at this time.     LOS:  2  Expected LOS: 7-10    Insurance info/prescription coverage:  [employer] MEDIC[place] + [employer] [place]  Date of last family contact:  None  Family requesting physician contact today:  No  Discharge plan:  Group home  Guns in the home:  No  Outpatient provider(s):  [facility]    Participating treatment team members: Dajuan Schultz, Alan Rowe, MEGHA  
Behavioral Health Treatment Team Note     Patient goal(s) for today: to go home this week  Treatment team focus/goals: continue monitor medication, adjust as needed    Admission type: Voluntary    Progress note: Pt presents ao x 4. Pt presents calm and cooperative, reports some \"background noise\" AH which he reports is not currently bothering him. ADLs appear good. Pt speech presents WNL. Pt denies active SI/HI/VH.    Writer to call pt's group home tomorrow to arrange for discharge Friday. Writer to arrange follow up for the pt if needed as well.    LOS:  15  Expected LOS: 17    Insurance info/prescription coverage:  [employer] MED[place] + [employer] [place]  Date of last family contact:  3/7 MS. Mcfarlane from group home  Family requesting physician contact today:  No  Discharge plan:  Group home  Guns in the home:  No  Outpatient provider(s):  [facility]     Participating treatment team members: Alan Hurst, Supervisee in Social Work  
Behavioral Health Treatment Team Note     Patient goal(s) for today: to go home tomorrow  Treatment team focus/goals: continue monitor medication, adjust as needed    Admission type: Voluntary    Progress note: Pt presents ao x 4. Pt presents with brighter affect, discharge focused. ADLs appear good. Pt speech presents WNL. Pt denies SI/HI/AVH. Pt presents isolative to room.    Writer coordinating discharge for tomorrow for pt to return to group [place], writer following up with  at [facility] today.    LOS:  16  Expected LOS: 17    Insurance info/prescription coverage:  [employer] MEDICARE + [facility] MEDICAID  Date of last family contact:  3/20 MS. Mcfarlane from group home  Family requesting physician contact today:  No  Discharge plan:  Group home  Guns in the home:  No  Outpatient provider(s):  [facility]     Participating treatment team members: Dajuan Schultz, Alan Rowe, Supervisee in Social Work           
Behavioral Health Treatment Team Note     Patient goal(s) for today: to have a bowel movement  Treatment team focus/goals: continue monitor medication, adjust as needed    Admission type: Voluntary    Progress note: Pt presents ao x 3. Pt presents in bed, isolative to room, reporting he has not had bowel movement in months. ADLs appear poor. Pt speech presents WNL. Pt endorses depression 10/10, anxiety 8/10, and reports AH unable to tell what the voices are saying to him.     Pt to be seen for his complaint of not having bowel movement for months.     LOS:  3  Expected LOS: 10-14    Insurance info/prescription coverage:  [employer]IC[employer] + [facility] MEDICAID  Date of last family contact:  3/7 MS.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 Patient verbalized understanding. Patient belongings and valuables returned. Patient discharged to group home via lyft.   
Patient arrived on unit as a voluntary patient.   Patient currently endorses SI with plan to cut wrists. Patient states that he does not intent to harm self while hospitalized. Patient scored high risk on CSSRs suicide screening. Patient continued on suicide precautions from emergency room. Patient with constant observer. Dr. Yoon notified of SSRS screening results and patient presentation. Orders for suicide precautions and constant observer discontinued per Dr. Yoon order.    
Patient is alert and oriented x 3 with episodes of confusion. Patient is also high risk for fall. LOS in progress for safety.  Patient is redirectable, calm and cooperative.  Affect is flat, mood is depressed.  C-SSRS  level rated as no risk.  Patient endorsed anxiety at 10/10, depression at 7/10, and AVH, patient states \" voices are telling me all kinds of things\" Patient  denied SI, and HI.  PRN trazodone given. Med and meals compliant.   No side effect noted. Emotional support and encouragement provided.   No aggressive behaviors noted and no complaint made. Q 15 minutes and hourly rounds in progress.   Pt slept for the total of 7 hours.     
Patient was encountered in his room lying down gently.Patient denied pain.Eye contact is noted to be good. Mood is calm with appropriate affect.Patient  was isolative to room. VS  done, /109.Patient endorsed depression 9/10  and anxiety 5/10,but  denies all SI/HI, AVH. Patient accepted his medication.There are no visible side effects from medications noted.C-SSRS level is no risk.Hourly rounds are being completed to assure patient safety and attend to care needs. Monitoring will continue for safety reason. Patient slept for 8hrs.  
Pharmacists verified senokot-s can be given twice daily, pt feels he needs it.   MD okays one time order for 2100 tonight for second dose.    After order entered pt had second bm today, pt states it was about 6 inches in length still feels the need for 2100 p.m. second dose. Pt states pain is till 9/10 but has been calm all day.  Tylenol did not help.  
Pt received in bed with lights out, easily roused when name was called. A&Ox4, VSS. Affect brightened, eye contact appropriate, mood depressed/anxious. Pt rates anxiety 9/10 and depression 8/10. Denies SI/HI/AVH. Compliant with scheduled medications. No episodes of confusion or wandering observed. Pt remains withdrawn and isolative in bed throughout most of shift. Pleasant and cooperative when engaged.  maintained for safety.  
Pt still has pain but is calm and feels the pain medication doesn't help, so we are addressing the bowel issues with mirolax and senokot-s.  
Spoke with pt's group home, [facility], they report they are able to receive pt tomorrow for discharge.    Alan Rowe, Supervisee in Social Work  
This writer called Ms. Mcfarlane with [facility] and updated on pt's current mental state. Writer reported to her that pt's confusion appears to be improving and he is to be monitored to ensure he remains psychiatrically stable prior to discharge. Writer informed her pt will be discharged either end of this week or early next week.    Alan Rowe, Supervisee in Social Work  
  Suicidal ideations no intention   Mood:  depressed   Affect:  blunted and constricted   Memory   adequate   Concentration:  impaired   Insight:  limited   Judgment:  limited       MEDICATIONS:  Current Facility-Administered Medications   Medication Dose Route Frequency    sennosides-docusate sodium (SENOKOT-S) 8.6-50 MG tablet 2 tablet  2 tablet Oral Daily PRN    albuterol sulfate HFA (PROVENTIL;VENTOLIN;PROAIR) 108 (90 Base) MCG/ACT inhaler 2 puff  2 puff Inhalation Q4H PRN    aspirin chewable tablet 81 mg  81 mg Oral Daily    atorvastatin (LIPITOR) tablet 20 mg  20 mg Oral Daily    budesonide-formoterol (SYMBICORT) 160-4.5 MCG/ACT inhaler 2 puff  2 puff Inhalation BID    DULoxetine (CYMBALTA) extended release capsule 30 mg  30 mg Oral QAM    DULoxetine (CYMBALTA) extended release capsule 60 mg  60 mg Oral Nightly    fluticasone (FLONASE) 50 MCG/ACT nasal spray 2 spray  2 spray Each Nostril Daily    metoprolol succinate (TOPROL XL) extended release tablet 100 mg  100 mg Oral Daily    risperiDONE (RISPERDAL) tablet 2 mg  2 mg Oral BID    tiotropium (SPIRIVA RESPIMAT) 2.5 MCG/ACT inhaler 2 puff  2 puff Inhalation Daily RT    traMADol (ULTRAM) tablet 50 mg  50 mg Oral TID PRN    ibuprofen (ADVIL;MOTRIN) tablet 800 mg  800 mg Oral TID PRN    [START ON 3/22/2024] haloperidol decanoate (HALDOL DECANOATE) injection 200 mg  200 mg IntraMUSCular Q30 Days    nicotine (NICODERM CQ) 21 MG/24HR 1 patch  1 patch TransDERmal Daily    nicotine polacrilex (COMMIT) lozenge 2 mg  2 mg Oral Q2H PRN    benztropine (COGENTIN) tablet 1 mg  1 mg Oral BID    amLODIPine (NORVASC) tablet 10 mg  10 mg Oral Daily    ondansetron (ZOFRAN) injection 4 mg  4 mg IntraVENous Q6H PRN    acetaminophen (TYLENOL) tablet 650 mg  650 mg Oral Q4H PRN    polyethylene glycol (GLYCOLAX) packet 17 g  17 g Oral Daily PRN    aluminum & magnesium hydroxide-simethicone (MAALOX) 200-200-20 MG/5ML suspension 30 mL  30 mL Oral Q6H PRN    hydrOXYzine HCl (ATARAX) 
8.6-50 MG tablet 2 tablet  2 tablet Oral Daily    naloxone (NARCAN) injection 0.4 mg  0.4 mg IntraMUSCular PRN    albuterol sulfate HFA (PROVENTIL;VENTOLIN;PROAIR) 108 (90 Base) MCG/ACT inhaler 2 puff  2 puff Inhalation Q4H PRN    aspirin chewable tablet 81 mg  81 mg Oral Daily    atorvastatin (LIPITOR) tablet 20 mg  20 mg Oral Daily    budesonide-formoterol (SYMBICORT) 160-4.5 MCG/ACT inhaler 2 puff  2 puff Inhalation BID    DULoxetine (CYMBALTA) extended release capsule 30 mg  30 mg Oral QAM    DULoxetine (CYMBALTA) extended release capsule 60 mg  60 mg Oral Nightly    fluticasone (FLONASE) 50 MCG/ACT nasal spray 2 spray  2 spray Each Nostril Daily    metoprolol succinate (TOPROL XL) extended release tablet 100 mg  100 mg Oral Daily    risperiDONE (RISPERDAL) tablet 2 mg  2 mg Oral BID    tiotropium (SPIRIVA RESPIMAT) 2.5 MCG/ACT inhaler 2 puff  2 puff Inhalation Daily RT    traMADol (ULTRAM) tablet 50 mg  50 mg Oral TID PRN    ibuprofen (ADVIL;MOTRIN) tablet 800 mg  800 mg Oral TID PRN    [START ON 3/22/2024] haloperidol decanoate (HALDOL DECANOATE) injection 200 mg  200 mg IntraMUSCular Q30 Days    nicotine (NICODERM CQ) 21 MG/24HR 1 patch  1 patch TransDERmal Daily    nicotine polacrilex (COMMIT) lozenge 2 mg  2 mg Oral Q2H PRN    benztropine (COGENTIN) tablet 1 mg  1 mg Oral BID    amLODIPine (NORVASC) tablet 10 mg  10 mg Oral Daily    acetaminophen (TYLENOL) tablet 650 mg  650 mg Oral Q4H PRN    polyethylene glycol (GLYCOLAX) packet 17 g  17 g Oral Daily PRN    aluminum & magnesium hydroxide-simethicone (MAALOX) 200-200-20 MG/5ML suspension 30 mL  30 mL Oral Q6H PRN    hydrOXYzine HCl (ATARAX) tablet 50 mg  50 mg Oral TID PRN    haloperidol (HALDOL) tablet 5 mg  5 mg Oral Q4H PRN    Or    haloperidol lactate (HALDOL) injection 5 mg  5 mg IntraMUSCular Q4H PRN    diphenhydrAMINE (BENADRYL) injection 50 mg  50 mg IntraMUSCular Q4H PRN    traZODone (DESYREL) tablet 50 mg  50 mg Oral Nightly PRN    thiamine 
TransDERmal Daily    nicotine polacrilex (COMMIT) lozenge 2 mg  2 mg Oral Q2H PRN    benztropine (COGENTIN) tablet 1 mg  1 mg Oral BID    amLODIPine (NORVASC) tablet 10 mg  10 mg Oral Daily    acetaminophen (TYLENOL) tablet 650 mg  650 mg Oral Q4H PRN    aluminum & magnesium hydroxide-simethicone (MAALOX) 200-200-20 MG/5ML suspension 30 mL  30 mL Oral Q6H PRN    hydrOXYzine HCl (ATARAX) tablet 50 mg  50 mg Oral TID PRN    haloperidol (HALDOL) tablet 5 mg  5 mg Oral Q4H PRN    Or    haloperidol lactate (HALDOL) injection 5 mg  5 mg IntraMUSCular Q4H PRN    diphenhydrAMINE (BENADRYL) injection 50 mg  50 mg IntraMUSCular Q4H PRN    traZODone (DESYREL) tablet 50 mg  50 mg Oral Nightly PRN    thiamine tablet 100 mg  100 mg Oral Daily    folic acid (FOLVITE) tablet 1 mg  1 mg Oral Daily    multivitamin 1 tablet  1 tablet Oral Daily      DISCUSSION:   the risks and benefits of the proposed medication    Lab/Data Review:  All lab results for the last 24 hours reviewed.    No results found for this or any previous visit (from the past 24 hour(s)).          Assessment:     Principal Problem:    Paranoid schizophrenia (HCC)  Active Problems:    Schizophrenia (HCC)  Resolved Problems:    * No resolved hospital problems. *      Plan:     Continue current care  Collateral information  Medication modification as appropriate  Discontinue line of sight  Move to general bed when available  Continue Risperdal 3 mg PO BID   Continue Haldol  mg IM Q 28 days at next schedualed dosing.  Disposition planning with social work    I certify that this patient's inpatient psychiatric hospital services furnished since the previous certification were, and continue to be, required for treatment that could reasonably be expected to improve the patient's condition, or for diagnostic study, and that the patient continues to need, on a daily basis, active treatment furnished directly by or requiring the supervision of inpatient psychiatric 
200-200-20 MG/5ML suspension 30 mL  30 mL Oral Q6H PRN    hydrOXYzine HCl (ATARAX) tablet 50 mg  50 mg Oral TID PRN    haloperidol (HALDOL) tablet 5 mg  5 mg Oral Q4H PRN    Or    haloperidol lactate (HALDOL) injection 5 mg  5 mg IntraMUSCular Q4H PRN    diphenhydrAMINE (BENADRYL) injection 50 mg  50 mg IntraMUSCular Q4H PRN    traZODone (DESYREL) tablet 50 mg  50 mg Oral Nightly PRN    thiamine tablet 100 mg  100 mg Oral Daily    folic acid (FOLVITE) tablet 1 mg  1 mg Oral Daily    multivitamin 1 tablet  1 tablet Oral Daily      DISCUSSION:   the risks and benefits of the proposed medication    Lab/Data Review:  All lab results for the last 24 hours reviewed.    No results found for this or any previous visit (from the past 24 hour(s)).        Assessment:     Principal Problem:    Paranoid schizophrenia (HCC)  Active Problems:    Schizophrenia (HCC)  Resolved Problems:    * No resolved hospital problems. *      Plan:     Continue current care  Collateral information  Medication modification as appropriate  Pericolace for constipation with prn Miralax  Increase Risperdal 3 mg PO BID  Increase Haldol  mg IM Q 28 days at next schedualed dosing.  Disposition planning with social work    I certify that this patient's inpatient psychiatric hospital services furnished since the previous certification were, and continue to be, required for treatment that could reasonably be expected to improve the patient's condition, or for diagnostic study, and that the patient continues to need, on a daily basis, active treatment furnished directly by or requiring the supervision of inpatient psychiatric facility personnel. In addition, the hospital records show that services furnished were intensive treatment services, admission or related services, or equivalent services.  Signed By: Jaron Yoon MD     March 14, 2024        
Differential Type AUTOMATED     T4, Free    Collection Time: 03/18/24  4:41 AM   Result Value Ref Range    T4 Free 1.2 0.8 - 1.5 NG/DL           Assessment:     Principal Problem:    Paranoid schizophrenia (HCC)  Active Problems:    Schizophrenia (HCC)  Resolved Problems:    * No resolved hospital problems. *      Plan:     Continue current care  Collateral information  Medication modification as appropriate  Mag citrate solution x 1  Continue Pericolace for constipation with prn Miralax  Continue Risperdal 3 mg PO BID  Continue Haldol  mg IM Q 28 days at next schedualed dosing.  Disposition planning with social work    I certify that this patient's inpatient psychiatric hospital services furnished since the previous certification were, and continue to be, required for treatment that could reasonably be expected to improve the patient's condition, or for diagnostic study, and that the patient continues to need, on a daily basis, active treatment furnished directly by or requiring the supervision of inpatient psychiatric facility personnel. In addition, the hospital records show that services furnished were intensive treatment services, admission or related services, or equivalent services.  Signed By: Jaron Yoon MD     March 18, 2024

## 2024-03-22 NOTE — DISCHARGE SUMMARY
KENALOG           * This list has 2 medication(s) that are the same as other medications prescribed for you. Read the directions carefully, and ask your doctor or other care provider to review them with you.                STOP taking these medications      benztropine 1 MG tablet  Commonly known as: COGENTIN     carvedilol 25 MG tablet  Commonly known as: COREG     clonazePAM 1 MG tablet  Commonly known as: KLONOPIN     diclofenac sodium 1 % Gel  Commonly known as: VOLTAREN     gabapentin 600 MG tablet  Commonly known as: NEURONTIN     zolpidem 10 MG tablet  Commonly known as: AMBIEN               Where to Get Your Medications        These medications were sent to [facility] - [place], [place] - [address] - P [phone] - F [phone]  [address] [place] [address] [address], [facility] [address]      Phone: [phone]   amLODIPine 10 MG tablet  haloperidol decanoate 100 MG/ML injection  nicotine 21 MG/24HR  risperiDONE 3 MG tablet  sennosides-docusate sodium 8.6-50 MG tablet                A coordinated, multidisplinary treatment team round was conducted with Dajuan Schultz---this is done daily here at [facility]. This team consists of the nurse, psychiatric unit pharmacist,  and writer.     I have spent greater than 35 minutes on discharge work.    Signed:  Jaron Yoon MD  3/22/2024

## 2024-03-22 NOTE — PLAN OF CARE
Problem: Anxiety  Goal: Will report anxiety at manageable levels  Description: INTERVENTIONS:  1. Administer medication as ordered  2. Teach and rehearse alternative coping skills  3. Provide emotional support with 1:1 interaction with staff  3/17/2024 2253 by Elizabeth Leon RN  Outcome: Not Progressing  3/17/2024 1540 by Evelyn Mckeon RN  Outcome: Not Progressing     Problem: Coping  Goal: Pt/Family able to verbalize concerns and demonstrate effective coping strategies  Description: INTERVENTIONS:  1. Assist patient/family to identify coping skills, available support systems and cultural and spiritual values  2. Provide emotional support, including active listening and acknowledgement of concerns of patient and caregivers  3. Reduce environmental stimuli, as able  4. Instruct patient/family in relaxation techniques, as appropriate  5. Assess for spiritual pain/suffering and initiate Spiritual Care, Psychosocial Clinical Specialist consults as needed  3/17/2024 2253 by Elizabeth Leon RN  Outcome: Not Progressing  3/17/2024 1540 by Evelyn Mckeon RN  Outcome: Not Progressing     
  Problem: Anxiety  Goal: Will report anxiety at manageable levels  Description: INTERVENTIONS:  1. Administer medication as ordered  2. Teach and rehearse alternative coping skills  3. Provide emotional support with 1:1 interaction with staff  3/6/2024 0815 by Shruti Mcgill RN  Outcome: Progressing     Problem: Behavior  Goal: Pt/Family maintain appropriate behavior and adhere to behavioral management agreement, if implemented  Description: INTERVENTIONS:  1. Assess patient/family's coping skills and  non-compliant behavior (including use of illegal substances)  2. Notify security of behavior or suspected illegal substances which indicate the need for search of the family and/or belongings  3. Encourage verbalization of thoughts and concerns in a socially appropriate manner  4. Utilize positive, consistent limit setting strategies supporting safety of patient, staff and others  5. Encourage participation in the decision making process about the behavioral management agreement  6. If a visitor's behavior poses a threat to safety call refer to organization policy.  7. Initiate consult with , Psychosocial CNS, Spiritual Care as appropriate  Outcome: Progressing     
  Problem: Anxiety  Goal: Will report anxiety at manageable levels  Description: INTERVENTIONS:  1. Administer medication as ordered  2. Teach and rehearse alternative coping skills  3. Provide emotional support with 1:1 interaction with staff  3/7/2024 0833 by Shruti Mcgill RN  Outcome: Progressing  Flowsheets (Taken 3/7/2024 0800)  Will report anxiety at manageable levels:   Administer medication as ordered   Provide emotional support with 1:1 interaction with staff     Problem: Decision Making  Goal: Pt/Family able to effectively weigh alternatives and participate in decision making related to treatment and care  Description: INTERVENTIONS:  1. Determine when there are differences between patient's view, family's view, and healthcare provider's view of condition  2. Facilitate patient and family articulation of goals for care  3. Help patient and family identify pros/cons of alternative solutions  4. Provide information as requested by patient/family  5. Respect patient/family right to receive or not to receive information  6. Serve as a liaison between patient and family and health care team  7. Initiate Consults from Ethics, Palliative Care or initiate Family Care Conference as is appropriate  3/7/2024 0833 by Shruti Mcgill, RN  Outcome: Progressing     
  Problem: Anxiety  Goal: Will report anxiety at manageable levels  Description: INTERVENTIONS:  1. Administer medication as ordered  2. Teach and rehearse alternative coping skills  3. Provide emotional support with 1:1 interaction with staff  Outcome: Not Progressing     Problem: Coping  Goal: Pt/Family able to verbalize concerns and demonstrate effective coping strategies  Description: INTERVENTIONS:  1. Assist patient/family to identify coping skills, available support systems and cultural and spiritual values  2. Provide emotional support, including active listening and acknowledgement of concerns of patient and caregivers  3. Reduce environmental stimuli, as able  4. Instruct patient/family in relaxation techniques, as appropriate  5. Assess for spiritual pain/suffering and initiate Spiritual Care, Psychosocial Clinical Specialist consults as needed  Outcome: Not Progressing     
  Problem: Anxiety  Goal: Will report anxiety at manageable levels  Description: INTERVENTIONS:  1. Administer medication as ordered  2. Teach and rehearse alternative coping skills  3. Provide emotional support with 1:1 interaction with staff  Outcome: Not Progressing     Problem: Coping  Goal: Pt/Family able to verbalize concerns and demonstrate effective coping strategies  Description: INTERVENTIONS:  1. Assist patient/family to identify coping skills, available support systems and cultural and spiritual values  2. Provide emotional support, including active listening and acknowledgement of concerns of patient and caregivers  3. Reduce environmental stimuli, as able  4. Instruct patient/family in relaxation techniques, as appropriate  5. Assess for spiritual pain/suffering and initiate Spiritual Care, Psychosocial Clinical Specialist consults as needed  Outcome: Not Progressing     Problem: Anxiety  Goal: Will report anxiety at manageable levels  Description: INTERVENTIONS:  1. Administer medication as ordered  2. Teach and rehearse alternative coping skills  3. Provide emotional support with 1:1 interaction with staff  Outcome: Not Progressing     Problem: Coping  Goal: Pt/Family able to verbalize concerns and demonstrate effective coping strategies  Description: INTERVENTIONS:  1. Assist patient/family to identify coping skills, available support systems and cultural and spiritual values  2. Provide emotional support, including active listening and acknowledgement of concerns of patient and caregivers  3. Reduce environmental stimuli, as able  4. Instruct patient/family in relaxation techniques, as appropriate  5. Assess for spiritual pain/suffering and initiate Spiritual Care, Psychosocial Clinical Specialist consults as needed  Outcome: Not Progressing     
  Problem: Anxiety  Goal: Will report anxiety at manageable levels  Description: INTERVENTIONS:  1. Administer medication as ordered  2. Teach and rehearse alternative coping skills  3. Provide emotional support with 1:1 interaction with staff  Outcome: Progressing     
  Problem: Behavior  Goal: Pt/Family maintain appropriate behavior and adhere to behavioral management agreement, if implemented  Description: INTERVENTIONS:  1. Assess patient/family's coping skills and  non-compliant behavior (including use of illegal substances)  2. Notify security of behavior or suspected illegal substances which indicate the need for search of the family and/or belongings  3. Encourage verbalization of thoughts and concerns in a socially appropriate manner  4. Utilize positive, consistent limit setting strategies supporting safety of patient, staff and others  5. Encourage participation in the decision making process about the behavioral management agreement  6. If a visitor's behavior poses a threat to safety call refer to organization policy.  7. Initiate consult with , Psychosocial CNS, Spiritual Care as appropriate  Outcome: Progressing     
  Problem: Coping  Goal: Pt/Family able to verbalize concerns and demonstrate effective coping strategies  Description: INTERVENTIONS:  1. Assist patient/family to identify coping skills, available support systems and cultural and spiritual values  2. Provide emotional support, including active listening and acknowledgement of concerns of patient and caregivers  3. Reduce environmental stimuli, as able  4. Instruct patient/family in relaxation techniques, as appropriate  5. Assess for spiritual pain/suffering and initiate Spiritual Care, Psychosocial Clinical Specialist consults as needed  Outcome: Progressing     Problem: Anxiety  Goal: Will report anxiety at manageable levels  Description: INTERVENTIONS:  1. Administer medication as ordered  2. Teach and rehearse alternative coping skills  3. Provide emotional support with 1:1 interaction with staff  Outcome: Not Progressing     
  Problem: Depression/Self Harm  Goal: Effect of psychiatric condition will be minimized and patient will be protected from self harm  Description: INTERVENTIONS:  1. Assess impact of patient's symptoms on level of functioning, self care needs and offer support as indicated  2. Assess patient/family knowledge of depression, impact on illness and need for teaching  3. Provide emotional support, presence and reassurance  4. Assess for possible suicidal thoughts or ideation. If patient expresses suicidal thoughts or statements do not leave alone, initiate Suicide Precautions, move to a room close to the nursing station and obtain sitter  5. Initiate consults as appropriate with Mental Health Professional, Spiritual Care, Psychosocial CNS, and consider a recommendation to the [facility] for a Psychiatric Consultation  Outcome: Not Progressing  Flowsheets (Taken 3/21/2024 1511 by Du Metz RN)  Effect of psychiatric condition will be minimized and patient will be protected from self harm: Assess impact of patient’s symptoms on level of functioning, self care needs and offer support as indicated     Problem: Coping  Goal: Pt/Family able to verbalize concerns and demonstrate effective coping strategies  Description: INTERVENTIONS:  1. Assist patient/family to identify coping skills, available support systems and cultural and spiritual values  2. Provide emotional support, including active listening and acknowledgement of concerns of patient and caregivers  3. Reduce environmental stimuli, as able  4. Instruct patient/family in relaxation techniques, as appropriate  5. Assess for spiritual pain/suffering and initiate Spiritual Care, Psychosocial Clinical Specialist consults as needed  3/22/2024 0416 by Adis Aguilar RN  Outcome: Progressing  Flowsheets (Taken 3/21/2024 1511 by Du Metz RN)  Patient/family able to verbalize anxieties, fears, and concerns, and demonstrate effective coping: Assist patient/family to 
  Problem: Discharge Planning  Goal: Discharge to home or other facility with appropriate resources  Outcome: Progressing     Problem: Self Harm/Suicidality  Goal: Will have no self-injury during hospital stay  Description: INTERVENTIONS:  1.  Ensure constant observer at bedside with Q15M safety checks  2.  Maintain a safe environment  3.  Secure patient belongings  4.  Ensure family/visitors adhere to safety recommendations  5.  Ensure safety tray has been added to patient's diet order  6.  Every shift and PRN: Re-assess suicidal risk via Frequent Screener    Outcome: Progressing     Problem: Coping  Goal: Pt/Family able to verbalize concerns and demonstrate effective coping strategies  Description: INTERVENTIONS:  1. Assist patient/family to identify coping skills, available support systems and cultural and spiritual values  2. Provide emotional support, including active listening and acknowledgement of concerns of patient and caregivers  3. Reduce environmental stimuli, as able  4. Instruct patient/family in relaxation techniques, as appropriate  5. Assess for spiritual pain/suffering and initiate Spiritual Care, Psychosocial Clinical Specialist consults as needed  Outcome: Progressing     Problem: Decision Making  Goal: Pt/Family able to effectively weigh alternatives and participate in decision making related to treatment and care  Description: INTERVENTIONS:  1. Determine when there are differences between patient's view, family's view, and healthcare provider's view of condition  2. Facilitate patient and family articulation of goals for care  3. Help patient and family identify pros/cons of alternative solutions  4. Provide information as requested by patient/family  5. Respect patient/family right to receive or not to receive information  6. Serve as a liaison between patient and family and health care team  7. Initiate Consults from Ethics, Palliative Care or initiate Family Care Conference as is 
  Problem: Pain  Goal: Verbalizes/displays adequate comfort level or baseline comfort level  Outcome: Progressing     Problem: Self Harm/Suicidality  Goal: Will have no self-injury during hospital stay  Description: INTERVENTIONS:  1.  Ensure constant observer at bedside with Q15M safety checks  2.  Maintain a safe environment  3.  Secure patient belongings  4.  Ensure family/visitors adhere to safety recommendations  5.  Ensure safety tray has been added to patient's diet order  6.  Every shift and PRN: Re-assess suicidal risk via Frequent Screener    Outcome: Progressing     Problem: Anxiety  Goal: Will report anxiety at manageable levels  Description: INTERVENTIONS:  1. Administer medication as ordered  2. Teach and rehearse alternative coping skills  3. Provide emotional support with 1:1 interaction with staff  3/6/2024 2205 by Cookie Berger RN  Outcome: Progressing  3/6/2024 0834 by Shruti Mcgill, RN  Outcome: Progressing     
  Problem: Physical Therapy - Adult  Goal: By Discharge: Performs mobility at highest level of function for planned discharge setting.  See evaluation for individualized goals.  Description: FUNCTIONAL STATUS PRIOR TO ADMISSION: Patient was independent and active without use of DME.    HOME SUPPORT PRIOR TO ADMISSION: The patient lived in group home and did not require assist.    Physical Therapy Goals  Initiated 3/6/2024  1.  Patient will move from supine to sit and sit to supine  in bed with independence within 7 day(s).  MET  2.  Patient will transfer from bed to chair and chair to bed with independence using the least restrictive device within 7 day(s).  3.  Patient will perform sit to stand with independence within 7 day(s).  4.  Patient will ambulate with independence for 75 feet with the least restrictive device within 7 day(s). MET      Outcome: Completed   PHYSICAL THERAPY TREATMENT/DISCHARGE    Patient: Dajuan Schultz (58 y.o. male)  Date: 3/8/2024  Diagnosis: Hallucinations [R44.3]  Suicidal ideation [R45.851]  Schizophrenia (HCC) [F20.9]  Paranoid schizophrenia (HCC) [F20.0] Paranoid schizophrenia (HCC)        ASSESSMENT:  Patient has been followed by skilled PT services and has progressed towards goals. Patient benefits from supervision with transitional movements for increased awareness of obstacles, but otherwise does not require assistance with mobility. He was independent with bed mobility and ambulation in hallway without the need for assistive device. PT left rolling walker in room for patient comfort and to encourage independent out of room activity, however patient showed ability to navigate [facility] without need for RW. Patient will benefit from encouragement from staff for out of room ambulation. No acute PT needs noted currently, if patient's functional mobility declines please reconsult physical therapy.       PLAN:  Maximum therapeutic benefit has been met at current level of care and patient will 
  Problem: Self Harm/Suicidality  Goal: Will have no self-injury during hospital stay  Description: INTERVENTIONS:  1.  Ensure constant observer at bedside with Q15M safety checks  2.  Maintain a safe environment  3.  Secure patient belongings  4.  Ensure family/visitors adhere to safety recommendations  5.  Ensure safety tray has been added to patient's diet order  6.  Every shift and PRN: Re-assess suicidal risk via Frequent Screener    Outcome: Progressing     Problem: Anxiety  Goal: Will report anxiety at manageable levels  Description: INTERVENTIONS:  1. Administer medication as ordered  2. Teach and rehearse alternative coping skills  3. Provide emotional support with 1:1 interaction with staff  3/18/2024 2250 by Carmen Emmanuel, RN  Outcome: Progressing  3/18/2024 1309 by Du Metz, RN  Outcome: Progressing  Flowsheets (Taken 3/18/2024 0816)  Will report anxiety at manageable levels: Administer medication as ordered     Problem: Coping  Goal: Pt/Family able to verbalize concerns and demonstrate effective coping strategies  Description: INTERVENTIONS:  1. Assist patient/family to identify coping skills, available support systems and cultural and spiritual values  2. Provide emotional support, including active listening and acknowledgement of concerns of patient and caregivers  3. Reduce environmental stimuli, as able  4. Instruct patient/family in relaxation techniques, as appropriate  5. Assess for spiritual pain/suffering and initiate Spiritual Care, Psychosocial Clinical Specialist consults as needed  Outcome: Progressing     
  Problem: Self Harm/Suicidality  Goal: Will have no self-injury during hospital stay  Description: INTERVENTIONS:  1.  Ensure constant observer at bedside with Q15M safety checks  2.  Maintain a safe environment  3.  Secure patient belongings  4.  Ensure family/visitors adhere to safety recommendations  5.  Ensure safety tray has been added to patient's diet order  6.  Every shift and PRN: Re-assess suicidal risk via Frequent Screener    Outcome: Progressing     Problem: Anxiety  Goal: Will report anxiety at manageable levels  Description: INTERVENTIONS:  1. Administer medication as ordered  2. Teach and rehearse alternative coping skills  3. Provide emotional support with 1:1 interaction with staff  3/6/2024 0215 by Carmen Emmanuel RN  Outcome: Progressing  3/5/2024 1217 by Shruti Mcgill RN  Outcome: Progressing     Problem: Depression/Self Harm  Goal: Effect of psychiatric condition will be minimized and patient will be protected from self harm  Description: INTERVENTIONS:  1. Assess impact of patient's symptoms on level of functioning, self care needs and offer support as indicated  2. Assess patient/family knowledge of depression, impact on illness and need for teaching  3. Provide emotional support, presence and reassurance  4. Assess for possible suicidal thoughts or ideation. If patient expresses suicidal thoughts or statements do not leave alone, initiate Suicide Precautions, move to a room close to the nursing station and obtain sitter  5. Initiate consults as appropriate with Mental Health Professional, Spiritual Care, Psychosocial CNS, and consider a recommendation to the [facility]IP for a Psychiatric Consultation  3/6/2024 0215 by Carmen Emmanuel RN  Outcome: Progressing  3/5/2024 1217 by Shruti Mcgill RN  Outcome: Progressing     
  Problem: Self Harm/Suicidality  Goal: Will have no self-injury during hospital stay  Description: INTERVENTIONS:  1.  Ensure constant observer at bedside with Q15M safety checks  2.  Maintain a safe environment  3.  Secure patient belongings  4.  Ensure family/visitors adhere to safety recommendations  5.  Ensure safety tray has been added to patient's diet order  6.  Every shift and PRN: Re-assess suicidal risk via Frequent Screener    Outcome: Progressing     Problem: Anxiety  Goal: Will report anxiety at manageable levels  Description: INTERVENTIONS:  1. Administer medication as ordered  2. Teach and rehearse alternative coping skills  3. Provide emotional support with 1:1 interaction with staff  Outcome: Progressing     Problem: Depression/Self Harm  Goal: Effect of psychiatric condition will be minimized and patient will be protected from self harm  Description: INTERVENTIONS:  1. Assess impact of patient's symptoms on level of functioning, self care needs and offer support as indicated  2. Assess patient/family knowledge of depression, impact on illness and need for teaching  3. Provide emotional support, presence and reassurance  4. Assess for possible suicidal thoughts or ideation. If patient expresses suicidal thoughts or statements do not leave alone, initiate Suicide Precautions, move to a room close to the nursing station and obtain sitter  5. Initiate consults as appropriate with Mental Health Professional, Spiritual Care, Psychosocial CNS, and consider a recommendation to the LIP for a Psychiatric Consultation  Outcome: Progressing     
  Problem: Self Harm/Suicidality  Goal: Will have no self-injury during hospital stay  Description: INTERVENTIONS:  1.  Ensure constant observer at bedside with Q15M safety checks  2.  Maintain a safe environment  3.  Secure patient belongings  4.  Ensure family/visitors adhere to safety recommendations  5.  Ensure safety tray has been added to patient's diet order  6.  Every shift and PRN: Re-assess suicidal risk via Frequent Screener    Outcome: Progressing     Problem: Depression/Self Harm  Goal: Effect of psychiatric condition will be minimized and patient will be protected from self harm  Description: INTERVENTIONS:  1. Assess impact of patient's symptoms on level of functioning, self care needs and offer support as indicated  2. Assess patient/family knowledge of depression, impact on illness and need for teaching  3. Provide emotional support, presence and reassurance  4. Assess for possible suicidal thoughts or ideation. If patient expresses suicidal thoughts or statements do not leave alone, initiate Suicide Precautions, move to a room close to the nursing station and obtain sitter  5. Initiate consults as appropriate with Mental Health Professional, Spiritual Care, Psychosocial CNS, and consider a recommendation to the [facility]IP for a Psychiatric Consultation  Outcome: Progressing     Problem: Safety - Adult  Goal: Free from fall injury  Outcome: Progressing     
  Problem: Self Harm/Suicidality  Goal: Will have no self-injury during hospital stay  Description: INTERVENTIONS:  1.  Ensure constant observer at bedside with Q15M safety checks  2.  Maintain a safe environment  3.  Secure patient belongings  4.  Ensure family/visitors adhere to safety recommendations  5.  Ensure safety tray has been added to patient's diet order  6.  Every shift and PRN: Re-assess suicidal risk via Frequent Screener    Outcome: Progressing     Problem: Pain  Goal: Verbalizes/displays adequate comfort level or baseline comfort level  Outcome: Not Progressing     Problem: Anxiety  Goal: Will report anxiety at manageable levels  Description: INTERVENTIONS:  1. Administer medication as ordered  2. Teach and rehearse alternative coping skills  3. Provide emotional support with 1:1 interaction with staff  Outcome: Not Progressing     
0800 At this time morning assessment was done. Patient was encountered resting in his room. He was calm and cooperative with assessment. Oriented in person, but disoriented in place, time and situation, eye contact is noted to be poor. Flat affect was noted. Mood is noted to be depressed and anxious, denied all SI, HI, A/VH. Patient has been isolative to his room. He is both med and meal compliant. There are no untoward side effects from medications to note. Remains on 1:1 to assure patient safety and attend to care needs. Will continue to monitor for safety.        Problem: Anxiety  Goal: Will report anxiety at manageable levels  Description: INTERVENTIONS:  1. Administer medication as ordered  2. Teach and rehearse alternative coping skills  3. Provide emotional support with 1:1 interaction with staff  Outcome: Progressing  Flowsheets (Taken 3/18/2024 7532)  Will report anxiety at manageable levels: Administer medication as ordered     
0800 At this time morning assessment was done. Patient was encountered resting in his room. He was calm and cooperative with assessment. Oriented in person, but disoriented in place, time and situation, eye contact is noted to be poor. Flat affect was noted. Mood is noted to be depressed and anxious, denied all SI, HI, A/VH. Patient remains isolated to his room most of the time. He is both med and meal compliant. There are no untoward side effects from medications to note. Remains on 1:1 to assure patient safety and attend to care needs. Will continue to monitor for safety.     Problem: Self Harm/Suicidality  Goal: Will have no self-injury during hospital stay  Description: INTERVENTIONS:  1.  Ensure constant observer at bedside with Q15M safety checks  2.  Maintain a safe environment  3.  Secure patient belongings  4.  Ensure family/visitors adhere to safety recommendations  5.  Ensure safety tray has been added to patient's diet order  6.  Every shift and PRN: Re-assess suicidal risk via Frequent Screener    Outcome: Progressing  Flowsheets (Taken 3/20/2024 2619)  Will have no self-injury during hospital stay: Maintain a safe environment     
0800 At this time patient was encountered in in his room. She endorsed depression, denied SI, HI, A/VH, and anxiety. No complain of pain. He is medication and meal compliant. Q 15 minute checks ongoing to ensure patient safety. Will continue to monitor.       Problem: Self Harm/Suicidality  Goal: Will have no self-injury during hospital stay  Description: INTERVENTIONS:  1.  Ensure constant observer at bedside with Q15M safety checks  2.  Maintain a safe environment  3.  Secure patient belongings  4.  Ensure family/visitors adhere to safety recommendations  5.  Ensure safety tray has been added to patient's diet order  6.  Every shift and PRN: Re-assess suicidal risk via Frequent Screener    Outcome: Progressing  Flowsheets (Taken 3/11/2024 0930)  Will have no self-injury during hospital stay: Maintain a safe environment     
Patient was encountered resting in his room. He was calm and cooperative with assessment. Oriented in person and place, disoriented by time and situation, eye contact is noted to be poor. Flat affect was noted. Mood is noted to be depressed and anxious, denied all SI, HI, A/VH. Patient remains isolated to his room most of the time. He is both med and meal compliant. There are no untoward side effects from medications to note. Remains on 1:1 to assure patient safety and attend to care needs. Will continue to monitor for safety.      Problem: Coping  Goal: Pt/Family able to verbalize concerns and demonstrate effective coping strategies  Description: INTERVENTIONS:  1. Assist patient/family to identify coping skills, available support systems and cultural and spiritual values  2. Provide emotional support, including active listening and acknowledgement of concerns of patient and caregivers  3. Reduce environmental stimuli, as able  4. Instruct patient/family in relaxation techniques, as appropriate  5. Assess for spiritual pain/suffering and initiate Spiritual Care, Psychosocial Clinical Specialist consults as needed  Outcome: Progressing     
Problem: Discharge Planning  Goal: Discharge to home or other facility with appropriate resources  Outcome: Not Progressing     Problem: Anxiety  Goal: Will report anxiety at manageable levels  Description: INTERVENTIONS:  1. Administer medication as ordered  2. Teach and rehearse alternative coping skills  3. Provide emotional support with 1:1 interaction with staff  3/13/2024 2044 by Heather Vargas RN  Outcome: Not Progressing  3/13/2024 1234 by Carmen Emmanuel RN  Outcome: Progressing     Problem: Coping  Goal: Pt/Family able to verbalize concerns and demonstrate effective coping strategies  Description: INTERVENTIONS:  1. Assist patient/family to identify coping skills, available support systems and cultural and spiritual values  2. Provide emotional support, including active listening and acknowledgement of concerns of patient and caregivers  3. Reduce environmental stimuli, as able  4. Instruct patient/family in relaxation techniques, as appropriate  5. Assess for spiritual pain/suffering and initiate Spiritual Care, Psychosocial Clinical Specialist consults as needed  Outcome: Not Progressing     Problem: Depression/Self Harm  Goal: Effect of psychiatric condition will be minimized and patient will be protected from self harm  Description: INTERVENTIONS:  1. Assess impact of patient's symptoms on level of functioning, self care needs and offer support as indicated  2. Assess patient/family knowledge of depression, impact on illness and need for teaching  3. Provide emotional support, presence and reassurance  4. Assess for possible suicidal thoughts or ideation. If patient expresses suicidal thoughts or statements do not leave alone, initiate Suicide Precautions, move to a room close to the nursing station and obtain sitter  5. Initiate consults as appropriate with Mental Health Professional, Spiritual Care, Psychosocial CNS, and consider a recommendation to the [facility] for a Psychiatric 
appropriate  Outcome: Progressing     Problem: Behavior  Goal: Pt/Family maintain appropriate behavior and adhere to behavioral management agreement, if implemented  Description: INTERVENTIONS:  1. Assess patient/family's coping skills and  non-compliant behavior (including use of illegal substances)  2. Notify security of behavior or suspected illegal substances which indicate the need for search of the family and/or belongings  3. Encourage verbalization of thoughts and concerns in a socially appropriate manner  4. Utilize positive, consistent limit setting strategies supporting safety of patient, staff and others  5. Encourage participation in the decision making process about the behavioral management agreement  6. If a visitor's behavior poses a threat to safety call refer to organization policy.  7. Initiate consult with , Psychosocial CNS, Spiritual Care as appropriate  Outcome: Progressing     Problem: Depression/Self Harm  Goal: Effect of psychiatric condition will be minimized and patient will be protected from self harm  Description: INTERVENTIONS:  1. Assess impact of patient's symptoms on level of functioning, self care needs and offer support as indicated  2. Assess patient/family knowledge of depression, impact on illness and need for teaching  3. Provide emotional support, presence and reassurance  4. Assess for possible suicidal thoughts or ideation. If patient expresses suicidal thoughts or statements do not leave alone, initiate Suicide Precautions, move to a room close to the nursing station and obtain sitter  5. Initiate consults as appropriate with Mental Health Professional, Spiritual Care, Psychosocial CNS, and consider a recommendation to the [facility] for a Psychiatric Consultation  Outcome: Progressing     Problem: Safety - Adult  Goal: Free from fall injury  Outcome: Progressing     Problem: Pain  Goal: Verbalizes/displays adequate comfort level or baseline comfort level  Outcome: 
JOSE Yang  Outcome: Progressing  3/8/2024 1732 by Celia Verdugo RN  Outcome: Progressing     Problem: Depression/Self Harm  Goal: Effect of psychiatric condition will be minimized and patient will be protected from self harm  Description: INTERVENTIONS:  1. Assess impact of patient's symptoms on level of functioning, self care needs and offer support as indicated  2. Assess patient/family knowledge of depression, impact on illness and need for teaching  3. Provide emotional support, presence and reassurance  4. Assess for possible suicidal thoughts or ideation. If patient expresses suicidal thoughts or statements do not leave alone, initiate Suicide Precautions, move to a room close to the nursing station and obtain sitter  5. Initiate consults as appropriate with Mental Health Professional, Spiritual Care, Psychosocial CNS, and consider a recommendation to the [facility] for a Psychiatric Consultation  3/8/2024 1733 by Celia Verdugo RN  Outcome: Progressing  3/8/2024 1732 by Celia Verdugo RN  Outcome: Progressing     Problem: Safety - Adult  Goal: Free from fall injury  3/8/2024 1733 by Celia Verdugo RN  Outcome: Progressing  3/8/2024 1732 by Celia Verdugo RN  Outcome: Progressing     Problem: Pain  Goal: Verbalizes/displays adequate comfort level or baseline comfort level  3/8/2024 1733 by Celia Verdugo RN  Outcome: Not Progressing  3/8/2024 1732 by Celia Verduog RN  Outcome: Progressing     
behavior and adheres to behavioral management agreement, if implemented: Assess patient/family’s coping skills and  non-compliant behavior (including use of illegal substances)     Problem: Depression/Self Harm  Goal: Effect of psychiatric condition will be minimized and patient will be protected from self harm  Description: INTERVENTIONS:  1. Assess impact of patient's symptoms on level of functioning, self care needs and offer support as indicated  2. Assess patient/family knowledge of depression, impact on illness and need for teaching  3. Provide emotional support, presence and reassurance  4. Assess for possible suicidal thoughts or ideation. If patient expresses suicidal thoughts or statements do not leave alone, initiate Suicide Precautions, move to a room close to the nursing station and obtain sitter  5. Initiate consults as appropriate with Mental Health Professional, Spiritual Care, Psychosocial CNS, and consider a recommendation to the [facility] for a Psychiatric Consultation  3/22/2024 1246 by Celia Verdugo RN  Outcome: Adequate for Discharge  3/22/2024 0416 by Adis Aguilar RN  Outcome: Not Progressing  Flowsheets (Taken 3/21/2024 1511 by Du Metz RN)  Effect of psychiatric condition will be minimized and patient will be protected from self harm: Assess impact of patient’s symptoms on level of functioning, self care needs and offer support as indicated     Problem: Safety - Adult  Goal: Free from fall injury  3/22/2024 1246 by Celia Verdugo RN  Outcome: Adequate for Discharge  3/22/2024 0416 by Adis Aguilar RN  Outcome: Progressing     Problem: Chronic Conditions and Co-morbidities  Goal: Patient's chronic conditions and co-morbidity symptoms are monitored and maintained or improved  Outcome: Adequate for Discharge     Problem: Depression/Self Harm  Goal: Effect of psychiatric condition will be minimized and patient will be protected from self harm  Description:

## 2024-03-22 NOTE — PROGRESS NOTES
Behavioral Services                                              Medicare Re-Certification    I certify that the inpatient psychiatric hospital services furnished since the previous certification/re-certification were, and continue to be, medically necessary for;    [x] (1) Treatment which could reasonably be expected to improve the patient's condition,    [x] (2) Or for diagnostic study.    Estimated length of stay/service 3 - 5 days    Plan for post-hospital care per social work    This patient continues to need, on a daily basis, active treatment furnished directly by or requiring the supervision of inpatient psychiatric personnel.    Electronically signed by Jaron Yoon MD on 3/12/2024 at 9:42 AM   
      Behavioral Services                                              Medicare Re-Certification    I certify that the inpatient psychiatric hospital services furnished since the previous certification/re-certification were, and continue to be, medically necessary for;    [x] (1) Treatment which could reasonably be expected to improve the patient's condition,    [x] (2) Or for diagnostic study.    Estimated length of stay/service 5 - 7 days    Plan for post-hospital care per social work    This patient continues to need, on a daily basis, active treatment furnished directly by or requiring the supervision of inpatient psychiatric personnel.    Electronically signed by Jaron Yoon MD on 3/20/2024 at 1:15 PM   
      Behavioral Services  Medicare Certification Upon Admission    I certify that this patient's inpatient psychiatric hospital admission is medically necessary for:    [x] (1) Treatment which could reasonably be expected to improve this patient's condition,       [x] (2) Or for diagnostic study;     AND     [x](2) The inpatient psychiatric services are provided while the individual is under the care of a physician and are included in the individualized plan of care.    Estimated length of stay/service 5 - 7 days    Plan for post-hospital care per social work    Electronically signed by Jaron Yoon MD on 3/6/2024 at 12:04 PM      
0600  no significant changes this shift. Patient slept for 3 hours and 30 minutes.   
Evening assessment complete.  Patient was encountered resting in his room.  Patient was calm and cooperative with assessment.  Eye contact is noted to be fair.  Patient appears flat.  Mood is noted to be depressed and anxious with a constricted affect.  Patient endorsed depression (level 8) and anxiety (level 9).  Denies all SI/HI/AVH.  C-SSRS level is no risk.  Patient has been isolative to his room throughout the evening.  Patient is med compliant. There are no untoward side effects from medications to note.  Fifteen minute rounds are being completed to assure patient safety and attend to care needs. Will continue to monitor for safety.      Patient appeared to sleep 8 hours.  
Evening assessment complete.  Patient was encountered sleeping in his room.  Patient was calm and cooperative with assessment.  Eye contact is noted to be fair.  Patient appears flat.  Mood is noted to be depressed and anxious with a blunted affect.  Patient endorsed depression and anxiety both at a level of 8.  Denies all SI/HI/AVH.  C-SSRS level is no risk.  ROBERWA is a 4  Patient has been isolative to his room throughout the evening.  Patient is med compliant. There are no untoward side effects from medications to note.  Fifteen minute rounds are being completed to assure patient safety and attend to care needs. Patient remains on 1:1 observation for safety.  Will continue to monitor.      Patient appeared to sleep 7.75 hours.  
GROUP THERAPY PROGRESS NOTE      Dajuan Schultz was not present for medication group.    GROUP TIME: 45 minutes, Wednesdays 2pm    Participants: 4    PERSONAL GOAL FOR PARTICIPATION: To be present for group, participate in discussion, and answer patient-directed questions.    GOAL ORIENTATION: Personal    THERAPEUTIC INTERVENTIONS REVIEWED AND DISCUSSED: The following topics were presented: storage of medications, how to remember to refill medications and keep up with doctor appointments, relapse prevention, keeping a record of all medication including prescription and non-prescription drugs, and who to contact with medication questions. Patients were given time to ask questions regarding their current therapy.    IMPRESSION OF PARTICIPATION: Did not attend      Maren Cueva RPH    
Morning assessment complete.   Patient was encountered resting in his room.  Patient was calm and cooperative with assessment.  Eye contact is noted to be fair.  Patient appears flat.  Mood is noted to be depressed and anxious with a constricted affect.  Patient endorsed depression (level 7) and anxiety (level 8).  He states he had AH earlier this morning but is not having them currently.  Denies all SI/HI/AVH.  C-SSRS level is no risk.  Patient has been isolative to his room throughout the shift.  Patient is both med and meal compliant. There are no untoward side effects from medications to note.  Fifteen minute rounds are being completed to assure patient safety and attend to care needs. Will continue to monitor for safety.    
Morning assessment complete.   Patient was encountered resting in his room.  Patient was calm and cooperative with assessment.  Eye contact is noted to be fair.  Patient appears flat.  Mood is noted to be depressed and anxious with a constricted affect.  Patient endorsed depression (level 8) and anxiety (level 10).  Denies all SI/HI/AVH.  C-SSRS level is no risk.  Patient has been isolative to his room throughout the shift.   Patient is both med and meal compliant. There are no untoward side effects from medications to note.  Fifteen minute rounds are being completed to assure patient safety and attend to care needs. Will continue to monitor for safety.     
Mr Schultz remained in his room and did not socialize with his peers. He was pleasant with staff and eye contact was good. He denied SI and HI.and pain. He stated the presence of AH in a form of \"ringing and background noise\". He requested Atarax for anxiety which he rated a 10/10. He requested and received trazodone for insomnia with good results. He requested and received albuterol inhaler which he stated was effective. He appears to be sleeping well.  
Night Shift assessment completed.   Dajuan is isolative to his room, alert, calm, cooperative and free from distress. Appropriately dressed and groomed. Affect constricted. Mood is anxious/depressed. Eye contact is fair. Interacts appropriately and aware. Poor judgement. Endorses depression of 10/10, anxiety 8/10 and VH of images. Denies SI/HI/AH and pain. C-SSRS, No risk. No aggression or violence.     Nursing Intervention: VS are within defined limit. CIWA score- 5. Compliant with med and meals. Requested and received PRN Atarax and Trazodone for anxiety and sleep. Emotional support and encouragement provided. No side effects from medications observed. Behavioral control fair.Care plan up to date.    Response: Positive.    Plan: Monitoring for safety and behavior continues q 15 mins. Pt slept for 6.15 hours.            
Partial work-up came back.   CT head: No acute intracranial abnormality  Serum ammonia level: 19  Na:128.  Patient has previous history of chroni hyponatremia consistent with SIADH. Pt is on multiple medications(Haldol, Risperdal, Cymbalta) that can cause SIADH/hyponatremia. Will repeat in a.m.  Recommend nephrology evaluation.  WBC: 13.4.  Patient is afebrile.  If CXR/urinalysis is negative for UTI, most likely stress related.  Repeat in a.m to monitor the trend.  Mildly elevated AST(45): Likely due to medications. Monitor with periodic labs and if remains persistently elevated, will need further workup.  Plan discussed with nurse, Evelyn. Thank you for the consultation.  Will sign off.  Please reconsult if further assistance is needed.    
Patient was monitored by a 1:1 observer for safety due to previous SI. He denies SI. HI. AVH and pain. He was med compliant. He appears to be sleeping fairly well.  
Physical Therapy    Chart reviewed and appreciated. Patient received side lying in bed resting with  present. Patient declining PT services secondary to fatigue, but stated that his unsteadiness began ~2 weeks ago alongside feelings of being overwhelmed. Patient denies use of assistive devices at baseline. PT will follow up with patient as able.     Curtis Winter, PT    
Pt screened per LOS policy.  No acute nutrition or weight issues identified.  Pt meal compliant. Hx notable for cancer, COPD, HLD, HTN, CM, constipation.  Ht: 5'8\"  Wt: 160 lb  BMI: 24.33 kg/(m^2) c/w normal weight  Est energy needs: 1845 kcal, 68 g protein, 1 mL/kcal fluids  Pt will consume > 75% of meals at follow up 7-10 days  LOS    
Pt was noted pacing the unit. He presented disorganized and confused. He was looking for someone named \"Jacob\". He was wondering into peers rooms. He was trying to leave the unit to \"go smoke a cigarette.\" He became agitated when redirection offered. Pt was offered and accepted a nicotine lozenge. Pt was given Haldol and Benadryl IM for agitation and psychosis. He also received Atarax for anxiety and Trazodone for sleep. Medications had minimal effect. Will continue to offer support and close observation.    Pt did not sleep during the night. Pt repeatedly going to every exit door trying to leave the  unit. Monitoring for safety and behaviors continues.   
Pt was noted resting quietly in bed. He presented alert, somewhat confused, flat affect, mood depressed. He denied SI/HI/AVH and pain. He endorsed depression 10/10. Pt reported no BM yet. He was compliant with his scheduled meds. He also received Atarax for anxiety rated at 10/10. He accepted fluids and snack. He remained isolative to his room. He ambulated without the use of his walker.     Pt appeared to have slept approx 8 hours during the night. No acute distress observed. Monitoring for safety and behaviors continues.  
Pt was noted resting quietly in bed. He presented alert, somewhat confused, flat affect, mood depressed. He denied SI/HI/AVH and pain. He endorsed depression 10/10. Pt reported that he has excessive gas but no BM yet. He was compliant with his scheduled meds. He also received Atarax for anxiety rated at 10/10. He accepted fluids and snack. He remained isolative to his room. He ambulated without the use of his walker. He was Monitored LOS for falls.    Pt appeared to have slept approx 7.5 hours during the night. No acute distress observed. Monitoring for safety and behaviors continues.  
Pt was noted resting quietly in bed. He presented alert, somewhat confused, flat affect, mood depressed. He endorsed depression and anxiety at 10/10. He reported that he had feelings of constipation. He was given Glycolax. He was compliant with his scheduled meds. He accepted fluids and snack. He remained isolative to his room.    Pt appeared to have slept approx 7.5 hours during the night. No acute distress observed. Monitoring for safety and behaviors continues.  
Pt was received awake in bed, sitter at bedside. Pt's affect is flat and he endorsed feeling depressed and anxious. Pt endorsed passive SI without a plan and ensured staff that he would be safe while in the hospital. Pt denies HI but states he is having auditory and visual hallucinations but unable to tell what the voices are saying and what he is seeing. Pt's bp was elevated, hospitalist made medication changes. Pt took scheduled medication along with prn trazodone for sleep. 1:1 safety rounds in place for falls, plan of care to continue.    Pt  slept for 6.30 hours.  
RN Reassessment Note 7pm-7am:     Dajuan was observed laying awake in bed, Pt's affect is flat and he endorsed feelings of depression. Pt stated his mood is sad. Pt rates depression 10/10 and anxiety, rating 8/10. Pt endorse pain 9/10 for sharp, lower left and right stomach. Pt denies SI, HI, AVH, at time of assessment, but he stated he had visual hallucinations earlier of his son crying out to him. Pt is medication compliant. plan of care to continue.         
RN Reassessment Note 7pm-7am:    Pt was met and observed laying awake in bed, with sitter at bedside. Pt's affect is flat and he endorsed feelings of depression, rating 10/10 and anxious, rating 8/10. Pt endorse pain 10/10 for headache. Pt denies SI, HI, VH, but states he is endorsing auditory hallucinations, but unable to tell what the voices are saying. Pt is medication compliant 1:1 safety rounds in place for falls, plan of care to continue. Pt  slept for 4.5 hours.  
The beginning of Daylight Saving Time occurred at 0200 hrs. Documentation of patient care and medications administered is done with respect to the time change.   
[facility] Admission Pharmacy Medication Reconciliation    Information obtained from:[facility] ([phone]) and [facility]   RxQuery data available1:Yes    Comments/recommendations:  Patient unable to provide list of medications.  Verified his current med list with [facility].  Haldol dec is administered by [facility].  Per Charlee, last administration 2/23/24.  The [place] Prescription Monitoring Program () was accessed to determine fill history of any controlled medications.  2/26/24, 1/09/24 Clonazepam 1mg #60 tab/30 ds   12/01/23 Tramadol 50mg #90 tab/30 ds    Medication changes (since last review):  Added  Trazodone   Haldol dec  Metoprolol XL 100mg  Spiriva  Fluticasone  Removed  Carvedolol 25mg twice daily  Diclofenac sodium 1% gel  Gabapentin 600mg 3 times daily  Zolpidem 10mg nightly as needed  Adjusted  Ibuprofen dose adjusted from 2 times daily to 3 times daily  Duloxetine 30mg changed to morning dosing  Duloxetine 60mg changed to bedtime dosing       [facility] reflects medications filled and processed through the patient's insurance, however                this data does NOT capture whether the medication was picked up or is currently being taken by the patient.         Patient allergies:   Allergies as of 03/04/2024 - Fully Reviewed 03/04/2024   Allergen Reaction Noted    Tuberculin ppd Swelling 10/26/2020         Prior to Admission Medications   Prescriptions Last Dose Informant   DULoxetine (CYMBALTA) 30 MG extended release capsule Past Week Outside Pharmacy/PCP   Sig: Take 1 capsule by mouth every morning for mood   DULoxetine (CYMBALTA) 60 MG extended release capsule Past Week Outside Pharmacy/PCP   Sig: Take 1 capsule by mouth nightly for mood.   albuterol sulfate HFA (PROVENTIL;VENTOLIN;PROAIR) 108 (90 Base) MCG/ACT inhaler Past Week Outside Pharmacy/PCP   Sig: Inhale 2 puffs into the lungs every 4 to 6 hours as needed for shortness of breath.   aspirin 81 MG 
Writer met with patient in his room. Patient was resting in bed and appeared calm/free from distress. Patient was cooperative with assessment. Patient is Aox4. Patient presents with flat affect and depressed/anxious mood. Patient endorses depression 9/10 and anxiety 8/10. Patient requested prn medication for anxiety, atarax was given atarax was given at 0904 (see MAR). Patient endorses pain 7/10 in his head (the posterior towards his neck) and his abdomen (mid abdomen) and requested prn medication, tylenol was given at 0904 (see MAR). Patient denies SI, HI, and AVH. Patient states he had previously been having AH but did not have today. Patient observed to have somatic delusions and continues to state he has not had a bowel movement in 3 months. Patient is noted to have had a confirmed bowel movement 3 nights ago on 3/7/24, patient also noted to have a suspected bowel movement yesterday 3/9/24. Patient does not request prn medication for constipation at this time. Patient is medication and meal compliant. Q15 minute safety checks maintained.  
Writer met with patient in his room. Patient was resting quietly in bed and appeared calm/free from distress. Patient was cooperative with assessment. Patient is AOx4. Patient presents with flat affect and depressed mood. Patient endorses depression 9/10 and anxiety 7/10 but does not request prn medication at this time. Patient denies SI, HI, and AVH. Patient is discharge focused. Patient is medication and meal compliant. Q15 minute safety checks maintained.   
Writer met with patient in his room. Patient was resting quietly in bed and appeared calm/free from distress. Patient was cooperative with assessment. Patient is Aox4. Patient presents with a flat affect and depressed mood. Patient endorses depression and anxiety and requests prn medication, atarax was given at 0920. Patient endorses AH of incoherent voices in the background that the patient cannot make out. Patient endorses pain in his abdomen  6/10 and requests prn medication, tylenol was given at 0920. Patient denies SI, HI, and VH. Patient complains of constipation and states he hasn't had a bowel movement in 3 months, night shift staff reported patient had a confirmed bowel movement 2 nights ago. Patient believed to have somatic delusions. Patient does not request prn medication at this time for his constipation. Patient is medication and meal compliant. Q15 minute safety checks maintained.  
Writer met with patient in his room. Patient was resting quietly in bed and appeared calm/free from distress. Patient was cooperative with assessment. Patient is Aox4. Patient presents with a flat affect and depressed/anxious mood. Patient endorses depression 9/10 and anxiety 8/10 and requests prn medication for anxiety, atarax was given at 1040. Patient endorses AH of incoherent mumbling. Patient denies SI, HI, and VH. Patient endorses pain in his head and his abdomen and requests prn medication, tylenol was given at 1039. Patient states he hasn't had a bowel movement in 3 months. Writer inspected patient's abdomen which did not appear distended, the abdomen did not feel rigid upon palpation and the patient did not endorses any pain from palpation. Dr. Yoon notified of patient's complaints of constipation. Patient given prn  senokot at 1039 for constipation. KUB x-ray ordered. Patient is medication and meal compliant. Patient remains on a 1:1 for fall risks with Q15 minute documentation. Q15 minute safety checks maintained.  
(ATARAX) tablet 50 mg, 50 mg, Oral, TID PRN  haloperidol (HALDOL) tablet 5 mg, 5 mg, Oral, Q4H PRN **OR** haloperidol lactate (HALDOL) injection 5 mg, 5 mg, IntraMUSCular, Q4H PRN  diphenhydrAMINE (BENADRYL) injection 50 mg, 50 mg, IntraMUSCular, Q4H PRN  traZODone (DESYREL) tablet 50 mg, 50 mg, Oral, Nightly PRN  [] PHENobarbital tablet 32.4 mg, 32.4 mg, Oral, Q6H PRN **FOLLOWED BY** PHENobarbital tablet 16.2 mg, 16.2 mg, Oral, Q6H PRN  thiamine tablet 100 mg, 100 mg, Oral, Daily  folic acid (FOLVITE) tablet 1 mg, 1 mg, Oral, Daily  multivitamin 1 tablet, 1 tablet, Oral, Daily    The following labs have been completed for monitoring of antipsychotics and/or mood stabilizers:    Height, Weight, BMI Estimation  Estimated body mass index is 24.33 kg/m² as calculated from the following:    Height as of this encounter: 1.727 m (5' 8\").    Weight as of this encounter: 72.6 kg (160 lb).     Vital Signs/Blood Pressure  BP (!) 143/87   Pulse 77   Temp 97.4 °F (36.3 °C) (Oral)   Resp 18   Ht 1.727 m (5' 8\")   Wt 72.6 kg (160 lb)   SpO2 98%   BMI 24.33 kg/m²     Renal Function, Hepatic Function and Chemistry  Estimated Creatinine Clearance: 81 mL/min (based on SCr of 0.95 mg/dL).    Lab Results   Component Value Date/Time     2024 02:17 AM    K 3.5 2024 02:17 AM     2024 02:17 AM    CO2 28 2024 02:17 AM    ANIONGAP 7 2024 02:17 AM    GLUCOSE 93 2024 02:17 AM    BUN 5 2024 02:17 AM    CREATININE 0.95 2024 02:17 AM    BUNCRER 5 2024 02:17 AM    BILITOT 0.6 2024 09:55 PM    PROT 8.0 2024 09:55 PM    LABALBU 3.6 2024 09:55 PM    GLOB 4.4 2024 09:55 PM    ALT 40 2024 09:55 PM    AST 30 2024 09:55 PM    ALKPHOS 99 2024 09:55 PM    ALKPHOS 50 2022 03:15 AM       Glucose/Hemoglobin A1c  No results found for: \"GLU\", \"GLUCPOC\"  Lab Results   Component Value Date/Time    LABA1C 5.5 2024 05:20 AM    EAG 
   ALKPHOS 50 03/08/2022 03:15 AM       Glucose/Hemoglobin A1c  No results found for: \"GLU\", \"GLUCPOC\"  Lab Results   Component Value Date/Time    LABA1C 5.6 02/26/2022 05:44 AM     02/26/2022 05:44 AM       Hematology  Lab Results   Component Value Date/Time    WBC 10.9 03/04/2024 09:55 PM    RBC 5.06 03/04/2024 09:55 PM    HGB 16.4 03/04/2024 09:55 PM    HCT 46.8 03/04/2024 09:55 PM    MCV 92.5 03/04/2024 09:55 PM    MCH 32.4 03/04/2024 09:55 PM    MCHC 35.0 03/04/2024 09:55 PM    RDW 13.5 03/04/2024 09:55 PM     03/04/2024 09:55 PM       Thyroid Function  Lab Results   Component Value Date/Time    TSH 1.96 02/26/2022 05:44 AM     Maren Cueva PharmD, BCPS, BCPP  Clinical Pharmacy Specialist, Behavioral Health  Desk: [phone] ([phone])  Pharmacy: [phone] ([phone])      
  Based on the above components, the patient evaluation is determined to be of the following complexity level: Low

## 2024-03-22 NOTE — GROUP NOTE
Group Therapy Note    Date: 3/22/2024    Group Start Time: 1000  Group End Time: 1100  Group Topic: Topic Group    [facility] 3 ACUTE BEHAV [facility]    Rosario Hauser        Group Therapy Note    Attendees: 7       Patient's Goal:  To participate in relaxation activity    Notes:  Pt did not attend session    Discipline Responsible: Recreational Therapist      Signature:  ROSARIO HAUSER

## 2024-06-20 ENCOUNTER — TRANSCRIBE ORDERS (OUTPATIENT)
Facility: HOSPITAL | Age: 59
End: 2024-06-20

## 2024-06-20 DIAGNOSIS — R06.02 SHORTNESS OF BREATH: Primary | ICD-10-CM

## 2024-07-02 ENCOUNTER — TRANSCRIBE ORDERS (OUTPATIENT)
Facility: HOSPITAL | Age: 59
End: 2024-07-02

## 2024-07-02 DIAGNOSIS — B18.2 CHRONIC HEPATITIS C WITHOUT HEPATIC COMA (HCC): ICD-10-CM

## 2024-07-02 DIAGNOSIS — R06.09 DYSPNEA ON EXERTION: Primary | ICD-10-CM

## 2024-10-22 ENCOUNTER — HOSPITAL ENCOUNTER (OUTPATIENT)
Facility: HOSPITAL | Age: 59
Discharge: HOME | End: 2024-10-25
Payer: MEDICARE

## 2024-10-22 DIAGNOSIS — B18.2 CHRONIC HEPATITIS C WITHOUT HEPATIC COMA (HCC): ICD-10-CM

## 2024-10-22 PROCEDURE — 76700 US EXAM ABDOM COMPLETE: CPT

## 2024-11-13 ENCOUNTER — TRANSCRIBE ORDERS (OUTPATIENT)
Facility: HOSPITAL | Age: 59
End: 2024-11-13

## 2024-11-13 ENCOUNTER — CLINICAL DOCUMENTATION (OUTPATIENT)
Age: 59
End: 2024-11-13

## 2024-11-13 DIAGNOSIS — R06.09 DYSPNEA ON EXERTION: Primary | ICD-10-CM

## 2024-11-13 NOTE — PROGRESS NOTES
11/13/2024 10:07am: Records received from Janet Grullon MD at [facility]. Dx: Hep C. Needs routine appt. Records placed in Sabiha's box

## 2024-12-02 ENCOUNTER — TRANSCRIBE ORDERS (OUTPATIENT)
Facility: HOSPITAL | Age: 59
End: 2024-12-02

## 2024-12-02 DIAGNOSIS — J43.9 PULMONARY EMPHYSEMA, UNSPECIFIED EMPHYSEMA TYPE (HCC): Primary | ICD-10-CM

## 2024-12-13 ENCOUNTER — HOSPITAL ENCOUNTER (OUTPATIENT)
Facility: HOSPITAL | Age: 59
Discharge: HOME | End: 2024-12-16
Payer: MEDICARE

## 2024-12-13 DIAGNOSIS — J43.9 PULMONARY EMPHYSEMA, UNSPECIFIED EMPHYSEMA TYPE (HCC): ICD-10-CM

## 2024-12-13 PROCEDURE — 71271 CT THORAX LUNG CANCER SCR C-: CPT

## 2024-12-17 ENCOUNTER — HOSPITAL ENCOUNTER (OUTPATIENT)
Facility: HOSPITAL | Age: 59
Discharge: HOME | End: 2024-12-20
Payer: MEDICARE

## 2024-12-17 ENCOUNTER — HOSPITAL ENCOUNTER (OUTPATIENT)
Facility: HOSPITAL | Age: 59
Discharge: HOME | End: 2024-12-19
Payer: MEDICARE

## 2024-12-17 ENCOUNTER — HOSPITAL ENCOUNTER (EMERGENCY)
Facility: HOSPITAL | Age: 59
Discharge: HOME | End: 2024-12-18
Attending: EMERGENCY MEDICINE
Payer: MEDICARE

## 2024-12-17 VITALS
RESPIRATION RATE: 20 BRPM | DIASTOLIC BLOOD PRESSURE: 79 MMHG | HEIGHT: 67 IN | HEART RATE: 88 BPM | BODY MASS INDEX: 25.43 KG/M2 | TEMPERATURE: 98 F | OXYGEN SATURATION: 95 % | SYSTOLIC BLOOD PRESSURE: 114 MMHG | WEIGHT: 162 LBS

## 2024-12-17 VITALS
BODY MASS INDEX: 24.25 KG/M2 | SYSTOLIC BLOOD PRESSURE: 114 MMHG | HEART RATE: 78 BPM | RESPIRATION RATE: 18 BRPM | HEIGHT: 68 IN | WEIGHT: 160 LBS | DIASTOLIC BLOOD PRESSURE: 82 MMHG

## 2024-12-17 DIAGNOSIS — S39.012A BACK STRAIN, INITIAL ENCOUNTER: Primary | ICD-10-CM

## 2024-12-17 DIAGNOSIS — R06.09 DYSPNEA ON EXERTION: ICD-10-CM

## 2024-12-17 PROCEDURE — 99283 EMERGENCY DEPT VISIT LOW MDM: CPT

## 2024-12-17 PROCEDURE — 93018 CV STRESS TEST I&R ONLY: CPT | Performed by: RADIOLOGY

## 2024-12-17 PROCEDURE — 6360000002 HC RX W HCPCS: Performed by: NURSE PRACTITIONER

## 2024-12-17 PROCEDURE — 93016 CV STRESS TEST SUPVJ ONLY: CPT | Performed by: RADIOLOGY

## 2024-12-17 PROCEDURE — A9500 TC99M SESTAMIBI: HCPCS | Performed by: INTERNAL MEDICINE

## 2024-12-17 PROCEDURE — 3430000000 HC RX DIAGNOSTIC RADIOPHARMACEUTICAL: Performed by: INTERNAL MEDICINE

## 2024-12-17 RX ORDER — REGADENOSON 0.08 MG/ML
0.4 INJECTION, SOLUTION INTRAVENOUS
Status: COMPLETED | OUTPATIENT
Start: 2024-12-17 | End: 2024-12-17

## 2024-12-17 RX ORDER — TETRAKIS(2-METHOXYISOBUTYLISOCYANIDE)COPPER(I) TETRAFLUOROBORATE 1 MG/ML
30 INJECTION, POWDER, LYOPHILIZED, FOR SOLUTION INTRAVENOUS ONCE
Status: DISCONTINUED | OUTPATIENT
Start: 2024-12-17 | End: 2024-12-21 | Stop reason: HOSPADM

## 2024-12-17 RX ORDER — TETRAKIS(2-METHOXYISOBUTYLISOCYANIDE)COPPER(I) TETRAFLUOROBORATE 1 MG/ML
10 INJECTION, POWDER, LYOPHILIZED, FOR SOLUTION INTRAVENOUS ONCE
Status: COMPLETED | OUTPATIENT
Start: 2024-12-17 | End: 2024-12-17

## 2024-12-17 RX ADMIN — REGADENOSON 0.4 MG: 0.08 INJECTION, SOLUTION INTRAVENOUS at 13:52

## 2024-12-17 RX ADMIN — TECHNETIUM TC-99M SESTAMIBI 10 MILLICURIE: 1 INJECTION INTRAVENOUS at 11:22

## 2024-12-17 ASSESSMENT — PAIN DESCRIPTION - LOCATION: LOCATION: BACK

## 2024-12-17 ASSESSMENT — PAIN DESCRIPTION - PAIN TYPE: TYPE: ACUTE PAIN

## 2024-12-17 ASSESSMENT — PAIN DESCRIPTION - DESCRIPTORS: DESCRIPTORS: ACHING

## 2024-12-17 ASSESSMENT — PAIN - FUNCTIONAL ASSESSMENT: PAIN_FUNCTIONAL_ASSESSMENT: 0-10

## 2024-12-17 ASSESSMENT — PAIN DESCRIPTION - ORIENTATION: ORIENTATION: LOWER

## 2024-12-17 ASSESSMENT — PAIN SCALES - GENERAL: PAINLEVEL_OUTOF10: 8

## 2024-12-18 LAB
ECHO BSA: 1.87 M2
STRESS BASELINE DIAS BP: 82 MMHG
STRESS BASELINE HR: 78 BPM
STRESS BASELINE SYS BP: 114 MMHG
STRESS ESTIMATED WORKLOAD: 1 METS
STRESS EXERCISE DUR MIN: 3 MIN
STRESS EXERCISE DUR SEC: 45 SEC
STRESS O2 SAT REST: 98 %
STRESS PEAK DIAS BP: 82 MMHG
STRESS PEAK SYS BP: 114 MMHG
STRESS PERCENT HR ACHIEVED: 56 %
STRESS POST PEAK HR: 90 BPM
STRESS RATE PRESSURE PRODUCT: NORMAL BPM*MMHG
STRESS TARGET HR: 160 BPM

## 2024-12-18 PROCEDURE — 6370000000 HC RX 637 (ALT 250 FOR IP): Performed by: EMERGENCY MEDICINE

## 2024-12-18 RX ORDER — IBUPROFEN 600 MG/1
600 TABLET, FILM COATED ORAL
Status: COMPLETED | OUTPATIENT
Start: 2024-12-18 | End: 2024-12-18

## 2024-12-18 RX ORDER — IBUPROFEN 800 MG/1
800 TABLET, FILM COATED ORAL EVERY 8 HOURS PRN
Qty: 30 TABLET | Refills: 0 | Status: SHIPPED | OUTPATIENT
Start: 2024-12-18

## 2024-12-18 RX ADMIN — IBUPROFEN 600 MG: 600 TABLET, FILM COATED ORAL at 00:19

## 2024-12-18 ASSESSMENT — LIFESTYLE VARIABLES
HOW MANY STANDARD DRINKS CONTAINING ALCOHOL DO YOU HAVE ON A TYPICAL DAY: 1 OR 2
HOW OFTEN DO YOU HAVE A DRINK CONTAINING ALCOHOL: MONTHLY OR LESS

## 2024-12-18 NOTE — ED NOTES
Refer to triage note. Pt is alert and oriented x 4, RR even and unlabored, skin  intact. Assessment completed and pt updated on plan of care.  Call bell in reach.       Emergency Department Nursing Plan of Care       The Nursing Plan of Care is developed from the Nursing assessment and Emergency Department Attending provider initial evaluation.  The plan of care may be reviewed in the “ED Provider note”.    The Plan of Care was developed with the following considerations:   Patient / Family readiness to learn indicated by:verbalized understanding  Persons(s) to be included in education: patient  Barriers to Learning/Limitations:No    Signed

## 2024-12-18 NOTE — ED PROVIDER NOTES
800 MG tablet Take 1 tablet by mouth 3 times daily as needed for PainHistorical Med      loratadine (CLARITIN) 10 MG tablet Take 1 tablet by mouth daily for allergies.Historical Med      traMADol (ULTRAM) 50 MG tablet Take 1 tablet by mouth 3 times daily as needed. for severe back, knee and lung pain.Historical Med      triamcinolone (KENALOG) 0.1 % cream Apply a small amount to affected area 2 times a day as needed for itching., Topical, 2 TIMES DAILY PRN Starting Wed 7/22/2020, Historical Med       !! - Potential duplicate medications found. Please discuss with provider.          SCREENINGS               No data recorded            PHYSICAL EXAM      ED Triage Vitals [12/17/24 2246]   Encounter Vitals Group      /79      Systolic BP Percentile       Diastolic BP Percentile       Pulse 88      Respirations 20      Temp 98 °F (36.7 °C)      Temp Source Oral      SpO2 95 %      Weight - Scale 73.5 kg (162 lb)      Height 1.702 m (5' 7\")      Head Circumference       Peak Flow       Pain Score       Pain Loc       Pain Education       Exclude from Growth Chart         Physical Exam  Constitutional:       General: He is not in acute distress.     Appearance: Normal appearance. He is not ill-appearing.      Comments: Appears disheveled   HENT:      Head: Normocephalic and atraumatic.   Eyes:      Extraocular Movements: Extraocular movements intact.      Pupils: Pupils are equal, round, and reactive to light.   Cardiovascular:      Rate and Rhythm: Normal rate.   Pulmonary:      Effort: Pulmonary effort is normal. No respiratory distress.   Musculoskeletal:         General: Normal range of motion.      Cervical back: Normal range of motion.      Lumbar back: Tenderness present. No bony tenderness.        Back:    Skin:     General: Skin is warm and dry.   Neurological:      General: No focal deficit present.      Mental Status: He is alert.          DIAGNOSTIC RESULTS   LABS:     Recent Results (from the past 24  MCG/ACT Aero  Generic drug: budesonide-formoterol     tiotropium 18 MCG inhalation capsule  Commonly known as: SPIRIVA     traMADol 50 MG tablet  Commonly known as: ULTRAM     traZODone 50 MG tablet  Commonly known as: DESYREL     triamcinolone 0.1 % cream  Commonly known as: KENALOG           * This list has 2 medication(s) that are the same as other medications prescribed for you. Read the directions carefully, and ask your doctor or other care provider to review them with you.                   Where to Get Your Medications        These medications were sent to [facility] [place], [place] - [address] - P [phone] - F [phone]  [address], [facility] [address]      Phone: [phone]   ibuprofen 800 MG tablet           DISCONTINUED MEDICATIONS:  Discharge Medication List as of 12/18/2024 12:18 AM          I am the Primary Clinician of Record.   Karrie Zaragoza MD (electronically signed)    (Please note that parts of this dictation were completed with voice recognition software. Quite often unanticipated grammatical, syntax, homophones, and other interpretive errors are inadvertently transcribed by the computer software. Please disregards these errors. Please excuse any errors that have escaped final proofreading.)         Karrie Zaragoza MD  12/18/24 3959
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
## 2025-03-24 ENCOUNTER — OFFICE VISIT (OUTPATIENT)
Age: 60
End: 2025-03-24
Payer: MEDICARE

## 2025-03-24 VITALS
HEIGHT: 67 IN | WEIGHT: 153.8 LBS | DIASTOLIC BLOOD PRESSURE: 87 MMHG | OXYGEN SATURATION: 96 % | HEART RATE: 83 BPM | BODY MASS INDEX: 24.14 KG/M2 | TEMPERATURE: 98.1 F | SYSTOLIC BLOOD PRESSURE: 137 MMHG

## 2025-03-24 DIAGNOSIS — Z72.89 OTHER PROBLEMS RELATED TO LIFESTYLE: ICD-10-CM

## 2025-03-24 DIAGNOSIS — N18.6 END STAGE RENAL DISEASE (HCC): ICD-10-CM

## 2025-03-24 DIAGNOSIS — Z11.59 ENCOUNTER FOR SCREENING FOR OTHER VIRAL DISEASES: ICD-10-CM

## 2025-03-24 DIAGNOSIS — B18.2 CHRONIC HEPATITIS C WITHOUT HEPATIC COMA (HCC): Primary | ICD-10-CM

## 2025-03-24 LAB
HBV SURFACE AB SER QL: NONREACTIVE
HBV SURFACE AB SER-ACNC: <3.1 MIU/ML

## 2025-03-24 PROCEDURE — 99203 OFFICE O/P NEW LOW 30 MIN: CPT | Performed by: STUDENT IN AN ORGANIZED HEALTH CARE EDUCATION/TRAINING PROGRAM

## 2025-03-24 PROCEDURE — 3075F SYST BP GE 130 - 139MM HG: CPT | Performed by: STUDENT IN AN ORGANIZED HEALTH CARE EDUCATION/TRAINING PROGRAM

## 2025-03-24 PROCEDURE — 3079F DIAST BP 80-89 MM HG: CPT | Performed by: STUDENT IN AN ORGANIZED HEALTH CARE EDUCATION/TRAINING PROGRAM

## 2025-03-24 RX ORDER — GLECAPREVIR AND PIBRENTASVIR 40; 100 MG/1; MG/1
3 TABLET, FILM COATED ORAL DAILY
Qty: 84 TABLET | Refills: 1 | Status: ACTIVE | OUTPATIENT
Start: 2025-03-24

## 2025-03-24 ASSESSMENT — ANXIETY QUESTIONNAIRES
1. FEELING NERVOUS, ANXIOUS, OR ON EDGE: NOT AT ALL
7. FEELING AFRAID AS IF SOMETHING AWFUL MIGHT HAPPEN: MORE THAN HALF THE DAYS
6. BECOMING EASILY ANNOYED OR IRRITABLE: NOT AT ALL
IF YOU CHECKED OFF ANY PROBLEMS ON THIS QUESTIONNAIRE, HOW DIFFICULT HAVE THESE PROBLEMS MADE IT FOR YOU TO DO YOUR WORK, TAKE CARE OF THINGS AT HOME, OR GET ALONG WITH OTHER PEOPLE: NOT DIFFICULT AT ALL
GAD7 TOTAL SCORE: 0
4. TROUBLE RELAXING: MORE THAN HALF THE DAYS
4. TROUBLE RELAXING: NOT AT ALL
3. WORRYING TOO MUCH ABOUT DIFFERENT THINGS: MORE THAN HALF THE DAYS
IF YOU CHECKED OFF ANY PROBLEMS ON THIS QUESTIONNAIRE, HOW DIFFICULT HAVE THESE PROBLEMS MADE IT FOR YOU TO DO YOUR WORK, TAKE CARE OF THINGS AT HOME, OR GET ALONG WITH OTHER PEOPLE: SOMEWHAT DIFFICULT
1. FEELING NERVOUS, ANXIOUS, OR ON EDGE: MORE THAN HALF THE DAYS
5. BEING SO RESTLESS THAT IT IS HARD TO SIT STILL: MORE THAN HALF THE DAYS
6. BECOMING EASILY ANNOYED OR IRRITABLE: MORE THAN HALF THE DAYS
3. WORRYING TOO MUCH ABOUT DIFFERENT THINGS: NOT AT ALL
7. FEELING AFRAID AS IF SOMETHING AWFUL MIGHT HAPPEN: NOT AT ALL
2. NOT BEING ABLE TO STOP OR CONTROL WORRYING: MORE THAN HALF THE DAYS
5. BEING SO RESTLESS THAT IT IS HARD TO SIT STILL: NOT AT ALL
GAD7 TOTAL SCORE: 14
2. NOT BEING ABLE TO STOP OR CONTROL WORRYING: NOT AT ALL

## 2025-03-24 ASSESSMENT — PATIENT HEALTH QUESTIONNAIRE - PHQ9
2. FEELING DOWN, DEPRESSED OR HOPELESS: NOT AT ALL
SUM OF ALL RESPONSES TO PHQ QUESTIONS 1-9: 0
1. LITTLE INTEREST OR PLEASURE IN DOING THINGS: NOT AT ALL
DEPRESSION UNABLE TO ASSESS: FUNCTIONAL CAPACITY MOTIVATION LIMITS ACCURACY
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
## 2025-03-25 ENCOUNTER — RESULTS FOLLOW-UP (OUTPATIENT)
Age: 60
End: 2025-03-25

## 2025-03-25 LAB
HAV AB SER QL IA: NEGATIVE
HBV CORE AB SERPL QL IA: NEGATIVE
HBV E AG SERPL QL IA: NEGATIVE
HCV GENTYP SERPL NAA+PROBE: NORMAL
HCV RNA SERPL NAA+PROBE-ACNC: NORMAL IU/ML
HCV RNA SERPL NAA+PROBE-LOG IU: NORMAL LOG10 IU/ML
LABORATORY COMMENT REPORT: NORMAL

## 2025-03-26 LAB
A2 MACROGLOB SERPL-MCNC: 252 MG/DL (ref 110–276)
ALT SERPL-CCNC: 13 IU/L (ref 0–55)
APO A-I SERPL-MCNC: 152 MG/DL (ref 101–178)
AST SERPL W P-5'-P-CCNC: 18 IU/L (ref 0–40)
BILIRUB SERPL-MCNC: 0.4 MG/DL (ref 0–1.2)
CHOLEST SERPL-MCNC: 147 MG/DL (ref 100–199)
FIBROSIS SCORING:: ABNORMAL
FIBROSIS STAGE SERPL QL: ABNORMAL
GGT SERPL-CCNC: 26 IU/L (ref 0–65)
GLUCOSE SERPL-MCNC: 100 MG/DL (ref 70–99)
HAPTOGLOB SERPL-MCNC: 160 MG/DL (ref 29–370)
HCV GENTYP SERPL NAA+PROBE: NORMAL
INTERPRETATION: ABNORMAL
LABORATORY COMMENT REPORT: ABNORMAL
LABORATORY COMMENT REPORT: NORMAL
LIVER FIBR SCORE SERPL CALC.FIBROSURE: 0.28 (ref 0–0.21)
Lab: ABNORMAL
NASH - STEATOSIS GRADE: ABNORMAL
NASH - STEATOSIS GRADING: ABNORMAL
NASH - STEATOSIS SCORE: 0.2 (ref 0–0.4)
NASH SCORING: ABNORMAL
NECROINFLAMMATORY ACT GRADE SERPL QL: ABNORMAL
NECROINFLAMMATORY ACT SCORE SERPL: 0 (ref 0–0.25)
SERVICE CMNT-IMP: ABNORMAL
TRIGL SERPL-MCNC: 70 MG/DL (ref 0–149)

## (undated) DEVICE — COLON CLOSING PACK: Brand: MEDLINE INDUSTRIES, INC.

## (undated) DEVICE — REM POLYHESIVE ADULT PATIENT RETURN ELECTRODE: Brand: VALLEYLAB

## (undated) DEVICE — STRAP,POSITIONING,KNEE/BODY,FOAM,4X60": Brand: MEDLINE

## (undated) DEVICE — VISUALIZATION SYSTEM: Brand: CLEARIFY

## (undated) DEVICE — TOWEL SURG W17XL27IN STD BLU COT NONFENESTRATED PREWASHED

## (undated) DEVICE — Device

## (undated) DEVICE — SURGICAL PROCEDURE KIT GEN LAPAROSCOPY LF

## (undated) DEVICE — SYSTEM EVAC SMOKE LAPARSCOPIC

## (undated) DEVICE — RESERVOIR,SUCTION,100CC,SILICONE: Brand: MEDLINE

## (undated) DEVICE — CONTAINER,SPECIMEN,3OZ,OR STRL: Brand: MEDLINE

## (undated) DEVICE — TROCAR: Brand: KII® OPTICAL ACCESS SYSTEM

## (undated) DEVICE — TRI-LUMEN FILTERED TUBE SET WITH ACTIVATED CHARCOAL FILTER: Brand: AIRSEAL

## (undated) DEVICE — SPONGE GZ W4XL4IN COT 12 PLY TYP VII WVN C FLD DSGN

## (undated) DEVICE — STERILE POLYISOPRENE POWDER-FREE SURGICAL GLOVES WITH EMOLLIENT COATING: Brand: PROTEXIS

## (undated) DEVICE — 4-PORT MANIFOLD: Brand: NEPTUNE 2

## (undated) DEVICE — DERMABOND SKIN ADH 0.7ML -- DERMABOND ADVANCED 12/BX

## (undated) DEVICE — SEAL UNIV 5-8MM DISP BX/10 -- DA VINCI XI - SNGL USE

## (undated) DEVICE — SUTURE SZ 0 27IN 5/8 CIR UR-6  TAPER PT VIOLET ABSRB VICRYL J603H

## (undated) DEVICE — BAG SPEC RETRV 275ML 10ML DISPOSABLE RELIACATCH

## (undated) DEVICE — INFECTION CONTROL KIT SYS

## (undated) DEVICE — SUTURE VCRL SZ 4-0 L27IN ABSRB UD L19MM PS-2 3/8 CIR PRIM J426H

## (undated) DEVICE — TROCAR: Brand: KII® SLEEVE

## (undated) DEVICE — SUTURE PERMAHAND SZ 0 L30IN NONABSORBABLE BLK SILK BRAID A306H

## (undated) DEVICE — NDL PRT INJ NSAF BLNT 18GX1.5 --

## (undated) DEVICE — GARMENT,MEDLINE,DVT,INT,CALF,MED, GEN2: Brand: MEDLINE

## (undated) DEVICE — PAD,NON-ADHERENT,3X8,STERILE,LF,1/PK: Brand: MEDLINE

## (undated) DEVICE — TUBING, SUCTION, 1/4" X 10', STRAIGHT: Brand: MEDLINE

## (undated) DEVICE — 3M™ IOBAN™ 2 ANTIMICROBIAL INCISE DRAPE 6648EZ: Brand: IOBAN™ 2

## (undated) DEVICE — PENCIL SMK EVAC 10 FT BLADE ELECTRD ROCKER FOR TELSCP

## (undated) DEVICE — ELECTRO LUBE IS A SINGLE PATIENT USE DEVICE THAT IS INTENDED TO BE USED ON ELECTROSURGICAL ELECTRODES TO REDUCE STICKING.: Brand: KEY SURGICAL ELECTRO LUBE

## (undated) DEVICE — CLICKLINE SCISSORS INSERT: Brand: CLICKLINE

## (undated) DEVICE — CONNECTOR TBNG WHT PLAS SUCT STR 5IN1 LTWT W/ M CONN

## (undated) DEVICE — LAPAROSCOPIC TROCAR SLEEVE/SINGLE USE: Brand: KII® OPTICAL ACCESS SYSTEM

## (undated) DEVICE — DRAPE,ROBOTICS,STERILE: Brand: MEDLINE

## (undated) DEVICE — YANKAUER,POOLE TIP,STERILE,50/CS: Brand: MEDLINE

## (undated) DEVICE — INTENDED FOR TISSUE SEPARATION, AND OTHER PROCEDURES THAT REQUIRE A SHARP SURGICAL BLADE TO PUNCTURE OR CUT.: Brand: BARD-PARKER ® CARBON RIB-BACK BLADES

## (undated) DEVICE — PACK,BASIC,SIRUS,V: Brand: MEDLINE

## (undated) DEVICE — COVER LT HNDL PLAS RIG 1 PER PK

## (undated) DEVICE — HYPODERMIC SAFETY NEEDLE: Brand: MAGELLAN

## (undated) DEVICE — STAPLER INT DIA29MM CLS STPL H1.5-2.2MM OPN LEG L5.2MM 26

## (undated) DEVICE — SUTURE MCRYL SZ 4-0 L27IN ABSRB UD L19MM PS-2 1/2 CIR PRIM Y426H

## (undated) DEVICE — Z DISCONTINUED USE 2272114 DRAPE SURG UTIL 26X15 IN W/ TAPE N INVASIVE MULTLYR DISP

## (undated) DEVICE — SUTURE PERMA-HAND 0 L18IN NONABSORBABLE BLK CT-1 L36MM 1/2 C021D

## (undated) DEVICE — TUBING, SUCTION, 1/4" X 12', STRAIGHT: Brand: MEDLINE

## (undated) DEVICE — SYR 10ML LUER LOK 1/5ML GRAD --

## (undated) DEVICE — ARTICULATING RELOAD WITH TRI-STAPLE TECHNOLOGY: Brand: ENDO GIA

## (undated) DEVICE — NEEDLE SPNL 22GA L3.5IN BLK HUB S STL REG WALL FIT STYL W/

## (undated) DEVICE — PREP SKN CHLRAPRP APL 26ML STR --

## (undated) DEVICE — TBG INSUFFLATION LUER LOCK: Brand: MEDLINE INDUSTRIES, INC.

## (undated) DEVICE — TROCAR SITE CLOSURE DEVICE: Brand: ENDO CLOSE

## (undated) DEVICE — 40580 - THE PINK PAD - ADVANCED TRENDELENBURG POSITIONING KIT: Brand: 40580 - THE PINK PAD - ADVANCED TRENDELENBURG POSITIONING KIT

## (undated) DEVICE — AGENT HEMSTAT W2XL14IN OXIDIZED REGENERATED CELOS ABSRB FOR

## (undated) DEVICE — CODMAN® SURGICAL STRIPS 3 1/2" X 6" (89MM X 152MM): Brand: CODMAN®

## (undated) DEVICE — SUTURING DEVICE: Brand: ENDO STITCH

## (undated) DEVICE — SUTURE VCRL SZ 3-0 L27IN ABSRB UD L26MM SH 1/2 CIR J416H

## (undated) DEVICE — MARYLAND JAW LAPAROSCOPIC SEALER/DIVIDER COATED: Brand: LIGASURE

## (undated) DEVICE — STERILE-Z MAYO STAND COVERS CLEAR POLYETHYLENE STERILE UNIVERSAL FIT 20 PER CASE: Brand: STERILE-Z

## (undated) DEVICE — SEAL

## (undated) DEVICE — DRAIN SURG 19FR 100% SIL RADPQ RND CHN FULL FLUT

## (undated) DEVICE — ARM DRAPE

## (undated) DEVICE — SPONGE,DRAIN,NONWVN,4"X4",6PLY,STRL,LF: Brand: MEDLINE

## (undated) DEVICE — 14FR COLON DECOMPRESSION SET: Brand: COOK

## (undated) DEVICE — STAPLER 45 RELOAD WHITE: Brand: ENDOWRIST

## (undated) DEVICE — MARKER,SKIN,WI/RULER AND LABELS: Brand: MEDLINE

## (undated) DEVICE — NEEDLE HYPO 22GA L1.5IN BLK S STL HUB POLYPR SHLD REG BVL

## (undated) DEVICE — SPONGE TONSIL MED X RAY DETECTABLE STRL LTX FREE

## (undated) DEVICE — STAPLER 45 RELOAD: Brand: ENDOWRIST

## (undated) DEVICE — ACCESS PLATFORM FOR MINIMALLY INVASIVE SURGERY.: Brand: GELPORT® LAPAROSCOPIC  SYSTEM

## (undated) DEVICE — AIRSEAL 12 MM ACCESS PORT AND PALM GRIP OBTURATOR WITH BLADELESS OPTICAL TIP, 120 MM LENGTH: Brand: AIRSEAL

## (undated) DEVICE — STAPLER INT DIA5MM 25 ABSRB STRP FIX DISP FOR HERN MESH

## (undated) DEVICE — GLOVE SURG SZ 75 L1212IN FNGR THK138MIL BRN LTX FREE

## (undated) DEVICE — SUTURE VCRL SZ 3-0 L27IN ABSRB VLT L26MM SH 1/2 CIR J316H

## (undated) DEVICE — SIGMOIDOSCOPE MED L25CM DIA20MM W/ OBT DISP KLEENSPEC

## (undated) DEVICE — DRAPE,REIN 53X77,STERILE: Brand: MEDLINE

## (undated) DEVICE — BASIN ST MAJOR-NO CAUTERY: Brand: MEDLINE INDUSTRIES, INC.

## (undated) DEVICE — BLADELESS OBTURATOR: Brand: WECK VISTA

## (undated) DEVICE — SOLUTION IV 1000ML 0.9% SOD CHL

## (undated) DEVICE — SOLUTION IRRIG 1000ML 0.9% SOD CHL USP POUR PLAS BTL

## (undated) DEVICE — HANDLE LT SNAP ON ULT DURABLE LENS FOR TRUMPF ALC DISPOSABLE

## (undated) DEVICE — REDUCER CANN ENDOWRIST 12-8MM -- DA VINCI XI - SNGL USE

## (undated) DEVICE — GENERAL LAPAROSCOPY - SMH: Brand: MEDLINE INDUSTRIES, INC.

## (undated) DEVICE — TOTAL TRAY, DB, 100% SILI FOLEY, 16FR 10: Brand: MEDLINE

## (undated) DEVICE — POWER SHELL: Brand: SIGNIA

## (undated) DEVICE — STAPLER SHEATH: Brand: ENDOWRIST